# Patient Record
Sex: MALE | Race: WHITE | NOT HISPANIC OR LATINO | Employment: OTHER | URBAN - METROPOLITAN AREA
[De-identification: names, ages, dates, MRNs, and addresses within clinical notes are randomized per-mention and may not be internally consistent; named-entity substitution may affect disease eponyms.]

---

## 2017-01-09 ENCOUNTER — OFFICE VISIT (OUTPATIENT)
Dept: FAMILY MEDICINE | Facility: CLINIC | Age: 77
End: 2017-01-09
Payer: COMMERCIAL

## 2017-01-09 VITALS
DIASTOLIC BLOOD PRESSURE: 67 MMHG | HEIGHT: 71 IN | OXYGEN SATURATION: 100 % | BODY MASS INDEX: 21.22 KG/M2 | TEMPERATURE: 97.6 F | WEIGHT: 151.6 LBS | HEART RATE: 47 BPM | SYSTOLIC BLOOD PRESSURE: 120 MMHG

## 2017-01-09 DIAGNOSIS — I25.10 ATHEROSCLEROSIS OF NATIVE CORONARY ARTERY OF NATIVE HEART WITHOUT ANGINA PECTORIS: ICD-10-CM

## 2017-01-09 DIAGNOSIS — L30.9 ECZEMA, UNSPECIFIED TYPE: ICD-10-CM

## 2017-01-09 DIAGNOSIS — I10 BENIGN ESSENTIAL HYPERTENSION: ICD-10-CM

## 2017-01-09 DIAGNOSIS — F43.22 ADJUSTMENT DISORDER WITH ANXIOUS MOOD: ICD-10-CM

## 2017-01-09 DIAGNOSIS — E78.5 HYPERLIPIDEMIA LDL GOAL <100: Primary | ICD-10-CM

## 2017-01-09 PROCEDURE — 99214 OFFICE O/P EST MOD 30 MIN: CPT | Performed by: INTERNAL MEDICINE

## 2017-01-09 RX ORDER — TRIAMCINOLONE ACETONIDE 1 MG/G
CREAM TOPICAL
Qty: 30 G | Refills: 1 | Status: SHIPPED | OUTPATIENT
Start: 2017-01-09 | End: 2017-02-21

## 2017-01-09 NOTE — NURSING NOTE
"Chief Complaint   Patient presents with     Derm Problem       Initial /67 mmHg  Pulse 47  Temp(Src) 97.6  F (36.4  C) (Oral)  Ht 5' 11\" (1.803 m)  Wt 151 lb 9.6 oz (68.765 kg)  BMI 21.15 kg/m2  SpO2 100% Estimated body mass index is 21.15 kg/(m^2) as calculated from the following:    Height as of this encounter: 5' 11\" (1.803 m).    Weight as of this encounter: 151 lb 9.6 oz (68.765 kg).  BP completed using cuff size: regular, left arm  Charissa Akhtar CMA    "

## 2017-01-09 NOTE — PROGRESS NOTES
SUBJECTIVE:                                                    Rm Villarreal is a 76 year old male who presents to clinic today for the following health issues:    Medications:  Metoprolol 100 mg  Amlodipine 5 mg   Imodium    Niacin 500 mg BID  Vitamin D  Baby Aspirin    Rash  Duration of complaint: 6 wks     Patient reports experiencing little red blister spots 6 weeks ago that exacerbated into large, red, hot, itchy blisters. He has associated swelling on forearms and lower legs. He also reports that the symptoms were worse on his left leg than they were on his right leg. He states that he used compounded Eucerin+triamcinolone cream about 3 weeks ago with relief of symptoms. He denies any new lesions or blisters. These symptoms are not new to the patient as he has experienced them in March 2016. Patient also has dry scaly skin on his back. Of note reports walking in the mall for 45 minutes for exercise and denies any breathing problems.    Problem list and histories reviewed & adjusted, as indicated.  Additional history:     Current Outpatient Prescriptions   Medication Sig Dispense Refill     lisinopril (PRINIVIL/ZESTRIL) 20 MG tablet TAKE 1 TABLET BY MOUTH TWICE DAILY 180 tablet 3     LORazepam (ATIVAN) 0.5 MG tablet TAKE ONE-HALF TO ONE TABLET BY MOUTH TWICE DAILY 60 tablet 1     metoprolol (LOPRESSOR) 100 MG tablet Take 1 tablet (100 mg) by mouth 2 times daily 180 tablet 3     amLODIPine (NORVASC) 5 MG tablet Take 5 mg by mouth daily Take 1/2 to 1 tablet by mouth every evening as directed  2     nitroglycerin (NITROSTAT) 0.4 MG SL tablet For chest pain place 1 tablet under the tongue every 5 minutes for 3 doses. If symptoms persist 5 minutes after 1st dose call 911. 25 tablet 3     Loperamide HCl (IMODIUM A-D PO) Take by mouth 2 times daily       rosuvastatin (CRESTOR) 5 MG tablet Take 0.5 tablets (2.5 mg) by mouth three times a week 20 tablet 3     coenzyme Q-10 (CO Q10) 100 MG TABS Take 100 mg by mouth  "daily 90 tablet 3     NIACIN CR PO Take 500 mg by mouth 2 times daily (before meals).       VITAMIN D, CHOLECALCIFEROL, PO Take 1,000 Units by mouth daily.       ASPIRIN ADULT LOW STRENGTH PO Take 81 mg by mouth daily.       [DISCONTINUED] metoprolol (LOPRESSOR) 50 MG tablet Take 50 mg by mouth 2 times daily  60 tablet      No Known Allergies    ROS:  Constitutional, HEENT, cardiovascular, pulmonary, gi and gu systems are negative, except as otherwise noted.    This document serves as a record of the services and decisions personally performed and made by Héctor Solis MD. It was created on his/her behalf by Rylan Flowers, a trained medical scribe. The creation of this document is based the provider's statements to the medical scribe.  Scribloretta Flowers 7:36 AM, January 9, 2017  OBJECTIVE:                                                    /67 mmHg  Pulse 47  Temp(Src) 97.6  F (36.4  C) (Oral)  Ht 1.803 m (5' 11\")  Wt 68.765 kg (151 lb 9.6 oz)  BMI 21.15 kg/m2  SpO2 100%  Body mass index is 21.15 kg/(m^2).    Neck was supple without adenopathy or thyromegaly his carotids were normal without bruits  Chest clear to auscultation and percussion  Cardiovascular S1 and S2 are physiologic without murmurs or gallops  Abdomen bowel sounds were normal.  There is no palpable mass or organomegaly  Extremities nontender without any edema  Pulses pedal pulses are as described otherwise his pulses are bilaterally symmetrical throughout without bruits  Skin: forearms and lower legs erythematous macular rash with minor exfoliative rash without any bullae  Mild to moderate dry scaly skin         ASSESSMENT/PLAN:                                                    1. Hyperlipidemia LDL goal <100  LDL 88, total cholesterol decreased from 209 to 201, not on any statins or cholesterol medications because of side effects with multiple medications.    2. Benign essential hypertension  Well managed with lisinopril, " amlodipine, metoprolol    3. Atherosclerosis of native coronary artery of native heart without angina pectoris      4. Adjustment disorder with anxious mood      5. Eczema, unspecified type  Symptoms not likely to be psoriasis due to absence of scaly plaques. Discussed using Eucerin cream after bathing. Also instructed patient to compound triamcinolone and eucerin cream himself for cost effectiveness.     - triamcinolone (KENALOG) 0.1 % cream; Apply sparingly to affected area three times daily for 14 days.  Dispense: 30 g; Refill: 1    Follow up visit before patient leaves for [place]    The information in this document, created by the medical scribe for me, accurately reflects the services I personally performed and the decisions made by me. I have reviewed and approved this document for accuracy prior to leaving the patient care area.  Héctor Solis MD  7:36 AM, 01/09/2017  Héctor Solis MD  [facility]

## 2017-01-24 DIAGNOSIS — F13.20 SEDATIVE, HYPNOTIC OR ANXIOLYTIC DEPENDENCE (H): ICD-10-CM

## 2017-01-24 DIAGNOSIS — F43.22 ADJUSTMENT DISORDER WITH ANXIOUS MOOD: Primary | ICD-10-CM

## 2017-01-24 NOTE — TELEPHONE ENCOUNTER
LORazepam (ATIVAN) 0.5 MG tablet       Last Written Prescription Date: 11/8/2016  Last Fill Quantity: 60,  # refills: 1   Last Office Visit with [facility], [facility] or [facility] prescribing provider: 1/9/2017

## 2017-01-26 PROBLEM — F13.20 SEDATIVE, HYPNOTIC OR ANXIOLYTIC DEPENDENCE (H): Status: ACTIVE | Noted: 2017-01-26

## 2017-01-26 RX ORDER — LORAZEPAM 0.5 MG/1
TABLET ORAL
Qty: 60 TABLET | Refills: 1 | Status: SHIPPED | OUTPATIENT
Start: 2017-01-26 | End: 2017-04-06

## 2017-02-21 DIAGNOSIS — L30.9 ECZEMA, UNSPECIFIED TYPE: ICD-10-CM

## 2017-02-21 RX ORDER — TRIAMCINOLONE ACETONIDE 1 MG/G
CREAM TOPICAL
Qty: 30 G | Refills: 0 | Status: SHIPPED | OUTPATIENT
Start: 2017-02-21 | End: 2017-03-14

## 2017-02-21 NOTE — TELEPHONE ENCOUNTER
triamcinolone (KENALOG) 0.1 % cream        Last Written Prescription Date: 1/9/2017  Last Fill Quantity: 30g,  # refills: 1   Last Office Visit with [facility], [facility] or [facility] prescribing provider: 1/9/2017

## 2017-03-14 ENCOUNTER — OFFICE VISIT (OUTPATIENT)
Dept: FAMILY MEDICINE | Facility: CLINIC | Age: 77
End: 2017-03-14
Payer: COMMERCIAL

## 2017-03-14 VITALS
WEIGHT: 155 LBS | DIASTOLIC BLOOD PRESSURE: 74 MMHG | BODY MASS INDEX: 21.7 KG/M2 | TEMPERATURE: 97.8 F | SYSTOLIC BLOOD PRESSURE: 132 MMHG | HEIGHT: 71 IN | OXYGEN SATURATION: 97 % | HEART RATE: 54 BPM

## 2017-03-14 DIAGNOSIS — I10 BENIGN ESSENTIAL HYPERTENSION: ICD-10-CM

## 2017-03-14 DIAGNOSIS — F13.20 SEDATIVE, HYPNOTIC OR ANXIOLYTIC DEPENDENCE (H): ICD-10-CM

## 2017-03-14 DIAGNOSIS — L30.9 ECZEMA, UNSPECIFIED TYPE: Primary | ICD-10-CM

## 2017-03-14 DIAGNOSIS — I25.10 ATHEROSCLEROSIS OF NATIVE CORONARY ARTERY OF NATIVE HEART WITHOUT ANGINA PECTORIS: ICD-10-CM

## 2017-03-14 DIAGNOSIS — F43.22 ADJUSTMENT DISORDER WITH ANXIOUS MOOD: ICD-10-CM

## 2017-03-14 DIAGNOSIS — E78.5 HYPERLIPIDEMIA LDL GOAL <100: ICD-10-CM

## 2017-03-14 PROCEDURE — 99214 OFFICE O/P EST MOD 30 MIN: CPT | Performed by: INTERNAL MEDICINE

## 2017-03-14 RX ORDER — TRIAMCINOLONE ACETONIDE 1 MG/G
CREAM TOPICAL
Qty: 30 G | Refills: 1 | Status: SHIPPED | OUTPATIENT
Start: 2017-03-14 | End: 2017-05-24

## 2017-03-14 NOTE — MR AVS SNAPSHOT
"              After Visit Summary   3/14/2017    Rm Villarreal    MRN: [id number]           Patient Information     Date Of Birth          1940        Visit Information        Provider Department      3/14/2017 7:30 AM Héctor Solis MD [facility]        Today's Diagnoses     Eczema, unspecified type    -  1    Atherosclerosis of native coronary artery of native heart without angina pectoris        Hyperlipidemia LDL goal <100        Benign essential hypertension        Adjustment disorder with anxious mood        Sedative, hypnotic or anxiolytic dependence (H)           Follow-ups after your visit        Who to contact     If you have questions or need follow up information about today's clinic visit or your schedule please contact [facility] directly at [phone].  Normal or non-critical lab and imaging results will be communicated to you by MyChart, letter or phone within 4 business days after the clinic has received the results. If you do not hear from us within 7 days, please contact the clinic through MyChart or phone. If you have a critical or abnormal lab result, we will notify you by phone as soon as possible.  Submit refill requests through [employer] or call your pharmacy and they will forward the refill request to us. Please allow 3 business days for your refill to be completed.          Additional Information About Your Visit        MyChart Information     [employer] lets you send messages to your doctor, view your test results, renew your prescriptions, schedule appointments and more. To sign up, go to [email or web address] . Click on \"Log in\" on the left side of the screen, which will take you to the Welcome page. Then click on \"Sign up Now\" on the right side of the page.     You will be asked to enter the access code listed below, as well as some personal information. Please follow the directions to create your username and password.     Your access code is: " [email or web address]  Expires: 2017  3:08 PM     Your access code will  in 90 days. If you need help or a new code, please call your [place] clinic or [phone].        Care EveryWhere ID     This is your Care EveryWhere ID. This could be used by other organizations to access your [place] medical records  [id number]        Your Vitals Were     Pulse Temperature Height Pulse Oximetry BMI (Body Mass Index)       54 97.8  F (36.6  C) (Oral) 5' 11\" (1.803 m) 97% 21.62 kg/m2        Blood Pressure from Last 3 Encounters:   17 132/74   17 120/67   10/21/16 137/76    Weight from Last 3 Encounters:   17 155 lb (70.3 kg)   17 151 lb 9.6 oz (68.8 kg)   10/21/16 150 lb (68 kg)              Today, you had the following     No orders found for display         Today's Medication Changes          These changes are accurate as of: 3/14/17  3:08 PM.  If you have any questions, ask your nurse or doctor.               These medicines have changed or have updated prescriptions.        Dose/Directions    triamcinolone 0.1 % cream   Commonly known as:  KENALOG   This may have changed:  See the new instructions.   Used for:  Eczema, unspecified type   Changed by:  Héctor Solis MD        APPLY SPARINGLY EXTERNALLY TO THE AFFECTED AREA THREE TIMES DAILY FOR 14 DAYS   Quantity:  30 g   Refills:  1         Stop taking these medicines if you haven't already. Please contact your care team if you have questions.     coenzyme Q-10 100 MG Tabs   Stopped by:  Héctor Solis MD           rosuvastatin 5 MG tablet   Commonly known as:  CRESTOR   Stopped by:  Hcétor Solis MD                Where to get your medicines      These medications were sent to [facility] [address] [facility] [address] AT [facility] [address] [facility] [address]     Phone:  [phone]     triamcinolone 0.1 % cream                Primary Care Provider Office Phone # Fax #    Héctor Solis MD " [phone]       [facility] [address] [facility] [address]  [facility] [address]        Thank you!     Thank you for choosing [facility]  for your care. Our goal is always to provide you with excellent care. Hearing back from our patients is one way we can continue to improve our services. Please take a few minutes to complete the written survey that you may receive in the mail after your visit with us. Thank you!             Your Updated Medication List - Protect others around you: Learn how to safely use, store and throw away your medicines at www.disposemymeds.org.          This list is accurate as of: 3/14/17  3:08 PM.  Always use your most recent med list.                   Brand Name Dispense Instructions for use    amLODIPine 5 MG tablet    NORVASC    90 tablet    TAKE 1/2 TO 1 TABLET BY MOUTH EVERY EVENING AS DIRECTED       ASPIRIN ADULT LOW STRENGTH PO      Take 81 mg by mouth daily.       IMODIUM A-D PO      Take by mouth 2 times daily       lisinopril 20 MG tablet    PRINIVIL/ZESTRIL    180 tablet    TAKE 1 TABLET BY MOUTH TWICE DAILY       LORazepam 0.5 MG tablet    ATIVAN    60 tablet    TAKE ONE-HALF TO ONE TABLET BY MOUTH TWICE DAILY       metoprolol 100 MG tablet    LOPRESSOR    180 tablet    Take 1 tablet (100 mg) by mouth 2 times daily       NIACIN CR PO      Take 500 mg by mouth 2 times daily (before meals).       nitroglycerin 0.4 MG sublingual tablet    NITROSTAT    25 tablet    For chest pain place 1 tablet under the tongue every 5 minutes for 3 doses. If symptoms persist 5 minutes after 1st dose call 911.       triamcinolone 0.1 % cream    KENALOG    30 g    APPLY SPARINGLY EXTERNALLY TO THE AFFECTED AREA THREE TIMES DAILY FOR 14 DAYS       VITAMIN D (CHOLECALCIFEROL) PO      Take 1,000 Units by mouth daily.

## 2017-03-14 NOTE — NURSING NOTE
"No chief complaint on file.      Initial /79  Pulse 54  Temp 97.8  F (36.6  C) (Oral)  Ht 5' 11\" (1.803 m)  Wt 155 lb (70.3 kg)  SpO2 97%  BMI 21.62 kg/m2 Estimated body mass index is 21.62 kg/(m^2) as calculated from the following:    Height as of this encounter: 5' 11\" (1.803 m).    Weight as of this encounter: 155 lb (70.3 kg).  Medication Reconciliation: complete   Kailee SCHUMACHER CMA      "

## 2017-03-14 NOTE — PROGRESS NOTES
SUBJECTIVE:                                                    Rm Villarreal is a 76 year old male who presents to clinic today for the following health issues:    He reports persistent itchy rash over that last 2 months covering his legs (left greater than right), hips and stomach that waxes and wanes in intensity. He also reports an itchy back without noticable lesions. Patient has been using triamcinolone and Eucerin with some relief but not total resolve. He also uses acetaminophen with relief of itch discomfort. Mr. Villarreal reports a similar occurrence last winter he attributed to amoxacillin use that completely resolved with  The onset of summer. Of note, he will be leaving for [place] in 4 days.     Rash  Duration of complaint: 2 months     Problem list and histories reviewed & adjusted, as indicated.  Additional history: as documented    Current Outpatient Prescriptions   Medication Sig Dispense Refill     triamcinolone (KENALOG) 0.1 % cream APPLY SPARINGLY EXTERNALLY TO THE AFFECTED AREA THREE TIMES DAILY FOR 14 DAYS 30 g 0     LORazepam (ATIVAN) 0.5 MG tablet TAKE ONE-HALF TO ONE TABLET BY MOUTH TWICE DAILY 60 tablet 1     amLODIPine (NORVASC) 5 MG tablet TAKE 1/2 TO 1 TABLET BY MOUTH EVERY EVENING AS DIRECTED 90 tablet 3     lisinopril (PRINIVIL/ZESTRIL) 20 MG tablet TAKE 1 TABLET BY MOUTH TWICE DAILY 180 tablet 3     metoprolol (LOPRESSOR) 100 MG tablet Take 1 tablet (100 mg) by mouth 2 times daily 180 tablet 3     Loperamide HCl (IMODIUM A-D PO) Take by mouth 2 times daily       NIACIN CR PO Take 500 mg by mouth 2 times daily (before meals).       VITAMIN D, CHOLECALCIFEROL, PO Take 1,000 Units by mouth daily.       ASPIRIN ADULT LOW STRENGTH PO Take 81 mg by mouth daily.       nitroglycerin (NITROSTAT) 0.4 MG SL tablet For chest pain place 1 tablet under the tongue every 5 minutes for 3 doses. If symptoms persist 5 minutes after 1st dose call 911. (Patient not taking: Reported on 3/14/2017) 25  "tablet 3     No Known Allergies    Reviewed and updated as needed this visit by clinical staff  Meds       Reviewed and updated as needed this visit by Provider  Meds       ROS:  Constitutional, HEENT, cardiovascular, pulmonary, gi and gu systems are negative, except as otherwise noted.    This document serves as a record of the services and decisions personally performed and made by Héctor Solis MD. It was created on his/her behalf by Marcy Tam, a trained medical scribe. The creation of this document is based the provider's statements to the medical scribe.  Scribe Marcy Tam 8:36 AM, March 14, 2017    OBJECTIVE:                                                    /74  Pulse 54  Temp 97.8  F (36.6  C) (Oral)  Ht 1.803 m (5' 11\")  Wt 70.3 kg (155 lb)  SpO2 97%  BMI 21.62 kg/m2  Body mass index is 21.62 kg/(m^2).  Neck was supple without adenopathy or thyromegaly his carotids were normal without bruits  Chest clear to auscultation and percussion  Cardiovascular S1 and S2 are physiologic without murmurs or gallops  Abdomen bowel sounds were normal.  There is no palpable mass or organomegaly  Extremities nontender without any edema  Pulses pedal pulses are as described otherwise his pulses are bilaterally symmetrical throughout without bruits  Skin dry scaly skin with patches of erythematous scaly macular rash, prominently on his arms and legs, trunk was clear      ASSESSMENT/PLAN:                                                    1. Atherosclerosis of native coronary artery of native heart without angina pectoris      2. Hyperlipidemia LDL goal <100  Will not place on a statin or cholesterol medications because of side effects with multiple medications.    3. Benign essential hypertension  Well managed with lisinopril, metoprolol and amlodipine.     4. Adjustment disorder with anxious mood      5. Sedative, hypnotic or anxiolytic dependence (H)      6. Eczema, unspecified type  Discussed using " a mixture of Eucerin and triamcinolone cream after bathing. Recommended use of antihistamine for intense pruritis relief.    - triamcinolone (KENALOG) 0.1 % cream; APPLY SPARINGLY EXTERNALLY TO THE AFFECTED AREA THREE TIMES DAILY FOR 14 DAYS  Dispense: 30 g; Refill: 1    Follow up in mid-April for annual physical.     Héctor Solis MD  [facility]    The information in this document, created by the medical scribe for me, accurately reflects the services I personally performed and the decisions made by me. I have reviewed and approved this document for accuracy prior to leaving the patient care area.  Héctor Solis MD  7:48 AM, 03/14/17

## 2017-04-06 ENCOUNTER — OFFICE VISIT (OUTPATIENT)
Dept: FAMILY MEDICINE | Facility: CLINIC | Age: 77
End: 2017-04-06
Payer: COMMERCIAL

## 2017-04-06 ENCOUNTER — RADIANT APPOINTMENT (OUTPATIENT)
Dept: GENERAL RADIOLOGY | Facility: CLINIC | Age: 77
End: 2017-04-06
Attending: INTERNAL MEDICINE
Payer: COMMERCIAL

## 2017-04-06 VITALS
TEMPERATURE: 96.7 F | HEART RATE: 51 BPM | BODY MASS INDEX: 20.44 KG/M2 | SYSTOLIC BLOOD PRESSURE: 179 MMHG | HEIGHT: 71 IN | WEIGHT: 146 LBS | DIASTOLIC BLOOD PRESSURE: 88 MMHG | OXYGEN SATURATION: 99 %

## 2017-04-06 DIAGNOSIS — R05.9 COUGH: ICD-10-CM

## 2017-04-06 DIAGNOSIS — E78.5 HYPERLIPIDEMIA LDL GOAL <100: Primary | ICD-10-CM

## 2017-04-06 DIAGNOSIS — Z00.00 ROUTINE GENERAL MEDICAL EXAMINATION AT A HEALTH CARE FACILITY: ICD-10-CM

## 2017-04-06 DIAGNOSIS — F13.20 SEDATIVE, HYPNOTIC OR ANXIOLYTIC DEPENDENCE (H): ICD-10-CM

## 2017-04-06 DIAGNOSIS — I10 BENIGN ESSENTIAL HYPERTENSION: ICD-10-CM

## 2017-04-06 DIAGNOSIS — M85.80 OSTEOPENIA: ICD-10-CM

## 2017-04-06 DIAGNOSIS — F43.22 ADJUSTMENT DISORDER WITH ANXIOUS MOOD: ICD-10-CM

## 2017-04-06 DIAGNOSIS — R53.83 FATIGUE, UNSPECIFIED TYPE: ICD-10-CM

## 2017-04-06 DIAGNOSIS — Z12.5 SCREENING FOR PROSTATE CANCER: ICD-10-CM

## 2017-04-06 DIAGNOSIS — L30.9 ECZEMA, UNSPECIFIED TYPE: ICD-10-CM

## 2017-04-06 DIAGNOSIS — I25.10 ATHEROSCLEROSIS OF NATIVE CORONARY ARTERY OF NATIVE HEART WITHOUT ANGINA PECTORIS: ICD-10-CM

## 2017-04-06 LAB
ALBUMIN SERPL-MCNC: 3.3 G/DL (ref 3.4–5)
ALBUMIN UR-MCNC: ABNORMAL MG/DL
ALP SERPL-CCNC: 77 U/L (ref 40–150)
ALT SERPL W P-5'-P-CCNC: 24 U/L (ref 0–70)
ANION GAP SERPL CALCULATED.3IONS-SCNC: 7 MMOL/L (ref 3–14)
APPEARANCE UR: CLEAR
AST SERPL W P-5'-P-CCNC: 23 U/L (ref 0–45)
BILIRUB SERPL-MCNC: 0.5 MG/DL (ref 0.2–1.3)
BILIRUB UR QL STRIP: NEGATIVE
BUN SERPL-MCNC: 11 MG/DL (ref 7–30)
CALCIUM SERPL-MCNC: 8.7 MG/DL (ref 8.5–10.1)
CHLORIDE SERPL-SCNC: 106 MMOL/L (ref 94–109)
CHOLEST SERPL-MCNC: 164 MG/DL
CO2 SERPL-SCNC: 25 MMOL/L (ref 20–32)
COLOR UR AUTO: YELLOW
CREAT SERPL-MCNC: 0.79 MG/DL (ref 0.66–1.25)
ERYTHROCYTE [DISTWIDTH] IN BLOOD BY AUTOMATED COUNT: 13 % (ref 10–15)
GFR SERPL CREATININE-BSD FRML MDRD: ABNORMAL ML/MIN/1.7M2
GLUCOSE SERPL-MCNC: 86 MG/DL (ref 70–99)
GLUCOSE UR STRIP-MCNC: NEGATIVE MG/DL
HCT VFR BLD AUTO: 39.7 % (ref 40–53)
HDLC SERPL-MCNC: 55 MG/DL
HGB BLD-MCNC: 13.5 G/DL (ref 13.3–17.7)
HGB UR QL STRIP: ABNORMAL
KETONES UR STRIP-MCNC: ABNORMAL MG/DL
LDLC SERPL CALC-MCNC: 88 MG/DL
LEUKOCYTE ESTERASE UR QL STRIP: NEGATIVE
MCH RBC QN AUTO: 33.3 PG (ref 26.5–33)
MCHC RBC AUTO-ENTMCNC: 34 G/DL (ref 31.5–36.5)
MCV RBC AUTO: 98 FL (ref 78–100)
MUCOUS THREADS #/AREA URNS LPF: PRESENT /LPF
NITRATE UR QL: NEGATIVE
NONHDLC SERPL-MCNC: 109 MG/DL
PH UR STRIP: 5.5 PH (ref 5–7)
PLATELET # BLD AUTO: 625 10E9/L (ref 150–450)
POTASSIUM SERPL-SCNC: 4.2 MMOL/L (ref 3.4–5.3)
PROT SERPL-MCNC: 7.4 G/DL (ref 6.8–8.8)
PSA SERPL-ACNC: 0.77 UG/L (ref 0–4)
RBC # BLD AUTO: 4.06 10E12/L (ref 4.4–5.9)
RBC #/AREA URNS AUTO: ABNORMAL /HPF (ref 0–2)
SODIUM SERPL-SCNC: 138 MMOL/L (ref 133–144)
SP GR UR STRIP: 1.02 (ref 1–1.03)
TRIGL SERPL-MCNC: 105 MG/DL
TSH SERPL DL<=0.005 MIU/L-ACNC: 1.47 MU/L (ref 0.4–4)
URN SPEC COLLECT METH UR: ABNORMAL
UROBILINOGEN UR STRIP-ACNC: 0.2 EU/DL (ref 0.2–1)
WBC # BLD AUTO: 8.6 10E9/L (ref 4–11)
WBC #/AREA URNS AUTO: ABNORMAL /HPF (ref 0–2)

## 2017-04-06 PROCEDURE — 84443 ASSAY THYROID STIM HORMONE: CPT | Performed by: INTERNAL MEDICINE

## 2017-04-06 PROCEDURE — 85027 COMPLETE CBC AUTOMATED: CPT | Performed by: INTERNAL MEDICINE

## 2017-04-06 PROCEDURE — 71020 XR CHEST 2 VW: CPT

## 2017-04-06 PROCEDURE — 36415 COLL VENOUS BLD VENIPUNCTURE: CPT | Performed by: INTERNAL MEDICINE

## 2017-04-06 PROCEDURE — 99214 OFFICE O/P EST MOD 30 MIN: CPT | Performed by: INTERNAL MEDICINE

## 2017-04-06 PROCEDURE — 81001 URINALYSIS AUTO W/SCOPE: CPT | Performed by: INTERNAL MEDICINE

## 2017-04-06 PROCEDURE — 80061 LIPID PANEL: CPT | Performed by: INTERNAL MEDICINE

## 2017-04-06 PROCEDURE — 82306 VITAMIN D 25 HYDROXY: CPT | Performed by: INTERNAL MEDICINE

## 2017-04-06 PROCEDURE — 80053 COMPREHEN METABOLIC PANEL: CPT | Performed by: INTERNAL MEDICINE

## 2017-04-06 PROCEDURE — 83695 ASSAY OF LIPOPROTEIN(A): CPT | Performed by: INTERNAL MEDICINE

## 2017-04-06 PROCEDURE — G0103 PSA SCREENING: HCPCS | Performed by: INTERNAL MEDICINE

## 2017-04-06 RX ORDER — LORAZEPAM 0.5 MG/1
TABLET ORAL
Qty: 60 TABLET | Refills: 1 | Status: SHIPPED | OUTPATIENT
Start: 2017-04-06 | End: 2017-05-16

## 2017-04-06 RX ORDER — CODEINE PHOSPHATE AND GUAIFENESIN 10; 100 MG/5ML; MG/5ML
1 SOLUTION ORAL EVERY 4 HOURS PRN
Qty: 120 ML | Refills: 0 | Status: SHIPPED | OUTPATIENT
Start: 2017-04-06 | End: 2017-05-24

## 2017-04-06 NOTE — MR AVS SNAPSHOT
After Visit Summary   4/6/2017    Rm Villarreal    MRN: [id number]           Patient Information     Date Of Birth          1940        Visit Information        Provider Department      4/6/2017 11:00 AM Héctor Solis MD [facility]        Today's Diagnoses     Hyperlipidemia LDL goal <100    -  1    Sedative, hypnotic or anxiolytic dependence (H)        Adjustment disorder with anxious mood        Eczema, unspecified type        Benign essential hypertension        Atherosclerosis of native coronary artery of native heart without angina pectoris        Routine general medical examination at a health care facility        Osteopenia        Fatigue, unspecified type        Screening for prostate cancer        Cough           Follow-ups after your visit        Your next 10 appointments already scheduled     Apr 12, 2017  8:00 AM CDT   Office Visit with Héctor Solis MD   [facility]    [address] [facility] [address] [phone]           Bring a current list of meds and any records pertaining to this visit.  For Physicals, please bring immunization records and any forms needing to be filled out.  Please arrive 10 minutes early to complete paperwork.              Who to contact     If you have questions or need follow up information about today's clinic visit or your schedule please contact [facility] directly at [phone].  Normal or non-critical lab and imaging results will be communicated to you by MyChart, letter or phone within 4 business days after the clinic has received the results. If you do not hear from us within 7 days, please contact the clinic through MyChart or phone. If you have a critical or abnormal lab result, we will notify you by phone as soon as possible.  Submit refill requests through [employer] or call your pharmacy and they will forward the refill request to us. Please allow 3 business days for  "your refill to be completed.          Additional Information About Your Visit        [employer] Information     [employer] lets you send messages to your doctor, view your test results, renew your prescriptions, schedule appointments and more. To sign up, go to [email or web address] . Click on \"Log in\" on the left side of the screen, which will take you to the Welcome page. Then click on \"Sign up Now\" on the right side of the page.     You will be asked to enter the access code listed below, as well as some personal information. Please follow the directions to create your username and password.     Your access code is: [email or web address]  Expires: 2017  3:08 PM     Your access code will  in 90 days. If you need help or a new code, please call your [place] clinic or [phone].        Care EveryWhere ID     This is your Care EveryWhere ID. This could be used by other organizations to access your [place] medical records  [id number]        Your Vitals Were     Pulse Temperature Height Pulse Oximetry BMI (Body Mass Index)       51 96.7  F (35.9  C) 5' 11\" (1.803 m) 99% 20.36 kg/m2        Blood Pressure from Last 3 Encounters:   17 179/88   17 132/74   17 120/67    Weight from Last 3 Encounters:   17 146 lb (66.2 kg)   17 155 lb (70.3 kg)   17 151 lb 9.6 oz (68.8 kg)              We Performed the Following     CBC with platelets     Comprehensive metabolic panel     Lipid panel reflex to direct LDL     Lipoprotein A     Prostate spec antigen screen     TSH with free T4 reflex     UA reflex to Microscopic and Culture     Urine Microscopic     Vitamin D Deficiency          Today's Medication Changes          These changes are accurate as of: 17 11:59 PM.  If you have any questions, ask your nurse or doctor.               Start taking these medicines.        Dose/Directions    guaiFENesin-codeine 100-10 MG/5ML Soln solution   Commonly known as:  ROBITUSSIN AC   Used for:  Cough "   Started by:  Héctor Solis MD        Dose:  1 tsp.   Take 5 mLs by mouth every 4 hours as needed for cough   Quantity:  120 mL   Refills:  0         These medicines have changed or have updated prescriptions.        Dose/Directions    LORazepam 0.5 MG tablet   Commonly known as:  ATIVAN   This may have changed:  See the new instructions.   Used for:  Adjustment disorder with anxious mood   Changed by:  Héctor Solis MD        TAKE ONE-HALF TO ONE TABLET BY MOUTH TWICE DAILY   Quantity:  60 tablet   Refills:  1            Where to get your medicines      Some of these will need a paper prescription and others can be bought over the counter.  Ask your nurse if you have questions.     Bring a paper prescription for each of these medications     guaiFENesin-codeine 100-10 MG/5ML Soln solution    LORazepam 0.5 MG tablet                Primary Care Provider Office Phone # Fax #    Héctor Solis [phone]       [facility] [address] [facility] [address]  [facility] [address]        Thank you!     Thank you for choosing [facility]  for your care. Our goal is always to provide you with excellent care. Hearing back from our patients is one way we can continue to improve our services. Please take a few minutes to complete the written survey that you may receive in the mail after your visit with us. Thank you!             Your Updated Medication List - Protect others around you: Learn how to safely use, store and throw away your medicines at www.disposemymeds.org.          This list is accurate as of: 4/6/17 11:59 PM.  Always use your most recent med list.                   Brand Name Dispense Instructions for use    amLODIPine 5 MG tablet    NORVASC    90 tablet    TAKE 1/2 TO 1 TABLET BY MOUTH EVERY EVENING AS DIRECTED       ASPIRIN ADULT LOW STRENGTH PO      Take 81 mg by mouth daily.       guaiFENesin-codeine 100-10 MG/5ML Soln solution    ROBITUSSIN AC    120 mL    Take 5 mLs by  mouth every 4 hours as needed for cough       IMODIUM A-D PO      Take by mouth 2 times daily       lisinopril 20 MG tablet    PRINIVIL/ZESTRIL    180 tablet    TAKE 1 TABLET BY MOUTH TWICE DAILY       LORazepam 0.5 MG tablet    ATIVAN    60 tablet    TAKE ONE-HALF TO ONE TABLET BY MOUTH TWICE DAILY       metoprolol 100 MG tablet    LOPRESSOR    180 tablet    Take 1 tablet (100 mg) by mouth 2 times daily       NIACIN CR PO      Take 500 mg by mouth 2 times daily (before meals).       nitroglycerin 0.4 MG sublingual tablet    NITROSTAT    25 tablet    For chest pain place 1 tablet under the tongue every 5 minutes for 3 doses. If symptoms persist 5 minutes after 1st dose call 911.       triamcinolone 0.1 % cream    KENALOG    30 g    APPLY SPARINGLY EXTERNALLY TO THE AFFECTED AREA THREE TIMES DAILY FOR 14 DAYS       VITAMIN D (CHOLECALCIFEROL) PO      Take 1,000 Units by mouth daily.

## 2017-04-06 NOTE — PROGRESS NOTES
SUBJECTIVE:                                                    Rm Villarreal is a 76 year old male who presents to clinic today for the following health issues:    Chief Complaint   Patient presents with     URI     pt fell ill last month in [place] -cough, vomiting, diarhhea- pt is fasting for lab work - will do physical at another visit     Patient with history of hypertension presents to the clinic to check on his upper respiratory symptoms. He states that last month while he was traveling he had a bout of upper respiratory symptoms with coughing, vomiting, diarrhea with greenish stool, and associated loss of appetite. He states that he wasn't able to eat and he lost approximately 4 pounds. He then reports that his symptoms improved until a couple days ago when his symptoms returned. He describes that he had a productive cough with clear sputum and had a fever of 102. He states that since then his fever is down to 100, his diarrhea has resolved, his appetite has returned, his vomiting has resolved. He states that he was short of breath especially when he laid down but that resolved as well. He also reports pain in the epigastric area. He denies any hematochezia.    Patient also reports that while he was traveling a door closed on his left ankle. He states that his ankle swelled up and doubled in size and he had associated pain, discomfort, and bruising. Since then his ankle swelling has improved but it is still bruised.    Problem list and histories reviewed & adjusted, as indicated.  Additional history: as documented    Current Outpatient Prescriptions   Medication Sig Dispense Refill     triamcinolone (KENALOG) 0.1 % cream APPLY SPARINGLY EXTERNALLY TO THE AFFECTED AREA THREE TIMES DAILY FOR 14 DAYS 30 g 1     LORazepam (ATIVAN) 0.5 MG tablet TAKE ONE-HALF TO ONE TABLET BY MOUTH TWICE DAILY 60 tablet 1     amLODIPine (NORVASC) 5 MG tablet TAKE 1/2 TO 1 TABLET BY MOUTH EVERY EVENING AS DIRECTED 90 tablet 3  "    lisinopril (PRINIVIL/ZESTRIL) 20 MG tablet TAKE 1 TABLET BY MOUTH TWICE DAILY 180 tablet 3     metoprolol (LOPRESSOR) 100 MG tablet Take 1 tablet (100 mg) by mouth 2 times daily 180 tablet 3     nitroglycerin (NITROSTAT) 0.4 MG SL tablet For chest pain place 1 tablet under the tongue every 5 minutes for 3 doses. If symptoms persist 5 minutes after 1st dose call 911. 25 tablet 3     Loperamide HCl (IMODIUM A-D PO) Take by mouth 2 times daily       NIACIN CR PO Take 500 mg by mouth 2 times daily (before meals).       VITAMIN D, CHOLECALCIFEROL, PO Take 1,000 Units by mouth daily.       ASPIRIN ADULT LOW STRENGTH PO Take 81 mg by mouth daily.       No Known Allergies    Reviewed and updated as needed this visit by clinical staff  Tobacco  Allergies  Meds  Soc Hx      ROS:  Constitutional, HEENT POSITIVE for productive cough, cardiovascular, pulmonary, GI, , musculoskeletal POSITIVE for left ankle swelling and bruising, neuro, skin, endocrine and psych systems are negative, except as otherwise noted.    This document serves as a record of the services and decisions personally performed and made by Héctor Solis MD. It was created on his/her behalf by Rylan Flowers, a trained medical scribe. The creation of this document is based the provider's statements to the medical scribe.  Eda Flowers 11:54 AM, April 6, 2017    OBJECTIVE:                                                    /88  Pulse 51  Temp 96.7  F (35.9  C)  Ht 1.803 m (5' 11\")  Wt 66.2 kg (146 lb)  SpO2 99%  BMI 20.36 kg/m2  Body mass index is 20.36 kg/(m^2).    HEENT nose and throat clear  Neck was supple without adenopathy or thyromegaly his carotids were normal without bruits  Chest decreased breath sounds in both bases  Cardiovascular S1 and S2 are physiologic without murmurs or gallops  Abdomen bowel sounds were normal.  There is no palpable mass or organomegaly  Extremities nontender without any edema  Pulses pedal " pulses are as described otherwise his pulses are bilaterally symmetrical throughout without bruits  Skin without significant abnormality    Chest x-ray: negative  White blood cell count normal  Diagnostic Test Results:  Results for orders placed or performed in visit on 04/06/17 (from the past 24 hour(s))   UA reflex to Microscopic and Culture   Result Value Ref Range    Color Urine Yellow     Appearance Urine Clear     Glucose Urine Negative NEG mg/dL    Bilirubin Urine Negative NEG    Ketones Urine Trace (A) NEG mg/dL    Specific Gravity Urine 1.025 1.003 - 1.035    Blood Urine Trace (A) NEG    pH Urine 5.5 5.0 - 7.0 pH    Protein Albumin Urine Trace (A) NEG mg/dL    Urobilinogen Urine 0.2 0.2 - 1.0 EU/dL    Nitrite Urine Negative NEG    Leukocyte Esterase Urine Negative NEG    Source Midstream Urine    Urine Microscopic   Result Value Ref Range    WBC Urine O - 2 0 - 2 /HPF    RBC Urine O - 2 0 - 2 /HPF    Mucous Urine Present (A) NEG /LPF        ASSESSMENT/PLAN:                                                    1. Sedative, hypnotic or anxiolytic dependence (H)    2. Adjustment disorder with anxious mood  - LORazepam (ATIVAN) 0.5 MG tablet; TAKE ONE-HALF TO ONE TABLET BY MOUTH TWICE DAILY  Dispense: 60 tablet; Refill: 1    3. Eczema, unspecified type    4. Hyperlipidemia LDL goal <100  - Lipid panel reflex to direct LDL  - Lipoprotein A  - Urine Microscopic    5. Benign essential hypertension    6. Atherosclerosis of native coronary artery of native heart without angina pectoris    7. Routine general medical examination at a health care facility  - UA reflex to Microscopic and Culture  - CBC with platelets  - Comprehensive metabolic panel    8. Osteopenia  - Vitamin D Deficiency    9. Fatigue, unspecified type  - TSH with free T4 reflex    10. Screening for prostate cancer  - Prostate spec antigen screen    11. Cough  - XR Chest 2 Views; Future  - guaiFENesin-codeine (ROBITUSSIN AC) 100-10 MG/5ML SOLN solution;  Take 5 mLs by mouth every 4 hours as needed for cough  Dispense: 120 mL; Refill: 0    Follow up Soon for routine physical    The information in this document, created by the medical scribe for me, accurately reflects the services I personally performed and the decisions made by me. I have reviewed and approved this document for accuracy prior to leaving the patient care area.  Héctor Solis MD  11:54 AM, 04/06/17    Héctor Solis MD  [facility]

## 2017-04-07 LAB — DEPRECATED CALCIDIOL+CALCIFEROL SERPL-MC: 37 UG/L (ref 20–75)

## 2017-04-11 LAB — LPA SERPL-MCNC: 145 MG/DL (ref 0–30)

## 2017-04-12 ENCOUNTER — RADIANT APPOINTMENT (OUTPATIENT)
Dept: GENERAL RADIOLOGY | Facility: CLINIC | Age: 77
End: 2017-04-12
Attending: INTERNAL MEDICINE
Payer: COMMERCIAL

## 2017-04-12 ENCOUNTER — OFFICE VISIT (OUTPATIENT)
Dept: FAMILY MEDICINE | Facility: CLINIC | Age: 77
End: 2017-04-12
Payer: COMMERCIAL

## 2017-04-12 VITALS
OXYGEN SATURATION: 95 % | HEART RATE: 80 BPM | SYSTOLIC BLOOD PRESSURE: 116 MMHG | BODY MASS INDEX: 20.71 KG/M2 | WEIGHT: 147.9 LBS | DIASTOLIC BLOOD PRESSURE: 60 MMHG | TEMPERATURE: 96.7 F | HEIGHT: 71 IN

## 2017-04-12 DIAGNOSIS — F43.22 ADJUSTMENT DISORDER WITH ANXIOUS MOOD: ICD-10-CM

## 2017-04-12 DIAGNOSIS — S99.912A ANKLE INJURY, LEFT, INITIAL ENCOUNTER: ICD-10-CM

## 2017-04-12 DIAGNOSIS — F13.20 SEDATIVE, HYPNOTIC OR ANXIOLYTIC DEPENDENCE (H): ICD-10-CM

## 2017-04-12 DIAGNOSIS — I25.10 ATHEROSCLEROSIS OF NATIVE CORONARY ARTERY OF NATIVE HEART WITHOUT ANGINA PECTORIS: Primary | ICD-10-CM

## 2017-04-12 DIAGNOSIS — Z00.00 MEDICARE ANNUAL WELLNESS VISIT, INITIAL: ICD-10-CM

## 2017-04-12 DIAGNOSIS — I10 BENIGN ESSENTIAL HYPERTENSION: ICD-10-CM

## 2017-04-12 DIAGNOSIS — E78.5 HYPERLIPIDEMIA LDL GOAL <100: ICD-10-CM

## 2017-04-12 PROCEDURE — 99212 OFFICE O/P EST SF 10 MIN: CPT | Mod: 25 | Performed by: INTERNAL MEDICINE

## 2017-04-12 PROCEDURE — G0438 PPPS, INITIAL VISIT: HCPCS | Performed by: INTERNAL MEDICINE

## 2017-04-12 PROCEDURE — 73610 X-RAY EXAM OF ANKLE: CPT | Mod: LT

## 2017-04-12 RX ORDER — ATORVASTATIN CALCIUM 20 MG/1
20 TABLET, FILM COATED ORAL DAILY
Qty: 30 TABLET | Refills: 3 | Status: SHIPPED | OUTPATIENT
Start: 2017-04-12 | End: 2017-05-24 | Stop reason: SINTOL

## 2017-04-12 NOTE — MR AVS SNAPSHOT
After Visit Summary   4/12/2017    Rm Villarreal    MRN: [id number]           Patient Information     Date Of Birth          1940        Visit Information        Provider Department      4/12/2017 8:00 AM Héctor Solis MD [facility]        Today's Diagnoses     Atherosclerosis of native coronary artery of native heart without angina pectoris    -  1    Hyperlipidemia LDL goal <100        Benign essential hypertension        Adjustment disorder with anxious mood        Sedative, hypnotic or anxiolytic dependence (H)        Medicare annual wellness visit, initial        Ankle injury, left, initial encounter          Care Instructions      Preventive Health Recommendations:       Male Ages 65 and over    Yearly exam:             See your health care provider every year in order to  o   Review health changes.   o   Discuss preventive care.    o   Review your medicines if your doctor has prescribed any.    Talk with your health care provider about whether you should have a test to screen for prostate cancer (PSA).    Every 3 years, have a diabetes test (fasting glucose). If you are at risk for diabetes, you should have this test more often.    Every 5 years, have a cholesterol test. Have this test more often if you are at risk for high cholesterol or heart disease.     Every 10 years, have a colonoscopy. Or, have a yearly FIT test (stool test). These exams will check for colon cancer.    Talk to with your health care provider about screening for Abdominal Aortic Aneurysm if you have a family history of AAA or have a history of smoking.  Shots:     Get a flu shot each year.     Get a tetanus shot every 10 years.     Talk to your doctor about your pneumonia vaccines. There are now two you should receive - Pneumovax (PPSV 23) and Prevnar (PCV 13).    Talk to your doctor about a shingles vaccine.     Talk to your doctor about the hepatitis B vaccine.  Nutrition:     Eat at least 5  "servings of fruits and vegetables each day.     Eat whole-grain bread, whole-wheat pasta and brown rice instead of white grains and rice.     Talk to your doctor about Calcium and Vitamin D.   Lifestyle    Exercise for at least 150 minutes a week (30 minutes a day, 5 days a week). This will help you control your weight and prevent disease.     Limit alcohol to one drink per day.     No smoking.     Wear sunscreen to prevent skin cancer.     See your dentist every six months for an exam and cleaning.     See your eye doctor every 1 to 2 years to screen for conditions such as glaucoma, macular degeneration and cataracts.        Follow-ups after your visit        Who to contact     If you have questions or need follow up information about today's clinic visit or your schedule please contact [facility] directly at [phone].  Normal or non-critical lab and imaging results will be communicated to you by [employer], letter or phone within 4 business days after the clinic has received the results. If you do not hear from us within 7 days, please contact the clinic through [employer] or phone. If you have a critical or abnormal lab result, we will notify you by phone as soon as possible.  Submit refill requests through [employer] or call your pharmacy and they will forward the refill request to us. Please allow 3 business days for your refill to be completed.          Additional Information About Your Visit        [employer] Information     [employer] lets you send messages to your doctor, view your test results, renew your prescriptions, schedule appointments and more. To sign up, go to [email or web address] . Click on \"Log in\" on the left side of the screen, which will take you to the Welcome page. Then click on \"Sign up Now\" on the right side of the page.     You will be asked to enter the access code listed below, as well as some personal information. Please follow the directions to create your username and " "password.     Your access code is: [email or web address]  Expires: 2017  3:08 PM     Your access code will  in 90 days. If you need help or a new code, please call your [facility] or [phone].        Care EveryWhere ID     This is your Care EveryWhere ID. This could be used by other organizations to access your [place] medical records  [id number]        Your Vitals Were     Pulse Temperature Height Pulse Oximetry BMI (Body Mass Index)       80 96.7  F (35.9  C) (Oral) 5' 11\" (1.803 m) 95% 20.63 kg/m2        Blood Pressure from Last 3 Encounters:   17 116/60   17 179/88   17 132/74    Weight from Last 3 Encounters:   17 147 lb 14.4 oz (67.1 kg)   17 146 lb (66.2 kg)   17 155 lb (70.3 kg)                 Today's Medication Changes          These changes are accurate as of: 17 11:59 PM.  If you have any questions, ask your nurse or doctor.               Start taking these medicines.        Dose/Directions    atorvastatin 20 MG tablet   Commonly known as:  LIPITOR   Used for:  Hyperlipidemia LDL goal <100   Started by:  Héctor Solis MD        Dose:  20 mg   Take 1 tablet (20 mg) by mouth daily   Quantity:  30 tablet   Refills:  3            Where to get your medicines      These medications were sent to [facility] [address] [place] [address] [id number] AT [place] [id number] [facility] [address]     Phone:  [phone]     atorvastatin 20 MG tablet                Primary Care Provider Office Phone # Fax #    Héctor Solis [phone]       [facility] [address]  [facility] [address]        Thank you!     Thank you for choosing [facility]  for your care. Our goal is always to provide you with excellent care. Hearing back from our patients is one way we can continue to improve our services. Please take a few minutes to complete the written survey that you may receive in the " mail after your visit with us. Thank you!             Your Updated Medication List - Protect others around you: Learn how to safely use, store and throw away your medicines at www.disposemymeds.org.          This list is accurate as of: 4/12/17 11:59 PM.  Always use your most recent med list.                   Brand Name Dispense Instructions for use    amLODIPine 5 MG tablet    NORVASC    90 tablet    TAKE 1/2 TO 1 TABLET BY MOUTH EVERY EVENING AS DIRECTED       ASPIRIN ADULT LOW STRENGTH PO      Take 81 mg by mouth daily.       atorvastatin 20 MG tablet    LIPITOR    30 tablet    Take 1 tablet (20 mg) by mouth daily       guaiFENesin-codeine 100-10 MG/5ML Soln solution    ROBITUSSIN AC    120 mL    Take 5 mLs by mouth every 4 hours as needed for cough       IMODIUM A-D PO      Take by mouth 2 times daily       lisinopril 20 MG tablet    PRINIVIL/ZESTRIL    180 tablet    TAKE 1 TABLET BY MOUTH TWICE DAILY       LORazepam 0.5 MG tablet    ATIVAN    60 tablet    TAKE ONE-HALF TO ONE TABLET BY MOUTH TWICE DAILY       metoprolol 100 MG tablet    LOPRESSOR    180 tablet    Take 1 tablet (100 mg) by mouth 2 times daily       NIACIN CR PO      Take 500 mg by mouth 2 times daily (before meals).       nitroglycerin 0.4 MG sublingual tablet    NITROSTAT    25 tablet    For chest pain place 1 tablet under the tongue every 5 minutes for 3 doses. If symptoms persist 5 minutes after 1st dose call 911.       triamcinolone 0.1 % cream    KENALOG    30 g    APPLY SPARINGLY EXTERNALLY TO THE AFFECTED AREA THREE TIMES DAILY FOR 14 DAYS       VITAMIN D (CHOLECALCIFEROL) PO      Take 1,000 Units by mouth daily.

## 2017-04-12 NOTE — PROGRESS NOTES
"Left ankle pain and swelling. For initial presentation, evaluation and plan of care see my note from clinic visit on 4/6/2017.     Rhinorrhea all winter which was a nuisance and not aggravating enough to elicit use of medications. He reports not tolerating Flonase and *** in the past.      Constitutional: He reports slight weight loss after recent illness.   : Nocturia x3-4 which has been stable over the last year.  MS: He reports myalgia and arthralgia with recent cough which has been improving with convalescence of  Illness.    Rash on the left lateral aspect of the leg from the mid calf to the superior aspect of the ankle.    He reports anxiousness which requires daily use of lorazepam. Patient states he \"can make a mountian out of a mole hill.\" Anxiety generally surrounds around family members   "

## 2017-04-12 NOTE — NURSING NOTE
"Chief Complaint   Patient presents with     Wellness Visit       Initial /60 (BP Location: Left arm, Patient Position: Chair, Cuff Size: Adult Regular)  Pulse 80  Temp 96.7  F (35.9  C) (Oral)  Ht 5' 11\" (1.803 m)  Wt 147 lb 14.4 oz (67.1 kg)  SpO2 95%  BMI 20.63 kg/m2 Estimated body mass index is 20.63 kg/(m^2) as calculated from the following:    Height as of this encounter: 5' 11\" (1.803 m).    Weight as of this encounter: 147 lb 14.4 oz (67.1 kg).  Medication Reconciliation: complete     Jonathan Sweeney, SOL     "

## 2017-04-12 NOTE — PROGRESS NOTES
"  SUBJECTIVE:                                                            Rm Villarreal is a 76 year old male who presents for Preventive Visit.  Are you in the first 12 months of your Medicare Part B coverage?  No    Patient presents to the clinic today complaining of left ankle pain and swelling. For initial presentation, evaluation and plan of care see my note from clinic visit on 4/6/2017.      He also notes intermittent rhinorrhea all winter which was a nuisance and not aggravating enough to elicit use of medications. He reports not tolerating nasal sprays in the past. Patient reports increased rhinorrhea when going between cold and warm environments  He would not like to treat symptoms at this time.      He reports anxiousness which requires daily use of lorazepam. Patient states he \"can make a mountian out of a mole hill.\" Anxiety generally surrounds around family members. He reports lorazepam offers sufficient enough relief of anxiety to relax.     Healthy Habits:    Do you get at least three servings of calcium containing foods daily (dairy, green leafy vegetables, etc.)? yes    Amount of exercise or daily activities, outside of work: 6 day(s) per week, walking 2 miles    Problems taking medications regularly No    Medication side effects: No    Have you had an eye exam in the past two years? yes    Do you see a dentist twice per year? yes    Do you have sleep apnea, excessive snoring or daytime drowsiness?no    COGNITIVE SCREEN  1) Repeat 3 items (Banana, Sunrise, Chair)    2) Clock draw: NORMAL  3) 3 item recall: Recalls 2 object   Results: NORMAL clock, 1-2 items recalled: COGNITIVE IMPAIRMENT LESS LIKELY    Mini-CogTM Copyright EVANGELISTA Luz. Licensed by the author for use in [facility]; reprinted with permission (jimmy@.[facility]). All rights reserved.        Reviewed and updated as needed this visit by clinical staff  Tobacco  Allergies  Meds         Reviewed and updated as needed this visit " by Provider        Social History   Substance Use Topics     Smoking status: Former Smoker     Quit date: 7/17/2003     Smokeless tobacco: Never Used     Alcohol use 0.0 oz/week     0 Standard drinks or equivalent per week      Comment: 2-3 beer per day       The patient does not drink >3 drinks per day nor >7 drinks per week.    Today's PHQ-2 Score:   PHQ-2 ( 1999 [employer]) 4/12/2017 3/14/2017   Q1: Little interest or pleasure in doing things 0 0   Q2: Feeling down, depressed or hopeless 0 0   PHQ-2 Score 0 0     Do you feel safe in your environment - Yes    Do you have a Health Care Directive?: Yes: Patient states has Advance Directive and will bring in a copy to clinic.    Current providers sharing in care for this patient include:   Patient Care Team:  Héctor Solis MD as PCP - General (Internal Medicine)      Hearing impairment: No    Ability to successfully perform activities of daily living: Yes, no assistance needed     Fall risk:  Fallen 2 or more times in the past year?: No  Any fall with injury in the past year?: No    Home safety:  none identified      The following health maintenance items are reviewed in Epic and correct as of today:  Health Maintenance   Topic Date Due     ADVANCE DIRECTIVE PLANNING Q5 YRS (NO INBASKET)  07/22/1960     COLON CANCER SCREEN (SYSTEM ASSIGNED)  07/22/1992     FALL RISK ASSESSMENT  07/22/2007     AORTIC ANEURYSM SCREENING (SYSTEM ASSIGNED)  07/22/2007     INFLUENZA VACCINE (SYSTEM ASSIGNED)  09/01/2017     LIPID SCREEN Q5 YR MALE (SYSTEM ASSIGNED)  04/06/2022     TETANUS IMMUNIZATION (SYSTEM ASSIGNED)  10/15/2022     PNEUMOCOCCAL  Completed     ROS:  Constitutional: He reports slight weight loss after recent illness.   : Nocturia x3-4 which has been stable over the last year.  MS: He reports myalgia and arthralgia with recent cough which has been improving with convalescence of Illness.  Skin: Recurrent rash on the bilateral anterior tibial aspect of legs which improves  "over summer.    Constitutional, HEENT, cardiovascular, pulmonary, GI, , musculoskeletal, neuro, skin, endocrine and psych systems are negative, except as otherwise noted.    Problem list, Medication list, Allergies, and Medical/Social/Surgical histories reviewed in [facility] and updated as appropriate.    This document serves as a record of the services and decisions personally performed and made by Héctor Solis MD. It was created on his/her behalf by Marcy Tam, a trained medical scribe. The creation of this document is based the provider's statements to the medical scribe.  Scribe Marcy Tam 9:54 AM, April 12, 2017     OBJECTIVE:                                                            /60 (BP Location: Left arm, Patient Position: Chair, Cuff Size: Adult Regular)  Pulse 80  Temp 96.7  F (35.9  C) (Oral)  Ht 5' 11\" (1.803 m)  Wt 147 lb 14.4 oz (67.1 kg)  SpO2 95%  BMI 20.63 kg/m2 Estimated body mass index is 20.63 kg/(m^2) as calculated from the following:    Height as of this encounter: 5' 11\" (1.803 m).    Weight as of this encounter: 147 lb 14.4 oz (67.1 kg).  EXAM:   GENERAL: healthy, alert and no distress. Sallow appearance  EYES: Eyes grossly normal to inspection, PERRL and conjunctivae and sclerae normal  HENT: ear canals and TM's normal, nose and mouth without ulcers or lesions  NECK: no adenopathy, no asymmetry, masses, or scars and thyroid normal to palpation  RESP: lungs clear to auscultation - no rales, rhonchi or wheezes  CV: regular rate and rhythm, normal S1 S2, no S3 or S4, no murmur, click or rub, no peripheral edema and peripheral pulses strong  ABDOMEN: soft, nontender, no hepatosplenomegaly, no masses and bowel sounds normal  : testicles normal, anus was deformed with excessive scarring firm previous hemorrhoidal surgery, prostate was 2+ and smooth.  MS: no gross musculoskeletal defects noted, no edema. Left ankle showed soft tissue swelling with tenderness over the " "medial malleolus with a small joint effusion.  SKIN: no suspicious lesions. He has dry scaly skin and erythematous macualar patches on the anterior tibial regions of both legs.  NEURO: Normal strength and tone, mentation intact and speech normal  PSYCH: mentation appears normal, affect normal/bright    ASSESSMENT / PLAN:                                                            1. Atherosclerosis of native coronary artery of native heart without angina pectoris      2. Hyperlipidemia LDL goal <100  Advised OTC Co-Q 10 100 mg tablet.  - atorvastatin (LIPITOR) 20 MG tablet; Take 1 tablet (20 mg) by mouth daily  Dispense: 30 tablet; Refill: 3    3. Benign essential hypertension      4. Adjustment disorder with anxious mood      5. Sedative, hypnotic or anxiolytic dependence (H)      6. Medicare annual wellness visit, initial      7. Ankle injury, left, initial encounter  - XR Ankle Left G/E 3 Views; Future      Follow up in  2 months for muscle/liver enzymes and cholesterol.    End of Life Planning:  Patient currently has an advanced directive: Yes. He will bring in a copy of his Advanced Directive.    COUNSELING:  Reviewed preventive health counseling, as reflected in patient instructions    Estimated body mass index is 20.63 kg/(m^2) as calculated from the following:    Height as of this encounter: 5' 11\" (1.803 m).    Weight as of this encounter: 147 lb 14.4 oz (67.1 kg).  Weight management plan noted, stable and monitoring   reports that he quit smoking about 13 years ago. He has never used smokeless tobacco.      Appropriate preventive services were discussed with this patient, including applicable screening as appropriate for cardiovascular disease, diabetes, osteopenia/osteoporosis, and glaucoma.  As appropriate for age/gender, discussed screening for colorectal cancer, prostate cancer, breast cancer, and cervical cancer. Checklist reviewing preventive services available has been given to the " patient.    Reviewed patients plan of care and provided an AVS. The Basic Care Plan (routine screening as documented in Health Maintenance) for Rm meets the Care Plan requirement. This Care Plan has been established and reviewed with the Patient.    Counseling Resources:  ATP IV Guidelines  Pooled Cohorts Equation Calculator  Breast Cancer Risk Calculator  FRAX Risk Assessment  ICSI Preventive Guidelines  Dietary Guidelines for Americans, 2010  [employer]'s MyPlate  ASA Prophylaxis  Lung CA Screening    Héctor Solis MD  [facility]    The information in this document, created by the medical scribe for me, accurately reflects the services I personally performed and the decisions made by me. I have reviewed and approved this document for accuracy prior to leaving the patient care area.  Héctor Solis MD  8:40 AM, 04/12/17

## 2017-04-24 DIAGNOSIS — I10 BENIGN ESSENTIAL HYPERTENSION: ICD-10-CM

## 2017-04-25 RX ORDER — METOPROLOL TARTRATE 100 MG
50 TABLET ORAL 2 TIMES DAILY
Qty: 90 TABLET | Refills: 3 | Status: SHIPPED | OUTPATIENT
Start: 2017-04-25 | End: 2018-04-12

## 2017-04-25 NOTE — TELEPHONE ENCOUNTER
Pending Prescriptions:                       Disp   Refills    metoprolol (LOPRESSOR) 100 MG tablet [Phar*90 tab*0        Sig: TAKE 1/2 TABLET BY MOUTH EVERY MORNING AND 1/2 TABLET           BY MOUTH EVERY EVENING          Last Written Prescription Date: 10/21/2016  Last Fill Quantity: 180, # refills: 3  Last Office Visit with [facility] or [facility] prescribing provider: 04/12/2017       Potassium   Date Value Ref Range Status   04/06/2017 4.2 3.4 - 5.3 mmol/L Final     Creatinine   Date Value Ref Range Status   04/06/2017 0.79 0.66 - 1.25 mg/dL Final     BP Readings from Last 3 Encounters:   04/12/17 116/60   04/06/17 179/88   03/14/17 132/74

## 2017-05-16 DIAGNOSIS — F43.22 ADJUSTMENT DISORDER WITH ANXIOUS MOOD: ICD-10-CM

## 2017-05-16 NOTE — TELEPHONE ENCOUNTER
LORazepam (ATIVAN) 0.5 MG tablet 60 tablet 1 4/6/2017        Last Written Prescription Date: 04/06/2017  Last Fill Quantity: 60,  # refills: 1   Last Office Visit with [facility], [facility] or [facility] prescribing provider: 04/12/2017

## 2017-05-17 RX ORDER — LORAZEPAM 0.5 MG/1
TABLET ORAL
Qty: 60 TABLET | Refills: 1 | Status: SHIPPED | OUTPATIENT
Start: 2017-05-17 | End: 2017-08-15

## 2017-05-24 ENCOUNTER — RADIANT APPOINTMENT (OUTPATIENT)
Dept: GENERAL RADIOLOGY | Facility: CLINIC | Age: 77
End: 2017-05-24
Attending: INTERNAL MEDICINE
Payer: COMMERCIAL

## 2017-05-24 ENCOUNTER — OFFICE VISIT (OUTPATIENT)
Dept: FAMILY MEDICINE | Facility: CLINIC | Age: 77
End: 2017-05-24
Payer: COMMERCIAL

## 2017-05-24 VITALS
WEIGHT: 147.8 LBS | HEIGHT: 71 IN | DIASTOLIC BLOOD PRESSURE: 70 MMHG | SYSTOLIC BLOOD PRESSURE: 126 MMHG | HEART RATE: 96 BPM | BODY MASS INDEX: 20.69 KG/M2 | OXYGEN SATURATION: 98 % | TEMPERATURE: 97.6 F

## 2017-05-24 DIAGNOSIS — E78.5 HYPERLIPIDEMIA LDL GOAL <100: ICD-10-CM

## 2017-05-24 DIAGNOSIS — I25.10 ATHEROSCLEROSIS OF NATIVE CORONARY ARTERY OF NATIVE HEART WITHOUT ANGINA PECTORIS: ICD-10-CM

## 2017-05-24 DIAGNOSIS — F43.22 ADJUSTMENT DISORDER WITH ANXIOUS MOOD: ICD-10-CM

## 2017-05-24 DIAGNOSIS — S93.402D SPRAIN OF LEFT ANKLE, UNSPECIFIED LIGAMENT, SUBSEQUENT ENCOUNTER: Primary | ICD-10-CM

## 2017-05-24 DIAGNOSIS — I10 BENIGN ESSENTIAL HYPERTENSION: ICD-10-CM

## 2017-05-24 DIAGNOSIS — S93.402D SPRAIN OF LEFT ANKLE, UNSPECIFIED LIGAMENT, SUBSEQUENT ENCOUNTER: ICD-10-CM

## 2017-05-24 DIAGNOSIS — L30.9 ECZEMA, UNSPECIFIED TYPE: ICD-10-CM

## 2017-05-24 DIAGNOSIS — F13.20 SEDATIVE, HYPNOTIC OR ANXIOLYTIC DEPENDENCE (H): ICD-10-CM

## 2017-05-24 PROCEDURE — 99213 OFFICE O/P EST LOW 20 MIN: CPT | Performed by: INTERNAL MEDICINE

## 2017-05-24 PROCEDURE — 73610 X-RAY EXAM OF ANKLE: CPT | Mod: LT

## 2017-05-24 RX ORDER — TRIAMCINOLONE ACETONIDE 1 MG/G
CREAM TOPICAL
Qty: 30 G | Refills: 1 | Status: SHIPPED | OUTPATIENT
Start: 2017-05-24 | End: 2017-07-09

## 2017-05-24 NOTE — MR AVS SNAPSHOT
"              After Visit Summary   5/24/2017    Rm Villarreal    MRN: [id number]           Patient Information     Date Of Birth          1940        Visit Information        Provider Department      5/24/2017 8:30 AM Héctor Solis MD [facility] [place]        Today's Diagnoses     Sprain of left ankle, unspecified ligament, subsequent encounter    -  1    Sedative, hypnotic or anxiolytic dependence (H)        Adjustment disorder with anxious mood        Hyperlipidemia LDL goal <100        Benign essential hypertension        Atherosclerosis of native coronary artery of native heart without angina pectoris           Follow-ups after your visit        Who to contact     If you have questions or need follow up information about today's clinic visit or your schedule please contact [facility] directly at [phone].  Normal or non-critical lab and imaging results will be communicated to you by Arm[employer]onhart, letter or phone within 4 business days after the clinic has received the results. If you do not hear from us within 7 days, please contact the clinic through Arm[employer]on[employer] or phone. If you have a critical or abnormal lab result, we will notify you by phone as soon as possible.  Submit refill requests through [employer] or call your pharmacy and they will forward the refill request to us. Please allow 3 business days for your refill to be completed.          Additional Information About Your Visit        Armetheon[employer]agen Information     [employer] lets you send messages to your doctor, view your test results, renew your prescriptions, schedule appointments and more. To sign up, go to [email or web address] . Click on \"Log in\" on the left side of the screen, which will take you to the Welcome page. Then click on \"Sign up Now\" on the right side of the page.     You will be asked to enter the access code listed below, as well as some personal information. Please follow the directions to create your username and " "password.     Your access code is: [email or web address]  Expires: 2017  5:53 AM     Your access code will  in 90 days. If you need help or a new code, please call your [facility] clinic or [phone].        Care EveryWhere ID     This is your Care EveryWhere ID. This could be used by other organizations to access your [facility] medical records  [id number]        Your Vitals Were     Pulse Temperature Height Pulse Oximetry BMI (Body Mass Index)       96 97.6  F (36.4  C) (Oral) 5' 11\" (1.803 m) 98% 20.61 kg/m2        Blood Pressure from Last 3 Encounters:   17 126/70   17 116/60   17 179/88    Weight from Last 3 Encounters:   17 147 lb 12.8 oz (67 kg)   17 147 lb 14.4 oz (67.1 kg)   17 146 lb (66.2 kg)                 Today's Medication Changes          These changes are accurate as of: 17 11:59 PM.  If you have any questions, ask your nurse or doctor.               These medicines have changed or have updated prescriptions.        Dose/Directions    triamcinolone 0.1 % cream   Commonly known as:  KENALOG   This may have changed:  See the new instructions.   Used for:  Eczema, unspecified type   Changed by:  Héctor Solis MD        APPLY SPARINGLY EXTERNALLY TO THE AFFECTED AREA THREE TIMES DAILY FOR 14 DAYS   Quantity:  30 g   Refills:  1         Stop taking these medicines if you haven't already. Please contact your care team if you have questions.     atorvastatin 20 MG tablet   Commonly known as:  LIPITOR   Stopped by:  Héctor Solis MD           guaiFENesin-codeine 100-10 MG/5ML Soln solution   Commonly known as:  ROBITUSSIN AC   Stopped by:  Héctor Solis MD                Where to get your medicines      These medications were sent to [facility] [address] [facility] [address] AT [facility] [address]  [address] [facility] [address]     Phone:  [phone]     triamcinolone 0.1 % cream                Primary Care Provider " Office Phone # Fax #    Héctor Solis [phone]       [facility] [address] [facility] [address]  [facility] [address]        Thank you!     Thank you for choosing [facility]  for your care. Our goal is always to provide you with excellent care. Hearing back from our patients is one way we can continue to improve our services. Please take a few minutes to complete the written survey that you may receive in the mail after your visit with us. Thank you!             Your Updated Medication List - Protect others around you: Learn how to safely use, store and throw away your medicines at www.disposemymeds.org.          This list is accurate as of: 5/24/17 11:59 PM.  Always use your most recent med list.                   Brand Name Dispense Instructions for use    amLODIPine 5 MG tablet    NORVASC    90 tablet    TAKE 1/2 TO 1 TABLET BY MOUTH EVERY EVENING AS DIRECTED       ASPIRIN ADULT LOW STRENGTH PO      Take 81 mg by mouth daily.       IMODIUM A-D PO      Take by mouth 2 times daily       lisinopril 20 MG tablet    PRINIVIL/ZESTRIL    180 tablet    TAKE 1 TABLET BY MOUTH TWICE DAILY       LORazepam 0.5 MG tablet    ATIVAN    60 tablet    TAKE ONE-HALF TO ONE TABLET BY MOUTH TWICE DAILY       metoprolol 100 MG tablet    LOPRESSOR    90 tablet    Take 0.5 tablets (50 mg) by mouth 2 times daily Take one half tab in AM and one half tab in PM approximately 12 hours apart.       NIACIN CR PO      Take 500 mg by mouth 2 times daily (before meals).       nitroglycerin 0.4 MG sublingual tablet    NITROSTAT    25 tablet    For chest pain place 1 tablet under the tongue every 5 minutes for 3 doses. If symptoms persist 5 minutes after 1st dose call 911.       triamcinolone 0.1 % cream    KENALOG    30 g    APPLY SPARINGLY EXTERNALLY TO THE AFFECTED AREA THREE TIMES DAILY FOR 14 DAYS       VITAMIN D (CHOLECALCIFEROL) PO      Take 1,000 Units by mouth daily.

## 2017-05-24 NOTE — TELEPHONE ENCOUNTER
Prescription approved per [facility] Refill Protocol.  Lisa Bejarano RN

## 2017-05-24 NOTE — PROGRESS NOTES
SUBJECTIVE:                                                    Rm Villarreal is a 76 year old male who presents to clinic today for the following health issues:    Mr. Villarreal presents to the clinic for follow up of left ankle pain and pruritic, scaly, patchy erythematous rash of the left lower leg. For last related presentation, evaluation and plan of care see my note from clinic visit on 04/12/2017. He notes erythematous patches are present on his lower legs bilaterally, on the thighs and groin with a few healing lesions on the arms, bilaterally. He states left ankle swelling and associate pain is worsening as the day progresses. He notes pain with palpation of the left foot to the mid lower left extremity.    He notes generalized myalgias which resolved when he stopped atorvastatin.     Problem list and histories reviewed & adjusted, as indicated.  Additional history: as documented    Current Outpatient Prescriptions   Medication Sig Dispense Refill     LORazepam (ATIVAN) 0.5 MG tablet TAKE ONE-HALF TO ONE TABLET BY MOUTH TWICE DAILY 60 tablet 1     metoprolol (LOPRESSOR) 100 MG tablet Take 0.5 tablets (50 mg) by mouth 2 times daily Take one half tab in AM and one half tab in PM approximately 12 hours apart. 90 tablet 3     amLODIPine (NORVASC) 5 MG tablet TAKE 1/2 TO 1 TABLET BY MOUTH EVERY EVENING AS DIRECTED 90 tablet 3     lisinopril (PRINIVIL/ZESTRIL) 20 MG tablet TAKE 1 TABLET BY MOUTH TWICE DAILY 180 tablet 3     nitroglycerin (NITROSTAT) 0.4 MG SL tablet For chest pain place 1 tablet under the tongue every 5 minutes for 3 doses. If symptoms persist 5 minutes after 1st dose call 911. 25 tablet 3     Loperamide HCl (IMODIUM A-D PO) Take by mouth 2 times daily       NIACIN CR PO Take 500 mg by mouth 2 times daily (before meals).       VITAMIN D, CHOLECALCIFEROL, PO Take 1,000 Units by mouth daily.       ASPIRIN ADULT LOW STRENGTH PO Take 81 mg by mouth daily.       No Known Allergies    Reviewed and  "updated as needed this visit by clinical staff       Reviewed and updated as needed this visit by Provider       ROS:  Constitutional, HEENT, cardiovascular, pulmonary, gi and gu systems are negative, except as otherwise noted.    This document serves as a record of the services and decisions personally performed and made by Héctor Solis MD. It was created on his/her behalf by Marcy Tam, a trained medical scribe. The creation of this document is based the provider's statements to the medical scribe.  Scribe Marcy Tam 8:53 AM, May 24, 2017     OBJECTIVE:                                                    /70 (BP Location: Right arm, Patient Position: Chair, Cuff Size: Adult Regular)  Pulse 96  Temp 97.6  F (36.4  C) (Oral)  Ht 1.803 m (5' 11\")  Wt 67 kg (147 lb 12.8 oz)  SpO2 98%  BMI 20.61 kg/m2  Body mass index is 20.61 kg/(m^2).  Neck was supple without adenopathy or thyromegaly his carotids were normal without bruits  Chest clear to auscultation and percussion  Cardiovascular S1 and S2 are physiologic without murmurs or gallops  Abdomen bowel sounds were normal.  There is no palpable mass or organomegaly  Extremities nontender without any edema.   Ankle he has tenderness of the lateral malleolus and posterior and inferiorly  Good ROM and the achilles tendon was not tender  Pulses pedal pulses are as described otherwise his pulses are bilaterally symmetrical throughout without bruits  Skin has soft tissue swelling of the left lower extremity which is 2-3+ as compared to trace to 1+ on the right. Erythematous macular serpentine rash with scaly boarder left greater than right. He has nummular erythematous patches on his thighs      ASSESSMENT/PLAN:                                                    1. Sprain of left ankle, unspecified ligament, subsequent encounter  - XR Ankle Left G/E 3 Views; Future    2. Sedative, hypnotic or anxiolytic dependence (H)    3. Adjustment disorder with " anxious mood    4. Hyperlipidemia LDL goal <100    5. Benign essential hypertension    6. Atherosclerosis of native coronary artery of native heart without angina pectoris    Follow up in one month    Héctor Solis MD  [facility]    The information in this document, created by the medical scribe for me, accurately reflects the services I personally performed and the decisions made by me. I have reviewed and approved this document for accuracy prior to leaving the patient care area.  Héctor Solis MD  8:53 AM, 05/24/17

## 2017-05-24 NOTE — NURSING NOTE
"Chief Complaint   Patient presents with     Ankle Pain       Initial /70 (BP Location: Right arm, Patient Position: Chair, Cuff Size: Adult Regular)  Pulse 96  Temp 97.6  F (36.4  C) (Oral)  Ht 5' 11\" (1.803 m)  Wt 147 lb 12.8 oz (67 kg)  SpO2 98%  BMI 20.61 kg/m2 Estimated body mass index is 20.61 kg/(m^2) as calculated from the following:    Height as of this encounter: 5' 11\" (1.803 m).    Weight as of this encounter: 147 lb 12.8 oz (67 kg).  Medication Reconciliation: complete.  Charissa Akhtar CMA    "

## 2017-05-31 ENCOUNTER — TELEPHONE (OUTPATIENT)
Dept: FAMILY MEDICINE | Facility: CLINIC | Age: 77
End: 2017-05-31

## 2017-05-31 NOTE — TELEPHONE ENCOUNTER
Reason for Call:  Other update    Detailed comments: patient said he was supposed to call Dr Solis to remind him to call patient regarding rash he had been seen for.  He said the rash has improved.     Phone Number Patient can be reached at: Home number on file [phone] (home)    Best Time: any    Can we leave a detailed message on this number? YES    Call taken on 5/31/2017 at 9:26 AM by Rocio Collado

## 2017-05-31 NOTE — TELEPHONE ENCOUNTER
Called, rash 80% better w/ approp application of steroid cream.Rec: cont & if not resolved in 10 d rtc.

## 2017-06-08 ENCOUNTER — TELEPHONE (OUTPATIENT)
Dept: FAMILY MEDICINE | Facility: CLINIC | Age: 77
End: 2017-06-08

## 2017-06-08 NOTE — TELEPHONE ENCOUNTER
Pt called back re rash -states that he is using kenalog and it seems to be helping and it is drying up the area where the rash was at-Nany B, CMA

## 2017-06-08 NOTE — TELEPHONE ENCOUNTER
Follow-up on leg rash patient reports that the rash is 80% resolved and he has residual small erythematous discoid macular areas.  On the dry skin has improved with moisturization.  Recommend continuing to manage the dry skin with lubrication and to continue the triamcinolone cream as needed and as the rash continues to improve to reduce the amount of steroid cream from a dose and frequency standpoint.  If the rash is not continuing to make progress that he was requested to return to clinic for further evaluation

## 2017-06-08 NOTE — TELEPHONE ENCOUNTER
Reason for Call:  Other call back    Detailed comments: patient would like a call back from Dr Solis, for an update on your rash, rash has improved    Phone Number Patient can be reached at: Home number on file [phone] (home)    Best Time: anytime    Can we leave a detailed message on this number? YES    Call taken on 6/8/2017 at 9:02 AM by Bryan Torres

## 2017-06-27 DIAGNOSIS — L30.9 ECZEMA, UNSPECIFIED TYPE: ICD-10-CM

## 2017-06-28 RX ORDER — TRIAMCINOLONE ACETONIDE 1 MG/G
CREAM TOPICAL
Refills: 0
Start: 2017-06-28

## 2017-06-28 NOTE — TELEPHONE ENCOUNTER
Kenalog      Last Written Prescription Date: 05/24/17  Last Fill Quantity: 30g,  # refills: 1   Last Office Visit with [facility] or [facility] prescribing provider: 05/24/17                                            Quiana Trejo MA

## 2017-07-09 DIAGNOSIS — L30.9 ECZEMA, UNSPECIFIED TYPE: ICD-10-CM

## 2017-07-10 RX ORDER — TRIAMCINOLONE ACETONIDE 1 MG/G
CREAM TOPICAL
Qty: 30 G | Refills: 0 | Status: SHIPPED | OUTPATIENT
Start: 2017-07-10 | End: 2017-10-19

## 2017-07-10 NOTE — TELEPHONE ENCOUNTER
Prescription approved per [facility] Refill Protocol.    Julee Worrell RN

## 2017-07-10 NOTE — TELEPHONE ENCOUNTER
triamcinolone (KENALOG) 0.1 % cream        Last Written Prescription Date: 5/24/2017  Last Fill Quantity: 30g,  # refills: 1   Last Office Visit with [facility], [facility] or [facility] prescribing provider: 5/24/2017

## 2017-08-15 DIAGNOSIS — F43.22 ADJUSTMENT DISORDER WITH ANXIOUS MOOD: ICD-10-CM

## 2017-08-15 RX ORDER — LORAZEPAM 0.5 MG/1
TABLET ORAL
Qty: 60 TABLET | Refills: 0 | Status: SHIPPED | OUTPATIENT
Start: 2017-08-15 | End: 2017-09-19

## 2017-08-15 NOTE — TELEPHONE ENCOUNTER
LORazepam (ATIVAN) 0.5 MG tablet        Last Written Prescription Date: 5/17/2017  Last Fill Quantity: 60,  # refills: 1   Last Office Visit with [facility], [facility] or [facility] prescribing provider: 5/24/2017

## 2017-09-19 DIAGNOSIS — F43.22 ADJUSTMENT DISORDER WITH ANXIOUS MOOD: ICD-10-CM

## 2017-09-20 NOTE — TELEPHONE ENCOUNTER
Pending Prescriptions:                       Disp   Refills    LORazepam (ATIVAN) 0.5 MG tablet [Pharmac*60 tab*0            Sig: TAKE ONE-HALF TO 1 TABLET BY MOUTH TWICE DAILY.          Last Written Prescription Date:  8/15/17  Last Fill Quantity: 60,   # refills: 0  Last [facility] Visit with [facility] or [facility] prescribing provider: 5/24/17 [facility]  Future Office visit:       Routing refill request to provider for review/approval because:  Drug not on the [facility], [facility] or [facility] refill protocol or controlled substance    RT Monica(R)

## 2017-09-21 RX ORDER — LORAZEPAM 0.5 MG/1
TABLET ORAL
Qty: 60 TABLET | Refills: 0 | Status: SHIPPED | OUTPATIENT
Start: 2017-09-21 | End: 2017-10-31

## 2017-10-19 DIAGNOSIS — L30.9 ECZEMA, UNSPECIFIED TYPE: ICD-10-CM

## 2017-10-23 RX ORDER — TRIAMCINOLONE ACETONIDE 1 MG/G
CREAM TOPICAL
Qty: 30 G | Refills: 0 | Status: SHIPPED | OUTPATIENT
Start: 2017-10-23 | End: 2017-11-20

## 2017-10-23 NOTE — TELEPHONE ENCOUNTER
Kenalog       Last Written Prescription Date:  7/10/17  Last Fill Quantity: 30g,   # refills: 0  Future Office visit:       Routing refill request to provider for review/approval because:  Pt was to use APPLY SPARINGLY EXTERNALLY TO THE AFFECTED AREA THREE TIMES DAILY FOR 14 DAYS    Please authorize if appropriate.  Thanks,  Kelsey Castaneda RN

## 2017-10-31 DIAGNOSIS — F43.22 ADJUSTMENT DISORDER WITH ANXIOUS MOOD: ICD-10-CM

## 2017-10-31 NOTE — TELEPHONE ENCOUNTER
LORazepam (ATIVAN) 0.5 MG tablet        Last Written Prescription Date: 9/21/2017  Last Fill Quantity: 60,  # refills: 0   Last Office Visit with [facility], [facility] or [facility] prescribing provider: 5/24/2017

## 2017-11-01 RX ORDER — LORAZEPAM 0.5 MG/1
TABLET ORAL
Qty: 60 TABLET | Refills: 0 | Status: SHIPPED | OUTPATIENT
Start: 2017-11-01 | End: 2017-12-11

## 2017-11-20 DIAGNOSIS — L30.9 ECZEMA, UNSPECIFIED TYPE: ICD-10-CM

## 2017-11-22 RX ORDER — TRIAMCINOLONE ACETONIDE 1 MG/G
CREAM TOPICAL
Qty: 30 G | Refills: 4 | Status: SHIPPED | OUTPATIENT
Start: 2017-11-22 | End: 2019-03-11

## 2017-12-11 DIAGNOSIS — F43.22 ADJUSTMENT DISORDER WITH ANXIOUS MOOD: ICD-10-CM

## 2017-12-11 NOTE — TELEPHONE ENCOUNTER
LORazepam (ATIVAN) 0.5 MG tablet 60 tablet 0 11/1/2017           Last Written Prescription Date:  11/01/2017  Last Fill Quantity: 60,   # refills: 0  Last Office Visit: 05/24/2017  Future Office visit:  Unknown     Routing refill request to provider for review/approval because:  Drug not on the [facility], [facility] or [facility] refill protocol or controlled substance

## 2017-12-14 RX ORDER — LORAZEPAM 0.5 MG/1
TABLET ORAL
Qty: 60 TABLET | Refills: 0 | Status: SHIPPED | OUTPATIENT
Start: 2017-12-14 | End: 2018-01-15

## 2017-12-21 DIAGNOSIS — I10 BENIGN ESSENTIAL HYPERTENSION: ICD-10-CM

## 2017-12-22 RX ORDER — LISINOPRIL 20 MG/1
TABLET ORAL
Qty: 180 TABLET | Refills: 1 | Status: SHIPPED | OUTPATIENT
Start: 2017-12-22 | End: 2018-06-24

## 2018-01-04 ENCOUNTER — OFFICE VISIT (OUTPATIENT)
Dept: FAMILY MEDICINE | Facility: CLINIC | Age: 78
End: 2018-01-04
Payer: COMMERCIAL

## 2018-01-04 VITALS
DIASTOLIC BLOOD PRESSURE: 72 MMHG | TEMPERATURE: 97 F | WEIGHT: 150.1 LBS | HEART RATE: 51 BPM | OXYGEN SATURATION: 98 % | SYSTOLIC BLOOD PRESSURE: 112 MMHG | BODY MASS INDEX: 21.01 KG/M2 | HEIGHT: 71 IN

## 2018-01-04 DIAGNOSIS — J06.9 UPPER RESPIRATORY TRACT INFECTION, UNSPECIFIED TYPE: Primary | ICD-10-CM

## 2018-01-04 DIAGNOSIS — I10 BENIGN ESSENTIAL HYPERTENSION: ICD-10-CM

## 2018-01-04 DIAGNOSIS — F13.20 SEDATIVE, HYPNOTIC OR ANXIOLYTIC DEPENDENCE (H): ICD-10-CM

## 2018-01-04 DIAGNOSIS — F43.22 ADJUSTMENT DISORDER WITH ANXIOUS MOOD: ICD-10-CM

## 2018-01-04 DIAGNOSIS — I25.10 ATHEROSCLEROSIS OF NATIVE CORONARY ARTERY OF NATIVE HEART WITHOUT ANGINA PECTORIS: ICD-10-CM

## 2018-01-04 DIAGNOSIS — L30.9 ECZEMA, UNSPECIFIED TYPE: ICD-10-CM

## 2018-01-04 DIAGNOSIS — E78.5 HYPERLIPIDEMIA LDL GOAL <100: ICD-10-CM

## 2018-01-04 LAB
ERYTHROCYTE [DISTWIDTH] IN BLOOD BY AUTOMATED COUNT: 13.1 % (ref 10–15)
HCT VFR BLD AUTO: 39.4 % (ref 40–53)
HGB BLD-MCNC: 13.5 G/DL (ref 13.3–17.7)
MCH RBC QN AUTO: 32.8 PG (ref 26.5–33)
MCHC RBC AUTO-ENTMCNC: 34.3 G/DL (ref 31.5–36.5)
MCV RBC AUTO: 96 FL (ref 78–100)
PLATELET # BLD AUTO: 500 10E9/L (ref 150–450)
RBC # BLD AUTO: 4.11 10E12/L (ref 4.4–5.9)
WBC # BLD AUTO: 9.1 10E9/L (ref 4–11)

## 2018-01-04 PROCEDURE — 85027 COMPLETE CBC AUTOMATED: CPT | Performed by: INTERNAL MEDICINE

## 2018-01-04 PROCEDURE — 99214 OFFICE O/P EST MOD 30 MIN: CPT | Performed by: INTERNAL MEDICINE

## 2018-01-04 PROCEDURE — 36415 COLL VENOUS BLD VENIPUNCTURE: CPT | Performed by: INTERNAL MEDICINE

## 2018-01-04 RX ORDER — ALBUTEROL SULFATE 90 UG/1
2 AEROSOL, METERED RESPIRATORY (INHALATION) EVERY 6 HOURS PRN
Qty: 1 INHALER | Refills: 1 | Status: SHIPPED | OUTPATIENT
Start: 2018-01-04 | End: 2019-03-11

## 2018-01-04 RX ORDER — CODEINE PHOSPHATE AND GUAIFENESIN 10; 100 MG/5ML; MG/5ML
1 SOLUTION ORAL EVERY 4 HOURS PRN
Qty: 120 ML | Refills: 0 | Status: SHIPPED | OUTPATIENT
Start: 2018-01-04 | End: 2019-03-11

## 2018-01-04 NOTE — PROGRESS NOTES
SUBJECTIVE:   Rm Villarreal is a 77 year old male who presents to clinic today for the following health issues:    Refill request : Virtussin AC Syrup     Acute Illness   Acute illness concerns:  URI   Onset: x 10 days     Fever: no    Chills/Sweats: no    Headache (location?): no    Sinus Pressure:no    Conjunctivitis:  no    Ear Pain: no    Rhinorrhea: YES-  Mild     Congestion: YES-  Mild     Sore Throat: no     Cough: YES-productive of clear sputum    Wheeze: no    Decreased Appetite: no    Nausea: no    Vomiting: no    Diarrhea:  no    Dysuria/Freq.: no    Fatigue/Achiness: YES    Sick/Strep Exposure: YES    Body aches      Therapies Tried and outcome:  Virtussin Ac Syrup - shows improvement     Patient presents to the clinic to follow up on his URI symptoms. He states that his symptoms presented on elsie day with a severe dry cough with occasional yellow secretions. He also reports having nausea, post nasal drip, rhinorrhea, yellow drainage, nasal congestion, fatigue, myalgias, and a fever of 102. He denies any nasal congestion. He states that for three days he was unable to eat due to severe nausea. He states that he tried taking Virtussin AC syrup which seemed to have improved his symptoms. Today his symptoms have improved, but his cough persists.      Problem list and histories reviewed & adjusted, as indicated.  Additional history: as documented    Current Outpatient Prescriptions   Medication Sig Dispense Refill     lisinopril (PRINIVIL/ZESTRIL) 20 MG tablet TAKE 1 TABLET BY MOUTH TWICE DAILY 180 tablet 1     LORazepam (ATIVAN) 0.5 MG tablet TAKE 1/2 TO 1 TABLET BY MOUTH TWICE DAILY 60 tablet 0     metoprolol (LOPRESSOR) 100 MG tablet Take 0.5 tablets (50 mg) by mouth 2 times daily Take one half tab in AM and one half tab in PM approximately 12 hours apart. 90 tablet 3     amLODIPine (NORVASC) 5 MG tablet TAKE 1/2 TO 1 TABLET BY MOUTH EVERY EVENING AS DIRECTED 90 tablet 3     NIACIN CR PO Take  "500 mg by mouth 2 times daily (before meals).       ASPIRIN ADULT LOW STRENGTH PO Take 81 mg by mouth daily.       triamcinolone (KENALOG) 0.1 % cream APPLY SPARINGLY EXTERNALLY TO THE AFFECTED AREA THREE TIMES DAILY FOR 14 DAYS 30 g 4     nitroglycerin (NITROSTAT) 0.4 MG SL tablet For chest pain place 1 tablet under the tongue every 5 minutes for 3 doses. If symptoms persist 5 minutes after 1st dose call 911. 25 tablet 3     Loperamide HCl (IMODIUM A-D PO) Take by mouth 2 times daily       VITAMIN D, CHOLECALCIFEROL, PO Take 1,000 Units by mouth daily.       No Known Allergies    Reviewed and updated as needed this visit by clinical staff     Reviewed and updated as needed this visit by Provider         ROS:  Constitutional, HEENT, cardiovascular, pulmonary, gi and gu systems are negative, except as otherwise noted.    This document serves as a record of the services and decisions personally performed and made by Héctor Solis MD. It was created on his/her behalf by Rylan Flowers, a trained medical scribe. The creation of this document is based the provider's statements to the medical scribe.  Scribloretta Flowers 9:21 AM, January 4, 2018    OBJECTIVE:   /72 (BP Location: Left arm, Patient Position: Chair, Cuff Size: Adult Regular)  Pulse 51  Temp 97  F (36.1  C) (Oral)  Ht 1.803 m (5' 11\")  Wt 68.1 kg (150 lb 1.6 oz)  SpO2 98%  BMI 20.93 kg/m2  Body mass index is 20.93 kg/(m^2).    HEENT: Throat was clear  Neck was supple without adenopathy or thyromegaly his carotids were normal without bruits  Chest clear to auscultation and percussion, bilateral rhonchi left greater than right  Cardiovascular S1 and S2 are physiologic without murmurs or gallops  Abdomen bowel sounds were normal.  There is no palpable mass or organomegaly  Extremities nontender without any edema  Pulses pedal pulses are as described otherwise his pulses are bilaterally symmetrical throughout without bruits  Skin without " significant abnormality    Diagnostic Test Results:  Results for orders placed or performed in visit on 01/04/18 (from the past 24 hour(s))   CBC with platelets   Result Value Ref Range    WBC 9.1 4.0 - 11.0 10e9/L    RBC Count 4.11 (L) 4.4 - 5.9 10e12/L    Hemoglobin 13.5 13.3 - 17.7 g/dL    Hematocrit 39.4 (L) 40.0 - 53.0 %    MCV 96 78 - 100 fl    MCH 32.8 26.5 - 33.0 pg    MCHC 34.3 31.5 - 36.5 g/dL    RDW 13.1 10.0 - 15.0 %    Platelet Count 500 (H) 150 - 450 10e9/L     ASSESSMENT/PLAN:     1. Upper respiratory tract infection, unspecified type  - CBC with platelets  - albuterol (PROAIR HFA/PROVENTIL HFA/VENTOLIN HFA) 108 (90 BASE) MCG/ACT Inhaler; Inhale 2 puffs into the lungs every 6 hours as needed for shortness of breath / dyspnea or wheezing  Dispense: 1 Inhaler; Refill: 1  - guaiFENesin-codeine (ROBITUSSIN AC) 100-10 MG/5ML SOLN solution; Take 5 mLs by mouth every 4 hours as needed for cough  Dispense: 120 mL; Refill: 0    2. Atherosclerosis of native coronary artery of native heart without angina pectoris    3. Sedative, hypnotic or anxiolytic dependence (H)    4. Benign essential hypertension  -Well managed with current therapies.    5. Hyperlipidemia LDL goal <100  Status quo    6. Adjustment disorder with anxious mood  -Continue Adivan PRN    7. Eczema, unspecified type  Status quo.    -Recommended patient use Advair, two puffs 4x a day for the next week. Then after patient will use PRN.  -Patient will return in a month for follow up on his URI symptoms and area of skin on his right cheek.    Héctor Solis MD  [facility]    The information in this document, created by the medical scribe for me, accurately reflects the services I personally performed and the decisions made by me. I have reviewed and approved this document for accuracy prior to leaving the patient care area.  Héctor Solis MD  9:45 AM, 01/04/18

## 2018-01-04 NOTE — NURSING NOTE
"Chief Complaint   Patient presents with     URI     Refill Request     Virtussin AC Syrup        Initial /72 (BP Location: Left arm, Patient Position: Chair, Cuff Size: Adult Regular)  Pulse 51  Temp 97  F (36.1  C) (Oral)  Ht 5' 11\" (1.803 m)  Wt 150 lb 1.6 oz (68.1 kg)  SpO2 98%  BMI 20.93 kg/m2 Estimated body mass index is 20.93 kg/(m^2) as calculated from the following:    Height as of this encounter: 5' 11\" (1.803 m).    Weight as of this encounter: 150 lb 1.6 oz (68.1 kg).  Medication Reconciliation: complete     Sharron Curry MA     "

## 2018-01-04 NOTE — MR AVS SNAPSHOT
"              After Visit Summary   1/4/2018    Rm Villarreal    MRN: [id number]           Patient Information     Date Of Birth          1940        Visit Information        Provider Department      1/4/2018 8:30 AM Héctor Solis MD [facility]        Today's Diagnoses     Upper respiratory tract infection, unspecified type    -  1    Atherosclerosis of native coronary artery of native heart without angina pectoris        Sedative, hypnotic or anxiolytic dependence (H)        Benign essential hypertension        Hyperlipidemia LDL goal <100        Adjustment disorder with anxious mood        Eczema, unspecified type           Follow-ups after your visit        Who to contact     If you have questions or need follow up information about today's clinic visit or your schedule please contact [facility] directly at [phone].  Normal or non-critical lab and imaging results will be communicated to you by MyChart, letter or phone within 4 business days after the clinic has received the results. If you do not hear from us within 7 days, please contact the clinic through MyChart or phone. If you have a critical or abnormal lab result, we will notify you by phone as soon as possible.  Submit refill requests through Bacula or call your pharmacy and they will forward the refill request to us. Please allow 3 business days for your refill to be completed.          Additional Information About Your Visit        [employer] Information     Bacula lets you send messages to your doctor, view your test results, renew your prescriptions, schedule appointments and more. To sign up, go to [email or web address] . Click on \"Log in\" on the left side of the screen, which will take you to the Welcome page. Then click on \"Sign up Now\" on the right side of the page.     You will be asked to enter the access code listed below, as well as some personal information. Please follow the directions to create " "your username and password.     Your access code is: [email or web address]  Expires: 2018  9:19 PM     Your access code will  in 90 days. If you need help or a new code, please call your [place] clinic or [phone].        Care EveryWhere ID     This is your Care EveryWhere ID. This could be used by other organizations to access your [place] medical records  [id number]        Your Vitals Were     Pulse Temperature Height Pulse Oximetry BMI (Body Mass Index)       51 97  F (36.1  C) (Oral) 5' 11\" (1.803 m) 98% 20.93 kg/m2        Blood Pressure from Last 3 Encounters:   18 112/72   17 126/70   17 116/60    Weight from Last 3 Encounters:   18 150 lb 1.6 oz (68.1 kg)   17 147 lb 12.8 oz (67 kg)   17 147 lb 14.4 oz (67.1 kg)              We Performed the Following     CBC with platelets          Today's Medication Changes          These changes are accurate as of: 18 11:59 PM.  If you have any questions, ask your nurse or doctor.               Start taking these medicines.        Dose/Directions    albuterol 108 (90 BASE) MCG/ACT Inhaler   Commonly known as:  PROAIR HFA/PROVENTIL HFA/VENTOLIN HFA   Used for:  Upper respiratory tract infection, unspecified type   Started by:  Héctor Solis MD        Dose:  2 puff   Inhale 2 puffs into the lungs every 6 hours as needed for shortness of breath / dyspnea or wheezing   Quantity:  1 Inhaler   Refills:  1       guaiFENesin-codeine 100-10 MG/5ML Soln solution   Commonly known as:  ROBITUSSIN AC   Used for:  Upper respiratory tract infection, unspecified type   Started by:  Héctor Solis MD        Dose:  1 tsp.   Take 5 mLs by mouth every 4 hours as needed for cough   Quantity:  120 mL   Refills:  0            Where to get your medicines      [id number] [address] AT [facility] &   [address], [facility] [address]     Phone:  [phone] " albuterol 108 (90 BASE) MCG/ACT Inhaler         Some of these will need a paper prescription and others can be bought over the counter.  Ask your nurse if you have questions.     Bring a paper prescription for each of these medications     guaiFENesin-codeine 100-10 MG/5ML Soln solution                Primary Care Provider Office Phone # Fax #    Héctor Solis [phone] [address]  [facility] [address]        Equal Access to Services     MARC LINARES : Hadii aad ku hadasho Soomaali, waaxda luqadaha, qaybta kaalmada adeegyada, waxay idiin hayaan adeeg kharash lafaviola . So [facility] [phone].    ATENCIÓN: Si habla español, tiene a foy disposición servicios gratuitos de asistencia lingüística. [facility] [phone].    We comply with applicable federal civil rights laws and [place] laws. We do not discriminate on the basis of race, color, national origin, age, disability, sex, sexual orientation, or gender identity.            Thank you!     Thank you for choosing [facility]  for your care. Our goal is always to provide you with excellent care. Hearing back from our patients is one way we can continue to improve our services. Please take a few minutes to complete the written survey that you may receive in the mail after your visit with us. Thank you!             Your Updated Medication List - Protect others around you: Learn how to safely use, store and throw away your medicines at www.disposemymeds.org.          This list is accurate as of: 1/4/18 11:59 PM.  Always use your most recent med list.                   Brand Name Dispense Instructions for use Diagnosis    albuterol 108 (90 BASE) MCG/ACT Inhaler    PROAIR HFA/PROVENTIL HFA/VENTOLIN HFA    1 Inhaler    Inhale 2 puffs into the lungs every 6 hours as needed for shortness of breath / dyspnea or wheezing    Upper respiratory tract infection, unspecified type       amLODIPine 5 MG tablet    NORVASC    90 tablet     TAKE 1/2 TO 1 TABLET BY MOUTH EVERY EVENING AS DIRECTED    Benign essential hypertension       ASPIRIN ADULT LOW STRENGTH PO      Take 81 mg by mouth daily.        guaiFENesin-codeine 100-10 MG/5ML Soln solution    ROBITUSSIN AC    120 mL    Take 5 mLs by mouth every 4 hours as needed for cough    Upper respiratory tract infection, unspecified type       IMODIUM A-D PO      Take by mouth 2 times daily        lisinopril 20 MG tablet    PRINIVIL/ZESTRIL    180 tablet    TAKE 1 TABLET BY MOUTH TWICE DAILY    Benign essential hypertension       LORazepam 0.5 MG tablet    ATIVAN    60 tablet    TAKE 1/2 TO 1 TABLET BY MOUTH TWICE DAILY    Adjustment disorder with anxious mood       metoprolol 100 MG tablet    LOPRESSOR    90 tablet    Take 0.5 tablets (50 mg) by mouth 2 times daily Take one half tab in AM and one half tab in PM approximately 12 hours apart.    Benign essential hypertension       NIACIN CR PO      Take 500 mg by mouth 2 times daily (before meals).        nitroGLYcerin 0.4 MG sublingual tablet    NITROSTAT    25 tablet    For chest pain place 1 tablet under the tongue every 5 minutes for 3 doses. If symptoms persist 5 minutes after 1st dose call 911.    Chronic ischemic heart disease       triamcinolone 0.1 % cream    KENALOG    30 g    APPLY SPARINGLY EXTERNALLY TO THE AFFECTED AREA THREE TIMES DAILY FOR 14 DAYS    Eczema, unspecified type       VITAMIN D (CHOLECALCIFEROL) PO      Take 1,000 Units by mouth daily.

## 2018-01-15 DIAGNOSIS — F43.22 ADJUSTMENT DISORDER WITH ANXIOUS MOOD: ICD-10-CM

## 2018-01-16 RX ORDER — LORAZEPAM 0.5 MG/1
TABLET ORAL
Qty: 60 TABLET | Refills: 0 | Status: SHIPPED | OUTPATIENT
Start: 2018-01-16 | End: 2018-02-13

## 2018-01-16 NOTE — TELEPHONE ENCOUNTER
LORazepam (ATIVAN) 0.5 MG tablet      Last Written Prescription Date:  12/14/17  Last Fill Quantity: 60 tablet,   # refills: 0  Last Office Visit: 1/4/18 Will  Future Office visit:    Next 5 appointments (look out 90 days)     Jan 25, 2018  8:30 AM CST   [facility]    [address] [facility] [address]   [phone]            Jan 25, 2018  1:15 PM CST   Office Visit with Héctor Solis MD   [facility]    [address] [facility] [address]   [phone]                   Routing refill request to provider for review/approval because:  Drug not on the [facility], [facility] or [facility] refill protocol or controlled substance

## 2018-01-25 ENCOUNTER — OFFICE VISIT (OUTPATIENT)
Dept: FAMILY MEDICINE | Facility: CLINIC | Age: 78
End: 2018-01-25
Payer: COMMERCIAL

## 2018-01-25 VITALS
HEIGHT: 71 IN | DIASTOLIC BLOOD PRESSURE: 69 MMHG | HEART RATE: 59 BPM | OXYGEN SATURATION: 98 % | BODY MASS INDEX: 21.36 KG/M2 | WEIGHT: 152.6 LBS | SYSTOLIC BLOOD PRESSURE: 120 MMHG | TEMPERATURE: 96.8 F

## 2018-01-25 DIAGNOSIS — F13.20 SEDATIVE, HYPNOTIC OR ANXIOLYTIC DEPENDENCE (H): ICD-10-CM

## 2018-01-25 DIAGNOSIS — L30.9 ECZEMA, UNSPECIFIED TYPE: ICD-10-CM

## 2018-01-25 DIAGNOSIS — F43.22 ADJUSTMENT DISORDER WITH ANXIOUS MOOD: ICD-10-CM

## 2018-01-25 DIAGNOSIS — I10 BENIGN ESSENTIAL HYPERTENSION: ICD-10-CM

## 2018-01-25 DIAGNOSIS — E78.5 HYPERLIPIDEMIA LDL GOAL <100: ICD-10-CM

## 2018-01-25 DIAGNOSIS — J20.9 ACUTE BRONCHITIS, UNSPECIFIED ORGANISM: Primary | ICD-10-CM

## 2018-01-25 PROCEDURE — 99214 OFFICE O/P EST MOD 30 MIN: CPT | Performed by: INTERNAL MEDICINE

## 2018-01-25 NOTE — PROGRESS NOTES
SUBJECTIVE:   Rm Villarreal is a 77 year old male who presents to clinic today for the following health issues:    Chief Complaint   Patient presents with     Follow Up For     Acute bronchitis, unspecified organism        Patient presents to the clinic to follow up on his acute bronchitis. He states that he feels great and his symptoms have resolved. He reports that his breathing is back to normal. He is not having much drainage but still does get rhinorrhea, especially with cold temperatures. He notes that when he coughs up secretions they are white and not colored. Patient is not currently on any cholesterol lowering medication. He states he had to discontinue all the medications that he tried due to side effects like myalgias and fatigue.      Problem list and histories reviewed & adjusted, as indicated.  Additional history: as documented    Current Outpatient Prescriptions   Medication Sig Dispense Refill     LORazepam (ATIVAN) 0.5 MG tablet TAKE 1/2 TO 1 TABLET BY MOUTH TWICE DAILY 60 tablet 0     albuterol (PROAIR HFA/PROVENTIL HFA/VENTOLIN HFA) 108 (90 BASE) MCG/ACT Inhaler Inhale 2 puffs into the lungs every 6 hours as needed for shortness of breath / dyspnea or wheezing 1 Inhaler 1     guaiFENesin-codeine (ROBITUSSIN AC) 100-10 MG/5ML SOLN solution Take 5 mLs by mouth every 4 hours as needed for cough 120 mL 0     lisinopril (PRINIVIL/ZESTRIL) 20 MG tablet TAKE 1 TABLET BY MOUTH TWICE DAILY 180 tablet 1     triamcinolone (KENALOG) 0.1 % cream APPLY SPARINGLY EXTERNALLY TO THE AFFECTED AREA THREE TIMES DAILY FOR 14 DAYS 30 g 4     metoprolol (LOPRESSOR) 100 MG tablet Take 0.5 tablets (50 mg) by mouth 2 times daily Take one half tab in AM and one half tab in PM approximately 12 hours apart. 90 tablet 3     amLODIPine (NORVASC) 5 MG tablet TAKE 1/2 TO 1 TABLET BY MOUTH EVERY EVENING AS DIRECTED 90 tablet 3     nitroglycerin (NITROSTAT) 0.4 MG SL tablet For chest pain place 1 tablet under the tongue  "every 5 minutes for 3 doses. If symptoms persist 5 minutes after 1st dose call 911. 25 tablet 3     Loperamide HCl (IMODIUM A-D PO) Take by mouth 2 times daily       NIACIN CR PO Take 500 mg by mouth 2 times daily (before meals).       VITAMIN D, CHOLECALCIFEROL, PO Take 1,000 Units by mouth daily.       ASPIRIN ADULT LOW STRENGTH PO Take 81 mg by mouth daily.       No Known Allergies    Reviewed and updated as needed this visit by clinical staff  Tobacco  Allergies  Meds  Soc Hx      Reviewed and updated as needed this visit by Provider         ROS:  Constitutional, HEENT, cardiovascular, pulmonary, gi and gu systems are negative, except as otherwise noted.    This document serves as a record of the services and decisions personally performed and made by Héctor Solis MD. It was created on his/her behalf by Rylan Flowers, a trained medical scribe. The creation of this document is based the provider's statements to the medical scribe.  Scribloretta Flowers 8:38 AM, January 25, 2018    OBJECTIVE:     /69 (BP Location: Right arm, Cuff Size: Adult Regular)  Pulse 59  Temp 96.8  F (36  C) (Oral)  Ht 1.803 m (5' 11\")  Wt 69.2 kg (152 lb 9.6 oz)  SpO2 98%  BMI 21.28 kg/m2  Body mass index is 21.28 kg/(m^2).    Neck was supple without adenopathy or thyromegaly her carotids were normal without bruits  Chest clear to auscultation and percussion  Cardiovascular S1 and S2 are physiologic without murmurs or gallops  Abdomen bowel sounds were normal.  There is no palpable mass or organomegaly  Extremities nontender without any edema  Pulses pedal pulses are as described otherwise his pulses are bilaterally symmetrical throughout without bruits  Skin without significant abnormality    Diagnostic Test Results:  none     ASSESSMENT/PLAN:     1. Acute bronchitis, unspecified organism  -Symptoms have resolved.    2. Adjustment disorder with anxious mood    3. Sedative, hypnotic or anxiolytic dependence " (H)    4. Benign essential hypertension  -Well managed with current therapies.    5. Hyperlipidemia LDL goal <100  - STATIN NOT PRESCRIBED, INTENTIONAL,; Please choose reason not prescribed, below    6. Eczema, unspecified type  -Status quo    -Discussed ways for patient to avoid another URI. Patient will be returning in April to follow up with a wellness visit.    Héctor Solis MD  [facility]    The information in this document, created by the medical scribe for me, accurately reflects the services I personally performed and the decisions made by me. I have reviewed and approved this document for accuracy prior to leaving the patient care area.  Héctor Solis MD  8:51 AM, 01/25/18

## 2018-01-25 NOTE — MR AVS SNAPSHOT
"              After Visit Summary   1/25/2018    Rm Villarreal    MRN: [id number]           Patient Information     Date Of Birth          1940        Visit Information        Provider Department      1/25/2018 8:30 AM Héctor Solis MD [facility]        Today's Diagnoses     Acute bronchitis, unspecified organism    -  1    Adjustment disorder with anxious mood        Sedative, hypnotic or anxiolytic dependence (H)        Benign essential hypertension        Hyperlipidemia LDL goal <100        Eczema, unspecified type           Follow-ups after your visit        Who to contact     If you have questions or need follow up information about today's clinic visit or your schedule please contact [facility] directly at [phone].  Normal or non-critical lab and imaging results will be communicated to you by MyChart, letter or phone within 4 business days after the clinic has received the results. If you do not hear from us within 7 days, please contact the clinic through MyChart or phone. If you have a critical or abnormal lab result, we will notify you by phone as soon as possible.  Submit refill requests through [facility] or call your pharmacy and they will forward the refill request to us. Please allow 3 business days for your refill to be completed.          Additional Information About Your Visit        Care EveryWhere ID     This is your Care EveryWhere ID. This could be used by other organizations to access your [place] medical records  [id number]        Your Vitals Were     Pulse Temperature Height Pulse Oximetry BMI (Body Mass Index)       59 96.8  F (36  C) (Oral) 5' 11\" (1.803 m) 98% 21.28 kg/m2        Blood Pressure from Last 3 Encounters:   01/25/18 120/69   01/04/18 112/72   05/24/17 126/70    Weight from Last 3 Encounters:   01/25/18 152 lb 9.6 oz (69.2 kg)   01/04/18 150 lb 1.6 oz (68.1 kg)   05/24/17 147 lb 12.8 oz (67 kg)              Today, you had the following  "    No orders found for display         Today's Medication Changes          These changes are accurate as of 1/25/18 11:59 PM.  If you have any questions, ask your nurse or doctor.               Start taking these medicines.        Dose/Directions    STATIN NOT PRESCRIBED (INTENTIONAL)   Used for:  Hyperlipidemia LDL goal <100   Started by:  Héctor Solis MD        Please choose reason not prescribed, below   Refills:  0            Where to get your medicines      Some of these will need a paper prescription and others can be bought over the counter.  Ask your nurse if you have questions.     You don't need a prescription for these medications     STATIN NOT PRESCRIBED (INTENTIONAL)                Primary Care Provider Office Phone # Fax #    Héctor Solis [phone] [address] [address]        Equal Access to Services     MARC LINARES : Hadii merari blackwell hadasho Soomaali, waaxda luqadaha, qaybta kaalmada adeegyada, yoav alston hayevert [place] . So [facility] [phone].    ATENCIÓN: Si habla español, tiene a foy disposición servicios gratuitos de asistencia lingüística. Llame al [phone].    We comply with applicable federal civil rights laws and [place] laws. We do not discriminate on the basis of race, color, national origin, age, disability, sex, sexual orientation, or gender identity.            Thank you!     Thank you for choosing [facility]  for your care. Our goal is always to provide you with excellent care. Hearing back from our patients is one way we can continue to improve our services. Please take a few minutes to complete the written survey that you may receive in the mail after your visit with us. Thank you!             Your Updated Medication List - Protect others around you: Learn how to safely use, store and throw away your medicines at www.disposemymeds.org.          This list is accurate as of 1/25/18 11:59 PM.  Always  use your most recent med list.                   Brand Name Dispense Instructions for use Diagnosis    albuterol 108 (90 BASE) MCG/ACT Inhaler    PROAIR HFA/PROVENTIL HFA/VENTOLIN HFA    1 Inhaler    Inhale 2 puffs into the lungs every 6 hours as needed for shortness of breath / dyspnea or wheezing    Upper respiratory tract infection, unspecified type       amLODIPine 5 MG tablet    NORVASC    90 tablet    TAKE 1/2 TO 1 TABLET BY MOUTH EVERY EVENING AS DIRECTED    Benign essential hypertension       ASPIRIN ADULT LOW STRENGTH PO      Take 81 mg by mouth daily.        guaiFENesin-codeine 100-10 MG/5ML Soln solution    ROBITUSSIN AC    120 mL    Take 5 mLs by mouth every 4 hours as needed for cough    Upper respiratory tract infection, unspecified type       IMODIUM A-D PO      Take by mouth 2 times daily        lisinopril 20 MG tablet    PRINIVIL/ZESTRIL    180 tablet    TAKE 1 TABLET BY MOUTH TWICE DAILY    Benign essential hypertension       LORazepam 0.5 MG tablet    ATIVAN    60 tablet    TAKE 1/2 TO 1 TABLET BY MOUTH TWICE DAILY    Adjustment disorder with anxious mood       metoprolol tartrate 100 MG tablet    LOPRESSOR    90 tablet    Take 0.5 tablets (50 mg) by mouth 2 times daily Take one half tab in AM and one half tab in PM approximately 12 hours apart.    Benign essential hypertension       NIACIN CR PO      Take 500 mg by mouth 2 times daily (before meals).        nitroGLYcerin 0.4 MG sublingual tablet    NITROSTAT    25 tablet    For chest pain place 1 tablet under the tongue every 5 minutes for 3 doses. If symptoms persist 5 minutes after 1st dose call 911.    Chronic ischemic heart disease       STATIN NOT PRESCRIBED (INTENTIONAL)      Please choose reason not prescribed, below    Hyperlipidemia LDL goal <100       triamcinolone 0.1 % cream    KENALOG    30 g    APPLY SPARINGLY EXTERNALLY TO THE AFFECTED AREA THREE TIMES DAILY FOR 14 DAYS    Eczema, unspecified type       VITAMIN D (CHOLECALCIFEROL)  PO      Take 1,000 Units by mouth daily.

## 2018-01-25 NOTE — NURSING NOTE
"Chief Complaint   Patient presents with     Follow Up For     Acute bronchitis, unspecified organism        Initial /69 (BP Location: Right arm, Cuff Size: Adult Regular)  Pulse 59  Temp 96.8  F (36  C) (Oral)  Ht 5' 11\" (1.803 m)  Wt 152 lb 9.6 oz (69.2 kg)  SpO2 98%  BMI 21.28 kg/m2 Estimated body mass index is 21.28 kg/(m^2) as calculated from the following:    Height as of this encounter: 5' 11\" (1.803 m).    Weight as of this encounter: 152 lb 9.6 oz (69.2 kg).  Medication Reconciliation: complete   Rose Escamilla MA    "

## 2018-02-13 DIAGNOSIS — F43.22 ADJUSTMENT DISORDER WITH ANXIOUS MOOD: ICD-10-CM

## 2018-02-13 RX ORDER — LORAZEPAM 0.5 MG/1
TABLET ORAL
Qty: 60 TABLET | Refills: 0 | Status: SHIPPED | OUTPATIENT
Start: 2018-02-13 | End: 2018-03-16

## 2018-02-13 NOTE — TELEPHONE ENCOUNTER
Pending Prescriptions:                       Disp   Refills    LORazepam (ATIVAN) 0.5 MG tablet          60 tab*0            Sig: TAKE 1/2 TO 1 TABLET BY MOUTH TWICE DAILY          Last Written Prescription Date:  1-16-18  Last Fill Quantity: 60,   # refills: 0  Last Office Visit: 1-25-18 [facility]  Future Office visit:       Routing refill request to provider for review/approval because:  Drug not on the [facility], P or [facility] refill protocol or controlled substance    RT Robinson (R)

## 2018-02-18 DIAGNOSIS — I10 BENIGN ESSENTIAL HYPERTENSION: ICD-10-CM

## 2018-02-19 RX ORDER — AMLODIPINE BESYLATE 5 MG/1
TABLET ORAL
Qty: 90 TABLET | Refills: 1 | Status: SHIPPED | OUTPATIENT
Start: 2018-02-19 | End: 2018-11-18

## 2018-02-19 NOTE — TELEPHONE ENCOUNTER
"Last Written Prescription Date:  1/11/2017  Last Fill Quantity: 90,  # refills: 3   Last office visit: 1/25/2018 with prescribing provider:     Future Office Visit:      Requested Prescriptions   Pending Prescriptions Disp Refills     amLODIPine (NORVASC) 5 MG tablet [Pharmacy Med Name: AMLODIPINE BESYLATE 5MG TABLETS] 90 tablet 0     Sig: TAKE 1/2 TO 1 TABLET BY MOUTH EVERY EVENING AS DIRECTED    Calcium Channel Blockers Protocol  Passed    2/18/2018 10:25 AM       Passed - Blood pressure under 140/90 in past 12 months    BP Readings from Last 3 Encounters:   01/25/18 120/69   01/04/18 112/72   05/24/17 126/70                Passed - Recent or future visit with authorizing provider    Patient had office visit in the last year or has a visit in the next 30 days with authorizing provider.  See \"Patient Info\" tab in inbasket, or \"Choose Columns\" in Meds & Orders section of the refill encounter.            Passed - Patient is age 18 or older       Passed - Normal serum creatinine on file in past 12 months    Recent Labs   Lab Test  04/06/17   1155   CR  0.79               "

## 2018-02-19 NOTE — TELEPHONE ENCOUNTER
Prescription approved per [facility] Refill Protocol.  Gabriela Wilson RN

## 2018-03-16 DIAGNOSIS — F43.22 ADJUSTMENT DISORDER WITH ANXIOUS MOOD: ICD-10-CM

## 2018-03-16 NOTE — TELEPHONE ENCOUNTER
Routing refill request to provider for review/approval because:  Drug not on the [facility] refill protocol     Thank you,  Estrellita HINSON RN,BSN

## 2018-03-16 NOTE — TELEPHONE ENCOUNTER
LORazepam (ATIVAN) 0.5 MG tablet 60 tablet 0 2/13/2018           Last Written Prescription Date:  02/13/2018  Last Fill Quantity: 60,   # refills: 0  Last Office Visit: 01/25/2018  Future Office visit:   Unknown    Routing refill request to provider for review/approval because:  Drug not on the [facility], P or [facility] refill protocol or controlled substance

## 2018-03-19 RX ORDER — LORAZEPAM 0.5 MG/1
TABLET ORAL
Qty: 60 TABLET | Refills: 0 | Status: SHIPPED | OUTPATIENT
Start: 2018-03-19 | End: 2018-04-16

## 2018-04-12 DIAGNOSIS — I10 BENIGN ESSENTIAL HYPERTENSION: ICD-10-CM

## 2018-04-12 NOTE — TELEPHONE ENCOUNTER
"Last Written Prescription Date:  4/25/17  Last Fill Quantity: 90 tablet,  # refills: 3   Last office visit: 1/25/2018 with prescribing provider:  Will   Future Office Visit:      Requested Prescriptions   Pending Prescriptions Disp Refills     metoprolol tartrate (LOPRESSOR) 100 MG tablet [Pharmacy Med Name: METOPROLOL TARTRATE 100MG TABLETS] 90 tablet 0     Sig: TAKE 1/2 TABLETS BY MOUTH TWICE DAILY APPROXIMATELY 12 HOURS APART    Beta-Blockers Protocol Passed    4/12/2018  3:45 AM       Passed - Blood pressure under 140/90 in past 12 months    BP Readings from Last 3 Encounters:   01/25/18 120/69   01/04/18 112/72   05/24/17 126/70                Passed - Patient is age 6 or older       Passed - Recent (12 mo) or future (30 days) visit within the authorizing provider's specialty    Patient had office visit in the last 12 months or has a visit in the next 30 days with authorizing provider or within the authorizing provider's specialty.  See \"Patient Info\" tab in inbasket, or \"Choose Columns\" in Meds & Orders section of the refill encounter.              "

## 2018-04-13 RX ORDER — METOPROLOL TARTRATE 100 MG
TABLET ORAL
Qty: 90 TABLET | Refills: 1 | Status: SHIPPED | OUTPATIENT
Start: 2018-04-13 | End: 2018-10-09

## 2018-04-13 NOTE — TELEPHONE ENCOUNTER
Prescription approved per [facility] Refill Protocol.    Mariposa Arnold RN   [facility]

## 2018-04-16 DIAGNOSIS — F43.22 ADJUSTMENT DISORDER WITH ANXIOUS MOOD: ICD-10-CM

## 2018-04-17 RX ORDER — LORAZEPAM 0.5 MG/1
TABLET ORAL
Qty: 60 TABLET | Refills: 0 | Status: SHIPPED | OUTPATIENT
Start: 2018-04-17 | End: 2018-07-08

## 2018-04-17 NOTE — TELEPHONE ENCOUNTER
LORazepam (ATIVAN) 0.5 MG tablet        Last Written Prescription Date:  3/19/2018  Last Fill Quantity: 60,   # refills: 0  Last Office Visit: 1/25/2018  Future Office visit:       Routing refill request to provider for review/approval because:  Drug not on the [facility], [facility] or [facility] refill protocol or controlled substance

## 2018-05-16 ENCOUNTER — TELEPHONE (OUTPATIENT)
Dept: FAMILY MEDICINE | Facility: CLINIC | Age: 78
End: 2018-05-16

## 2018-05-16 DIAGNOSIS — F13.20 SEDATIVE, HYPNOTIC OR ANXIOLYTIC DEPENDENCE (H): ICD-10-CM

## 2018-05-16 DIAGNOSIS — I25.10 ATHEROSCLEROSIS OF NATIVE CORONARY ARTERY OF NATIVE HEART WITHOUT ANGINA PECTORIS: Primary | ICD-10-CM

## 2018-05-16 DIAGNOSIS — L30.9 ECZEMA, UNSPECIFIED TYPE: ICD-10-CM

## 2018-05-16 DIAGNOSIS — F43.22 ADJUSTMENT DISORDER WITH ANXIOUS MOOD: ICD-10-CM

## 2018-05-16 DIAGNOSIS — E78.5 HYPERLIPIDEMIA LDL GOAL <100: ICD-10-CM

## 2018-05-16 DIAGNOSIS — Z12.5 SCREENING FOR PROSTATE CANCER: ICD-10-CM

## 2018-05-16 DIAGNOSIS — I10 BENIGN ESSENTIAL HYPERTENSION: ICD-10-CM

## 2018-05-16 DIAGNOSIS — E55.9 VITAMIN D DEFICIENCY: ICD-10-CM

## 2018-05-16 DIAGNOSIS — Z00.00 ENCOUNTER FOR ROUTINE ADULT HEALTH EXAMINATION WITHOUT ABNORMAL FINDINGS: ICD-10-CM

## 2018-05-16 DIAGNOSIS — R53.83 FATIGUE, UNSPECIFIED TYPE: ICD-10-CM

## 2018-05-16 NOTE — TELEPHONE ENCOUNTER
Reason for Call: Request for an order or referral:    Order or referral being requested: Fasting Labs     Date needed: as soon as possible    Has the patient been seen by the PCP for this problem? YES    Additional comments: Pt would like to do lab work before his PX on 5/23 so he can discuss at his physical. Please call patient back when those orders have been placed. Would like PSA as well     Phone number Patient can be reached at:  Home number on file [phone] (home)    Best Time:  Anytime     Can we leave a detailed message on this number?  YES    Call taken on 5/16/2018 at 2:55 PM by Isabella Kohler

## 2018-05-16 NOTE — TELEPHONE ENCOUNTER
To PCP:     Please see note below. Pt would like Physical Lab orders placed prior to appt on 5/23. Would also like PSA, if possible.     Please place order if appropriate and Triage can call to schedule Lab Only Appt.     Thank you,   Kami BECK RN

## 2018-05-23 ENCOUNTER — OFFICE VISIT (OUTPATIENT)
Dept: FAMILY MEDICINE | Facility: CLINIC | Age: 78
End: 2018-05-23
Payer: COMMERCIAL

## 2018-05-23 VITALS
SYSTOLIC BLOOD PRESSURE: 135 MMHG | OXYGEN SATURATION: 99 % | HEART RATE: 47 BPM | HEIGHT: 71 IN | WEIGHT: 152 LBS | TEMPERATURE: 97.1 F | DIASTOLIC BLOOD PRESSURE: 75 MMHG | BODY MASS INDEX: 21.28 KG/M2

## 2018-05-23 DIAGNOSIS — F43.22 ADJUSTMENT DISORDER WITH ANXIOUS MOOD: ICD-10-CM

## 2018-05-23 DIAGNOSIS — E78.5 HYPERLIPIDEMIA LDL GOAL <100: ICD-10-CM

## 2018-05-23 DIAGNOSIS — L30.9 ECZEMA, UNSPECIFIED TYPE: ICD-10-CM

## 2018-05-23 DIAGNOSIS — Z12.5 SCREENING FOR PROSTATE CANCER: ICD-10-CM

## 2018-05-23 DIAGNOSIS — F13.20 SEDATIVE, HYPNOTIC OR ANXIOLYTIC DEPENDENCE (H): ICD-10-CM

## 2018-05-23 DIAGNOSIS — Z12.11 SPECIAL SCREENING FOR MALIGNANT NEOPLASMS, COLON: ICD-10-CM

## 2018-05-23 DIAGNOSIS — I25.10 ATHEROSCLEROSIS OF NATIVE CORONARY ARTERY OF NATIVE HEART WITHOUT ANGINA PECTORIS: ICD-10-CM

## 2018-05-23 DIAGNOSIS — R53.83 FATIGUE, UNSPECIFIED TYPE: ICD-10-CM

## 2018-05-23 DIAGNOSIS — Z00.00 ENCOUNTER FOR ROUTINE ADULT HEALTH EXAMINATION WITHOUT ABNORMAL FINDINGS: Primary | ICD-10-CM

## 2018-05-23 DIAGNOSIS — I10 BENIGN ESSENTIAL HYPERTENSION: ICD-10-CM

## 2018-05-23 DIAGNOSIS — E55.9 VITAMIN D DEFICIENCY: ICD-10-CM

## 2018-05-23 LAB
ALBUMIN SERPL-MCNC: 3.8 G/DL (ref 3.4–5)
ALBUMIN UR-MCNC: NEGATIVE MG/DL
ALP SERPL-CCNC: 75 U/L (ref 40–150)
ALT SERPL W P-5'-P-CCNC: 22 U/L (ref 0–70)
ANION GAP SERPL CALCULATED.3IONS-SCNC: 8 MMOL/L (ref 3–14)
APPEARANCE UR: CLEAR
AST SERPL W P-5'-P-CCNC: 21 U/L (ref 0–45)
BILIRUB SERPL-MCNC: 0.7 MG/DL (ref 0.2–1.3)
BILIRUB UR QL STRIP: NEGATIVE
BUN SERPL-MCNC: 11 MG/DL (ref 7–30)
CALCIUM SERPL-MCNC: 8.8 MG/DL (ref 8.5–10.1)
CHLORIDE SERPL-SCNC: 106 MMOL/L (ref 94–109)
CHOLEST SERPL-MCNC: 190 MG/DL
CO2 SERPL-SCNC: 24 MMOL/L (ref 20–32)
COLOR UR AUTO: YELLOW
CREAT SERPL-MCNC: 0.9 MG/DL (ref 0.66–1.25)
ERYTHROCYTE [DISTWIDTH] IN BLOOD BY AUTOMATED COUNT: 13.7 % (ref 10–15)
GFR SERPL CREATININE-BSD FRML MDRD: 82 ML/MIN/1.7M2
GLUCOSE SERPL-MCNC: 81 MG/DL (ref 70–99)
GLUCOSE UR STRIP-MCNC: NEGATIVE MG/DL
HCT VFR BLD AUTO: 41.7 % (ref 40–53)
HDLC SERPL-MCNC: 87 MG/DL
HGB BLD-MCNC: 14.5 G/DL (ref 13.3–17.7)
HGB UR QL STRIP: ABNORMAL
KETONES UR STRIP-MCNC: NEGATIVE MG/DL
LDLC SERPL CALC-MCNC: 85 MG/DL
LEUKOCYTE ESTERASE UR QL STRIP: NEGATIVE
MCH RBC QN AUTO: 34 PG (ref 26.5–33)
MCHC RBC AUTO-ENTMCNC: 34.8 G/DL (ref 31.5–36.5)
MCV RBC AUTO: 98 FL (ref 78–100)
NITRATE UR QL: NEGATIVE
NONHDLC SERPL-MCNC: 103 MG/DL
PH UR STRIP: 6 PH (ref 5–7)
PLATELET # BLD AUTO: 276 10E9/L (ref 150–450)
POTASSIUM SERPL-SCNC: 4.3 MMOL/L (ref 3.4–5.3)
PROT SERPL-MCNC: 7.1 G/DL (ref 6.8–8.8)
PSA SERPL-ACNC: 0.59 UG/L (ref 0–4)
RBC # BLD AUTO: 4.27 10E12/L (ref 4.4–5.9)
RBC #/AREA URNS AUTO: NORMAL /HPF
SODIUM SERPL-SCNC: 138 MMOL/L (ref 133–144)
SOURCE: ABNORMAL
SP GR UR STRIP: 1.02 (ref 1–1.03)
TRIGL SERPL-MCNC: 92 MG/DL
TSH SERPL DL<=0.005 MIU/L-ACNC: 2.36 MU/L (ref 0.4–4)
UROBILINOGEN UR STRIP-ACNC: 0.2 EU/DL (ref 0.2–1)
WBC # BLD AUTO: 6.8 10E9/L (ref 4–11)
WBC #/AREA URNS AUTO: NORMAL /HPF

## 2018-05-23 PROCEDURE — 84443 ASSAY THYROID STIM HORMONE: CPT | Performed by: INTERNAL MEDICINE

## 2018-05-23 PROCEDURE — 85027 COMPLETE CBC AUTOMATED: CPT | Performed by: INTERNAL MEDICINE

## 2018-05-23 PROCEDURE — 83695 ASSAY OF LIPOPROTEIN(A): CPT | Performed by: INTERNAL MEDICINE

## 2018-05-23 PROCEDURE — 80053 COMPREHEN METABOLIC PANEL: CPT | Performed by: INTERNAL MEDICINE

## 2018-05-23 PROCEDURE — 80061 LIPID PANEL: CPT | Performed by: INTERNAL MEDICINE

## 2018-05-23 PROCEDURE — 82306 VITAMIN D 25 HYDROXY: CPT | Performed by: INTERNAL MEDICINE

## 2018-05-23 PROCEDURE — G0439 PPPS, SUBSEQ VISIT: HCPCS | Performed by: INTERNAL MEDICINE

## 2018-05-23 PROCEDURE — 81001 URINALYSIS AUTO W/SCOPE: CPT | Performed by: INTERNAL MEDICINE

## 2018-05-23 PROCEDURE — 36415 COLL VENOUS BLD VENIPUNCTURE: CPT | Performed by: INTERNAL MEDICINE

## 2018-05-23 PROCEDURE — G0103 PSA SCREENING: HCPCS | Performed by: INTERNAL MEDICINE

## 2018-05-23 PROCEDURE — 93000 ELECTROCARDIOGRAM COMPLETE: CPT | Performed by: INTERNAL MEDICINE

## 2018-05-23 NOTE — MR AVS SNAPSHOT
After Visit Summary   5/23/2018    Rm Villarreal    MRN: [id number]           Patient Information     Date Of Birth          1940        Visit Information        Provider Department      5/23/2018 9:00 AM Héctor Solis MD [facility]        Today's Diagnoses     Encounter for routine adult health examination without abnormal findings    -  1    Hyperlipidemia LDL goal <100        Benign essential hypertension        Adjustment disorder with anxious mood        Atherosclerosis of native coronary artery of native heart without angina pectoris        Sedative, hypnotic or anxiolytic dependence (H)        Eczema, unspecified type        Special screening for malignant neoplasms, colon        Screening for prostate cancer        Vitamin D deficiency        Fatigue, unspecified type          Care Instructions      Preventive Health Recommendations:       Male Ages 65 and over    Yearly exam:             See your health care provider every year in order to  o   Review health changes.   o   Discuss preventive care.    o   Review your medicines if your doctor has prescribed any.    Talk with your health care provider about whether you should have a test to screen for prostate cancer (PSA).    Every 3 years, have a diabetes test (fasting glucose). If you are at risk for diabetes, you should have this test more often.    Every 5 years, have a cholesterol test. Have this test more often if you are at risk for high cholesterol or heart disease.     Every 10 years, have a colonoscopy. Or, have a yearly FIT test (stool test). These exams will check for colon cancer.    Talk to with your health care provider about screening for Abdominal Aortic Aneurysm if you have a family history of AAA or have a history of smoking.  Shots:     Get a flu shot each year.     Get a tetanus shot every 10 years.     Talk to your doctor about your pneumonia vaccines. There are now two you should receive - Pneumovax  "(PPSV 23) and Prevnar (PCV 13).    Talk to your doctor about a shingles vaccine.     Talk to your doctor about the hepatitis B vaccine.  Nutrition:     Eat at least 5 servings of fruits and vegetables each day.     Eat whole-grain bread, whole-wheat pasta and brown rice instead of white grains and rice.     Talk to your doctor about Calcium and Vitamin D.   Lifestyle    Exercise for at least 150 minutes a week (30 minutes a day, 5 days a week). This will help you control your weight and prevent disease.     Limit alcohol to one drink per day.     No smoking.     Wear sunscreen to prevent skin cancer.     See your dentist every six months for an exam and cleaning.     See your eye doctor every 1 to 2 years to screen for conditions such as glaucoma, macular degeneration and cataracts.          Follow-ups after your visit        Who to contact     If you have questions or need follow up information about today's clinic visit or your schedule please contact [facility] directly at [phone].  Normal or non-critical lab and imaging results will be communicated to you by MyChart, letter or phone within 4 business days after the clinic has received the results. If you do not hear from us within 7 days, please contact the clinic through [employer] or phone. If you have a critical or abnormal lab result, we will notify you by phone as soon as possible.  Submit refill requests through [employer] or call your pharmacy and they will forward the refill request to us. Please allow 3 business days for your refill to be completed.          Additional Information About Your Visit        Care EveryWhere ID     This is your Care EveryWhere ID. This could be used by other organizations to access your [facility] medical records  [id number]        Your Vitals Were     Pulse Temperature Height Pulse Oximetry BMI (Body Mass Index)       47 97.1  F (36.2  C) (Oral) 5' 11\" (1.803 m) 99% 21.2 kg/m2        Blood Pressure from Last 3 " Encounters:   05/23/18 135/75   01/25/18 120/69   01/04/18 112/72    Weight from Last 3 Encounters:   05/23/18 152 lb (68.9 kg)   01/25/18 152 lb 9.6 oz (69.2 kg)   01/04/18 150 lb 1.6 oz (68.1 kg)              We Performed the Following     **CBC with platelets FUTURE anytime     **Comprehensive metabolic panel FUTURE anytime     **Prostate spec antigen screen FUTURE anytime     **TSH with free T4 reflex FUTURE anytime     **UA reflex to Microscopic FUTURE anytime     **Vitamin D Deficiency FUTURE anytime     EKG 12-lead complete w/read - Clinics     Lipid panel reflex to direct LDL Fasting     Lipoprotein A     Urine Microscopic        Primary Care Provider Office Phone # Fax #    Héctor Solis [phone] [address] [facility] [address]  [facility] [address]        Equal Access to Services     [facility] : Hadii aad ku hadasho Soamanda, waaxda luqadaha, qaybta kaalmada adeegyada, yoav alston hayevert [place] . So [facility] [phone].    ATENCIÓN: Si habla español, tiene a foy disposición servicios gratuitos de asistencia lingüística. Krissy al [phone].    We comply with applicable federal civil rights laws and [place] laws. We do not discriminate on the basis of race, color, national origin, age, disability, sex, sexual orientation, or gender identity.            Thank you!     Thank you for choosing [facility]  for your care. Our goal is always to provide you with excellent care. Hearing back from our patients is one way we can continue to improve our services. Please take a few minutes to complete the written survey that you may receive in the mail after your visit with us. Thank you!             Your Updated Medication List - Protect others around you: Learn how to safely use, store and throw away your medicines at www.disposemymeds.org.          This list is accurate as of 5/23/18 11:59 PM.  Always use your most recent med list.                   Brand Name  Dispense Instructions for use Diagnosis    albuterol 108 (90 Base) MCG/ACT Inhaler    PROAIR HFA/PROVENTIL HFA/VENTOLIN HFA    1 Inhaler    Inhale 2 puffs into the lungs every 6 hours as needed for shortness of breath / dyspnea or wheezing    Upper respiratory tract infection, unspecified type       amLODIPine 5 MG tablet    NORVASC    90 tablet    TAKE 1/2 TO 1 TABLET BY MOUTH EVERY EVENING AS DIRECTED    Benign essential hypertension       ASPIRIN ADULT LOW STRENGTH PO      Take 81 mg by mouth daily.        guaiFENesin-codeine 100-10 MG/5ML Soln solution    ROBITUSSIN AC    120 mL    Take 5 mLs by mouth every 4 hours as needed for cough    Upper respiratory tract infection, unspecified type       IMODIUM A-D PO      Take by mouth 2 times daily        lisinopril 20 MG tablet    PRINIVIL/ZESTRIL    180 tablet    TAKE 1 TABLET BY MOUTH TWICE DAILY    Benign essential hypertension       LORazepam 0.5 MG tablet    ATIVAN    60 tablet    TAKE 1/2 TABLET BY MOUTH TWICE DAILY    Adjustment disorder with anxious mood       metoprolol tartrate 100 MG tablet    LOPRESSOR    90 tablet    TAKE 1/2 TABLETS BY MOUTH TWICE DAILY APPROXIMATELY 12 HOURS APART    Benign essential hypertension       NIACIN CR PO      Take 500 mg by mouth 2 times daily (before meals).        nitroGLYcerin 0.4 MG sublingual tablet    NITROSTAT    25 tablet    For chest pain place 1 tablet under the tongue every 5 minutes for 3 doses. If symptoms persist 5 minutes after 1st dose call 911.    Chronic ischemic heart disease       STATIN NOT PRESCRIBED (INTENTIONAL)      Please choose reason not prescribed, below    Hyperlipidemia LDL goal <100       triamcinolone 0.1 % cream    KENALOG    30 g    APPLY SPARINGLY EXTERNALLY TO THE AFFECTED AREA THREE TIMES DAILY FOR 14 DAYS    Eczema, unspecified type       VITAMIN D (CHOLECALCIFEROL) PO      Take 1,000 Units by mouth daily.

## 2018-05-23 NOTE — LETTER
[facility]  [address] [facility]  [address]  [place]  [address]  Tel: [phone]    June 11, 2018    Rm Villarreal  [address] [place] [address]        Giovany Meehan are your lab reports from your recent wellness exam. I will review the reports individually, with comments.     Your urinalysis was normal.     The lipoprotein A was abnormal but better than it was a year ago. This is associated with your cholesterol report.The lipid panel shows a your total cholesterol was 190, anything less than 200 is normal. The important details are related to the triglyceride, HDL and LDL cholesterol. The triglycerides were 92 better than 105 from a year ago. The triglycerides are intimately related to carbohydrates in your diet. The HDL or good cholesterol was remarkably better at 87, better than 55 from a year ago and anything greater than 40 is normal and anything greater than 59 is to your advantage. The most important factor is the LDL or bad cholesterol which was 85 better than 88 from a year ago.     The vitamin D level which is important for bone health is slightly lower than normal at 25 and not as good as 37 from a year ago. Because you live in [place] and should be worrying good sunscreen which blocks the absorption of vitamin D from the sun you should be taking a vitamin D supplement. I recommend getting vitamin D3 over-the-counter at 1000 international units daily.     The TSH is a sensitive indicator of thyroid function and yours was well within the normal range at 2.36.     The comprehensive metabolic panel showed us that your minerals and electrolytes, kidney function and liver function tests were all normal.     The PSA is a screen for prostate cancer was in a very good range and slightly better than a year ago, no sign of prostate cancer.     The CBC was essentially normal, no sign of low blood or anemia.     Last but not least we had given you a stool test as a screen for  prostate cancer. The report came back abnormal which means that we need to follow up with a colonoscopy. The abnormality could come from other inflammatory conditions including polyps. So as a screening test it does not guarantee that you have colon cancer but it suggests that a more definitive test such as colonoscopy is necessary. I have signed you up for a colonoscopy and you should be receiving a phone call and you can schedule this at your convenience.     If you have any questions regarding these reports please let me know. I anticipate having you come back in the fall if everything turns out normal as I would expect.       Sincerely,    Héctor Solis MD/gretta    Results for orders placed or performed in visit on 05/23/18   **CBC with platelets FUTURE anytime   Result Value Ref Range    WBC 6.8 4.0 - 11.0 10e9/L    RBC Count 4.27 (L) 4.4 - 5.9 10e12/L    Hemoglobin 14.5 13.3 - 17.7 g/dL    Hematocrit 41.7 40.0 - 53.0 %    MCV 98 78 - 100 fl    MCH 34.0 (H) 26.5 - 33.0 pg    MCHC 34.8 31.5 - 36.5 g/dL    RDW 13.7 10.0 - 15.0 %    Platelet Count 276 150 - 450 10e9/L   **Comprehensive metabolic panel FUTURE anytime   Result Value Ref Range    Sodium 138 133 - 144 mmol/L    Potassium 4.3 3.4 - 5.3 mmol/L    Chloride 106 94 - 109 mmol/L    Carbon Dioxide 24 20 - 32 mmol/L    Anion Gap 8 3 - 14 mmol/L    Glucose 81 70 - 99 mg/dL    Urea Nitrogen 11 7 - 30 mg/dL    Creatinine 0.90 0.66 - 1.25 mg/dL    GFR Estimate 82 >60 mL/min/1.7m2    GFR Estimate If Black >90 >60 mL/min/1.7m2    Calcium 8.8 8.5 - 10.1 mg/dL    Bilirubin Total 0.7 0.2 - 1.3 mg/dL    Albumin 3.8 3.4 - 5.0 g/dL    Protein Total 7.1 6.8 - 8.8 g/dL    Alkaline Phosphatase 75 40 - 150 U/L    ALT 22 0 - 70 U/L    AST 21 0 - 45 U/L   Lipid panel reflex to direct LDL Fasting   Result Value Ref Range    Cholesterol 190 <200 mg/dL    Triglycerides 92 <150 mg/dL    HDL Cholesterol 87 >39 mg/dL    LDL Cholesterol Calculated 85 <100 mg/dL    Non HDL Cholesterol  103 <130 mg/dL   **Prostate spec antigen screen FUTURE anytime   Result Value Ref Range    PSA 0.59 0 - 4 ug/L   **Vitamin D Deficiency FUTURE anytime   Result Value Ref Range    Vitamin D Deficiency screening 25 20 - 75 ug/L   **TSH with free T4 reflex FUTURE anytime   Result Value Ref Range    TSH 2.36 0.40 - 4.00 mU/L   **UA reflex to Microscopic FUTURE anytime   Result Value Ref Range    Color Urine Yellow     Appearance Urine Clear     Glucose Urine Negative NEG^Negative mg/dL    Bilirubin Urine Negative NEG^Negative    Ketones Urine Negative NEG^Negative mg/dL    Specific Gravity Urine 1.020 1.003 - 1.035    Blood Urine Trace (A) NEG^Negative    pH Urine 6.0 5.0 - 7.0 pH    Protein Albumin Urine Negative NEG^Negative mg/dL    Urobilinogen Urine 0.2 0.2 - 1.0 EU/dL    Nitrite Urine Negative NEG^Negative    Leukocyte Esterase Urine Negative NEG^Negative    Source Midstream Urine    Lipoprotein A   Result Value Ref Range    Lipoprotein(a) 121 (H) 0 - 30 mg/dL   Urine Microscopic   Result Value Ref Range    WBC Urine 0 - 5 OTO5^0 - 5 /HPF    RBC Urine O - 2 OTO2^O - 2 /HPF           Enclosure: Lab Results

## 2018-05-23 NOTE — PROGRESS NOTES
SUBJECTIVE:   Rm Villarreal is a 77 year old male who presents for Preventive Visit.    Are you in the first 12 months of your Medicare Part B coverage?  No    Healthy Habits:    Do you get at least three servings of calcium containing foods daily (dairy, green leafy vegetables, etc.)? yes    Amount of exercise or daily activities, outside of work: 5-7 day(s) per week    Problems taking medications regularly No    Medication side effects: No    Have you had an eye exam in the past two years? no    Do you see a dentist twice per year? yes    Do you have sleep apnea, excessive snoring or daytime drowsiness?no      Ability to successfully perform activities of daily living: Yes, no assistance needed    Home safety:  none identified     Hearing impairment: No    Fall risk:  Fallen 2 or more times in the past year?: No  Any fall with injury in the past year?: No        COGNITIVE SCREEN  1) Repeat 3 items (Banana, Sunrise, Chair)    2) Clock draw: NORMAL  3) 3 item recall: Recalls 3 objects  Results: 3 items recalled: COGNITIVE IMPAIRMENT LESS LIKELY    Mini-CogTM Copyright EVANGELISTA Luz. Licensed by the author for use in [facility] [employer]; reprinted with permission ([email or web address]). All rights reserved.        The patient presents to the clinic for a wellness exam. He has a history of hypertension that is controlled well with amlodipine, lisinopril, and metoprolol. He has a history of hyperlipidemia controlled with Niacin, intentionally not prescribed statin.     He denies vision changes, neck problems, back problems, headaches, sinus pressure, shortness of breath, chest pain, chest discomfort, heart palpitations, stomach problems, indigestion, heartburn, hematochezia, diarrhea, constipation, urination problems, nocturia, skin changes, rashes, bone pain, muscle pain, joint pain, and weight changes.      Reviewed and updated as needed this visit by clinical staff  Tobacco  Allergies  Meds  Problems          Reviewed and updated as needed this visit by Provider        Social History   Substance Use Topics     Smoking status: Former Smoker     Quit date: 7/17/2003     Smokeless tobacco: Never Used     Alcohol use 0.0 oz/week     0 Standard drinks or equivalent per week      Comment: 3 beer per day       If you drink alcohol do you typically have >3 drinks per day or >7 drinks per week? No                        Today's PHQ-2 Score:   PHQ-2 ( 1999 [employer]) 5/23/2018 1/25/2018   Q1: Little interest or pleasure in doing things 0 0   Q2: Feeling down, depressed or hopeless 0 0   PHQ-2 Score 0 0       Do you feel safe in your environment - Yes    Do you have a Health Care Directive?: Yes: Patient states has Advance Directive and will bring in a copy to clinic.    Current providers sharing in care for this patient include:   Patient Care Team:  Héctor Solis MD as PCP - General (Internal Medicine)    The following health maintenance items are reviewed in Epic and correct as of today:  Health Maintenance   Topic Date Due     COLON CANCER SCREEN (SYSTEM ASSIGNED)  07/22/1992     ADVANCE DIRECTIVE PLANNING Q5 YRS  07/22/1997     AORTIC ANEURYSM SCREENING (SYSTEM ASSIGNED)  07/22/2007     FALL RISK ASSESSMENT  04/12/2018     INFLUENZA VACCINE (Season Ended) 09/01/2018     LIPID SCREEN Q5 YR MALE (SYSTEM ASSIGNED)  04/06/2022     TETANUS IMMUNIZATION (SYSTEM ASSIGNED)  10/15/2022     PNEUMOCOCCAL  Completed     BP Readings from Last 3 Encounters:   05/23/18 135/75   01/25/18 120/69   01/04/18 112/72    Wt Readings from Last 3 Encounters:   05/23/18 68.9 kg (152 lb)   01/25/18 69.2 kg (152 lb 9.6 oz)   01/04/18 68.1 kg (150 lb 1.6 oz)                  Current Outpatient Prescriptions   Medication Sig Dispense Refill     albuterol (PROAIR HFA/PROVENTIL HFA/VENTOLIN HFA) 108 (90 BASE) MCG/ACT Inhaler Inhale 2 puffs into the lungs every 6 hours as needed for shortness of breath / dyspnea or wheezing 1 Inhaler 1     amLODIPine  (NORVASC) 5 MG tablet TAKE 1/2 TO 1 TABLET BY MOUTH EVERY EVENING AS DIRECTED 90 tablet 1     ASPIRIN ADULT LOW STRENGTH PO Take 81 mg by mouth daily.       guaiFENesin-codeine (ROBITUSSIN AC) 100-10 MG/5ML SOLN solution Take 5 mLs by mouth every 4 hours as needed for cough 120 mL 0     lisinopril (PRINIVIL/ZESTRIL) 20 MG tablet TAKE 1 TABLET BY MOUTH TWICE DAILY 180 tablet 1     Loperamide HCl (IMODIUM A-D PO) Take by mouth 2 times daily       LORazepam (ATIVAN) 0.5 MG tablet TAKE 1/2 TABLET BY MOUTH TWICE DAILY 60 tablet 0     metoprolol tartrate (LOPRESSOR) 100 MG tablet TAKE 1/2 TABLETS BY MOUTH TWICE DAILY APPROXIMATELY 12 HOURS APART 90 tablet 1     NIACIN CR PO Take 500 mg by mouth 2 times daily (before meals).       nitroglycerin (NITROSTAT) 0.4 MG SL tablet For chest pain place 1 tablet under the tongue every 5 minutes for 3 doses. If symptoms persist 5 minutes after 1st dose call 911. 25 tablet 3     STATIN NOT PRESCRIBED, INTENTIONAL, Please choose reason not prescribed, below       triamcinolone (KENALOG) 0.1 % cream APPLY SPARINGLY EXTERNALLY TO THE AFFECTED AREA THREE TIMES DAILY FOR 14 DAYS 30 g 4     VITAMIN D, CHOLECALCIFEROL, PO Take 1,000 Units by mouth daily.         ROS:  CONSTITUTIONAL: NEGATIVE for fever, chills, change in weight  INTEGUMENTARY/SKIN: NEGATIVE for worrisome rashes, moles or lesions  EYES: NEGATIVE for vision changes or irritation  ENT/MOUTH: NEGATIVE for ear, mouth and throat problems  RESP: NEGATIVE for significant cough or SOB  BREAST: NEGATIVE for masses, tenderness or discharge  CV: NEGATIVE for chest pain, palpitations or peripheral edema  GI: NEGATIVE for nausea, abdominal pain, heartburn, or change in bowel habits  : NEGATIVE for frequency, dysuria, or hematuria  MUSCULOSKELETAL: NEGATIVE for significant arthralgias or myalgia  NEURO: NEGATIVE for weakness, dizziness or paresthesias  ENDOCRINE: NEGATIVE for temperature intolerance, skin/hair changes  HEME: NEGATIVE for  "bleeding problems  PSYCHIATRIC: NEGATIVE for changes in mood or affect    This document serves as a record of the services and decisions personally performed and made by Héctor Solis MD. It was created on his behalf by Abigail Taylor, a trained medical scribe. The creation of this document is based the provider's statements to the medical scribe. 5/23/2018  OBJECTIVE:   /75 (BP Location: Left arm, Cuff Size: Adult Regular)  Pulse (!) 47  Temp 97.1  F (36.2  C) (Oral)  Ht 1.803 m (5' 11\")  Wt 68.9 kg (152 lb)  SpO2 99%  BMI 21.2 kg/m2 Estimated body mass index is 21.2 kg/(m^2) as calculated from the following:    Height as of this encounter: 1.803 m (5' 11\").    Weight as of this encounter: 68.9 kg (152 lb).  EXAM:   GENERAL: healthy, alert and no distress  EYES: Eyes grossly normal to inspection, PERRL and conjunctivae and sclerae normal  HENT: ear canals and TM's normal, nose and mouth without ulcers or lesions  NECK: no adenopathy, no asymmetry, masses, or scars and thyroid normal to palpation  RESP: lungs clear to auscultation - no rales, rhonchi or wheezes  BREAST: normal without masses, tenderness or nipple discharge and no palpable axillary masses or adenopathy  CV: regular rate and rhythm, normal S1 S2, no S3 or S4, no murmur, click or rub, no peripheral edema and peripheral pulses strong  ABDOMEN: soft, nontender, no hepatosplenomegaly, no masses and bowel sounds normal, bilateral femoral bruits    (male): left epididymal cyst, otherwise normal male genitalia without lesions or urethral discharge, no hernia  RECTAL: anal stenosis with associated scaring and fissures otherwise normal sphincter tone, no rectal masses, prostate 1-2+, smooth, nontender without nodules or masses  MS: no gross musculoskeletal defects noted, no edema with minor varicose veins and chronic venous stasis skin changes without current edema   SKIN: no suspicious lesions or rashes  NEURO: Normal strength and tone, " mentation intact and speech normal  PSYCH: mentation appears normal, affect normal/bright  LYMPH: no cervical, supraclavicular, axillary, or inguinal adenopathy    Diagnostic Results:  Pending     EKG:  Marked sinus  Bradycardia   Diffuse low voltage.    -Anterior infarct -age undetermined.    -  Precordial T-wave abnormality  -Possible anterolateral ischemia.  ASSESSMENT / PLAN:   1. Encounter for routine adult health examination without abnormal findings  Fasting labs pending. Patient will call insurance company to see if Shingrix vaccination is covered.  - **CBC with platelets FUTURE anytime  - **Comprehensive metabolic panel FUTURE anytime  - Lipid panel reflex to direct LDL Fasting  - **Prostate spec antigen screen FUTURE anytime  - **Vitamin D Deficiency FUTURE anytime  - **TSH with free T4 reflex FUTURE anytime  - **UA reflex to Microscopic FUTURE anytime  - Lipoprotein A    2. Hyperlipidemia LDL goal <100  Controlled with Niacin.   - Lipid panel reflex to direct LDL Fasting  - Lipoprotein A    3. Benign essential hypertension  Controlled with amlodipine and lisinopril.   - **CBC with platelets FUTURE anytime  - **Comprehensive metabolic panel FUTURE anytime  - **UA reflex to Microscopic FUTURE anytime    4. Adjustment disorder with anxious mood  Controlled with Ativan.     5. Atherosclerosis of native coronary artery of native heart without angina pectoris  No changes noted in EKG, decided stress echo was not needed at this time.   - EKG 12-lead complete w/read - Clinics    6. Sedative, hypnotic or anxiolytic dependence (H)    7. Eczema, unspecified type  Controlled with Kenalog.     8. Special screening for malignant neoplasms, colon  Complete FIT test.  - Fecal colorectal cancer screen (FIT); Future    9. Screening for prostate cancer  - **Prostate spec antigen screen FUTURE anytime    10. Vitamin D deficiency  - **Vitamin D Deficiency FUTURE anytime    11. Fatigue, unspecified type  - **TSH with free T4  "reflex FUTURE anytime    End of Life Planning:  Patient currently has an advanced directive: No.  I have verified the patient's ablity to prepare an advanced directive/make health care decisions.  Literature was provided to assist patient in preparing an advanced directive.    COUNSELING:  Reviewed preventive health counseling, as reflected in patient instructions       Consider AAA screening for ages 65-75 and smoking history       Regular exercise       Healthy diet/nutrition       Vision screening       Hearing screening       Dental care       Consider lung cancer screening for ages 55-80 years and 30 pack-year smoking history        Colon cancer screening       Prostate cancer screening    Estimated body mass index is 21.2 kg/(m^2) as calculated from the following:    Height as of this encounter: 1.803 m (5' 11\").    Weight as of this encounter: 68.9 kg (152 lb).     reports that he quit smoking about 14 years ago. He has never used smokeless tobacco.    Appropriate preventive services were discussed with this patient, including applicable screening as appropriate for cardiovascular disease, diabetes, osteopenia/osteoporosis, and glaucoma.  As appropriate for age/gender, discussed screening for colorectal cancer, prostate cancer, breast cancer, and cervical cancer. Checklist reviewing preventive services available has been given to the patient.    Reviewed patients plan of care and provided an AVS. The Basic Care Plan (routine screening as documented in Health Maintenance) for Rm meets the Care Plan requirement. This Care Plan has been established and reviewed with the Patient.    Counseling Resources:  ATP IV Guidelines  Pooled Cohorts Equation Calculator  Breast Cancer Risk Calculator  FRAX Risk Assessment  ICSI Preventive Guidelines  Dietary Guidelines for Americans, 2010  [employer]'s MyPlate  ASA Prophylaxis  Lung CA Screening    Héctor Solis MD  [facility]    The information in this document, " created by the medical scribe for me, accurately reflects the services I personally performed and the decisions made by me. I have reviewed and approved this document for accuracy prior to leaving the patient care area.  Héctor Solis MD  9:38 AM, 05/23/18

## 2018-05-24 LAB
DEPRECATED CALCIDIOL+CALCIFEROL SERPL-MC: 25 UG/L (ref 20–75)
LPA SERPL-MCNC: 121 MG/DL (ref 0–30)

## 2018-05-24 PROCEDURE — 82274 ASSAY TEST FOR BLOOD FECAL: CPT | Performed by: INTERNAL MEDICINE

## 2018-05-27 LAB — HEMOCCULT STL QL IA: POSITIVE

## 2018-05-29 DIAGNOSIS — Z12.11 SPECIAL SCREENING FOR MALIGNANT NEOPLASMS, COLON: ICD-10-CM

## 2018-06-11 ENCOUNTER — TELEPHONE (OUTPATIENT)
Dept: FAMILY MEDICINE | Facility: CLINIC | Age: 78
End: 2018-06-11

## 2018-06-11 DIAGNOSIS — Z12.11 SPECIAL SCREENING FOR MALIGNANT NEOPLASMS, COLON: Primary | ICD-10-CM

## 2018-06-24 DIAGNOSIS — I10 BENIGN ESSENTIAL HYPERTENSION: ICD-10-CM

## 2018-06-25 NOTE — TELEPHONE ENCOUNTER
"Requested Prescriptions   Pending Prescriptions Disp Refills     lisinopril (PRINIVIL/ZESTRIL) 20 MG tablet [Pharmacy Med Name: LISINOPRIL 20MG TABLETS] 180 tablet 0    Last Written Prescription Date:  12/22/17  Last Fill Quantity: 180,  # refills: 1  Last office visit: 5/23/2018 with prescribing provider:     Future Office Visit:   Sig: TAKE 1 TABLET BY MOUTH TWICE DAILY    ACE Inhibitors (Including Combos) Protocol Passed    6/24/2018  3:59 AM       Passed - Blood pressure under 140/90 in past 12 months    BP Readings from Last 3 Encounters:   05/23/18 135/75   01/25/18 120/69   01/04/18 112/72                Passed - Recent (12 mo) or future (30 days) visit within the authorizing provider's specialty    Patient had office visit in the last 12 months or has a visit in the next 30 days with authorizing provider or within the authorizing provider's specialty.  See \"Patient Info\" tab in inbasket, or \"Choose Columns\" in Meds & Orders section of the refill encounter.           Passed - Patient is age 18 or older       Passed - Normal serum creatinine on file in past 12 months    Recent Labs   Lab Test  05/23/18   0954   CR  0.90            Passed - Normal serum potassium on file in past 12 months    Recent Labs   Lab Test  05/23/18   0954   POTASSIUM  4.3                 "

## 2018-06-26 RX ORDER — LISINOPRIL 20 MG/1
TABLET ORAL
Qty: 180 TABLET | Refills: 1 | Status: SHIPPED | OUTPATIENT
Start: 2018-06-26 | End: 2018-12-23

## 2018-07-08 DIAGNOSIS — F43.22 ADJUSTMENT DISORDER WITH ANXIOUS MOOD: ICD-10-CM

## 2018-07-09 NOTE — TELEPHONE ENCOUNTER
LORazepam (ATIVAN) 0.5 MG tablet 60 tablet 0 4/17/2018           Last Written Prescription Date:  04/17/2018  Last Fill Quantity: 60,   # refills: 0  Last Office Visit: 05/23/2018  Future Office visit:   Unknown    Routing refill request to provider for review/approval because:  Drug not on the [facility], P or [facility] refill protocol or controlled substance

## 2018-07-10 RX ORDER — LORAZEPAM 0.5 MG/1
TABLET ORAL
Qty: 60 TABLET | Refills: 0 | Status: SHIPPED | OUTPATIENT
Start: 2018-07-10 | End: 2018-09-07

## 2018-08-30 ENCOUNTER — TRANSFERRED RECORDS (OUTPATIENT)
Dept: HEALTH INFORMATION MANAGEMENT | Facility: CLINIC | Age: 78
End: 2018-08-30

## 2018-08-30 ENCOUNTER — SURGERY (OUTPATIENT)
Age: 78
End: 2018-08-30

## 2018-08-30 ENCOUNTER — HOSPITAL ENCOUNTER (OUTPATIENT)
Facility: CLINIC | Age: 78
Discharge: HOME | End: 2018-08-30
Attending: SPECIALIST | Admitting: SPECIALIST
Payer: COMMERCIAL

## 2018-08-30 VITALS
WEIGHT: 150 LBS | BODY MASS INDEX: 21 KG/M2 | RESPIRATION RATE: 21 BRPM | HEIGHT: 71 IN | OXYGEN SATURATION: 93 % | SYSTOLIC BLOOD PRESSURE: 129 MMHG | DIASTOLIC BLOOD PRESSURE: 96 MMHG

## 2018-08-30 LAB — COLONOSCOPY: NORMAL

## 2018-08-30 PROCEDURE — 45380 COLONOSCOPY AND BIOPSY: CPT | Mod: PT | Performed by: SPECIALIST

## 2018-08-30 PROCEDURE — 88305 TISSUE EXAM BY PATHOLOGIST: CPT | Mod: 26 | Performed by: SPECIALIST

## 2018-08-30 PROCEDURE — 99153 MOD SED SAME PHYS/QHP EA: CPT | Performed by: SPECIALIST

## 2018-08-30 PROCEDURE — 25000128 H RX IP 250 OP 636: Performed by: SPECIALIST

## 2018-08-30 PROCEDURE — G0500 MOD SEDAT ENDO SERVICE >5YRS: HCPCS | Performed by: SPECIALIST

## 2018-08-30 PROCEDURE — 88305 TISSUE EXAM BY PATHOLOGIST: CPT | Performed by: SPECIALIST

## 2018-08-30 RX ORDER — FENTANYL CITRATE 50 UG/ML
INJECTION, SOLUTION INTRAMUSCULAR; INTRAVENOUS PRN
Status: DISCONTINUED | OUTPATIENT
Start: 2018-08-30 | End: 2018-08-30 | Stop reason: HOSPADM

## 2018-08-30 RX ORDER — ONDANSETRON 2 MG/ML
4 INJECTION INTRAMUSCULAR; INTRAVENOUS
Status: DISCONTINUED | OUTPATIENT
Start: 2018-08-30 | End: 2018-08-30 | Stop reason: HOSPADM

## 2018-08-30 RX ORDER — LIDOCAINE 40 MG/G
CREAM TOPICAL
Status: DISCONTINUED | OUTPATIENT
Start: 2018-08-30 | End: 2018-08-30 | Stop reason: HOSPADM

## 2018-08-30 RX ADMIN — FENTANYL CITRATE 50 MCG: 50 INJECTION, SOLUTION INTRAMUSCULAR; INTRAVENOUS at 12:31

## 2018-08-30 RX ADMIN — MIDAZOLAM 1 MG: 1 INJECTION INTRAMUSCULAR; INTRAVENOUS at 12:16

## 2018-08-30 RX ADMIN — FENTANYL CITRATE 50 MCG: 50 INJECTION, SOLUTION INTRAMUSCULAR; INTRAVENOUS at 12:17

## 2018-08-30 RX ADMIN — MIDAZOLAM 1 MG: 1 INJECTION INTRAMUSCULAR; INTRAVENOUS at 12:29

## 2018-08-30 NOTE — H&P
Pre-Endoscopy History and Physical     Rm Villarreal MRN# [id number]   YOB: 1940 Age: 78 year old     Date of Procedure: 8/30/2018  Primary care provider: Héctor Solis  Type of Endoscopy: Colonoscopy with possible biopsy, possible polypectomy  Reason for Procedure: screening, positive FIT test  Type of Anesthesia Anticipated: Conscious Sedation    HPI:    Rm is a 78 year old male who will be undergoing the above procedure.      A history and physical has been performed. The patient's medications and allergies have been reviewed. The risks and benefits of the procedure and the sedation options and risks were discussed with the patient.  All questions were answered and informed consent was obtained.      He denies a personal or family history of anesthesia complications or bleeding disorders.     Patient Active Problem List   Diagnosis     Coronary atherosclerosis of native coronary artery     Hyperlipidemia LDL goal <100     Benign essential hypertension     Adjustment disorder with anxious mood     Eczema, unspecified type     Sedative, hypnotic or anxiolytic dependence (H)        Past Medical History:   Diagnosis Date     Coronary artery disease     stents X2     History of blood transfusion      Hypertension         Past Surgical History:   Procedure Laterality Date     CARDIAC SURGERY       COLONOSCOPY      2010     VASCULAR SURGERY      left CEA       Social History   Substance Use Topics     Smoking status: Former Smoker     Quit date: 7/17/2003     Smokeless tobacco: Never Used     Alcohol use 0.0 oz/week     0 Standard drinks or equivalent per week      Comment: 3 beer per day       History reviewed. No pertinent family history.    Prior to Admission medications    Medication Sig Start Date End Date Taking? Authorizing Provider   amLODIPine (NORVASC) 5 MG tablet TAKE 1/2 TO 1 TABLET BY MOUTH EVERY EVENING AS DIRECTED 2/19/18  Yes Héctor Solis MD   ASPIRIN ADULT LOW STRENGTH PO  "Take 81 mg by mouth daily.   Yes Reported, Patient   lisinopril (PRINIVIL/ZESTRIL) 20 MG tablet TAKE 1 TABLET BY MOUTH TWICE DAILY 6/26/18  Yes Héctor Solis MD   LORazepam (ATIVAN) 0.5 MG tablet TAKE 1/2 TABLET BY MOUTH TWICE DAILY 7/10/18  Yes Héctor Solis MD   metoprolol tartrate (LOPRESSOR) 100 MG tablet TAKE 1/2 TABLETS BY MOUTH TWICE DAILY APPROXIMATELY 12 HOURS APART 4/13/18  Yes Héctor Solis MD   NIACIN CR PO Take 500 mg by mouth 2 times daily (before meals).   Yes Reported, Patient   albuterol (PROAIR HFA/PROVENTIL HFA/VENTOLIN HFA) 108 (90 BASE) MCG/ACT Inhaler Inhale 2 puffs into the lungs every 6 hours as needed for shortness of breath / dyspnea or wheezing 1/4/18   Héctor Solis MD   guaiFENesin-codeine (ROBITUSSIN AC) 100-10 MG/5ML SOLN solution Take 5 mLs by mouth every 4 hours as needed for cough 1/4/18   Héctor Solis MD   Loperamide HCl (IMODIUM A-D PO) Take by mouth 2 times daily    Reported, Patient   nitroglycerin (NITROSTAT) 0.4 MG SL tablet For chest pain place 1 tablet under the tongue every 5 minutes for 3 doses. If symptoms persist 5 minutes after 1st dose call 911. 10/21/16   Héctor Solis MD   STATIN NOT PRESCRIBED, INTENTIONAL, Please choose reason not prescribed, below 1/25/18   Héctor Solis MD   triamcinolone (KENALOG) 0.1 % cream APPLY SPARINGLY EXTERNALLY TO THE AFFECTED AREA THREE TIMES DAILY FOR 14 DAYS 11/22/17   Héctor Solis MD   VITAMIN D, CHOLECALCIFEROL, PO Take 1,000 Units by mouth daily.    Reported, Patient       No Known Allergies     REVIEW OF SYSTEMS:   5 point ROS negative except as noted above in HPI, including Gen., Resp., CV, GI &  system review.    PHYSICAL EXAM:   BP (!) 170/101  Resp 19  Ht 1.803 m (5' 11\")  Wt 68 kg (150 lb)  SpO2 95%  BMI 20.92 kg/m2 Estimated body mass index is 20.92 kg/(m^2) as calculated from the following:    Height as of this encounter: 1.803 m (5' 11\").    Weight as of this encounter: 68 kg (150 lb). "   GENERAL APPEARANCE: alert, and oriented  MENTAL STATUS: alert  AIRWAY EXAM: Mallampatti Class II (visualization of the soft palate, fauces, and uvula)  RESP: lungs clear to auscultation - no rales, rhonchi or wheezes  CV: regular rates and rhythm  DIAGNOSTICS:    Not indicated    IMPRESSION   ASA Class 2 - Mild systemic disease    PLAN:   Plan for Colonoscopy with possible biopsy, possible polypectomy. We discussed the risks, benefits and alternatives and the patient wished to proceed.    The above has been forwarded to the consulting provider.      Signed Electronically by: Tyler Dee  August 30, 2018

## 2018-08-30 NOTE — BRIEF OP NOTE
[facility] Brief Operative Note    Pre-operative diagnosis: positive fit test    Post-operative diagnosis diverticulosis     Procedure: Procedure(s):  colonoscopy - Wound Class: II-Clean Contaminated   Surgeon(s): Surgeon(s) and Role:     * Tyler Dee MD - Primary   Estimated blood loss: * No values recorded between 8/30/2018 12:00 AM and 8/30/2018 12:48 PM *    Specimens:   ID Type Source Tests Collected by Time Destination   A : right colon bopsies for microscopic colitis Biopsy Large Intestine, Right/Ascending SURGICAL PATHOLOGY EXAM Katina Servin RN 8/30/2018 12:41 PM    B : left colon biopsies for microscopic colitis Biopsy Large Intestine, Left/Descending SURGICAL PATHOLOGY EXAM Katina Servin RN 8/30/2018 12:47 PM       Findings: Please see ProVation procedure note in Chart Review

## 2018-08-31 LAB — COPATH REPORT: NORMAL

## 2018-09-07 DIAGNOSIS — F43.22 ADJUSTMENT DISORDER WITH ANXIOUS MOOD: ICD-10-CM

## 2018-09-07 NOTE — TELEPHONE ENCOUNTER
LORazepam (ATIVAN) 0.5 MG tablet 60 tablet 0 7/10/2018  No   Sig: TAKE 1/2 TABLET BY MOUTH TWICE DAILY       Last Written Prescription Date:  07/10/2018  Last Fill Quantity: 60,  # refills: 0   Last office visit: 5/23/2018 with prescribing provider:     Future Office Visit:   Next 5 appointments (look out 90 days)     Sep 19, 2018  8:00 AM CDT   Office Visit with Héctor Solis MD   [facility]    [address] [facility] [address]   [phone]                        Routing refill request to provider for review/approval because:  Drug not on the [facility], P or [facility] refill protocol or controlled substance

## 2018-09-10 RX ORDER — LORAZEPAM 0.5 MG/1
TABLET ORAL
Qty: 60 TABLET | Refills: 0 | Status: SHIPPED | OUTPATIENT
Start: 2018-09-10 | End: 2018-11-07

## 2018-10-09 DIAGNOSIS — I10 BENIGN ESSENTIAL HYPERTENSION: ICD-10-CM

## 2018-10-09 NOTE — TELEPHONE ENCOUNTER
"Last Written Prescription Date:  04/13/2018  Last Fill Quantity: 90,  # refills: 1   Last office visit: 5/23/2018 with prescribing provider:  Will   Future Office Visit:      Requested Prescriptions   Pending Prescriptions Disp Refills     metoprolol tartrate (LOPRESSOR) 100 MG tablet [Pharmacy Med Name: METOPROLOL TARTRATE 100MG TABLETS] 90 tablet 0     Sig: TAKE 1/2 TABLETS BY MOUTH TWICE DAILY APPROXIMATELY 12 HOURS APART    Beta-Blockers Protocol Failed    10/9/2018  3:45 AM       Failed - Blood pressure under 140/90 in past 12 months    BP Readings from Last 3 Encounters:   08/30/18 (!) 129/96   05/23/18 135/75   01/25/18 120/69                Passed - Patient is age 6 or older       Passed - Recent (12 mo) or future (30 days) visit within the authorizing provider's specialty    Patient had office visit in the last 12 months or has a visit in the next 30 days with authorizing provider or within the authorizing provider's specialty.  See \"Patient Info\" tab in inbasket, or \"Choose Columns\" in Meds & Orders section of the refill encounter.            "

## 2018-10-10 NOTE — TELEPHONE ENCOUNTER
Routing refill request to provider for review/approval because:  BP out of range:   08/30/18 (!) 129/96   05/23/18 135/75   01/25/18 120/69     Please review and authorize if appropriate.    Thank you,   Silas BANDA RN

## 2018-10-11 RX ORDER — METOPROLOL TARTRATE 100 MG
TABLET ORAL
Qty: 90 TABLET | Refills: 1 | Status: SHIPPED | OUTPATIENT
Start: 2018-10-11 | End: 2019-04-08

## 2018-11-07 DIAGNOSIS — F43.22 ADJUSTMENT DISORDER WITH ANXIOUS MOOD: ICD-10-CM

## 2018-11-07 NOTE — TELEPHONE ENCOUNTER
Pending Prescriptions:                       Disp   Refills    LORazepam (ATIVAN) 0.5 MG tablet          60 tab*0            Sig: TAKE 1/2 TABLET BY MOUTH TWICE DAILY          Last Written Prescription Date:  9-10-18  Last Fill Quantity: 60,   # refills: 0  Last Office Visit: 5-23-18 [facility]  Future Office visit:       Routing refill request to provider for review/approval because:  Drug not on the [facility], P or [facility] refill protocol or controlled substance    RT Robinson (R)

## 2018-11-08 RX ORDER — LORAZEPAM 0.5 MG/1
TABLET ORAL
Qty: 60 TABLET | Refills: 0 | Status: SHIPPED | OUTPATIENT
Start: 2018-11-08 | End: 2018-12-24

## 2018-11-08 NOTE — ASSESSMENT & PLAN NOTE
Patient is followed by CRISTIANO ACOSTA for ongoing prescription of benzodiazepines.  All refills should be approved by this provider, or covering partner.    Medication(s): Pending Prescriptions:                       Disp   Refills    LORazepam (ATIVAN) 0.5 MG tablet          60 tab*0            Sig: TAKE 1/2 TABLET BY MOUTH TWICE DAILY    .   Maximum quantity per month: 60  Clinic visit frequency required: Q 6  months     Controlled substance agreement on file: No  Benzodiazepine use reviewed by psychiatry: No    Last [facility] website verification:  done on 11/8/2018 ES   [email or web address]

## 2018-11-18 DIAGNOSIS — I10 BENIGN ESSENTIAL HYPERTENSION: ICD-10-CM

## 2018-11-19 NOTE — TELEPHONE ENCOUNTER
Routing refill request to provider for review/approval because:  Labs out of range:  BP---recent diastolic.     BP Readings from Last 3 Encounters:   08/30/18 (!) 129/96   05/23/18 135/75   01/25/18 120/69         Estrellita HINSON RN,BSN

## 2018-11-19 NOTE — TELEPHONE ENCOUNTER
"amLODIPine (NORVASC) 5 MG tablet  Last Written Prescription Date:  2/19/18  Last Fill Quantity: 90,  # refills: 1   Last office visit: 5/23/2018 with prescribing provider:  Will   Future Office Visit:        Requested Prescriptions   Pending Prescriptions Disp Refills     amLODIPine (NORVASC) 5 MG tablet [Pharmacy Med Name: AMLODIPINE BESYLATE 5MG TABLETS] 90 tablet 0     Sig: TAKE 1/2 TO 1 TABLET BY MOUTH EVERY EVENING AS DIRECTED    Calcium Channel Blockers Protocol  Failed    11/18/2018  3:44 AM       Failed - Blood pressure under 140/90 in past 12 months    BP Readings from Last 3 Encounters:   08/30/18 (!) 129/96   05/23/18 135/75   01/25/18 120/69                Passed - Recent (12 mo) or future (30 days) visit within the authorizing provider's specialty    Patient had office visit in the last 12 months or has a visit in the next 30 days with authorizing provider or within the authorizing provider's specialty.  See \"Patient Info\" tab in inbasket, or \"Choose Columns\" in Meds & Orders section of the refill encounter.             Passed - Patient is age 18 or older       Passed - Normal serum creatinine on file in past 12 months    Recent Labs   Lab Test  05/23/18   0954   CR  0.90               "

## 2018-11-20 RX ORDER — AMLODIPINE BESYLATE 5 MG/1
TABLET ORAL
Qty: 90 TABLET | Refills: 0 | Status: SHIPPED | OUTPATIENT
Start: 2018-11-20 | End: 2019-04-13

## 2018-12-23 DIAGNOSIS — I10 BENIGN ESSENTIAL HYPERTENSION: ICD-10-CM

## 2018-12-24 DIAGNOSIS — F43.22 ADJUSTMENT DISORDER WITH ANXIOUS MOOD: ICD-10-CM

## 2018-12-24 DIAGNOSIS — I10 BENIGN ESSENTIAL HYPERTENSION: ICD-10-CM

## 2018-12-24 NOTE — TELEPHONE ENCOUNTER
"Requested Prescriptions   Pending Prescriptions Disp Refills     LORazepam (ATIVAN) 0.5 MG tablet [Pharmacy Med Name: LORAZEPAM 0.5MG TABLETS] 60 tablet 0     Sig: TAKE 1/2 TABLET BY MOUTH TWICE DAILY    There is no refill protocol information for this order        lisinopril (PRINIVIL/ZESTRIL) 20 MG tablet [Pharmacy Med Name: LISINOPRIL 20MG TABLETS] 180 tablet 0     Sig: TAKE 1 TABLET BY MOUTH TWICE DAILY    ACE Inhibitors (Including Combos) Protocol Failed - 12/24/2018 10:32 AM       Failed - Blood pressure under 140/90 in past 12 months    BP Readings from Last 3 Encounters:   08/30/18 (!) 129/96   05/23/18 135/75   01/25/18 120/69                Passed - Recent (12 mo) or future (30 days) visit within the authorizing provider's specialty    Patient had office visit in the last 12 months or has a visit in the next 30 days with authorizing provider or within the authorizing provider's specialty.  See \"Patient Info\" tab in inbasket, or \"Choose Columns\" in Meds & Orders section of the refill encounter.             Passed - Patient is age 18 or older       Passed - Normal serum creatinine on file in past 12 months    Recent Labs   Lab Test 05/23/18  0954   CR 0.90            Passed - Normal serum potassium on file in past 12 months    Recent Labs   Lab Test 05/23/18  0954   POTASSIUM 4.3             A prescription for Lorazepam  Last Written Prescription Date:  11/08/18  Last Fill Quantity: 60,  # refills: 0   Last office visit: 5/23/2018 with prescribing provider:     Future Office Visit:      A prescription forLisinopril  Last Written Prescription Date:  06/26/18  Last Fill Quantity: 180,  # refills: 1   Last office visit: 5/23/2018 with prescribing provider:     Future Office Visit:      Quiana Trejo MA      "

## 2018-12-24 NOTE — TELEPHONE ENCOUNTER
"Requested Prescriptions   Pending Prescriptions Disp Refills     lisinopril (PRINIVIL/ZESTRIL) 20 MG tablet [Pharmacy Med Name: LISINOPRIL 20MG TABLETS] 180 tablet 0     Sig: TAKE 1 TABLET BY MOUTH TWICE DAILY    ACE Inhibitors (Including Combos) Protocol Failed - 12/23/2018  3:45 AM       Failed - Blood pressure under 140/90 in past 12 months    BP Readings from Last 3 Encounters:   08/30/18 (!) 129/96   05/23/18 135/75   01/25/18 120/69                Passed - Recent (12 mo) or future (30 days) visit within the authorizing provider's specialty    Patient had office visit in the last 12 months or has a visit in the next 30 days with authorizing provider or within the authorizing provider's specialty.  See \"Patient Info\" tab in inbasket, or \"Choose Columns\" in Meds & Orders section of the refill encounter.             Passed - Patient is age 18 or older       Passed - Normal serum creatinine on file in past 12 months    Recent Labs   Lab Test 05/23/18  0954   CR 0.90            Passed - Normal serum potassium on file in past 12 months    Recent Labs   Lab Test 05/23/18  0954   POTASSIUM 4.3             Last Written Prescription Date:  06/26/18  Last Fill Quantity: 180,  # refills: 1   Last office visit: 5/23/2018 with prescribing provider:     Future Office Visit:      Quiana Trejo MA    "

## 2018-12-24 NOTE — TELEPHONE ENCOUNTER
Last Written Prescription Date:  11/08/2018  Last Fill Quantity: 60,  # refills: 0   Last office visit: 5/23/2018 with prescribing provider:  5/23/2018   Future Office Visit:    Requested Prescriptions   Pending Prescriptions Disp Refills     LORazepam (ATIVAN) 0.5 MG tablet [Pharmacy Med Name: LORAZEPAM 0.5MG TABLETS] 60 tablet 0     Sig: TAKE 1/2 TABLET BY MOUTH TWICE DAILY    There is no refill protocol information for this order

## 2018-12-26 NOTE — TELEPHONE ENCOUNTER
Routing refill request to provider for review/approval because:  BP out of range.  Please authorize if appropriate.  Thanks,  Kelsey Castaneda RN

## 2018-12-26 NOTE — TELEPHONE ENCOUNTER
Patient is followed by SANCHEZ ROWLEY for ongoing prescription of benzodiazepines.  All refills should be approved by this provider, or covering partner.    Medication(s): Lorazepam 0.5mg.   Maximum quantity per month: #60   Clinic visit frequency required: Q 6  months     Controlled substance agreement on file: No  Benzodiazepine use reviewed by psychiatry:  No    Last [facility] website verification:  done on 12/26/18 [place]    [email or web address]    Routing refill request to provider for review/approval because:  Drug not on the [facility] refill protocol     Sabine SCHUMACHER RN

## 2018-12-27 RX ORDER — LORAZEPAM 0.5 MG/1
TABLET ORAL
Qty: 60 TABLET | Refills: 0 | Status: SHIPPED | OUTPATIENT
Start: 2018-12-27 | End: 2019-04-30

## 2018-12-27 RX ORDER — LISINOPRIL 20 MG/1
TABLET ORAL
Qty: 180 TABLET | Refills: 1 | Status: SHIPPED | OUTPATIENT
Start: 2018-12-27 | End: 2019-03-11

## 2018-12-27 RX ORDER — LISINOPRIL 20 MG/1
TABLET ORAL
Qty: 180 TABLET | Refills: 3 | Status: SHIPPED | OUTPATIENT
Start: 2018-12-27 | End: 2019-10-02 | Stop reason: DRUGHIGH

## 2018-12-27 NOTE — TELEPHONE ENCOUNTER
Patient is followed by CRISTIANO ACOSTA for ongoing prescription of benzodiazepines.  All refills should be approved by this provider, or covering partner.    Medication(s): Ativan.   Maximum quantity per month: 60  Clinic visit frequency required: Q 6  months     Controlled substance agreement on file: No  Benzodiazepine use reviewed by psychiatry:  No    Last [facility] website verification:  done on 12/27/18   [email or web address]        Lisinopril Prescription approved per [facility] Refill Protocol.    Silas BANDA RN

## 2018-12-28 RX ORDER — LORAZEPAM 0.5 MG/1
TABLET ORAL
Qty: 60 TABLET | Refills: 0 | Status: SHIPPED | OUTPATIENT
Start: 2018-12-28 | End: 2019-03-11

## 2019-03-11 ENCOUNTER — OFFICE VISIT (OUTPATIENT)
Dept: FAMILY MEDICINE | Facility: CLINIC | Age: 79
End: 2019-03-11
Payer: COMMERCIAL

## 2019-03-11 VITALS
SYSTOLIC BLOOD PRESSURE: 129 MMHG | BODY MASS INDEX: 21.14 KG/M2 | WEIGHT: 151 LBS | TEMPERATURE: 97.1 F | DIASTOLIC BLOOD PRESSURE: 72 MMHG | OXYGEN SATURATION: 100 % | HEART RATE: 53 BPM | HEIGHT: 71 IN

## 2019-03-11 DIAGNOSIS — F13.20 SEDATIVE, HYPNOTIC OR ANXIOLYTIC DEPENDENCE (H): ICD-10-CM

## 2019-03-11 DIAGNOSIS — E78.5 HYPERLIPIDEMIA LDL GOAL <100: ICD-10-CM

## 2019-03-11 DIAGNOSIS — I25.10 ATHEROSCLEROSIS OF NATIVE CORONARY ARTERY OF NATIVE HEART WITHOUT ANGINA PECTORIS: ICD-10-CM

## 2019-03-11 DIAGNOSIS — I10 BENIGN ESSENTIAL HYPERTENSION: Primary | ICD-10-CM

## 2019-03-11 DIAGNOSIS — L30.9 ECZEMA, UNSPECIFIED TYPE: ICD-10-CM

## 2019-03-11 DIAGNOSIS — F43.22 ADJUSTMENT DISORDER WITH ANXIOUS MOOD: ICD-10-CM

## 2019-03-11 PROCEDURE — 99214 OFFICE O/P EST MOD 30 MIN: CPT | Performed by: INTERNAL MEDICINE

## 2019-03-11 ASSESSMENT — MIFFLIN-ST. JEOR: SCORE: 1437.06

## 2019-03-11 NOTE — PROGRESS NOTES
SUBJECTIVE:   Rm Villarreal is a 78 year old male who presents to clinic today for the following health issues:    Medication Recheck  Rm is encountering episodes of dizziness and inquiries if his medications may be causing his intermittent lightheadedness episodes.     Colonoscopy Follow Up  Rm presents to clinic to review and discuss his colonoscopy results.     Problem list and histories reviewed & adjusted, as indicated.  Additional history: as documented    Patient Active Problem List   Diagnosis     Coronary atherosclerosis of native coronary artery     Hyperlipidemia LDL goal <100     Benign essential hypertension     Adjustment disorder with anxious mood     Eczema, unspecified type     Sedative, hypnotic or anxiolytic dependence (H)     Past Surgical History:   Procedure Laterality Date     CARDIAC SURGERY       COLONOSCOPY      2010     COLONOSCOPY N/A 8/30/2018    Procedure: COMBINED COLONOSCOPY, SINGLE OR MULTIPLE BIOPSY/POLYPECTOMY BY BIOPSY;  colonoscopy;  Surgeon: Tyler Dee MD;  Location:  GI     GI SURGERY      hemorrhoidectomy     VASCULAR SURGERY      left CEA       Social History     Tobacco Use     Smoking status: Former Smoker     Last attempt to quit: 7/17/2003     Years since quitting: 15.6     Smokeless tobacco: Never Used   Substance Use Topics     Alcohol use: Yes     Alcohol/week: 0.0 oz     Comment: 3 beer per day     History reviewed. No pertinent family history.      Current Outpatient Medications   Medication Sig Dispense Refill     amLODIPine (NORVASC) 5 MG tablet TAKE 1/2 TO 1 TABLET BY MOUTH EVERY EVENING AS DIRECTED 90 tablet 0     ASPIRIN ADULT LOW STRENGTH PO Take 81 mg by mouth daily.       lisinopril (PRINIVIL/ZESTRIL) 20 MG tablet TAKE 1 TABLET BY MOUTH TWICE DAILY 180 tablet 3     Loperamide HCl (IMODIUM A-D PO) Take by mouth 2 times daily       LORazepam (ATIVAN) 0.5 MG tablet TAKE 1/2 TABLET BY MOUTH TWICE DAILY 60 tablet 0     metoprolol tartrate  (LOPRESSOR) 100 MG tablet TAKE 1/2 TABLETS BY MOUTH TWICE DAILY APPROXIMATELY 12 HOURS APART 90 tablet 1     NIACIN CR PO Take 500 mg by mouth 2 times daily (before meals).       VITAMIN D, CHOLECALCIFEROL, PO Take 1,000 Units by mouth daily.       nitroglycerin (NITROSTAT) 0.4 MG SL tablet For chest pain place 1 tablet under the tongue every 5 minutes for 3 doses. If symptoms persist 5 minutes after 1st dose call 911. (Patient not taking: Reported on 3/11/2019) 25 tablet 3     STATIN NOT PRESCRIBED, INTENTIONAL, Please choose reason not prescribed, below (Patient not taking: Reported on 3/11/2019)       No Known Allergies  Labs reviewed in EPIC    Reviewed and updated as needed this visit by clinical staff  Tobacco  Allergies  Meds  Problems  Med Hx  Surg Hx  Fam Hx  Soc Hx        Reviewed and updated as needed this visit by Provider         ROS:  CONSTITUTIONAL: NEGATIVE for fever, chills, change in weight  INTEGUMENTARY/SKIN: NEGATIVE for worrisome rashes, moles or lesions; dark skin spots on right cheek  EYES: NEGATIVE for vision changes or irritation  ENT/MOUTH: NEGATIVE for ear, mouth and throat problems  RESP: NEGATIVE for significant cough or SOB; Bronchitis 3 months ago, had sprays left which were used to help symptoms and had run its course.    BREAST: NEGATIVE for masses, tenderness or discharge  CV: NEGATIVE for chest pain, palpitations or peripheral edema  GI: NEGATIVE for nausea, abdominal pain, heartburn, or change in bowel habits  : NEGATIVE for frequency, dysuria, or hematuria  MUSCULOSKELETAL: NEGATIVE for significant arthralgias or myalgia; difficulty to turn   NEURO: NEGATIVE for weakness, dizziness or paresthesias  ENDOCRINE: NEGATIVE for temperature intolerance, skin/hair changes  HEME: NEGATIVE for bleeding problems  PSYCHIATRIC: NEGATIVE for changes in mood or affect  This document serves as a record of the services and decisions personally performed and made by Héctor Solis MD. It  "was created on his behalf by Sergio Waggoner, a trained medical scribe. The creation of this document is based the provider's statements to the medical scribe.  Scribe Sergio Waggoner 9:55 AM, March 11, 2019    OBJECTIVE:   /72 (BP Location: Right arm, Patient Position: Chair, Cuff Size: Adult Large)   Pulse 53   Temp 97.1  F (36.2  C) (Oral)   Ht 1.803 m (5' 11\")   Wt 68.5 kg (151 lb)   SpO2 100%   BMI 21.06 kg/m    Body mass index is 21.06 kg/m .   HEENT: right face on his cheek he has prominent venules across his cheek with hyperpigmentation otherwise no other skin changes of his cheeks. Scaliness with his scalp as it may be indicative of a sunburn.  Neck was supple without adenopathy or thyromegaly his carotids were normal without bruits  Chest clear to auscultation and percussion  Cardiovascular S1 and S2 are physiologic without murmurs or gallops  Abdomen bowel sounds were normal.  There is no palpable mass or organomegaly  Extremities nontender without any edema  Pulses pedal pulses are as described otherwise his pulses are bilaterally symmetrical throughout without bruits  Skin without significant abnormality    Colonoscopy results discussed.     ASSESSMENT/PLAN:   Rm was seen today for recheck medication.    Diagnoses and all orders for this visit:    Benign essential hypertension  Controlled with current therapy, no medication changes. Following up in 4 months for medication recheck.    Hyperlipidemia LDL goal <100  Controlled with current therapy, no medication changes.     Adjustment disorder with anxious mood  Stable, no concerns    Sedative, hypnotic or anxiolytic dependence (H)    Eczema, unspecified type  Stable with current therapy    Atherosclerosis of native coronary artery of native heart without angina pectoris  Will be scheduling a stress echo in the future. Discussing status and wellness 4 months    The information in this document, created by the medical scribe for me, accurately reflects the " services I personally performed and the decisions made by me. I have reviewed and approved this document for accuracy prior to leaving the patient care area.  9:55 AM, 03/11/19    Héctor Solis MD  [facility]

## 2019-04-08 DIAGNOSIS — I10 BENIGN ESSENTIAL HYPERTENSION: ICD-10-CM

## 2019-04-08 NOTE — TELEPHONE ENCOUNTER
"Pending Prescriptions:                       Disp   Refills    metoprolol tartrate (LOPRESSOR) 100 MG ta*90 tab*0            Sig: TAKE 1/2 TABLETS BY MOUTH TWICE DAILY           APPROXIMATELY 12 HOURS APART    Last Written Prescription Date:  10/11/18  Last Fill Quantity: 90,  # refills: 0   Last office visit: 3/11/2019 with prescribing provider:     Future Office Visit:   Next 5 appointments (look out 90 days)    Jun 18, 2019  9:00 AM CDT  PHYSICAL with Héctor Solis MD  [facility] ([facility]) [address] [address]  [phone]         Requested Prescriptions   Pending Prescriptions Disp Refills     metoprolol tartrate (LOPRESSOR) 100 MG tablet [Pharmacy Med Name: METOPROLOL TARTRATE 100MG TABLETS] 90 tablet 0     Sig: TAKE 1/2 TABLETS BY MOUTH TWICE DAILY APPROXIMATELY 12 HOURS APART       Beta-Blockers Protocol Passed - 4/8/2019  3:46 AM        Passed - Blood pressure under 140/90 in past 12 months     BP Readings from Last 3 Encounters:   03/11/19 129/72   08/30/18 (!) 129/96   05/23/18 135/75                 Passed - Patient is age 6 or older        Passed - Recent (12 mo) or future (30 days) visit within the authorizing provider's specialty     Patient had office visit in the last 12 months or has a visit in the next 30 days with authorizing provider or within the authorizing provider's specialty.  See \"Patient Info\" tab in inbasket, or \"Choose Columns\" in Meds & Orders section of the refill encounter.              Passed - Medication is active on med list          "

## 2019-04-09 RX ORDER — METOPROLOL TARTRATE 100 MG
TABLET ORAL
Qty: 60 TABLET | Refills: 0 | Status: SHIPPED | OUTPATIENT
Start: 2019-04-09 | End: 2019-05-12

## 2019-04-09 NOTE — TELEPHONE ENCOUNTER
Prescription approved per [facility] Refill Protocol.  Kelsey Castaneda RN      OV notes;  Return in about 3 months (around 6/11/2019) for Physical Exam.

## 2019-04-30 DIAGNOSIS — F43.22 ADJUSTMENT DISORDER WITH ANXIOUS MOOD: ICD-10-CM

## 2019-05-01 NOTE — TELEPHONE ENCOUNTER
LORazepam (ATIVAN) 0.5 MG tablet  Last Written Prescription Date:  12/27/18  Last Fill Quantity: 60,  # refills: 0   Last office visit: 3/11/2019 with prescribing provider:  Will    Future Office Visit:   Next 5 appointments (look out 90 days)    Jun 18, 2019  9:00 AM CDT  PHYSICAL with Héctor Solis MD  [facility] [address] [facility] [address]  [phone]           Requested Prescriptions   Pending Prescriptions Disp Refills     LORazepam (ATIVAN) 0.5 MG tablet [Pharmacy Med Name: LORAZEPAM 0.5MG TABLETS] 60 tablet 0     Sig: TAKE ONE-HALF TABLET BY MOUTH TWICE DAILY       There is no refill protocol information for this order

## 2019-05-02 ENCOUNTER — ANCILLARY PROCEDURE (OUTPATIENT)
Dept: GENERAL RADIOLOGY | Facility: CLINIC | Age: 79
End: 2019-05-02
Attending: INTERNAL MEDICINE
Payer: COMMERCIAL

## 2019-05-02 ENCOUNTER — OFFICE VISIT (OUTPATIENT)
Dept: FAMILY MEDICINE | Facility: CLINIC | Age: 79
End: 2019-05-02
Payer: COMMERCIAL

## 2019-05-02 VITALS
WEIGHT: 150 LBS | HEART RATE: 50 BPM | DIASTOLIC BLOOD PRESSURE: 74 MMHG | HEIGHT: 71 IN | OXYGEN SATURATION: 98 % | BODY MASS INDEX: 21 KG/M2 | SYSTOLIC BLOOD PRESSURE: 142 MMHG | TEMPERATURE: 97.4 F

## 2019-05-02 DIAGNOSIS — R05.9 COUGH: Primary | ICD-10-CM

## 2019-05-02 DIAGNOSIS — R05.9 COUGH: ICD-10-CM

## 2019-05-02 PROCEDURE — 99213 OFFICE O/P EST LOW 20 MIN: CPT | Performed by: INTERNAL MEDICINE

## 2019-05-02 PROCEDURE — 71046 X-RAY EXAM CHEST 2 VIEWS: CPT

## 2019-05-02 RX ORDER — LORAZEPAM 0.5 MG/1
TABLET ORAL
Qty: 60 TABLET | Refills: 0 | Status: SHIPPED | OUTPATIENT
Start: 2019-05-02 | End: 2019-06-18

## 2019-05-02 RX ORDER — CODEINE PHOSPHATE/GUAIFENESIN 10-100MG/5
5 LIQUID (ML) ORAL
Qty: 236 ML | Refills: 0 | Status: SHIPPED | OUTPATIENT
Start: 2019-05-02 | End: 2019-06-18

## 2019-05-02 RX ORDER — GUAIFENESIN AND DEXTROMETHORPHAN HYDROBROMIDE 600; 30 MG/1; MG/1
1 TABLET, EXTENDED RELEASE ORAL EVERY 12 HOURS PRN
Qty: 30 TABLET | Refills: 3 | Status: SHIPPED | OUTPATIENT
Start: 2019-05-02 | End: 2019-06-18

## 2019-05-02 ASSESSMENT — MIFFLIN-ST. JEOR: SCORE: 1432.53

## 2019-05-02 NOTE — PROGRESS NOTES
SUBJECTIVE:   Rm Villarreal is a 78 year old male who presents to clinic today for the following   health issues:      RESPIRATORY SYMPTOMS      Duration: 1 week    Description  nasal congestion, rhinorrhea, facial pain/pressure, cough, wheezing, fever, chills, headache, fatigue/malaise, hoarse voice and myalgias    Severity: moderate    Accompanying signs and symptoms: occasional tachycardia    History (predisposing factors):  none    Precipitating or alleviating factors: has a hx of same illness symptoms 2-3 times the past year    Therapies tried and outcome:  rest and fluids OTC NSAID - ibuprofen helps with myalgias      Pleasant man with coronary artery disease, former smoker who presents with a one-week history of a severe cough that is worse at night.  Cough is not productive of sputum although he wishes he could cough sputum that seems to be stuck down his throat out better.  He denies hemoptysis.  He did have fevers and chills at the onset of his symptoms.  He describes mild fatigue and malaise without dyspnea.  He denies new chest pains.    Additional history: as documented    Reviewed  and updated as needed this visit by clinical staff         Reviewed and updated as needed this visit by Provider         Patient Active Problem List   Diagnosis     Coronary atherosclerosis of native coronary artery     Hyperlipidemia LDL goal <100     Benign essential hypertension     Adjustment disorder with anxious mood     Eczema, unspecified type     Sedative, hypnotic or anxiolytic dependence (H)     Past Surgical History:   Procedure Laterality Date     CARDIAC SURGERY       COLONOSCOPY      2010     COLONOSCOPY N/A 8/30/2018    Procedure: COMBINED COLONOSCOPY, SINGLE OR MULTIPLE BIOPSY/POLYPECTOMY BY BIOPSY;  colonoscopy;  Surgeon: Tyler Dee MD;  Location:  GI     GI SURGERY      hemorrhoidectomy     VASCULAR SURGERY      left CEA       Social History     Tobacco Use     Smoking status: Former Smoker     " Last attempt to quit: 7/17/2003     Years since quitting: 15.8     Smokeless tobacco: Never Used   Substance Use Topics     Alcohol use: Yes     Alcohol/week: 0.0 oz     Comment: 3 beer per day     No family history on file.      Current Outpatient Medications   Medication Sig Dispense Refill     amLODIPine (NORVASC) 5 MG tablet TAKE 1/2 TO 1 TABLET BY MOUTH EVERY EVENING AS DIRECTED 90 tablet 0     ASPIRIN ADULT LOW STRENGTH PO Take 81 mg by mouth daily.       dextromethorphan-guaiFENesin (MUCINEX DM)  MG 12 hr tablet Take 1 tablet by mouth every 12 hours as needed for cough 30 tablet 3     guaiFENesin-codeine (GUAIFENESIN AC) 100-10 MG/5ML syrup Take 5 mLs by mouth nightly as needed for congestion 236 mL 0     lisinopril (PRINIVIL/ZESTRIL) 20 MG tablet TAKE 1 TABLET BY MOUTH TWICE DAILY 180 tablet 3     Loperamide HCl (IMODIUM A-D PO) Take by mouth 2 times daily       LORazepam (ATIVAN) 0.5 MG tablet TAKE ONE-HALF TABLET BY MOUTH TWICE DAILY 60 tablet 0     metoprolol tartrate (LOPRESSOR) 100 MG tablet TAKE 1/2 TABLETS BY MOUTH TWICE DAILY APPROXIMATELY 12 HOURS APART 60 tablet 0     NIACIN CR PO Take 500 mg by mouth 2 times daily (before meals).       nitroglycerin (NITROSTAT) 0.4 MG SL tablet For chest pain place 1 tablet under the tongue every 5 minutes for 3 doses. If symptoms persist 5 minutes after 1st dose call 911. 25 tablet 3     STATIN NOT PRESCRIBED, INTENTIONAL, Please choose reason not prescribed, below       VITAMIN D, CHOLECALCIFEROL, PO Take 1,000 Units by mouth daily.       No Known Allergies    ROS:  Pertinent positive negatives are reviewed in history of present illness    OBJECTIVE:     /74 (BP Location: Right arm, Patient Position: Sitting, Cuff Size: Adult Regular)   Pulse 50   Temp 97.4  F (36.3  C) (Tympanic)   Ht 1.803 m (5' 11\")   Wt 68 kg (150 lb)   SpO2 98%   BMI 20.92 kg/m    Body mass index is 20.92 kg/m .  GENERAL: healthy, alert and no distress  EYES: Eyes grossly " normal to inspection, PERRL and conjunctivae and sclerae normal  HENT: ear canals and TM's normal, nose and mouth without ulcers or lesions  NECK: no adenopathy, no asymmetry, masses, or scars and thyroid normal to palpation  RESP: lungs clear to auscultation - no rales, rhonchi or wheezes  CV: Heart with regular rate and rhythm. No carotid bruits.    ABDOMEN: soft, nontender, no hepatosplenomegaly, no masses and bowel sounds normal  MS: no gross musculoskeletal defects noted, no edema  NEURO: Normal strength and tone, mentation intact and speech normal  PSYCH: mentation appears normal, affect normal/bright    Diagnostic Test Results:  Chest x-ray pending    ASSESSMENT/PLAN:       1. Cough  Pleasant 78-year-old man with coronary artery disease who presents with a one-week history of cough it seems most consistent with a viral upper respiratory tract infection.  He can use Mucinex DM and codeine cough syrup as below to manage symptoms.  Side effects and risks were discussed.  Check chest x-ray to exclude pneumonia or other intrathoracic pathology that might be contributing to cough.  Also, since he seems to have infections like this once a winter, and he has a history of smoking, we did decide to obtain pulmonary function testing to exclude COPD as a potential possibility for recurrent lung infections, he does have an appointment to see his primary care physician in June upon which time the results of the pulmonary function testing studies can be discussed.  - XR Chest 2 Views; Future  - General PFT Lab (Please always keep checked); Future  - Pulmonary Function Test; Future  - dextromethorphan-guaiFENesin (MUCINEX DM)  MG 12 hr tablet; Take 1 tablet by mouth every 12 hours as needed for cough  Dispense: 30 tablet; Refill: 3  - guaiFENesin-codeine (GUAIFENESIN AC) 100-10 MG/5ML syrup; Take 5 mLs by mouth nightly as needed for congestion  Dispense: 236 mL; Refill: 0        Ron Wright MD  [facility]  MILL[place]

## 2019-05-02 NOTE — LETTER
[address] [facility]  [address]  [place]  [address]  Tel: [phone]    May 3, 2019    Rm Villarreal  [address] [place] [address] [address]  [facility] [address]        Dear Mr. Villarreal,    The following letter pertains to your most recent diagnostic tests:    Good news! The chest x-ray looks normal.  If you have any further questions or problems, please contact our office.      Sincerely,    Ron Wright MD/Т[facility]

## 2019-05-03 NOTE — RESULT ENCOUNTER NOTE
The following letter pertains to your most recent diagnostic tests:    Good news! The chest x-ray looks normal.          Sincerely,    Dr. Wright

## 2019-05-12 DIAGNOSIS — I10 BENIGN ESSENTIAL HYPERTENSION: ICD-10-CM

## 2019-05-13 NOTE — TELEPHONE ENCOUNTER
"Requested Prescriptions   Pending Prescriptions Disp Refills     metoprolol tartrate (LOPRESSOR) 100 MG tablet [Pharmacy Med Name: METOPROLOL TARTRATE 100MG TABLETS]  Last Written Prescription Date:  4/9/2019  Last Fill Quantity: 60 tab,  # refills: 0   Last Office Visit 3/11/2019 kristin  Future Office Visit:    Next 5 appointments (look out 90 days)    Jun 18, 2019  9:00 AM CDT  PHYSICAL with Héctor Solis MD  [facility] [address] [facility] [facility] [address]  [phone]          60 tablet 0     Sig: TAKE 1/2 TABLET BY MOUTH TWICE DAILY. TAKE APPROXIMATELY 12 HOURS APART       Beta-Blockers Protocol Failed - 5/12/2019  3:45 AM        Failed - Blood pressure under 140/90 in past 12 months     BP Readings from Last 3 Encounters:   05/02/19 142/74   03/11/19 129/72   08/30/18 (!) 129/96                 Passed - Patient is age 6 or older        Passed - Recent (12 mo) or future (30 days) visit within the authorizing provider's specialty     Patient had office visit in the last 12 months or has a visit in the next 30 days with authorizing provider or within the authorizing provider's specialty.  See \"Patient Info\" tab in inbasket, or \"Choose Columns\" in Meds & Orders section of the refill encounter.              Passed - Medication is active on med list        "

## 2019-05-14 RX ORDER — METOPROLOL TARTRATE 100 MG
TABLET ORAL
Qty: 60 TABLET | Refills: 0 | Status: SHIPPED | OUTPATIENT
Start: 2019-05-14 | End: 2019-07-08

## 2019-05-14 NOTE — TELEPHONE ENCOUNTER
Prescription approved per [facility] Refill Protocol.  Due and scheduled for physical next month.  Kelsey Castaneda RN

## 2019-05-20 ENCOUNTER — TELEPHONE (OUTPATIENT)
Dept: FAMILY MEDICINE | Facility: CLINIC | Age: 79
End: 2019-05-20

## 2019-05-20 DIAGNOSIS — Z00.00 ENCOUNTER FOR ROUTINE ADULT HEALTH EXAMINATION WITHOUT ABNORMAL FINDINGS: ICD-10-CM

## 2019-05-20 DIAGNOSIS — I10 BENIGN ESSENTIAL HYPERTENSION: Primary | ICD-10-CM

## 2019-05-20 DIAGNOSIS — E78.5 HYPERLIPIDEMIA LDL GOAL <100: ICD-10-CM

## 2019-05-20 DIAGNOSIS — F43.22 ADJUSTMENT DISORDER WITH ANXIOUS MOOD: ICD-10-CM

## 2019-05-20 DIAGNOSIS — R53.83 FATIGUE, UNSPECIFIED TYPE: ICD-10-CM

## 2019-05-20 DIAGNOSIS — E55.9 VITAMIN D DEFICIENCY: ICD-10-CM

## 2019-05-20 DIAGNOSIS — Z12.5 SCREENING FOR PROSTATE CANCER: ICD-10-CM

## 2019-05-20 NOTE — TELEPHONE ENCOUNTER
Reason for Call: Request for an order or referral:    Order being requested: fasting labs for physical    Date needed: before my next appointment on 6.10.19    Has the patient been seen by the PCP for this problem? NO    Additional comments: Pt is requesting fasting labs prior to his physical    Phone number Patient can be reached at:  Cell number on file:    Telephone Information:   Mobile [phone]     Best Time:  any    Can we leave a detailed message on this number?  YES    Call taken on 5/20/2019 at 11:03 AM by Gabi Perez

## 2019-06-10 DIAGNOSIS — Z12.5 SCREENING FOR PROSTATE CANCER: ICD-10-CM

## 2019-06-10 DIAGNOSIS — I10 BENIGN ESSENTIAL HYPERTENSION: ICD-10-CM

## 2019-06-10 DIAGNOSIS — R53.83 FATIGUE, UNSPECIFIED TYPE: ICD-10-CM

## 2019-06-10 DIAGNOSIS — E78.5 HYPERLIPIDEMIA LDL GOAL <100: ICD-10-CM

## 2019-06-10 DIAGNOSIS — Z00.00 ENCOUNTER FOR ROUTINE ADULT HEALTH EXAMINATION WITHOUT ABNORMAL FINDINGS: ICD-10-CM

## 2019-06-10 DIAGNOSIS — E55.9 VITAMIN D DEFICIENCY: ICD-10-CM

## 2019-06-10 LAB
ALBUMIN SERPL-MCNC: 3.8 G/DL (ref 3.4–5)
ALBUMIN UR-MCNC: NEGATIVE MG/DL
ALP SERPL-CCNC: 74 U/L (ref 40–150)
ALT SERPL W P-5'-P-CCNC: 19 U/L (ref 0–70)
ANION GAP SERPL CALCULATED.3IONS-SCNC: 10 MMOL/L (ref 3–14)
APPEARANCE UR: CLEAR
AST SERPL W P-5'-P-CCNC: 18 U/L (ref 0–45)
BACTERIA #/AREA URNS HPF: ABNORMAL /HPF
BILIRUB SERPL-MCNC: 0.7 MG/DL (ref 0.2–1.3)
BILIRUB UR QL STRIP: NEGATIVE
BUN SERPL-MCNC: 14 MG/DL (ref 7–30)
CALCIUM SERPL-MCNC: 8.8 MG/DL (ref 8.5–10.1)
CHLORIDE SERPL-SCNC: 105 MMOL/L (ref 94–109)
CHOLEST SERPL-MCNC: 223 MG/DL
CO2 SERPL-SCNC: 24 MMOL/L (ref 20–32)
COLOR UR AUTO: YELLOW
CREAT SERPL-MCNC: 0.98 MG/DL (ref 0.66–1.25)
DEPRECATED CALCIDIOL+CALCIFEROL SERPL-MC: 24 UG/L (ref 20–75)
ERYTHROCYTE [DISTWIDTH] IN BLOOD BY AUTOMATED COUNT: 14.1 % (ref 10–15)
GFR SERPL CREATININE-BSD FRML MDRD: 74 ML/MIN/{1.73_M2}
GLUCOSE SERPL-MCNC: 92 MG/DL (ref 70–99)
GLUCOSE UR STRIP-MCNC: NEGATIVE MG/DL
HCT VFR BLD AUTO: 42.4 % (ref 40–53)
HDLC SERPL-MCNC: 96 MG/DL
HGB BLD-MCNC: 15.2 G/DL (ref 13.3–17.7)
HGB UR QL STRIP: ABNORMAL
KETONES UR STRIP-MCNC: NEGATIVE MG/DL
LDLC SERPL CALC-MCNC: 109 MG/DL
LEUKOCYTE ESTERASE UR QL STRIP: NEGATIVE
MCH RBC QN AUTO: 33.9 PG (ref 26.5–33)
MCHC RBC AUTO-ENTMCNC: 35.8 G/DL (ref 31.5–36.5)
MCV RBC AUTO: 94 FL (ref 78–100)
NITRATE UR QL: NEGATIVE
NON-SQ EPI CELLS #/AREA URNS LPF: ABNORMAL /LPF
NONHDLC SERPL-MCNC: 127 MG/DL
PH UR STRIP: 6 PH (ref 5–7)
PLATELET # BLD AUTO: 281 10E9/L (ref 150–450)
POTASSIUM SERPL-SCNC: 5.1 MMOL/L (ref 3.4–5.3)
PROT SERPL-MCNC: 7.5 G/DL (ref 6.8–8.8)
PSA SERPL-ACNC: 0.69 UG/L (ref 0–4)
RBC # BLD AUTO: 4.49 10E12/L (ref 4.4–5.9)
RBC #/AREA URNS AUTO: ABNORMAL /HPF
SODIUM SERPL-SCNC: 139 MMOL/L (ref 133–144)
SOURCE: ABNORMAL
SP GR UR STRIP: 1.02 (ref 1–1.03)
TRIGL SERPL-MCNC: 89 MG/DL
TSH SERPL DL<=0.005 MIU/L-ACNC: 3.6 MU/L (ref 0.4–4)
UROBILINOGEN UR STRIP-ACNC: 0.2 EU/DL (ref 0.2–1)
WBC # BLD AUTO: 5.5 10E9/L (ref 4–11)
WBC #/AREA URNS AUTO: ABNORMAL /HPF

## 2019-06-10 PROCEDURE — 80061 LIPID PANEL: CPT | Performed by: INTERNAL MEDICINE

## 2019-06-10 PROCEDURE — 36415 COLL VENOUS BLD VENIPUNCTURE: CPT | Performed by: INTERNAL MEDICINE

## 2019-06-10 PROCEDURE — 84443 ASSAY THYROID STIM HORMONE: CPT | Performed by: INTERNAL MEDICINE

## 2019-06-10 PROCEDURE — 85027 COMPLETE CBC AUTOMATED: CPT | Performed by: INTERNAL MEDICINE

## 2019-06-10 PROCEDURE — 82306 VITAMIN D 25 HYDROXY: CPT | Performed by: INTERNAL MEDICINE

## 2019-06-10 PROCEDURE — G0103 PSA SCREENING: HCPCS | Performed by: INTERNAL MEDICINE

## 2019-06-10 PROCEDURE — 80053 COMPREHEN METABOLIC PANEL: CPT | Performed by: INTERNAL MEDICINE

## 2019-06-10 PROCEDURE — 81001 URINALYSIS AUTO W/SCOPE: CPT | Performed by: INTERNAL MEDICINE

## 2019-06-11 ENCOUNTER — HOSPITAL ENCOUNTER (OUTPATIENT)
Dept: CARDIAC REHAB | Facility: CLINIC | Age: 79
Setting detail: THERAPIES SERIES
End: 2019-06-11
Attending: INTERNAL MEDICINE
Payer: COMMERCIAL

## 2019-06-11 DIAGNOSIS — R05.9 COUGH: ICD-10-CM

## 2019-06-11 PROCEDURE — 40001038 ZZH STATISTIC RESPIRATORY TESTING VISIT

## 2019-06-11 PROCEDURE — 94729 DIFFUSING CAPACITY: CPT

## 2019-06-11 PROCEDURE — 94060 EVALUATION OF WHEEZING: CPT

## 2019-06-11 NOTE — LETTER
"[place] [address] [facility]  [address]  [place]  [address]  Tel: [phone]    June 19, 2019    Rm Villarreal  [address] [place] [address] [address] SILVA  [facility] [address]        Dear Mr. Villarreal,    The following letter pertains to your most recent diagnostic tests:    Your lung function tests indicate the presence of mild obstructive lung disease (\"COPD\").  This might explain why you are more susceptible to respiratory infections.   You can discuss treatment for this if any is needed when you next see Dr. Solis.        Sincerely,    Dr. Wright/ТАТЬЯНА        "

## 2019-06-17 LAB
DLCOUNC-%PRED-PRE: 92 %
DLCOUNC-PRE: 23.94 ML/MIN/MMHG
DLCOUNC-PRED: 25.76 ML/MIN/MMHG
ERV-%PRED-PRE: 76 %
ERV-PRE: 1.12 L
ERV-PRED: 1.47 L
EXPTIME-PRE: 7.28 SEC
FEF2575-%PRED-POST: 67 %
FEF2575-%PRED-PRE: 55 %
FEF2575-POST: 1.51 L/SEC
FEF2575-PRE: 1.23 L/SEC
FEF2575-PRED: 2.24 L/SEC
FEFMAX-%PRED-PRE: 73 %
FEFMAX-PRE: 5.83 L/SEC
FEFMAX-PRED: 7.94 L/SEC
FEV1-%PRED-PRE: 74 %
FEV1-PRE: 2.32 L
FEV1FEV6-PRE: 63 %
FEV1FEV6-PRED: 77 %
FEV1FVC-PRE: 61 %
FEV1FVC-PRED: 75 %
FEV1SVC-PRE: 60 %
FEV1SVC-PRED: 65 %
FIFMAX-PRE: 4.54 L/SEC
FVC-%PRED-PRE: 90 %
FVC-PRE: 3.79 L
FVC-PRED: 4.17 L
IC-%PRED-PRE: 82 %
IC-PRE: 2.75 L
IC-PRED: 3.33 L
VA-%PRED-PRE: 99 %
VA-PRE: 6.54 L
VC-%PRED-PRE: 80 %
VC-PRE: 3.88 L
VC-PRED: 4.8 L

## 2019-06-18 ENCOUNTER — OFFICE VISIT (OUTPATIENT)
Dept: FAMILY MEDICINE | Facility: CLINIC | Age: 79
End: 2019-06-18
Payer: COMMERCIAL

## 2019-06-18 VITALS
SYSTOLIC BLOOD PRESSURE: 142 MMHG | BODY MASS INDEX: 22.36 KG/M2 | HEIGHT: 69 IN | WEIGHT: 151 LBS | OXYGEN SATURATION: 99 % | TEMPERATURE: 97 F | DIASTOLIC BLOOD PRESSURE: 80 MMHG | HEART RATE: 42 BPM

## 2019-06-18 DIAGNOSIS — Z12.5 SCREENING FOR PROSTATE CANCER: ICD-10-CM

## 2019-06-18 DIAGNOSIS — F43.22 ADJUSTMENT DISORDER WITH ANXIOUS MOOD: ICD-10-CM

## 2019-06-18 DIAGNOSIS — L30.9 ECZEMA, UNSPECIFIED TYPE: ICD-10-CM

## 2019-06-18 DIAGNOSIS — Z00.00 ENCOUNTER FOR ROUTINE ADULT HEALTH EXAMINATION WITHOUT ABNORMAL FINDINGS: Primary | ICD-10-CM

## 2019-06-18 DIAGNOSIS — E55.9 VITAMIN D DEFICIENCY: ICD-10-CM

## 2019-06-18 DIAGNOSIS — E78.5 HYPERLIPIDEMIA LDL GOAL <100: ICD-10-CM

## 2019-06-18 DIAGNOSIS — I10 BENIGN ESSENTIAL HYPERTENSION: ICD-10-CM

## 2019-06-18 DIAGNOSIS — I25.10 ATHEROSCLEROSIS OF NATIVE CORONARY ARTERY OF NATIVE HEART WITHOUT ANGINA PECTORIS: ICD-10-CM

## 2019-06-18 DIAGNOSIS — F13.20 SEDATIVE, HYPNOTIC OR ANXIOLYTIC DEPENDENCE (H): ICD-10-CM

## 2019-06-18 PROCEDURE — G0439 PPPS, SUBSEQ VISIT: HCPCS | Performed by: INTERNAL MEDICINE

## 2019-06-18 RX ORDER — AMLODIPINE BESYLATE 5 MG/1
TABLET ORAL
Qty: 90 TABLET | Refills: 0 | Status: SHIPPED | OUTPATIENT
Start: 2019-06-18 | End: 2019-09-16

## 2019-06-18 RX ORDER — LORAZEPAM 0.5 MG/1
TABLET ORAL
Qty: 60 TABLET | Refills: 0 | Status: SHIPPED | OUTPATIENT
Start: 2019-06-18 | End: 2019-07-25

## 2019-06-18 SDOH — HEALTH STABILITY: MENTAL HEALTH: HOW OFTEN DO YOU HAVE A DRINK CONTAINING ALCOHOL?: 4 OR MORE TIMES A WEEK

## 2019-06-18 SDOH — HEALTH STABILITY: MENTAL HEALTH: HOW MANY DRINKS CONTAINING ALCOHOL DO YOU HAVE ON A TYPICAL DAY WHEN YOU ARE DRINKING?: 3 OR 4

## 2019-06-18 SDOH — HEALTH STABILITY: MENTAL HEALTH: HOW OFTEN DO YOU HAVE SIX OR MORE DRINKS ON ONE OCCASION?: NEVER

## 2019-06-18 ASSESSMENT — ACTIVITIES OF DAILY LIVING (ADL): CURRENT_FUNCTION: NO ASSISTANCE NEEDED

## 2019-06-18 ASSESSMENT — MIFFLIN-ST. JEOR: SCORE: 1405.31

## 2019-06-18 NOTE — RESULT ENCOUNTER NOTE
"The following letter pertains to your most recent diagnostic tests:    Your lung function tests indicate the presence of mild obstructive lung disease (\"COPD\").  This might explain why you are more susceptible to respiratory infections.   You can discuss treatment for this if any is needed when you next see Dr. Solis.        Sincerely,    Dr. Wright"

## 2019-06-18 NOTE — PROGRESS NOTES
"SUBJECTIVE:   Rm Villarreal is a 78 year old male who presents for Preventive Visit.    Are you in the first 12 months of your Medicare coverage?  No    Healthy Habits:     In general, how would you rate your overall health?  Good    Frequency of exercise:  6-7 days/week    Duration of exercise:  15-30 minutes (walking and yard work)    Do you usually eat at least 4 servings of fruit and vegetables a day, include whole grains    & fiber and avoid regularly eating high fat or \"junk\" foods?  Yes    Taking medications regularly:  Yes    Barriers to taking medications:  None    Medication side effects:  Lightheadedness    Ability to successfully perform activities of daily living:  No assistance needed    Home Safety:  No safety concerns identified    Hearing Impairment:  No hearing concerns    In the past 6 months, have you been bothered by leaking of urine?  No    In general, how would you rate your overall mental or emotional health?  Very good      PHQ-2 Total Score: 0    Additional concerns today:  No    Do you feel safe in your environment? YES    Do you have a Health Care Directive? Yes: Patient states has Advance Directive and will bring in a copy to clinic.      Fall risk  Fallen 2 or more times in the past year?: No  Any fall with injury in the past year?: No    Cognitive Screening   1) Repeat 3 items (Leader, Season, Table)    2) Clock draw: NORMAL  3) 3 item recall: Recalls 3 objects  Results: 3 items recalled: COGNITIVE IMPAIRMENT LESS LIKELY    Mini-CogTM Copyright S Natty. Licensed by the author for use in [facility]; reprinted with permission (jimmy@.[facility]). All rights reserved.      Do you have sleep apnea, excessive snoring or daytime drowsiness?: no    Reviewed and updated as needed this visit by clinical staff  Tobacco  Allergies  Meds  Soc Hx        Reviewed and updated as needed this visit by Provider        Social History     Tobacco Use     Smoking status: Former Smoker     " Last attempt to quit: 7/17/2003     Years since quitting: 15.9     Smokeless tobacco: Never Used   Substance Use Topics     Alcohol use: Yes     Alcohol/week: 0.0 oz     Frequency: 4 or more times a week     Drinks per session: 3 or 4     Binge frequency: Never     Comment: 3 beer per day     If you drink alcohol do you typically have >3 drinks per day or >7 drinks per week? Yes      Alcohol Use 6/18/2019   Prescreen: >3 drinks/day or >7 drinks/week? -   AUDIT SCORE  6     AUDIT - Alcohol Use Disorders Identification Test - Reproduced from the World Health Organization Audit 2001 (Second Edition) 6/18/2019   1.  How often do you have a drink containing alcohol? 4 or more times a week   2.  How many drinks containing alcohol do you have on a typical day when you are drinking? 3 or 4   3.  How often do you have five or more drinks on one occasion? Less than monthly   4.  How often during the last year have you found that you were not able to stop drinking once you had started? Never   5.  How often during the last year have you failed to do what was normally expected of you because of drinking? Never   6.  How often during the last year have you needed a first drink in the morning to get yourself going after a heavy drinking session? Never   7.  How often during the last year have you had a feeling of guilt or remorse after drinking? Never   8.  How often during the last year have you been unable to remember what happened the night before because of your drinking? Never   9.  Have you or someone else been injured because of your drinking? No   10. Has a relative, friend, doctor or other health care worker been concerned about your drinking or suggested you cut down? No   TOTAL SCORE 6             Current providers sharing in care for this patient include:   Patient Care Team:  Héctor Solis MD as PCP - General (Internal Medicine)  Héctor Solis MD as Assigned PCP    The following health maintenance items are  reviewed in Epic and correct as of today:  Health Maintenance   Topic Date Due     ADVANCE CARE PLANNING  1940     ZOSTER IMMUNIZATION (1 of 2) 07/22/1992     MEDICARE ANNUAL WELLNESS VISIT  05/23/2019     DTAP/TDAP/TD IMMUNIZATION (2 - Td) 10/15/2022     LIPID  06/10/2024     COLONOSCOPY  08/30/2028     PHQ-2  Completed     INFLUENZA VACCINE  Completed     PNEUMOCOCCAL IMMUNIZATION 65+ LOW/MEDIUM RISK  Completed     IPV IMMUNIZATION  Aged Out     MENINGITIS IMMUNIZATION  Aged Out     Current Outpatient Medications   Medication Sig Dispense Refill     amLODIPine (NORVASC) 5 MG tablet TAKE 1/2 TO 1 TABLET BY MOUTH EVERY EVENING AS DIRECTED 90 tablet 0     ASPIRIN ADULT LOW STRENGTH PO Take 81 mg by mouth daily.       lisinopril (PRINIVIL/ZESTRIL) 20 MG tablet TAKE 1 TABLET BY MOUTH TWICE DAILY 180 tablet 3     Loperamide HCl (IMODIUM A-D PO) Take by mouth 2 times daily       LORazepam (ATIVAN) 0.5 MG tablet TAKE ONE-HALF TABLET BY MOUTH TWICE DAILY 60 tablet 0     metoprolol tartrate (LOPRESSOR) 100 MG tablet TAKE 1/2 TABLET BY MOUTH TWICE DAILY. TAKE APPROXIMATELY 12 HOURS APART 60 tablet 0     NIACIN CR PO Take 500 mg by mouth 2 times daily (before meals).       nitroglycerin (NITROSTAT) 0.4 MG SL tablet For chest pain place 1 tablet under the tongue every 5 minutes for 3 doses. If symptoms persist 5 minutes after 1st dose call 911. 25 tablet 3     STATIN NOT PRESCRIBED, INTENTIONAL, Please choose reason not prescribed, below       VITAMIN D, CHOLECALCIFEROL, PO Take 1,000 Units by mouth daily.       No Known Allergies      Review of Systems  CONSTITUTIONAL: NEGATIVE for fever, chills, change in weight  INTEGUMENTARY/SKIN: NEGATIVE for worrisome rashes, moles or lesions  EYES: NEGATIVE for vision changes or irritation  ENT/MOUTH: NEGATIVE for ear, mouth and throat problems  RESP: NEGATIVE for significant cough or SOB  BREAST: NEGATIVE for masses, tenderness or discharge  CV: NEGATIVE for chest pain,  "palpitations or peripheral edema  GI: NEGATIVE for nausea, abdominal pain, heartburn, or change in bowel habits  : NEGATIVE for frequency, dysuria, or hematuria  MUSCULOSKELETAL: NEGATIVE for significant arthralgias or myalgia  NEURO: NEGATIVE for weakness, dizziness or paresthesias  ENDOCRINE: NEGATIVE for temperature intolerance, skin/hair changes  HEME: NEGATIVE for bleeding problems  PSYCHIATRIC: NEGATIVE for changes in mood or affect    OBJECTIVE:   /80 (BP Location: Right arm, Patient Position: Sitting, Cuff Size: Adult Regular)   Pulse (!) 42   Temp 97  F (36.1  C) (Oral)   Ht 1.753 m (5' 9\")   Wt 68.5 kg (151 lb)   SpO2 99%   BMI 22.30 kg/m   Estimated body mass index is 22.3 kg/m  as calculated from the following:    Height as of this encounter: 1.753 m (5' 9\").    Weight as of this encounter: 68.5 kg (151 lb).  Physical Exam  GENERAL: healthy, alert and no distress  EYES: Eyes grossly normal to inspection, PERRL and conjunctivae and sclerae normal  HENT: ear canals and TM's normal, nose and mouth without ulcers or lesions  NECK: no adenopathy, no asymmetry, masses, or scars and thyroid normal to palpation  RESP: lungs clear to auscultation - no rales, rhonchi or wheezes  CV: regular rate and rhythm, normal S1 S2, no S3 or S4, no murmur, click or rub, no peripheral edema and peripheral pulses strong  ABDOMEN: soft, nontender, no hepatosplenomegaly, no masses and bowel sounds normal  MS: no gross musculoskeletal defects noted, no edema  SKIN: no suspicious lesions or rashes  NEURO: Normal strength and tone, mentation intact and speech normal  PSYCH: mentation appears normal, affect normal/bright        ASSESSMENT / PLAN:   1. Encounter for routine adult health examination without abnormal findings      2. Atherosclerosis of native coronary artery of native heart without angina pectoris      3. Benign essential hypertension    - amLODIPine (NORVASC) 5 MG tablet; TAKE 1/2 TO 1 TABLET BY MOUTH " "EVERY EVENING AS DIRECTED  Dispense: 90 tablet; Refill: 0    4. Sedative, hypnotic or anxiolytic dependence (H)      5. Adjustment disorder with anxious mood    - LORazepam (ATIVAN) 0.5 MG tablet; TAKE ONE-HALF TABLET BY MOUTH TWICE DAILY  Dispense: 60 tablet; Refill: 0    6. Screening for prostate cancer      7. Hyperlipidemia LDL goal <100      8. Eczema, unspecified type      9. Vitamin D deficiency        End of Life Planning:  Patient currently has an advanced directive:     COUNSELING:        Estimated body mass index is 22.3 kg/m  as calculated from the following:    Height as of this encounter: 1.753 m (5' 9\").    Weight as of this encounter: 68.5 kg (151 lb).         reports that he quit smoking about 15 years ago. He has never used smokeless tobacco.      Appropriate preventive services were discussed with this patient, including applicable screening as appropriate for cardiovascular disease, diabetes, osteopenia/osteoporosis, and glaucoma.  As appropriate for age/gender, discussed screening for colorectal cancer, prostate cancer, breast cancer, and cervical cancer. Checklist reviewing preventive services available has been given to the patient.    Reviewed patients plan of care and provided an AVS. The  for Rm meets the Care Plan requirement. This Care Plan has been established and reviewed with the Patient.    Counseling Resources:  ATP IV Guidelines  Pooled Cohorts Equation Calculator  Breast Cancer Risk Calculator  FRAX Risk Assessment  ICSI Preventive Guidelines  Dietary Guidelines for Americans, 2010  USDA's MyPlate  ASA Prophylaxis  Lung CA Screening  RTC 1 mo.  Héctor Solis MD  [facility]    Identified Health Risks:  "

## 2019-07-08 ENCOUNTER — TELEPHONE (OUTPATIENT)
Dept: FAMILY MEDICINE | Facility: CLINIC | Age: 79
End: 2019-07-08

## 2019-07-08 DIAGNOSIS — I10 BENIGN ESSENTIAL HYPERTENSION: ICD-10-CM

## 2019-07-08 NOTE — TELEPHONE ENCOUNTER
Reason for Call:  Other     Detailed comments: Pt would like to know if he should take his medication as normal on the day of his stress test at 8 am on 7/29    Phone Number Patient can be reached at: Home number on file [phone] (home)    Best Time: any    Can we leave a detailed message on this number? YES    Call taken on 7/8/2019 at 7:13 AM by Pura Aguilar

## 2019-07-09 NOTE — TELEPHONE ENCOUNTER
"Last Written Prescription Date:  5/14/19  Last Fill Quantity: 60 tablet,  # refills: 0   Last office visit: 6/18/2019 with prescribing provider:  Will   Future Office Visit:      Requested Prescriptions   Pending Prescriptions Disp Refills     metoprolol tartrate (LOPRESSOR) 100 MG tablet [Pharmacy Med Name: METOPROLOL TARTRATE 100MG TABLETS] 60 tablet 0     Sig: TAKE 1/2 TABLET BY MOUTH TWICE DAILY. TAKE APPROXIMATELY 12 HOURS APART       Beta-Blockers Protocol Failed - 7/8/2019  3:44 AM        Failed - Blood pressure under 140/90 in past 12 months     BP Readings from Last 3 Encounters:   06/18/19 142/80   05/02/19 142/74   03/11/19 129/72                 Passed - Patient is age 6 or older        Passed - Recent (12 mo) or future (30 days) visit within the authorizing provider's specialty     Patient had office visit in the last 12 months or has a visit in the next 30 days with authorizing provider or within the authorizing provider's specialty.  See \"Patient Info\" tab in inbasket, or \"Choose Columns\" in Meds & Orders section of the refill encounter.              Passed - Medication is active on med list          "

## 2019-07-10 RX ORDER — METOPROLOL TARTRATE 100 MG
TABLET ORAL
Qty: 60 TABLET | Refills: 0 | Status: SHIPPED | OUTPATIENT
Start: 2019-07-10 | End: 2019-07-25

## 2019-07-10 NOTE — TELEPHONE ENCOUNTER
Sent 1 refill. Reviewed previous OV and htn diagnosis comment. This should be documented at next OV.   SAMANTHA Soni CNP

## 2019-07-25 ENCOUNTER — APPOINTMENT (OUTPATIENT)
Dept: CT IMAGING | Facility: CLINIC | Age: 79
End: 2019-07-25
Attending: EMERGENCY MEDICINE
Payer: COMMERCIAL

## 2019-07-25 ENCOUNTER — APPOINTMENT (OUTPATIENT)
Dept: GENERAL RADIOLOGY | Facility: CLINIC | Age: 79
End: 2019-07-25
Attending: EMERGENCY MEDICINE
Payer: COMMERCIAL

## 2019-07-25 ENCOUNTER — TELEPHONE (OUTPATIENT)
Dept: FAMILY MEDICINE | Facility: CLINIC | Age: 79
End: 2019-07-25

## 2019-07-25 ENCOUNTER — APPOINTMENT (OUTPATIENT)
Dept: INTERVENTIONAL RADIOLOGY/VASCULAR | Facility: CLINIC | Age: 79
End: 2019-07-25
Attending: RADIOLOGY
Payer: COMMERCIAL

## 2019-07-25 ENCOUNTER — HOSPITAL ENCOUNTER (EMERGENCY)
Facility: CLINIC | Age: 79
Discharge: ANOTHER HOSPITAL | End: 2019-07-25
Attending: EMERGENCY MEDICINE | Admitting: EMERGENCY MEDICINE
Payer: COMMERCIAL

## 2019-07-25 VITALS
RESPIRATION RATE: 16 BRPM | SYSTOLIC BLOOD PRESSURE: 95 MMHG | WEIGHT: 150 LBS | TEMPERATURE: 98.6 F | DIASTOLIC BLOOD PRESSURE: 64 MMHG | BODY MASS INDEX: 20.32 KG/M2 | HEIGHT: 72 IN | OXYGEN SATURATION: 98 % | HEART RATE: 58 BPM

## 2019-07-25 DIAGNOSIS — S91.312A FOOT LACERATION, LEFT, INITIAL ENCOUNTER: ICD-10-CM

## 2019-07-25 DIAGNOSIS — S42.301A: ICD-10-CM

## 2019-07-25 DIAGNOSIS — S32.591A CLOSED FRACTURE OF MULTIPLE PUBIC RAMI, RIGHT, INITIAL ENCOUNTER (H): ICD-10-CM

## 2019-07-25 DIAGNOSIS — S91.011A LACERATION OF RIGHT ANKLE, INITIAL ENCOUNTER: ICD-10-CM

## 2019-07-25 DIAGNOSIS — S32.591A: ICD-10-CM

## 2019-07-25 DIAGNOSIS — S32.10XA CLOSED FRACTURE OF SACRUM, UNSPECIFIED FRACTURE MORPHOLOGY, INITIAL ENCOUNTER (H): ICD-10-CM

## 2019-07-25 DIAGNOSIS — S82.51XB: ICD-10-CM

## 2019-07-25 DIAGNOSIS — T14.8XXA SKIN ABRASION: ICD-10-CM

## 2019-07-25 DIAGNOSIS — S82.451A CLOSED DISPLACED COMMINUTED FRACTURE OF SHAFT OF RIGHT FIBULA, INITIAL ENCOUNTER: ICD-10-CM

## 2019-07-25 DIAGNOSIS — S81.811A LACERATION OF LEG, RIGHT, INITIAL ENCOUNTER: ICD-10-CM

## 2019-07-25 LAB
ABO + RH BLD: NORMAL
ABO + RH BLD: NORMAL
ANION GAP SERPL CALCULATED.3IONS-SCNC: 7 MMOL/L (ref 3–14)
BASOPHILS # BLD AUTO: 0.1 10E9/L (ref 0–0.2)
BASOPHILS NFR BLD AUTO: 0.3 %
BLD GP AB SCN SERPL QL: NORMAL
BLD PROD TYP BPU: NORMAL
BLD UNIT ID BPU: 0
BLD UNIT ID BPU: 0
BLOOD BANK CMNT PATIENT-IMP: NORMAL
BLOOD PRODUCT CODE: NORMAL
BLOOD PRODUCT CODE: NORMAL
BPU ID: NORMAL
BPU ID: NORMAL
BUN SERPL-MCNC: 15 MG/DL (ref 7–30)
CALCIUM SERPL-MCNC: 8.1 MG/DL (ref 8.5–10.1)
CHLORIDE SERPL-SCNC: 108 MMOL/L (ref 94–109)
CO2 SERPL-SCNC: 25 MMOL/L (ref 20–32)
CREAT SERPL-MCNC: 0.85 MG/DL (ref 0.66–1.25)
DIFFERENTIAL METHOD BLD: ABNORMAL
EOSINOPHIL # BLD AUTO: 0.2 10E9/L (ref 0–0.7)
EOSINOPHIL NFR BLD AUTO: 1.1 %
ERYTHROCYTE [DISTWIDTH] IN BLOOD BY AUTOMATED COUNT: 13.9 % (ref 10–15)
GFR SERPL CREATININE-BSD FRML MDRD: 84 ML/MIN/{1.73_M2}
GLUCOSE SERPL-MCNC: 118 MG/DL (ref 70–99)
HCT VFR BLD AUTO: 37.9 % (ref 40–53)
HGB BLD-MCNC: 11.7 G/DL (ref 13.3–17.7)
HGB BLD-MCNC: 13.3 G/DL (ref 13.3–17.7)
IMM GRANULOCYTES # BLD: 0.2 10E9/L (ref 0–0.4)
IMM GRANULOCYTES NFR BLD: 1.2 %
INR PPP: 1.08 (ref 0.86–1.14)
LYMPHOCYTES # BLD AUTO: 1.5 10E9/L (ref 0.8–5.3)
LYMPHOCYTES NFR BLD AUTO: 8.7 %
MCH RBC QN AUTO: 33.2 PG (ref 26.5–33)
MCHC RBC AUTO-ENTMCNC: 35.1 G/DL (ref 31.5–36.5)
MCV RBC AUTO: 95 FL (ref 78–100)
MONOCYTES # BLD AUTO: 1 10E9/L (ref 0–1.3)
MONOCYTES NFR BLD AUTO: 5.8 %
NEUTROPHILS # BLD AUTO: 14.1 10E9/L (ref 1.6–8.3)
NEUTROPHILS NFR BLD AUTO: 82.9 %
NRBC # BLD AUTO: 0 10*3/UL
NRBC BLD AUTO-RTO: 0 /100
NUM BPU REQUESTED: 2
PLATELET # BLD AUTO: 249 10E9/L (ref 150–450)
POTASSIUM SERPL-SCNC: 4.2 MMOL/L (ref 3.4–5.3)
RBC # BLD AUTO: 4.01 10E12/L (ref 4.4–5.9)
SODIUM SERPL-SCNC: 140 MMOL/L (ref 133–144)
SPECIMEN EXP DATE BLD: NORMAL
TRANSFUSION STATUS PATIENT QL: NORMAL
WBC # BLD AUTO: 17 10E9/L (ref 4–11)

## 2019-07-25 PROCEDURE — 86901 BLOOD TYPING SEROLOGIC RH(D): CPT | Performed by: EMERGENCY MEDICINE

## 2019-07-25 PROCEDURE — 12004 RPR S/N/AX/GEN/TRK7.6-12.5CM: CPT | Mod: 59

## 2019-07-25 PROCEDURE — 25500064 ZZH RX 255 OP 636: Performed by: RADIOLOGY

## 2019-07-25 PROCEDURE — 25000125 ZZHC RX 250: Performed by: EMERGENCY MEDICINE

## 2019-07-25 PROCEDURE — C1769 GUIDE WIRE: HCPCS

## 2019-07-25 PROCEDURE — 99204 OFFICE O/P NEW MOD 45 MIN: CPT | Performed by: SURGERY

## 2019-07-25 PROCEDURE — 86923 COMPATIBILITY TEST ELECTRIC: CPT | Performed by: EMERGENCY MEDICINE

## 2019-07-25 PROCEDURE — 72040 X-RAY EXAM NECK SPINE 2-3 VW: CPT

## 2019-07-25 PROCEDURE — 25000128 H RX IP 250 OP 636: Performed by: EMERGENCY MEDICINE

## 2019-07-25 PROCEDURE — 96376 TX/PRO/DX INJ SAME DRUG ADON: CPT

## 2019-07-25 PROCEDURE — 25500064 ZZH RX 255 OP 636: Performed by: EMERGENCY MEDICINE

## 2019-07-25 PROCEDURE — 90471 IMMUNIZATION ADMIN: CPT

## 2019-07-25 PROCEDURE — 25000125 ZZHC RX 250: Performed by: RADIOLOGY

## 2019-07-25 PROCEDURE — 96366 THER/PROPH/DIAG IV INF ADDON: CPT | Mod: 59

## 2019-07-25 PROCEDURE — 96367 TX/PROPH/DG ADDL SEQ IV INF: CPT | Mod: 59

## 2019-07-25 PROCEDURE — 85025 COMPLETE CBC W/AUTO DIFF WBC: CPT | Performed by: EMERGENCY MEDICINE

## 2019-07-25 PROCEDURE — 75774 ARTERY X-RAY EACH VESSEL: CPT | Mod: XS

## 2019-07-25 PROCEDURE — 96365 THER/PROPH/DIAG IV INF INIT: CPT | Mod: 59

## 2019-07-25 PROCEDURE — 72191 CT ANGIOGRAPH PELV W/O&W/DYE: CPT

## 2019-07-25 PROCEDURE — 99291 CRITICAL CARE FIRST HOUR: CPT | Mod: 25

## 2019-07-25 PROCEDURE — P9016 RBC LEUKOCYTES REDUCED: HCPCS | Performed by: EMERGENCY MEDICINE

## 2019-07-25 PROCEDURE — 36430 TRANSFUSION BLD/BLD COMPNT: CPT | Mod: 59

## 2019-07-25 PROCEDURE — 85018 HEMOGLOBIN: CPT | Performed by: EMERGENCY MEDICINE

## 2019-07-25 PROCEDURE — 70450 CT HEAD/BRAIN W/O DYE: CPT

## 2019-07-25 PROCEDURE — 25000128 H RX IP 250 OP 636

## 2019-07-25 PROCEDURE — 27210892 ZZH CATH CR4

## 2019-07-25 PROCEDURE — 99292 CRITICAL CARE ADDL 30 MIN: CPT

## 2019-07-25 PROCEDURE — 25000128 H RX IP 250 OP 636: Performed by: RADIOLOGY

## 2019-07-25 PROCEDURE — 71260 CT THORAX DX C+: CPT

## 2019-07-25 PROCEDURE — 86850 RBC ANTIBODY SCREEN: CPT | Performed by: EMERGENCY MEDICINE

## 2019-07-25 PROCEDURE — C1760 CLOSURE DEV, VASC: HCPCS

## 2019-07-25 PROCEDURE — G0390 TRAUMA RESPONS W/HOSP CRITI: HCPCS

## 2019-07-25 PROCEDURE — 85610 PROTHROMBIN TIME: CPT | Performed by: EMERGENCY MEDICINE

## 2019-07-25 PROCEDURE — 96375 TX/PRO/DX INJ NEW DRUG ADDON: CPT

## 2019-07-25 PROCEDURE — 99153 MOD SED SAME PHYS/QHP EA: CPT

## 2019-07-25 PROCEDURE — 36246 INS CATH ABD/L-EXT ART 2ND: CPT

## 2019-07-25 PROCEDURE — 27210886 ZZH ACCESSORY CR5

## 2019-07-25 PROCEDURE — 80048 BASIC METABOLIC PNL TOTAL CA: CPT | Performed by: EMERGENCY MEDICINE

## 2019-07-25 PROCEDURE — 90715 TDAP VACCINE 7 YRS/> IM: CPT | Performed by: EMERGENCY MEDICINE

## 2019-07-25 PROCEDURE — 27210804 ZZH SHEATH CR3

## 2019-07-25 PROCEDURE — 73610 X-RAY EXAM OF ANKLE: CPT | Mod: RT

## 2019-07-25 PROCEDURE — 73060 X-RAY EXAM OF HUMERUS: CPT | Mod: LT

## 2019-07-25 PROCEDURE — 86900 BLOOD TYPING SEROLOGIC ABO: CPT | Performed by: EMERGENCY MEDICINE

## 2019-07-25 PROCEDURE — 27210746 ZZH CATH CR16

## 2019-07-25 PROCEDURE — 25800030 ZZH RX IP 258 OP 636: Performed by: RADIOLOGY

## 2019-07-25 RX ORDER — SODIUM CHLORIDE 9 MG/ML
INJECTION, SOLUTION INTRAVENOUS CONTINUOUS
Status: CANCELLED | OUTPATIENT
Start: 2019-07-25

## 2019-07-25 RX ORDER — IOPAMIDOL 612 MG/ML
150 INJECTION, SOLUTION INTRAVASCULAR ONCE
Status: COMPLETED | OUTPATIENT
Start: 2019-07-25 | End: 2019-07-25

## 2019-07-25 RX ORDER — ONDANSETRON 2 MG/ML
INJECTION INTRAMUSCULAR; INTRAVENOUS
Status: COMPLETED
Start: 2019-07-25 | End: 2019-07-25

## 2019-07-25 RX ORDER — CEFAZOLIN SODIUM 1 G/3ML
1 INJECTION, POWDER, FOR SOLUTION INTRAMUSCULAR; INTRAVENOUS ONCE
Status: COMPLETED | OUTPATIENT
Start: 2019-07-25 | End: 2019-07-25

## 2019-07-25 RX ORDER — FENTANYL CITRATE 50 UG/ML
INJECTION, SOLUTION INTRAMUSCULAR; INTRAVENOUS
Status: DISCONTINUED
Start: 2019-07-25 | End: 2019-07-25 | Stop reason: HOSPADM

## 2019-07-25 RX ORDER — FLUMAZENIL 0.1 MG/ML
0.2 INJECTION, SOLUTION INTRAVENOUS
Status: DISCONTINUED | OUTPATIENT
Start: 2019-07-25 | End: 2019-07-25 | Stop reason: HOSPADM

## 2019-07-25 RX ORDER — ONDANSETRON 2 MG/ML
4 INJECTION INTRAMUSCULAR; INTRAVENOUS ONCE
Status: COMPLETED | OUTPATIENT
Start: 2019-07-25 | End: 2019-07-25

## 2019-07-25 RX ORDER — LIDOCAINE HYDROCHLORIDE 10 MG/ML
INJECTION, SOLUTION INFILTRATION; PERINEURAL
Status: DISCONTINUED
Start: 2019-07-25 | End: 2019-07-25 | Stop reason: HOSPADM

## 2019-07-25 RX ORDER — NALOXONE HYDROCHLORIDE 0.4 MG/ML
.1-.4 INJECTION, SOLUTION INTRAMUSCULAR; INTRAVENOUS; SUBCUTANEOUS
Status: DISCONTINUED | OUTPATIENT
Start: 2019-07-25 | End: 2019-07-25 | Stop reason: HOSPADM

## 2019-07-25 RX ORDER — NICOTINE POLACRILEX 4 MG
15-30 LOZENGE BUCCAL
Status: CANCELLED | OUTPATIENT
Start: 2019-07-25

## 2019-07-25 RX ORDER — FENTANYL CITRATE 50 UG/ML
25-50 INJECTION, SOLUTION INTRAMUSCULAR; INTRAVENOUS EVERY 5 MIN PRN
Status: DISCONTINUED | OUTPATIENT
Start: 2019-07-25 | End: 2019-07-25 | Stop reason: HOSPADM

## 2019-07-25 RX ORDER — ACETAMINOPHEN 500 MG
500 TABLET ORAL EVERY 6 HOURS PRN
Status: CANCELLED | OUTPATIENT
Start: 2019-07-25

## 2019-07-25 RX ORDER — ONDANSETRON 2 MG/ML
INJECTION INTRAMUSCULAR; INTRAVENOUS
Status: DISCONTINUED
Start: 2019-07-25 | End: 2019-07-25 | Stop reason: HOSPADM

## 2019-07-25 RX ORDER — METOPROLOL TARTRATE 50 MG
50 TABLET ORAL 2 TIMES DAILY
COMMUNITY
End: 2020-02-13

## 2019-07-25 RX ORDER — DEXTROSE MONOHYDRATE 25 G/50ML
25-50 INJECTION, SOLUTION INTRAVENOUS
Status: CANCELLED | OUTPATIENT
Start: 2019-07-25

## 2019-07-25 RX ADMIN — ONDANSETRON 4 MG: 2 INJECTION INTRAMUSCULAR; INTRAVENOUS at 11:40

## 2019-07-25 RX ADMIN — TRANEXAMIC ACID 1 G: 100 INJECTION, SOLUTION INTRAVENOUS at 13:37

## 2019-07-25 RX ADMIN — LIDOCAINE HYDROCHLORIDE 10 ML: 10 INJECTION, SOLUTION INFILTRATION; PERINEURAL at 16:02

## 2019-07-25 RX ADMIN — CEFAZOLIN 1 G: 1 INJECTION, POWDER, FOR SOLUTION INTRAMUSCULAR; INTRAVENOUS at 12:49

## 2019-07-25 RX ADMIN — IOPAMIDOL 70 ML: 612 INJECTION, SOLUTION INTRAVENOUS at 16:02

## 2019-07-25 RX ADMIN — SODIUM CHLORIDE 63 ML: 9 INJECTION, SOLUTION INTRAVENOUS at 12:38

## 2019-07-25 RX ADMIN — HEPARIN SODIUM 10000 UNITS: 10000 INJECTION INTRAVENOUS; SUBCUTANEOUS at 14:27

## 2019-07-25 RX ADMIN — FENTANYL CITRATE 25 MCG: 50 INJECTION, SOLUTION INTRAMUSCULAR; INTRAVENOUS at 14:39

## 2019-07-25 RX ADMIN — SODIUM CHLORIDE 1000 ML: 9 INJECTION, SOLUTION INTRAVENOUS at 13:13

## 2019-07-25 RX ADMIN — ONDANSETRON 4 MG: 2 INJECTION INTRAMUSCULAR; INTRAVENOUS at 16:40

## 2019-07-25 RX ADMIN — HEPARIN SODIUM 10000 UNITS: 10000 INJECTION INTRAVENOUS; SUBCUTANEOUS at 14:26

## 2019-07-25 RX ADMIN — IOHEXOL 75 ML: 350 INJECTION, SOLUTION INTRAVENOUS at 12:38

## 2019-07-25 RX ADMIN — CLOSTRIDIUM TETANI TOXOID ANTIGEN (FORMALDEHYDE INACTIVATED), CORYNEBACTERIUM DIPHTHERIAE TOXOID ANTIGEN (FORMALDEHYDE INACTIVATED), BORDETELLA PERTUSSIS TOXOID ANTIGEN (GLUTARALDEHYDE INACTIVATED), BORDETELLA PERTUSSIS FILAMENTOUS HEMAGGLUTININ ANTIGEN (FORMALDEHYDE INACTIVATED), BORDETELLA PERTUSSIS PERTACTIN ANTIGEN, AND BORDETELLA PERTUSSIS FIMBRIAE 2/3 ANTIGEN 0.5 ML: 5; 2; 2.5; 5; 3; 5 INJECTION, SUSPENSION INTRAMUSCULAR at 12:49

## 2019-07-25 RX ADMIN — SODIUM CHLORIDE 80 ML: 9 INJECTION, SOLUTION INTRAVENOUS at 14:02

## 2019-07-25 RX ADMIN — MIDAZOLAM HYDROCHLORIDE 0.5 MG: 1 INJECTION, SOLUTION INTRAMUSCULAR; INTRAVENOUS at 14:39

## 2019-07-25 RX ADMIN — IOHEXOL 80 ML: 350 INJECTION, SOLUTION INTRAVENOUS at 14:02

## 2019-07-25 RX ADMIN — FENTANYL CITRATE 25 MCG: 50 INJECTION, SOLUTION INTRAMUSCULAR; INTRAVENOUS at 16:47

## 2019-07-25 ASSESSMENT — ENCOUNTER SYMPTOMS
LIGHT-HEADEDNESS: 0
HEADACHES: 0
MYALGIAS: 1
ARTHRALGIAS: 1
WOUND: 1

## 2019-07-25 ASSESSMENT — MIFFLIN-ST. JEOR: SCORE: 1443.4

## 2019-07-25 NOTE — TELEPHONE ENCOUNTER
Panel Management Review      Patient has the following on his problem list:     Hypertension   Last three blood pressure readings:  BP Readings from Last 3 Encounters:   06/18/19 142/80   05/02/19 142/74   03/11/19 129/72     Blood pressure: FAILED    HTN Guidelines:  Less than 140/90      Composite cancer screening  Chart review shows that this patient is due/due soon for the following None  Summary:    Patient is due/failing the following:   BP CHECK    Action needed:   Patient will be doing stress test on 7/29 and bp will probably be checked at that time.       Type of outreach:    Phone, spoke to patient.  says he has white coat syndrome    Questions for provider review:    None                                                                                                                                    Quiana Trejo MA       Chart routed to  .
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
## 2019-07-25 NOTE — PROGRESS NOTES
patient Name:  Rm Villarreal    Medical Record Number: [id number]  Today's Date:  July 25, 2019  Procedure: pelvic angiogram, venogram and coiling of right obturator artery,angioseal rt femoral artery.  Start Time: 1439  End of procedure time: 1602    Report given to: ED  RN  Time pt departs:  1625       Notes:       Pt transferred to IR table. Prepped and draped appropriately. Monitoring equipment applied. NC applied. No complaints of pain at this time. Timeout recorded.       VSS. Pt remains on RA. No c/o pain at this time.  Rt Groin arterial site closed with angioseal, Left femoral venous access sheath dcd, both sites CDI, soft. No further questions from RN or pt.    Versed 0.5mg, Fentanyl 25mcg.
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
## 2019-07-25 NOTE — ED NOTES
Pt transferred to stabe room, no c/o abd pain, some nausea, given zofran. Dr Raymond here to see pt. Pt sent for CT. Family here. Blood started. sbp 108 after blood started. Pt [id number].

## 2019-07-25 NOTE — ED NOTES
Pulses to right DP and PT found per doppler and marked. Right groin assessed, no hematoma, no oozing. Foot is cool to touch. MD aware. Posterior splint applied to left arm. Tolerated well.
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
## 2019-08-04 VITALS
WEIGHT: 156.7 LBS | RESPIRATION RATE: 20 BRPM | OXYGEN SATURATION: 98 % | SYSTOLIC BLOOD PRESSURE: 118 MMHG | TEMPERATURE: 98 F | DIASTOLIC BLOOD PRESSURE: 73 MMHG | HEART RATE: 78 BPM | BODY MASS INDEX: 21.25 KG/M2

## 2019-08-04 RX ORDER — LIDOCAINE 50 MG/G
PATCH TOPICAL
COMMUNITY
End: 2019-08-05

## 2019-08-04 RX ORDER — OXYCODONE HYDROCHLORIDE 5 MG/1
TABLET ORAL
COMMUNITY
End: 2019-09-08

## 2019-08-04 RX ORDER — SENNOSIDES 8.6 MG
1 TABLET ORAL 2 TIMES DAILY PRN
COMMUNITY
End: 2019-08-29

## 2019-08-04 RX ORDER — METHOCARBAMOL 500 MG/1
TABLET, FILM COATED ORAL
COMMUNITY
End: 2019-10-10

## 2019-08-04 RX ORDER — POLYETHYLENE GLYCOL 3350 17 G/17G
1 POWDER, FOR SOLUTION ORAL DAILY PRN
COMMUNITY
End: 2019-10-10

## 2019-08-04 RX ORDER — ACETAMINOPHEN 500 MG
1000 TABLET ORAL EVERY 6 HOURS PRN
COMMUNITY
End: 2021-04-17

## 2019-08-05 ENCOUNTER — NURSING HOME VISIT (OUTPATIENT)
Dept: GERIATRICS | Facility: CLINIC | Age: 79
End: 2019-08-05
Payer: COMMERCIAL

## 2019-08-05 DIAGNOSIS — I10 BENIGN ESSENTIAL HYPERTENSION: ICD-10-CM

## 2019-08-05 DIAGNOSIS — S32.810D: Primary | ICD-10-CM

## 2019-08-05 DIAGNOSIS — S82.841D: ICD-10-CM

## 2019-08-05 DIAGNOSIS — F41.1 GAD (GENERALIZED ANXIETY DISORDER): ICD-10-CM

## 2019-08-05 DIAGNOSIS — D62 ANEMIA DUE TO BLOOD LOSS, ACUTE: ICD-10-CM
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
## 2019-08-05 DIAGNOSIS — I25.10 ATHEROSCLEROSIS OF NATIVE CORONARY ARTERY OF NATIVE HEART WITHOUT ANGINA PECTORIS: ICD-10-CM

## 2019-08-05 DIAGNOSIS — S42.302D CLOSED TRAUMATIC DISPLACED FRACTURE OF SHAFT OF LEFT HUMERUS WITH ROUTINE HEALING, SUBSEQUENT ENCOUNTER: ICD-10-CM

## 2019-08-05 PROCEDURE — 99310 SBSQ NF CARE HIGH MDM 45: CPT | Performed by: NURSE PRACTITIONER
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
## 2019-08-06 ENCOUNTER — TRANSFERRED RECORDS (OUTPATIENT)
Dept: HEALTH INFORMATION MANAGEMENT | Facility: CLINIC | Age: 79
End: 2019-08-06

## 2019-08-06 LAB
ANION GAP SERPL CALCULATED.3IONS-SCNC: 11 MMOL/L (ref 7–16)
BUN SERPL-MCNC: 11 MG/DL (ref 7–26)
CALCIUM SERPL-MCNC: 8.4 MG/DL (ref 8.4–10.4)
CHLORIDE SERPLBLD-SCNC: 101 MMOL/L (ref 98–109)
CO2 SERPL-SCNC: 20 MMOL/L (ref 20–29)
CREAT SERPL-MCNC: 0.79 MG/DL (ref 0.73–1.18)
GFR SERPL CREATININE-BSD FRML MDRD: >60 ML/MIN/1.73M2
GLUCOSE SERPL-MCNC: 73 MG/DL (ref 70–100)
HEMOGLOBIN: 9.1 G/DL (ref 13.5–17.5)
POTASSIUM SERPL-SCNC: 4.3 MMOL/L (ref 3.5–5.1)
SODIUM SERPL-SCNC: 132 MMOL/L (ref 136–145)

## 2019-08-07 VITALS
DIASTOLIC BLOOD PRESSURE: 71 MMHG | SYSTOLIC BLOOD PRESSURE: 126 MMHG | RESPIRATION RATE: 18 BRPM | BODY MASS INDEX: 21.25 KG/M2 | OXYGEN SATURATION: 100 % | TEMPERATURE: 97.6 F | HEART RATE: 83 BPM | WEIGHT: 156.7 LBS

## 2019-08-07 RX ORDER — LIDOCAINE 4 G/G
PATCH TOPICAL
COMMUNITY
End: 2019-09-16

## 2019-08-07 RX ORDER — OXYCODONE HCL 10 MG/1
10 TABLET, FILM COATED, EXTENDED RELEASE ORAL EVERY 12 HOURS
COMMUNITY
End: 2019-08-29

## 2019-08-08 ENCOUNTER — NURSING HOME VISIT (OUTPATIENT)
Dept: GERIATRICS | Facility: CLINIC | Age: 79
End: 2019-08-08
Payer: COMMERCIAL

## 2019-08-08 DIAGNOSIS — S32.810D: ICD-10-CM

## 2019-08-08 DIAGNOSIS — I10 BENIGN ESSENTIAL HYPERTENSION: ICD-10-CM

## 2019-08-08 DIAGNOSIS — F41.1 GAD (GENERALIZED ANXIETY DISORDER): ICD-10-CM

## 2019-08-08 DIAGNOSIS — D62 ANEMIA DUE TO BLOOD LOSS, ACUTE: ICD-10-CM

## 2019-08-08 DIAGNOSIS — S82.841D: ICD-10-CM
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
## 2019-08-08 DIAGNOSIS — S42.302D CLOSED TRAUMATIC DISPLACED FRACTURE OF SHAFT OF LEFT HUMERUS WITH ROUTINE HEALING, SUBSEQUENT ENCOUNTER: ICD-10-CM

## 2019-08-08 PROCEDURE — 99207 ZZC CDG-CODE INCORRECT PER BILLING BASED ON TIME: CPT | Performed by: NURSE PRACTITIONER

## 2019-08-08 PROCEDURE — 99309 SBSQ NF CARE MODERATE MDM 30: CPT | Performed by: NURSE PRACTITIONER
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
## 2019-08-09 ENCOUNTER — NURSING HOME VISIT (OUTPATIENT)
Dept: GERIATRICS | Facility: CLINIC | Age: 79
End: 2019-08-09
Payer: COMMERCIAL

## 2019-08-09 DIAGNOSIS — I10 BENIGN ESSENTIAL HYPERTENSION: ICD-10-CM

## 2019-08-09 DIAGNOSIS — S32.810D: ICD-10-CM
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
## 2019-08-09 DIAGNOSIS — S82.841D: ICD-10-CM

## 2019-08-09 DIAGNOSIS — D62 ANEMIA DUE TO BLOOD LOSS, ACUTE: ICD-10-CM

## 2019-08-09 PROCEDURE — 99306 1ST NF CARE HIGH MDM 50: CPT | Performed by: INTERNAL MEDICINE
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
## 2019-08-13 VITALS
DIASTOLIC BLOOD PRESSURE: 69 MMHG | SYSTOLIC BLOOD PRESSURE: 124 MMHG | OXYGEN SATURATION: 100 % | WEIGHT: 149.7 LBS | HEART RATE: 78 BPM | TEMPERATURE: 97.2 F | BODY MASS INDEX: 20.3 KG/M2 | RESPIRATION RATE: 16 BRPM

## 2019-08-14 ENCOUNTER — NURSING HOME VISIT (OUTPATIENT)
Dept: GERIATRICS | Facility: CLINIC | Age: 79
End: 2019-08-14
Payer: COMMERCIAL
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
## 2019-08-14 DIAGNOSIS — S32.810D: ICD-10-CM

## 2019-08-14 DIAGNOSIS — F41.1 GAD (GENERALIZED ANXIETY DISORDER): ICD-10-CM

## 2019-08-14 DIAGNOSIS — D62 ANEMIA DUE TO BLOOD LOSS, ACUTE: ICD-10-CM

## 2019-08-14 DIAGNOSIS — I10 BENIGN ESSENTIAL HYPERTENSION: ICD-10-CM

## 2019-08-14 DIAGNOSIS — S82.841D: ICD-10-CM

## 2019-08-14 DIAGNOSIS — S42.302D CLOSED TRAUMATIC DISPLACED FRACTURE OF SHAFT OF LEFT HUMERUS WITH ROUTINE HEALING, SUBSEQUENT ENCOUNTER: ICD-10-CM

## 2019-08-14 PROCEDURE — 99309 SBSQ NF CARE MODERATE MDM 30: CPT | Performed by: NURSE PRACTITIONER

## 2019-08-14 PROCEDURE — 99207 ZZC CDG-CODE INCORRECT PER BILLING BASED ON TIME: CPT | Performed by: NURSE PRACTITIONER
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
## 2019-08-15 ENCOUNTER — TRANSFERRED RECORDS (OUTPATIENT)
Dept: HEALTH INFORMATION MANAGEMENT | Facility: CLINIC | Age: 79
End: 2019-08-15

## 2019-08-15 ENCOUNTER — TELEPHONE (OUTPATIENT)
Dept: GERIATRICS | Facility: CLINIC | Age: 79
End: 2019-08-15
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
## 2019-08-16 ENCOUNTER — TELEPHONE (OUTPATIENT)
Dept: GERIATRICS | Facility: CLINIC | Age: 79
End: 2019-08-16

## 2019-08-16 ENCOUNTER — TRANSFERRED RECORDS (OUTPATIENT)
Dept: HEALTH INFORMATION MANAGEMENT | Facility: CLINIC | Age: 79
End: 2019-08-16

## 2019-08-16 LAB
ANION GAP SERPL CALCULATED.3IONS-SCNC: 10 MMOL/L (ref 7–16)
BUN SERPL-MCNC: 10 MG/DL (ref 7–26)
CALCIUM SERPL-MCNC: 9 MG/DL (ref 8.4–10.4)
CHLORIDE SERPLBLD-SCNC: 102 MMOL/L (ref 98–109)
CO2 SERPL-SCNC: 21 MMOL/L (ref 20–29)
CREAT SERPL-MCNC: 0.74 MG/DL (ref 0.73–1.18)
GFR SERPL CREATININE-BSD FRML MDRD: >60 ML/MIN/1.73M2
GLUCOSE SERPL-MCNC: 87 MG/DL (ref 70–100)
HEMOGLOBIN: 10.3 G/DL (ref 13.5–17.5)
POTASSIUM SERPL-SCNC: 4.2 MMOL/L (ref 3.5–5.1)
SODIUM SERPL-SCNC: 33 MMOL/L (ref 136–145)
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
## 2019-08-21 ENCOUNTER — TRANSFERRED RECORDS (OUTPATIENT)
Dept: HEALTH INFORMATION MANAGEMENT | Facility: CLINIC | Age: 79
End: 2019-08-21

## 2019-08-22 ENCOUNTER — TRANSFERRED RECORDS (OUTPATIENT)
Dept: HEALTH INFORMATION MANAGEMENT | Facility: CLINIC | Age: 79
End: 2019-08-22

## 2019-08-22 VITALS
RESPIRATION RATE: 17 BRPM | HEART RATE: 82 BPM | DIASTOLIC BLOOD PRESSURE: 64 MMHG | OXYGEN SATURATION: 98 % | BODY MASS INDEX: 18.72 KG/M2 | TEMPERATURE: 95.6 F | WEIGHT: 138 LBS | SYSTOLIC BLOOD PRESSURE: 98 MMHG

## 2019-08-22 RX ORDER — GINSENG 100 MG
CAPSULE ORAL
COMMUNITY
End: 2021-04-17

## 2019-08-22 RX ORDER — CLINDAMYCIN HCL 300 MG
300 CAPSULE ORAL EVERY 6 HOURS
COMMUNITY
End: 2019-08-29

## 2019-08-22 RX ORDER — TRAZODONE HYDROCHLORIDE 50 MG/1
25 TABLET, FILM COATED ORAL AT BEDTIME
COMMUNITY
End: 2019-08-29 | Stop reason: ALTCHOICE

## 2019-08-23 ENCOUNTER — DISCHARGE SUMMARY NURSING HOME (OUTPATIENT)
Dept: GERIATRICS | Facility: CLINIC | Age: 79
End: 2019-08-23
Payer: COMMERCIAL
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
## 2019-08-23 DIAGNOSIS — S82.841D: ICD-10-CM

## 2019-08-23 DIAGNOSIS — D62 ANEMIA DUE TO BLOOD LOSS, ACUTE: ICD-10-CM

## 2019-08-23 DIAGNOSIS — S42.302D CLOSED TRAUMATIC DISPLACED FRACTURE OF SHAFT OF LEFT HUMERUS WITH ROUTINE HEALING, SUBSEQUENT ENCOUNTER: ICD-10-CM

## 2019-08-23 DIAGNOSIS — I10 BENIGN ESSENTIAL HYPERTENSION: ICD-10-CM

## 2019-08-23 DIAGNOSIS — S32.810D: ICD-10-CM

## 2019-08-23 DIAGNOSIS — F41.1 GAD (GENERALIZED ANXIETY DISORDER): ICD-10-CM

## 2019-08-23 PROCEDURE — 99316 NF DSCHRG MGMT 30 MIN+: CPT | Performed by: NURSE PRACTITIONER

## 2019-08-23 RX ORDER — OXYCODONE HYDROCHLORIDE 5 MG/1
5-10 TABLET ORAL EVERY 4 HOURS PRN
Qty: 45 TABLET | Refills: 0 | Status: SHIPPED | OUTPATIENT
Start: 2019-08-23 | End: 2019-09-16

## 2019-08-23 RX ORDER — LORAZEPAM 0.5 MG/1
0.5 TABLET ORAL EVERY 8 HOURS PRN
Qty: 20 TABLET | Refills: 0 | Status: SHIPPED | OUTPATIENT
Start: 2019-08-23 | End: 2019-08-29 | Stop reason: ALTCHOICE
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
## 2019-08-27 ENCOUNTER — TELEPHONE (OUTPATIENT)
Dept: FAMILY MEDICINE | Facility: CLINIC | Age: 79
End: 2019-08-27

## 2019-08-27 NOTE — TELEPHONE ENCOUNTER
Verbal approval given per request below.Homecare/hospice agency to fax orders for provider signature.     Kami BECK RN

## 2019-08-28 ENCOUNTER — TELEPHONE (OUTPATIENT)
Dept: FAMILY MEDICINE | Facility: CLINIC | Age: 79
End: 2019-08-28
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
## 2019-08-28 NOTE — TELEPHONE ENCOUNTER
"ED / Discharge Outreach Protocol    Patient Contact    Attempt # 1    Was call answered?  Yes.  \"May I please speak with <patient name>\"  Is patient available?   Yes      Pt is still in therapy at \"a big house\" on [address]     He is still not able to walk- they are going to proceed \"up one notch\" with therapy, soon. Once OK'd by surgery.     He plans to attend his f/u appt with Dr. Solis, tomorrow.     Kami BECK RN                "

## 2019-08-29 ENCOUNTER — OFFICE VISIT (OUTPATIENT)
Dept: FAMILY MEDICINE | Facility: CLINIC | Age: 79
End: 2019-08-29
Payer: COMMERCIAL

## 2019-08-29 ENCOUNTER — TELEPHONE (OUTPATIENT)
Dept: FAMILY MEDICINE | Facility: CLINIC | Age: 79
End: 2019-08-29

## 2019-08-29 VITALS
HEART RATE: 74 BPM | WEIGHT: 138 LBS | TEMPERATURE: 98.1 F | OXYGEN SATURATION: 100 % | HEIGHT: 72 IN | BODY MASS INDEX: 18.69 KG/M2 | DIASTOLIC BLOOD PRESSURE: 64 MMHG | SYSTOLIC BLOOD PRESSURE: 98 MMHG

## 2019-08-29 DIAGNOSIS — L30.9 ECZEMA, UNSPECIFIED TYPE: ICD-10-CM

## 2019-08-29 DIAGNOSIS — F43.22 ADJUSTMENT DISORDER WITH ANXIOUS MOOD: ICD-10-CM

## 2019-08-29 DIAGNOSIS — S32.810A MULTIPLE CLOSED FRACTURES OF PELVIS WITH STABLE DISRUPTION OF PELVIC RING, INITIAL ENCOUNTER (H): Primary | ICD-10-CM

## 2019-08-29 DIAGNOSIS — F13.20 SEDATIVE, HYPNOTIC OR ANXIOLYTIC DEPENDENCE (H): ICD-10-CM

## 2019-08-29 DIAGNOSIS — F43.23 ADJUSTMENT DISORDER WITH MIXED ANXIETY AND DEPRESSED MOOD: ICD-10-CM

## 2019-08-29 DIAGNOSIS — I25.10 ATHEROSCLEROSIS OF NATIVE CORONARY ARTERY OF NATIVE HEART WITHOUT ANGINA PECTORIS: ICD-10-CM

## 2019-08-29 DIAGNOSIS — S72.90XA CLOSED FRACTURE OF FEMUR, UNSPECIFIED FRACTURE MORPHOLOGY, UNSPECIFIED LATERALITY, UNSPECIFIED PORTION OF FEMUR, INITIAL ENCOUNTER (H): ICD-10-CM

## 2019-08-29 DIAGNOSIS — L03.115 CELLULITIS OF RIGHT LOWER EXTREMITY: ICD-10-CM

## 2019-08-29 DIAGNOSIS — I10 BENIGN ESSENTIAL HYPERTENSION: ICD-10-CM

## 2019-08-29 DIAGNOSIS — E78.5 HYPERLIPIDEMIA LDL GOAL <100: ICD-10-CM

## 2019-08-29 PROCEDURE — 99214 OFFICE O/P EST MOD 30 MIN: CPT | Performed by: INTERNAL MEDICINE

## 2019-08-29 RX ORDER — CLINDAMYCIN HCL 300 MG
300 CAPSULE ORAL 4 TIMES DAILY
Qty: 12 CAPSULE | Refills: 0 | Status: SHIPPED | OUTPATIENT
Start: 2019-08-29 | End: 2019-10-10

## 2019-08-29 RX ORDER — LORAZEPAM 0.5 MG/1
TABLET ORAL
Qty: 45 TABLET | Refills: 1 | Status: SHIPPED | OUTPATIENT
Start: 2019-08-29 | End: 2019-09-04

## 2019-08-29 RX ORDER — MIRTAZAPINE 7.5 MG/1
7.5 TABLET, FILM COATED ORAL AT BEDTIME
Qty: 30 TABLET | Refills: 1 | Status: SHIPPED | OUTPATIENT
Start: 2019-08-29 | End: 2020-03-18

## 2019-08-29 ASSESSMENT — MIFFLIN-ST. JEOR: SCORE: 1388.96

## 2019-08-29 NOTE — TELEPHONE ENCOUNTER
Reason for Call:  Home Health Care    [facility] with [facility] called regarding (reason for call): verbal orders    Orders are needed for this patient. {PT OT Skilled Nursing     PT: na    OT: na    Skilled Nursing: 3 x/wk for 3 wks and 3 prn visits ok for wound care      Pt Provider: Will    Phone Number Homecare Nurse can be reached at: [phone]    Can we leave a detailed message on this number? YES    Phone number patient can be reached at: Home number on file [phone] (home)    Best Time: any    Call taken on 8/29/2019 at 1:46 PM by Sharron Allen
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
## 2019-08-29 NOTE — TELEPHONE ENCOUNTER
Called Indy with [facility] to provide verbal orders for SN as requested    Silas BANDA RN

## 2019-09-04 ENCOUNTER — DOCUMENTATION ONLY (OUTPATIENT)
Dept: CARE COORDINATION | Facility: CLINIC | Age: 79
End: 2019-09-04

## 2019-09-04 ENCOUNTER — TELEPHONE (OUTPATIENT)
Dept: FAMILY MEDICINE | Facility: CLINIC | Age: 79
End: 2019-09-04

## 2019-09-04 DIAGNOSIS — F43.23 ADJUSTMENT DISORDER WITH MIXED ANXIETY AND DEPRESSED MOOD: ICD-10-CM
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
## 2019-09-04 NOTE — TELEPHONE ENCOUNTER
Recent OV and referral in [facility].  YUAN for Colleen, [facility].  Ti for the requested orders.    Elise Alexandre RN on 9/4/2019 at 12:48 PM

## 2019-09-04 NOTE — TELEPHONE ENCOUNTER
Charissa White Delia called:     Requested clarification on some med orders - reviewed med list/med rec with her     Only change needed is: new script to [facility]. Sig cannot state to take 1/2 to 1 tablet. States cannot have a range. Pt always takes the full tab, not 1/2 so they are asking that PCP send a new Rx    Thank you    Sabine SCHUMACHER RN

## 2019-09-04 NOTE — TELEPHONE ENCOUNTER
Reason for Call:  Home Health Care    Colleen with [facility] called regarding (reason for call):     Orders are needed for this patient.       OT: treat 4 visits in September, for equipment and home safety       Pt Provider: Dr. Solis    Phone Number Homecare Nurse can be reached at: [phone]    Can we leave a detailed message on this number? YES    Phone number patient can be reached at: na    Best Time: any    Call taken on 9/4/2019 at 12:01 PM by Pura Aguilar

## 2019-09-05 RX ORDER — LORAZEPAM 0.5 MG/1
TABLET ORAL
Qty: 90 TABLET | Refills: 0 | Status: SHIPPED | OUTPATIENT
Start: 2019-09-05 | End: 2019-09-14

## 2019-09-08 ENCOUNTER — TELEPHONE (OUTPATIENT)
Dept: NURSING | Facility: CLINIC | Age: 79
End: 2019-09-08

## 2019-09-08 ENCOUNTER — NURSE TRIAGE (OUTPATIENT)
Dept: NURSING | Facility: CLINIC | Age: 79
End: 2019-09-08

## 2019-09-08 ENCOUNTER — TELEPHONE (OUTPATIENT)
Dept: FAMILY MEDICINE | Facility: CLINIC | Age: 79
End: 2019-09-08

## 2019-09-08 DIAGNOSIS — G89.21 CHRONIC PAIN DUE TO TRAUMA: Primary | ICD-10-CM

## 2019-09-08 RX ORDER — OXYCODONE HYDROCHLORIDE 5 MG/1
TABLET ORAL
Qty: 18 TABLET | Refills: 0 | Status: SHIPPED | OUTPATIENT
Start: 2019-09-08 | End: 2019-09-13

## 2019-09-08 NOTE — TELEPHONE ENCOUNTER
"  Reason for Disposition    Caller has URGENT medication question about med that PCP prescribed and triager unable to answer question    Additional Information    Negative: Drug overdose and nurse unable to answer question    Negative: Caller requesting information not related to medicine    Negative: Caller requesting a prescription for Strep throat and has a positive culture result    Negative: Rash while taking a medication or within 3 days of stopping it    Negative: Immunization reaction suspected    Negative: [1] Asthma and [2] having symptoms of asthma (cough, wheezing, etc)    Negative: MORE THAN A DOUBLE DOSE of a prescription or over-the-counter (OTC) drug    Negative: [1] DOUBLE DOSE (an extra dose or lesser amount) of over-the-counter (OTC) drug AND [2] any symptoms (e.g., dizziness, nausea, pain, sleepiness)    Negative: [1] DOUBLE DOSE (an extra dose or lesser amount) of prescription drug AND [2] any symptoms (e.g., dizziness, nausea, pain, sleepiness)    Negative: Took another person's prescription drug    Negative: [1] DOUBLE DOSE (an extra dose or lesser amount) of prescription drug AND [2] NO symptoms (Exception: a double dose of antibiotics)    Negative: [1] Prescription not at pharmacy AND [2] was prescribed today by PCP    Negative: Pharmacy calling with prescription questions and triager unable to answer question    Negative: [1] Request for URGENT new prescription or refill of \"essential\" medication (i.e., likelihood of harm to patient if not taken) AND [2] triager unable to fill per unit policy    Protocols used: MEDICATION QUESTION CALL-A-    "
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
## 2019-09-08 NOTE — TELEPHONE ENCOUNTER
Wife is calling, Zoran is present and gives verbal consent to talk about his issues. He is out of oxycodone and needs a refill. Advised that he will need to be evaluated in ER in order to get this medication today. Wife verbalized understanding.  Luz Feliciano RN  [place] [facility]

## 2019-09-11 ENCOUNTER — TRANSFERRED RECORDS (OUTPATIENT)
Dept: HEALTH INFORMATION MANAGEMENT | Facility: CLINIC | Age: 79
End: 2019-09-11

## 2019-09-11 ENCOUNTER — TELEPHONE (OUTPATIENT)
Dept: FAMILY MEDICINE | Facility: CLINIC | Age: 79
End: 2019-09-11

## 2019-09-11 NOTE — TELEPHONE ENCOUNTER
Reason for Call:  Form, our goal is to have forms completed with 72 hours, however, some forms may require a visit or additional information.    Type of letter, form or note:  handicap    Who is the form from?: [place] DMV (if other please explain)    Where did the form come from: Patient or family brought in       What clinic location was the form placed at?: [facility]    Where the form was placed: black forms bin    What number is listed as a contact on the form?: [phone]     Additional comments: Disability parking form dropped off by Queenie (wife) please call wife to come  form [phone]     Call taken on 9/11/2019 at 4:09 PM by Vamsi Lee

## 2019-09-11 NOTE — TELEPHONE ENCOUNTER
Reason for Call:  Other referral    Detailed comments: Pt would like a vascular surgeon referral    Phone Number Patient can be reached at: [phone] diya Jerome    Best Time: anytime  Can we leave a detailed message on this number? YES    Call taken on 9/11/2019 at 4:03 PM by Vamsi Lee

## 2019-09-12 NOTE — TELEPHONE ENCOUNTER
Spoke with Queenie - she will  completed disability parking form at     Copy placed in brown accordion [place] side, original placed at      Sabine SCHUMACHER RN

## 2019-09-12 NOTE — TELEPHONE ENCOUNTER
PT saw him today  Sat on edge of bed  Moved from bed to wheelchair   Assist x1 pivot     Completed forms and returned to PCP    Silas BANDA RN

## 2019-09-12 NOTE — TELEPHONE ENCOUNTER
Sabine from [facility] called and wanted to let us know that she is going to fax in this paperwork this morning and that we should keep an eye out.

## 2019-09-13 DIAGNOSIS — G89.21 CHRONIC PAIN DUE TO TRAUMA: ICD-10-CM

## 2019-09-13 RX ORDER — OXYCODONE HYDROCHLORIDE 5 MG/1
TABLET ORAL
Qty: 18 TABLET | Refills: 0 | Status: SHIPPED | OUTPATIENT
Start: 2019-09-13 | End: 2019-10-02 | Stop reason: DRUGHIGH

## 2019-09-13 NOTE — TELEPHONE ENCOUNTER
Spoke with Sabine at [facility]     They received med list faxed over     They said pt will be seeing a provider there to establish care, he has enough medication of Lorazepam on his script to last until he is seen by a provider there but they are needing a script for Oxycodone because they believe pt may run out before a provider there sees     Typically taking 2 per day - if he takes any extra Oxy may run out before he can see a provider     They would like Rx e-scribed to Sandra SCHUMACHER RN

## 2019-09-13 NOTE — TELEPHONE ENCOUNTER
Reason for Call:  Medication or medication refill: oxyCODONE (ROXICODONE) 5 MG tablet, LORazepam (ATIVAN) 0.5 MG tablet, and mirtazapine (REMERON) 7.5 MG tablet    Sabine called back requesting Rx be faxed along with medication list.     Name of the medication requested: oxyCODONE (ROXICODONE) 5 MG tablet, LORazepam (ATIVAN) 0.5 MG tablet, and mirtazapine (REMERON) 7.5 MG tablet      Other request: Sabine called requesting Rx for above medications be faxed to [phone] along with pt medication list.    Can we leave a detailed message on this number? [phone]    Phone number patient can be reached at: Other phone number: [phone]    Best Time: Anytime     Call taken on 9/13/2019 at 9:41 AM by Jackelyn Diop

## 2019-09-13 NOTE — TELEPHONE ENCOUNTER
Reason for Call:  Other medication list    Detailed comments: Sabine bradford [facility] called, they received paperwork for patient's admission, but the medication list is not signed so cannot be accepted by the pharmacy.  She is asking for a med list signed by an MD to be faxed to them today at [phone].  Patient is admitting today.     Phone Number Patient can be reached at: Other phone number:  [phone]  Ask for Sabine Jaramillo if questions    Best Time: any    Can we leave a detailed message on this number? Not Applicable    Call taken on 9/13/2019 at 8:49 AM by Rocio Collado
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
## 2019-09-13 NOTE — TELEPHONE ENCOUNTER
Called Admissions at [facility] to notify that PCP is OOO.     States that list can be signed by any Provider.     Placed on DOD's desk.     Nadine, are you able to sign? Pt has recent OV 8/29 with PCP    Thank you!

## 2019-09-13 NOTE — TELEPHONE ENCOUNTER
Queenie dropped form off. There is another blank on the form that needs to be filled in. Self addressed envelope on form. Mail when filled out please.

## 2019-09-14 DIAGNOSIS — F43.23 ADJUSTMENT DISORDER WITH MIXED ANXIETY AND DEPRESSED MOOD: ICD-10-CM

## 2019-09-14 RX ORDER — LORAZEPAM 0.5 MG/1
TABLET ORAL
Qty: 6 TABLET | Refills: 0 | Status: SHIPPED | OUTPATIENT
Start: 2019-09-14 | End: 2019-11-13

## 2019-09-15 VITALS
WEIGHT: 136.5 LBS | DIASTOLIC BLOOD PRESSURE: 63 MMHG | HEART RATE: 54 BPM | RESPIRATION RATE: 16 BRPM | OXYGEN SATURATION: 97 % | SYSTOLIC BLOOD PRESSURE: 97 MMHG | TEMPERATURE: 97.4 F | BODY MASS INDEX: 18.51 KG/M2

## 2019-09-16 ENCOUNTER — NURSING HOME VISIT (OUTPATIENT)
Dept: GERIATRICS | Facility: CLINIC | Age: 79
End: 2019-09-16
Payer: COMMERCIAL

## 2019-09-16 DIAGNOSIS — F41.1 GAD (GENERALIZED ANXIETY DISORDER): ICD-10-CM

## 2019-09-16 DIAGNOSIS — S32.810D: ICD-10-CM

## 2019-09-16 DIAGNOSIS — F43.23 ADJUSTMENT DISORDER WITH MIXED ANXIETY AND DEPRESSED MOOD: ICD-10-CM

## 2019-09-16 DIAGNOSIS — I10 BENIGN ESSENTIAL HYPERTENSION: ICD-10-CM

## 2019-09-16 DIAGNOSIS — S42.302D CLOSED TRAUMATIC DISPLACED FRACTURE OF SHAFT OF LEFT HUMERUS WITH ROUTINE HEALING, SUBSEQUENT ENCOUNTER: ICD-10-CM

## 2019-09-16 DIAGNOSIS — S82.841D: ICD-10-CM
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
## 2019-09-16 PROCEDURE — 99207 ZZC CDG-CODE INCORRECT PER BILLING BASED ON TIME: CPT | Performed by: NURSE PRACTITIONER

## 2019-09-16 PROCEDURE — 99309 SBSQ NF CARE MODERATE MDM 30: CPT | Performed by: NURSE PRACTITIONER
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
## 2019-09-16 NOTE — TELEPHONE ENCOUNTER
Form was brought back to addend or add information.  Please complete and return to your MA for the day.   Quiana Trejo MA

## 2019-09-17 NOTE — TELEPHONE ENCOUNTER
"Last Written Prescription Date:  6/18/19  Last Fill Quantity: 90 tablet,  # refills: 0   Last office visit: 8/29/2019 with prescribing provider:  Will   Future Office Visit:   Next 5 appointments (look out 90 days)    Sep 25, 2019 10:00 AM CDT  Office Visit with Héctor Solis MD  [facility] ([facility] [address] [facility] [address] [phone]         Requested Prescriptions   Pending Prescriptions Disp Refills     amLODIPine (NORVASC) 5 MG tablet [Pharmacy Med Name: AMLODIPINE BESYLATE 5MG TABLETS] 90 tablet 0     Sig: TAKE 1/2 TO 1 TABLET BY MOUTH EVERY EVENING AS DIRECTED       Calcium Channel Blockers Protocol  Passed - 9/16/2019  3:45 AM        Passed - Blood pressure under 140/90 in past 12 months     BP Readings from Last 3 Encounters:   09/15/19 97/63   08/29/19 98/64   08/22/19 98/64                 Passed - Recent (12 mo) or future (30 days) visit within the authorizing provider's specialty     Patient had office visit in the last 12 months or has a visit in the next 30 days with authorizing provider or within the authorizing provider's specialty.  See \"Patient Info\" tab in inbasket, or \"Choose Columns\" in Meds & Orders section of the refill encounter.              Passed - Medication is active on med list        Passed - Patient is age 18 or older        Passed - Normal serum creatinine on file in past 12 months     Recent Labs   Lab Test 08/16/19   CR 0.74               "

## 2019-09-18 RX ORDER — AMLODIPINE BESYLATE 5 MG/1
TABLET ORAL
Qty: 90 TABLET | Refills: 0 | Status: SHIPPED | OUTPATIENT
Start: 2019-09-18 | End: 2019-10-10

## 2019-09-20 DIAGNOSIS — I10 BENIGN ESSENTIAL HYPERTENSION: ICD-10-CM

## 2019-09-20 RX ORDER — LISINOPRIL 20 MG/1
TABLET ORAL
Qty: 180 TABLET | Refills: 2 | Status: SHIPPED | OUTPATIENT
Start: 2019-09-20 | End: 2020-12-29

## 2019-09-20 NOTE — TELEPHONE ENCOUNTER
Prescription approved per [facility] Refill Protocol.  Sabine SCHUMACHER RN

## 2019-09-20 NOTE — TELEPHONE ENCOUNTER
"lisinopril (PRINIVIL/ZESTRIL) 20 MG tablet 180 tablet 3 12/27/2018         Last Written Prescription Date:  12/27/2018  Last Fill Quantity: 180,  # refills: 3   Last office visit: 8/29/2019 with prescribing provider:     Future Office Visit:   Next 5 appointments (look out 90 days)    Oct 24, 2019 11:00 AM CDT  Office Visit with Héctor Solis MD  [facility] ([facility] [address] [id number] [phone]         Requested Prescriptions   Pending Prescriptions Disp Refills     lisinopril (PRINIVIL/ZESTRIL) 20 MG tablet [Pharmacy Med Name: LISINOPRIL 20MG TABLETS] 180 tablet 0     Sig: TAKE 1 TABLET BY MOUTH TWICE DAILY       ACE Inhibitors (Including Combos) Protocol Passed - 9/20/2019  3:45 AM        Passed - Blood pressure under 140/90 in past 12 months     BP Readings from Last 3 Encounters:   09/15/19 97/63   08/29/19 98/64   08/22/19 98/64                 Passed - Recent (12 mo) or future (30 days) visit within the authorizing provider's specialty     Patient had office visit in the last 12 months or has a visit in the next 30 days with authorizing provider or within the authorizing provider's specialty.  See \"Patient Info\" tab in inbasket, or \"Choose Columns\" in Meds & Orders section of the refill encounter.              Passed - Medication is active on med list        Passed - Patient is age 18 or older        Passed - Normal serum creatinine on file in past 12 months     Recent Labs   Lab Test 08/16/19   CR 0.74             Passed - Normal serum potassium on file in past 12 months     Recent Labs   Lab Test 08/16/19   POTASSIUM 4.2               "

## 2019-09-21 ENCOUNTER — TELEPHONE (OUTPATIENT)
Dept: GERIATRICS | Facility: CLINIC | Age: 79
End: 2019-09-21

## 2019-09-21 NOTE — TELEPHONE ENCOUNTER
Rm is having pain rating 8/9 out of 10 in the lumbar area.  Asking for other medication.  Daughter offered to go to store to get patches but will order through pharmacy.  Also suggested/asked for x-ray.    Orders:  Lumbar spine x-ray due to pain  Lidocaine patch 4% on for 12 hours and off 12 hours.    Electronically signed by Connie Duff RN, CNP

## 2019-09-23 ENCOUNTER — NURSING HOME VISIT (OUTPATIENT)
Dept: GERIATRICS | Facility: CLINIC | Age: 79
End: 2019-09-23
Payer: COMMERCIAL

## 2019-09-23 DIAGNOSIS — S82.841D: Primary | ICD-10-CM

## 2019-09-23 DIAGNOSIS — I10 BENIGN ESSENTIAL HYPERTENSION: ICD-10-CM

## 2019-09-23 PROCEDURE — 99307 SBSQ NF CARE SF MDM 10: CPT | Performed by: INTERNAL MEDICINE

## 2019-09-24 ENCOUNTER — NURSING HOME VISIT (OUTPATIENT)
Dept: GERIATRICS | Facility: CLINIC | Age: 79
End: 2019-09-24
Payer: COMMERCIAL

## 2019-09-24 DIAGNOSIS — S42.302D CLOSED TRAUMATIC DISPLACED FRACTURE OF SHAFT OF LEFT HUMERUS WITH ROUTINE HEALING, SUBSEQUENT ENCOUNTER: ICD-10-CM

## 2019-09-24 DIAGNOSIS — S32.810D: ICD-10-CM

## 2019-09-24 DIAGNOSIS — F41.1 GAD (GENERALIZED ANXIETY DISORDER): ICD-10-CM
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
## 2019-09-24 DIAGNOSIS — I10 BENIGN ESSENTIAL HYPERTENSION: ICD-10-CM

## 2019-09-24 DIAGNOSIS — S82.841D: Primary | ICD-10-CM

## 2019-09-24 DIAGNOSIS — D62 ANEMIA DUE TO BLOOD LOSS, ACUTE: ICD-10-CM

## 2019-09-24 PROCEDURE — 99310 SBSQ NF CARE HIGH MDM 45: CPT | Performed by: NURSE PRACTITIONER
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
## 2019-09-25 VITALS
HEART RATE: 64 BPM | OXYGEN SATURATION: 98 % | RESPIRATION RATE: 16 BRPM | BODY MASS INDEX: 18.85 KG/M2 | SYSTOLIC BLOOD PRESSURE: 137 MMHG | DIASTOLIC BLOOD PRESSURE: 69 MMHG | TEMPERATURE: 97.1 F | WEIGHT: 139 LBS

## 2019-09-25 NOTE — PROGRESS NOTES
[facility]  [place] Medical Record Number:  [id number]  Place of Service where encounter took place:  [facility] ([facility]) [[id number]]  Chief Complaint   Patient presents with     RECHECK       HPI:    Rm Villarreal  is a 79 year old (1940), who is being seen today for an episodic care visit.  HPI information obtained from: {[facility] HPI:020016}. Today's concern is:  {[facility] DX:678227}    Past Medical and Surgical History reviewed in Epic today.    MEDICATIONS:  Current Outpatient Medications   Medication Sig Dispense Refill     acetaminophen (TYLENOL) 500 MG tablet Take 1,000 mg by mouth every 6 hours as needed        amLODIPine (NORVASC) 5 MG tablet TAKE 1/2 TO 1 TABLET BY MOUTH EVERY EVENING AS DIRECTED 90 tablet 0     ASPIRIN ADULT LOW STRENGTH PO Take 81 mg by mouth daily.       bacitracin 500 UNIT/GM OINT Apply to right ankle (both sides) topically one time a day on even days for Pinkness AND Apply to right ankle (both sides) topically one time a day on odd days for Pinkness       clindamycin (CLEOCIN) 300 MG capsule Take 1 capsule (300 mg) by mouth 4 times daily 12 capsule 0     Lidocaine (LIDOCARE) 4 % Patch Apply to lumbar areas topically one time a day for back pain On for 12hrs. remove @ HS, Off for 12 hrs. AND Apply to Remove from Lumbar topically one time a day for Pain On for 12hrs. remove @ HS, Off for 12 hrs.       lisinopril (PRINIVIL/ZESTRIL) 20 MG tablet TAKE 1 TABLET BY MOUTH TWICE DAILY 180 tablet 2     lisinopril (PRINIVIL/ZESTRIL) 20 MG tablet TAKE 1 TABLET BY MOUTH TWICE DAILY 180 tablet 3     LORazepam (ATIVAN) 0.5 MG tablet Take 1 tablet three times daily as needed for anxiety or sleep 6 tablet 0     magnesium hydroxide (MILK OF MAGNESIA) 400 MG/5ML suspension Take 15 mLs by mouth 4 times daily as needed for constipation or heartburn       melatonin 5 MG tablet Take 5 mg by mouth At Bedtime       methocarbamol (ROBAXIN) 500 MG tablet Give 0.5 tablet by mouth as  needed for pain 1-5/10 BID prn AND Give 1 tablet by mouth as needed for pain 6-10/10 BID prn       metoprolol tartrate (LOPRESSOR) 50 MG tablet Take 50 mg by mouth 2 times daily       mirtazapine (REMERON) 7.5 MG tablet Take 1 tablet (7.5 mg) by mouth At Bedtime 30 tablet 1     Multiple Vitamins-Minerals (CERTAVITE SENIOR/ANTIOXIDANT PO) Take 1 tablet by mouth daily       NIACIN CR PO Take 1,000 mg by mouth 2 times daily (before meals)        nitroglycerin (NITROSTAT) 0.4 MG SL tablet For chest pain place 1 tablet under the tongue every 5 minutes for 3 doses. If symptoms persist 5 minutes after 1st dose call 911. 25 tablet 3     omeprazole (PRILOSEC) 20 MG DR capsule Take 20 mg by mouth daily       oxyCODONE (ROXICODONE) 5 MG tablet Take 5 mg by mouth every 4 hours as needed for severe pain       polyethylene glycol (MIRALAX/GLYCOLAX) packet Take 1 packet by mouth daily as needed for constipation       STATIN NOT PRESCRIBED, INTENTIONAL, Please choose reason not prescribed, below       VITAMIN D, CHOLECALCIFEROL, PO Take 1,000 Units by mouth daily.       oxyCODONE (ROXICODONE) 5 MG tablet Give 5 mg by mouth every 6 hours as needed for pain level 4-7 AND Give 10 mg by mouth every 6 hours as needed for pain level 8-10 (Patient not taking: Reported on 9/23/2019) 18 tablet 0     ***    REVIEW OF SYSTEMS:  {ROS [facility]:237294}    Objective:  /69   Pulse 64   Temp 97.1  F (36.2  C)   Resp 16   Wt 63 kg (139 lb)   SpO2 98%   BMI 18.85 kg/m    Exam:  {Nursing home physical exam :825951}    Labs:   {fgslab:067396}    ASSESSMENT/PLAN:  {[facility] DX:279935}    {fgsorders:389545}  ***    {fgstime1:034088}  Electronically signed by:  Betty Sigala ***  {Providers Please double check the med list (in the plan section >> meds & orders tab) and Discontinue any of the meds flagged by the TC to be discontinued}
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
## 2019-10-02 ENCOUNTER — DISCHARGE SUMMARY NURSING HOME (OUTPATIENT)
Dept: GERIATRICS | Facility: CLINIC | Age: 79
End: 2019-10-02
Payer: COMMERCIAL

## 2019-10-02 VITALS
OXYGEN SATURATION: 95 % | SYSTOLIC BLOOD PRESSURE: 146 MMHG | BODY MASS INDEX: 18.99 KG/M2 | RESPIRATION RATE: 18 BRPM | DIASTOLIC BLOOD PRESSURE: 73 MMHG | TEMPERATURE: 96 F | HEART RATE: 60 BPM | WEIGHT: 140 LBS

## 2019-10-02 DIAGNOSIS — S32.810D: ICD-10-CM

## 2019-10-02 DIAGNOSIS — S82.841D: Primary | ICD-10-CM

## 2019-10-02 DIAGNOSIS — F43.23 ADJUSTMENT DISORDER WITH MIXED ANXIETY AND DEPRESSED MOOD: ICD-10-CM

## 2019-10-02 DIAGNOSIS — F41.1 GAD (GENERALIZED ANXIETY DISORDER): ICD-10-CM
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
## 2019-10-02 DIAGNOSIS — D62 ANEMIA DUE TO BLOOD LOSS, ACUTE: ICD-10-CM

## 2019-10-02 DIAGNOSIS — S42.302D CLOSED TRAUMATIC DISPLACED FRACTURE OF SHAFT OF LEFT HUMERUS WITH ROUTINE HEALING, SUBSEQUENT ENCOUNTER: ICD-10-CM

## 2019-10-02 DIAGNOSIS — I10 BENIGN ESSENTIAL HYPERTENSION: ICD-10-CM

## 2019-10-02 PROCEDURE — 99316 NF DSCHRG MGMT 30 MIN+: CPT | Performed by: NURSE PRACTITIONER

## 2019-10-02 RX ORDER — OXYCODONE HYDROCHLORIDE 5 MG/1
5 TABLET ORAL EVERY 8 HOURS PRN
Qty: 15 TABLET | Refills: 0 | Status: SHIPPED | OUTPATIENT
Start: 2019-10-02 | End: 2019-11-13
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
## 2019-10-04 ENCOUNTER — DOCUMENTATION ONLY (OUTPATIENT)
Dept: CARE COORDINATION | Facility: CLINIC | Age: 79
End: 2019-10-04

## 2019-10-04 NOTE — PROGRESS NOTES
[facility] now requests orders and shares plan of care/discharge summaries for some patients through [employer].  Please REPLY TO THIS MESSAGE OR ROUTE BACK TO THE AUTHOR in order to give authorization for orders when needed.  This is considered a verbal order, you will still receive a faxed copy of orders for signature.  Thank you for your assistance in improving collaboration for our patients.    ORDER TO RESUME CARE    MD SUMMARY/PLAN OF CARE    SN 2W3 and 3PRN    PT/OT/OT lymphedema to eval and retom    HHA 2W3 for bathing assist      Small surgical site wound on the right posterior heal, cleanse with normal saline, bacitracin and bandaid daily until healed. Caregiver to provide wound care on the non nurse visit days.

## 2019-10-07 ENCOUNTER — DOCUMENTATION ONLY (OUTPATIENT)
Dept: FAMILY MEDICINE | Facility: CLINIC | Age: 79
End: 2019-10-07
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
## 2019-10-08 ENCOUNTER — DOCUMENTATION ONLY (OUTPATIENT)
Dept: FAMILY MEDICINE | Facility: CLINIC | Age: 79
End: 2019-10-08

## 2019-10-09 ENCOUNTER — TELEPHONE (OUTPATIENT)
Dept: FAMILY MEDICINE | Facility: CLINIC | Age: 79
End: 2019-10-09

## 2019-10-09 NOTE — TELEPHONE ENCOUNTER
Next 5 appointments (look out 90 days)    Oct 10, 2019  4:00 PM CDT  Office Visit with Héctor Solis MD  [facility]) [address] [address]  [phone]   Oct 24, 2019 11:00 AM CDT  Office Visit with Héctor Solis MD  [facility] ([facility]) [address] [address]  [phone]        Called [facility], gave verbal on below requested HC orders    Sabine SCHUMACHER RN

## 2019-10-09 NOTE — PROGRESS NOTES
[facility] now requests orders and shares plan of care/discharge summaries for some patients through [employer].  Please REPLY TO THIS MESSAGE OR ROUTE BACK TO THE AUTHOR in order to give authorization for orders when needed.  This is considered a verbal order, you will still receive a faxed copy of orders for signature.  Thank you for your assistance in improving collaboration for our patients.    ORDER  OT lymphedema therapy 1w1, 2w2    MD SUMMARY/PLAN OF CARE  Plan to reduce LE edema, assist with wound healing, fit for compression garment and provide long term management education.

## 2019-10-09 NOTE — TELEPHONE ENCOUNTER
Reason for Call:  Home Health Care  nick with [facility] called regarding (reason for call): verbal orders    Orders are needed for this patient.     PT: =    OT: 2x per wk for 2wks     Skilled Nursing:     Pt Provider:     Phone Number Homecare Nurse can be reached at: [phone]    Can we leave a detailed message on this number? YES    Phone number patient can be reached at: Other phone number:  n/a    Best Time: any    Call taken on 10/9/2019 at 2:10 PM by Vasyl Hester

## 2019-10-10 ENCOUNTER — OFFICE VISIT (OUTPATIENT)
Dept: FAMILY MEDICINE | Facility: CLINIC | Age: 79
End: 2019-10-10
Payer: COMMERCIAL

## 2019-10-10 VITALS
BODY MASS INDEX: 18.96 KG/M2 | TEMPERATURE: 97.6 F | HEIGHT: 72 IN | DIASTOLIC BLOOD PRESSURE: 81 MMHG | OXYGEN SATURATION: 100 % | WEIGHT: 140 LBS | SYSTOLIC BLOOD PRESSURE: 148 MMHG | HEART RATE: 67 BPM

## 2019-10-10 DIAGNOSIS — Z23 NEED FOR PROPHYLACTIC VACCINATION AND INOCULATION AGAINST INFLUENZA: ICD-10-CM

## 2019-10-10 DIAGNOSIS — S32.810A MULTIPLE CLOSED FRACTURES OF PELVIS WITH STABLE DISRUPTION OF PELVIC RING, INITIAL ENCOUNTER (H): Primary | ICD-10-CM

## 2019-10-10 DIAGNOSIS — E78.5 HYPERLIPIDEMIA LDL GOAL <100: ICD-10-CM

## 2019-10-10 DIAGNOSIS — I25.10 ATHEROSCLEROSIS OF NATIVE CORONARY ARTERY OF NATIVE HEART WITHOUT ANGINA PECTORIS: ICD-10-CM

## 2019-10-10 DIAGNOSIS — F43.23 ADJUSTMENT DISORDER WITH MIXED ANXIETY AND DEPRESSED MOOD: ICD-10-CM

## 2019-10-10 DIAGNOSIS — S72.90XA CLOSED FRACTURE OF FEMUR, UNSPECIFIED FRACTURE MORPHOLOGY, UNSPECIFIED LATERALITY, UNSPECIFIED PORTION OF FEMUR, INITIAL ENCOUNTER (H): ICD-10-CM

## 2019-10-10 DIAGNOSIS — I10 BENIGN ESSENTIAL HYPERTENSION: ICD-10-CM

## 2019-10-10 DIAGNOSIS — G89.21 CHRONIC PAIN DUE TO TRAUMA: ICD-10-CM

## 2019-10-10 DIAGNOSIS — F43.22 ADJUSTMENT DISORDER WITH ANXIOUS MOOD: ICD-10-CM

## 2019-10-10 DIAGNOSIS — F13.20 SEDATIVE, HYPNOTIC OR ANXIOLYTIC DEPENDENCE (H): ICD-10-CM

## 2019-10-10 PROCEDURE — 99214 OFFICE O/P EST MOD 30 MIN: CPT | Mod: 25 | Performed by: INTERNAL MEDICINE

## 2019-10-10 PROCEDURE — G0008 ADMIN INFLUENZA VIRUS VAC: HCPCS | Performed by: INTERNAL MEDICINE

## 2019-10-10 PROCEDURE — 90662 IIV NO PRSV INCREASED AG IM: CPT | Performed by: INTERNAL MEDICINE

## 2019-10-10 ASSESSMENT — MIFFLIN-ST. JEOR: SCORE: 1398.04
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
## 2019-10-10 NOTE — NURSING NOTE
Prior to immunization administration, verified patients identity using patient s name and date of birth. Please see Immunization Activity for additional information.     Screening Questionnaire for Adult Immunization    Are you sick today?   No   Do you have allergies to medications, food, a vaccine component or latex?   No   Have you ever had a serious reaction after receiving a vaccination?   No   Do you have a long-term health problem with heart disease, lung disease, asthma, kidney disease, metabolic disease (e.g. diabetes), anemia, or other blood disorder?   No   Do you have cancer, leukemia, HIV/AIDS, or any other immune system problem?   No   In the past 3 months, have you taken medications that affect  your immune system, such as prednisone, other steroids, or anticancer drugs; drugs for the treatment of rheumatoid arthritis, Crohn s disease, or psoriasis; or have you had radiation treatments?   No   Have you had a seizure, or a brain or other nervous system problem?   No   During the past year, have you received a transfusion of blood or blood     products, or been given immune (gamma) globulin or antiviral drug?   No   For women: Are you pregnant or is there a chance you could become        pregnant during the next month?   No   Have you received any vaccinations in the past 4 weeks?   No     Immunization questionnaire answers were all negative.        Per orders of Dr. Solis, injection of Flu HD given by Angela Scott CMA. Patient instructed to remain in clinic for 15 minutes afterwards, and to report any adverse reaction to me immediately.  Angela Scott CMA on 10/10/2019 at 4:11 PM       Screening performed by Angela Scott CMA on 10/10/2019 at 4:11 PM.

## 2019-10-14 ENCOUNTER — DOCUMENTATION ONLY (OUTPATIENT)
Dept: FAMILY MEDICINE | Facility: CLINIC | Age: 79
End: 2019-10-14

## 2019-10-14 NOTE — PROGRESS NOTES
[facility] now requests orders and shares plan of care/discharge summaries for some patients through [employer].  Please REPLY TO THIS MESSAGE OR ROUTE BACK TO THE AUTHOR in order to give authorization for orders when needed.  This is considered a verbal order, you will still receive a faxed copy of orders for signature.  Thank you for your assistance in improving collaboration for our patients.    ORDER    Change in PT frequency,    Continued home care PT 1x/week for 1 week for strengthening, balance and mobility training.

## 2019-10-17 ENCOUNTER — TELEPHONE (OUTPATIENT)
Dept: FAMILY MEDICINE | Facility: CLINIC | Age: 79
End: 2019-10-17

## 2019-10-17 DIAGNOSIS — S42.302D CLOSED TRAUMATIC DISPLACED FRACTURE OF SHAFT OF LEFT HUMERUS WITH ROUTINE HEALING, SUBSEQUENT ENCOUNTER: ICD-10-CM

## 2019-10-17 DIAGNOSIS — S82.841D: ICD-10-CM
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
## 2019-10-17 RX ORDER — TIZANIDINE 2 MG/1
2 TABLET ORAL 3 TIMES DAILY PRN
Qty: 30 TABLET | Refills: 0 | Status: SHIPPED | OUTPATIENT
Start: 2019-10-17 | End: 2019-10-25
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
## 2019-10-18 NOTE — TELEPHONE ENCOUNTER
Let him know that I am covering doctor  Tizanidine script is sent to pharmacy.  Dr.Nasima Geo MD

## 2019-10-18 NOTE — TELEPHONE ENCOUNTER
Called pt to update- he has received RX    Advised to f/u with PCP if medications do not help with pain or having worsening symptoms.     Verbalized agreement    Kami BECK RN

## 2019-10-23 ENCOUNTER — TRANSFERRED RECORDS (OUTPATIENT)
Dept: HEALTH INFORMATION MANAGEMENT | Facility: CLINIC | Age: 79
End: 2019-10-23
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
## 2019-10-25 NOTE — TELEPHONE ENCOUNTER
tiZANidine (ZANAFLEX) 2 MG tablet 30 tablet 0 10/17/2019           Last Written Prescription Date:  10/17/2019  Last Fill Quantity: 30,   # refills: 0  Last Office Visit: 10/10/2019  Future Office visit:    Next 5 appointments (look out 90 days)    Nov 13, 2019  9:30 AM CST  Office Visit with Héctor Solis MD  [facility] [address] [facility] [address]  [phone]           Routing refill request to provider for review/approval because:  Drug not on the [facility], [facility] or  Health refill protocol or controlled substance

## 2019-10-26 RX ORDER — TIZANIDINE 2 MG/1
TABLET ORAL
Qty: 30 TABLET | Refills: 0 | Status: SHIPPED | OUTPATIENT
Start: 2019-10-26 | End: 2020-02-13

## 2019-10-28 ENCOUNTER — PATIENT OUTREACH (OUTPATIENT)
Dept: CARE COORDINATION | Facility: CLINIC | Age: 79
End: 2019-10-28

## 2019-10-28 NOTE — PROGRESS NOTES
Clinic Care Coordination Contact  [facility]/Voicemail    Referral Source: Care Team  Clinical Data: Care Coordinator Outreach    Outreach attempted x 1.  Unable to leave message due to voicemail being full.    Plan: Care Coordinator will try to reach patient again in 1-2 business days.    CECI Richardson, [facility]  Clinic Care Coordinator  [facility]  [phone]  [email or web address]

## 2019-10-28 NOTE — LETTER
[place] CARE COORDINATION    October 29, 2019    Rm Villarreal  [address] [place]  [place] [address]      Dear Rm,    I am a clinic care coordinator who works with Héctor Solis MD at [facility]. I wanted to introduce myself and provide you with my contact information so that you can call me with questions or concerns about your health care. Below is a description of clinic care coordination and how I can further assist you.     The clinic care coordinator is a registered nurse and/or  who understand the health care system. The goal of clinic care coordination is to help you manage your health and improve access to the [facility] system in the most efficient manner. The registered nurse can assist you in meeting your health care goals by providing education, coordinating services, and strengthening the communication among your providers. The  can assist you with financial, behavioral, psychosocial, chemical dependency, counseling, and/or psychiatric resources.    Please feel free to contact me at [phone], with any questions or concerns. We at [facility] are focused on providing you with the highest-quality healthcare experience possible and that all starts with you.     Sincerely,     ANMLO Richardson

## 2019-10-29 NOTE — PROGRESS NOTES
Clinic Care Coordination Contact  [facility]/Voicemail    Referral Source: Care Team  Clinical Data: Care Coordinator Outreach    Outreach attempted x 2. Unable to leave a message because voicemail is full.    Plan: Care Coordinator will send care coordination introduction letter with care coordinator contact information and explanation of care coordination services via mail. Care Coordinator will do no further outreaches at this time.    CECI Richardson, [facility]  Clinic Care Coordinator  [facility]  [phone]  [email or web address]

## 2019-11-13 ENCOUNTER — OFFICE VISIT (OUTPATIENT)
Dept: FAMILY MEDICINE | Facility: CLINIC | Age: 79
End: 2019-11-13
Payer: COMMERCIAL

## 2019-11-13 VITALS
TEMPERATURE: 97.9 F | OXYGEN SATURATION: 98 % | BODY MASS INDEX: 19.53 KG/M2 | HEART RATE: 54 BPM | DIASTOLIC BLOOD PRESSURE: 71 MMHG | SYSTOLIC BLOOD PRESSURE: 135 MMHG | WEIGHT: 144 LBS

## 2019-11-13 DIAGNOSIS — D62 ANEMIA DUE TO BLOOD LOSS, ACUTE: ICD-10-CM

## 2019-11-13 DIAGNOSIS — I10 BENIGN ESSENTIAL HYPERTENSION: ICD-10-CM

## 2019-11-13 DIAGNOSIS — F43.23 ADJUSTMENT DISORDER WITH MIXED ANXIETY AND DEPRESSED MOOD: ICD-10-CM

## 2019-11-13 DIAGNOSIS — L30.9 ECZEMA, UNSPECIFIED TYPE: ICD-10-CM

## 2019-11-13 DIAGNOSIS — F13.20 SEDATIVE, HYPNOTIC OR ANXIOLYTIC DEPENDENCE (H): ICD-10-CM

## 2019-11-13 DIAGNOSIS — T07.XXXA MULTIPLE FRACTURES: ICD-10-CM

## 2019-11-13 DIAGNOSIS — I25.10 ATHEROSCLEROSIS OF NATIVE CORONARY ARTERY OF NATIVE HEART WITHOUT ANGINA PECTORIS: Primary | ICD-10-CM

## 2019-11-13 DIAGNOSIS — F43.22 ADJUSTMENT DISORDER WITH ANXIOUS MOOD: ICD-10-CM

## 2019-11-13 LAB
ANION GAP SERPL CALCULATED.3IONS-SCNC: 6 MMOL/L (ref 3–14)
BUN SERPL-MCNC: 17 MG/DL (ref 7–30)
CALCIUM SERPL-MCNC: 9.2 MG/DL (ref 8.5–10.1)
CHLORIDE SERPL-SCNC: 105 MMOL/L (ref 94–109)
CO2 SERPL-SCNC: 25 MMOL/L (ref 20–32)
CREAT SERPL-MCNC: 0.73 MG/DL (ref 0.66–1.25)
ERYTHROCYTE [DISTWIDTH] IN BLOOD BY AUTOMATED COUNT: 15.7 % (ref 10–15)
GFR SERPL CREATININE-BSD FRML MDRD: 89 ML/MIN/{1.73_M2}
GLUCOSE SERPL-MCNC: 85 MG/DL (ref 70–99)
HCT VFR BLD AUTO: 38.3 % (ref 40–53)
HGB BLD-MCNC: 13.2 G/DL (ref 13.3–17.7)
MCH RBC QN AUTO: 33.1 PG (ref 26.5–33)
MCHC RBC AUTO-ENTMCNC: 34.5 G/DL (ref 31.5–36.5)
MCV RBC AUTO: 96 FL (ref 78–100)
PLATELET # BLD AUTO: 323 10E9/L (ref 150–450)
POTASSIUM SERPL-SCNC: 4.9 MMOL/L (ref 3.4–5.3)
RBC # BLD AUTO: 3.99 10E12/L (ref 4.4–5.9)
SODIUM SERPL-SCNC: 136 MMOL/L (ref 133–144)
WBC # BLD AUTO: 8.3 10E9/L (ref 4–11)

## 2019-11-13 PROCEDURE — 83550 IRON BINDING TEST: CPT | Performed by: INTERNAL MEDICINE

## 2019-11-13 PROCEDURE — 80048 BASIC METABOLIC PNL TOTAL CA: CPT | Performed by: INTERNAL MEDICINE

## 2019-11-13 PROCEDURE — 36415 COLL VENOUS BLD VENIPUNCTURE: CPT | Performed by: INTERNAL MEDICINE

## 2019-11-13 PROCEDURE — 83540 ASSAY OF IRON: CPT | Performed by: INTERNAL MEDICINE

## 2019-11-13 PROCEDURE — 85027 COMPLETE CBC AUTOMATED: CPT | Performed by: INTERNAL MEDICINE

## 2019-11-13 PROCEDURE — 99214 OFFICE O/P EST MOD 30 MIN: CPT | Performed by: INTERNAL MEDICINE

## 2019-11-13 RX ORDER — LORAZEPAM 0.5 MG/1
TABLET ORAL
Qty: 60 TABLET | Refills: 1 | Status: SHIPPED | OUTPATIENT
Start: 2019-11-13 | End: 2019-12-17
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
## 2019-11-14 LAB
IRON SATN MFR SERPL: 44 % (ref 15–46)
IRON SERPL-MCNC: 119 UG/DL (ref 35–180)
TIBC SERPL-MCNC: 271 UG/DL (ref 240–430)

## 2019-11-26 ENCOUNTER — OFFICE VISIT (OUTPATIENT)
Dept: FAMILY MEDICINE | Facility: CLINIC | Age: 79
End: 2019-11-26
Payer: COMMERCIAL

## 2019-11-26 VITALS
WEIGHT: 143 LBS | DIASTOLIC BLOOD PRESSURE: 80 MMHG | BODY MASS INDEX: 19.39 KG/M2 | SYSTOLIC BLOOD PRESSURE: 140 MMHG | TEMPERATURE: 97.1 F | OXYGEN SATURATION: 98 % | HEART RATE: 52 BPM

## 2019-11-26 DIAGNOSIS — L03.115 CELLULITIS OF RIGHT LOWER EXTREMITY: Primary | ICD-10-CM

## 2019-11-26 PROCEDURE — 87077 CULTURE AEROBIC IDENTIFY: CPT | Performed by: INTERNAL MEDICINE

## 2019-11-26 PROCEDURE — 87070 CULTURE OTHR SPECIMN AEROBIC: CPT | Performed by: INTERNAL MEDICINE

## 2019-11-26 PROCEDURE — 99214 OFFICE O/P EST MOD 30 MIN: CPT | Performed by: INTERNAL MEDICINE

## 2019-11-26 PROCEDURE — 87186 SC STD MICRODIL/AGAR DIL: CPT | Performed by: INTERNAL MEDICINE
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
## 2019-11-30 LAB
BACTERIA SPEC CULT: ABNORMAL
Lab: ABNORMAL
SPECIMEN SOURCE: ABNORMAL

## 2019-12-04 ENCOUNTER — TRANSFERRED RECORDS (OUTPATIENT)
Dept: HEALTH INFORMATION MANAGEMENT | Facility: CLINIC | Age: 79
End: 2019-12-04

## 2019-12-04 ENCOUNTER — TELEPHONE (OUTPATIENT)
Dept: FAMILY MEDICINE | Facility: CLINIC | Age: 79
End: 2019-12-04

## 2019-12-04 DIAGNOSIS — L03.115 CELLULITIS OF RIGHT LOWER EXTREMITY: Primary | ICD-10-CM

## 2019-12-04 NOTE — TELEPHONE ENCOUNTER
Reason for Call:  Request for results:    Name of test or procedure: Wound culture    Date of test of procedure: 11/26/2019    Location of the test or procedure: in office    OK to leave the result message on voice mail or with a family member? YES    Phone number Patient can be reached at:  Home number on file [phone] (home)    Additional comments: Please call with results asap    Call taken on 12/4/2019 at 10:14 AM by Miranda Palm

## 2019-12-06 RX ORDER — AZITHROMYCIN 250 MG/1
TABLET, FILM COATED ORAL
Qty: 6 TABLET | Refills: 0 | Status: SHIPPED | OUTPATIENT
Start: 2019-12-06 | End: 2019-12-17

## 2019-12-06 RX ORDER — CIPROFLOXACIN 250 MG/1
250 TABLET, FILM COATED ORAL 2 TIMES DAILY WITH MEALS
Qty: 14 TABLET | Refills: 0 | Status: SHIPPED | OUTPATIENT
Start: 2019-12-06 | End: 2019-12-17

## 2019-12-17 ENCOUNTER — OFFICE VISIT (OUTPATIENT)
Dept: FAMILY MEDICINE | Facility: CLINIC | Age: 79
End: 2019-12-17
Payer: COMMERCIAL

## 2019-12-17 VITALS
SYSTOLIC BLOOD PRESSURE: 150 MMHG | TEMPERATURE: 96.8 F | BODY MASS INDEX: 19.77 KG/M2 | DIASTOLIC BLOOD PRESSURE: 80 MMHG | HEIGHT: 72 IN | WEIGHT: 146 LBS | HEART RATE: 54 BPM | OXYGEN SATURATION: 99 %

## 2019-12-17 DIAGNOSIS — F13.20 SEDATIVE, HYPNOTIC OR ANXIOLYTIC DEPENDENCE (H): ICD-10-CM

## 2019-12-17 DIAGNOSIS — D62 ANEMIA DUE TO BLOOD LOSS, ACUTE: ICD-10-CM

## 2019-12-17 DIAGNOSIS — F43.23 ADJUSTMENT DISORDER WITH MIXED ANXIETY AND DEPRESSED MOOD: ICD-10-CM

## 2019-12-17 DIAGNOSIS — L30.9 ECZEMA, UNSPECIFIED TYPE: ICD-10-CM

## 2019-12-17 DIAGNOSIS — R91.1 PULMONARY NODULE: ICD-10-CM
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
## 2019-12-17 DIAGNOSIS — I10 BENIGN ESSENTIAL HYPERTENSION: ICD-10-CM

## 2019-12-17 DIAGNOSIS — E78.5 HYPERLIPIDEMIA LDL GOAL <100: ICD-10-CM

## 2019-12-17 DIAGNOSIS — I65.23 CAROTID STENOSIS, BILATERAL: ICD-10-CM

## 2019-12-17 DIAGNOSIS — E55.9 VITAMIN D DEFICIENCY: ICD-10-CM

## 2019-12-17 DIAGNOSIS — Z13.29 SCREENING FOR HYPOTHYROIDISM: ICD-10-CM

## 2019-12-17 DIAGNOSIS — I25.5 ISCHEMIC CARDIOMYOPATHY: ICD-10-CM

## 2019-12-17 LAB — ERYTHROCYTE [SEDIMENTATION RATE] IN BLOOD BY WESTERGREN METHOD: 11 MM/H (ref 0–20)

## 2019-12-17 PROCEDURE — 80053 COMPREHEN METABOLIC PANEL: CPT | Performed by: INTERNAL MEDICINE

## 2019-12-17 PROCEDURE — 82306 VITAMIN D 25 HYDROXY: CPT | Performed by: INTERNAL MEDICINE

## 2019-12-17 PROCEDURE — 99214 OFFICE O/P EST MOD 30 MIN: CPT | Performed by: INTERNAL MEDICINE

## 2019-12-17 PROCEDURE — 36415 COLL VENOUS BLD VENIPUNCTURE: CPT | Performed by: INTERNAL MEDICINE

## 2019-12-17 PROCEDURE — 84443 ASSAY THYROID STIM HORMONE: CPT | Performed by: INTERNAL MEDICINE

## 2019-12-17 PROCEDURE — 85652 RBC SED RATE AUTOMATED: CPT | Performed by: INTERNAL MEDICINE

## 2019-12-17 RX ORDER — LORAZEPAM 0.5 MG/1
TABLET ORAL
Qty: 60 TABLET | Refills: 1 | Status: SHIPPED | OUTPATIENT
Start: 2019-12-17 | End: 2020-02-13

## 2019-12-17 RX ORDER — AMLODIPINE BESYLATE 5 MG/1
5 TABLET ORAL DAILY
Qty: 30 TABLET | Refills: 1 | Status: SHIPPED | OUTPATIENT
Start: 2019-12-17 | End: 2020-05-11

## 2019-12-17 ASSESSMENT — MIFFLIN-ST. JEOR: SCORE: 1425.25
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
## 2019-12-18 ENCOUNTER — OFFICE VISIT (OUTPATIENT)
Dept: CARDIOLOGY | Facility: CLINIC | Age: 79
End: 2019-12-18
Payer: COMMERCIAL

## 2019-12-18 VITALS
DIASTOLIC BLOOD PRESSURE: 77 MMHG | HEIGHT: 72 IN | WEIGHT: 146.9 LBS | BODY MASS INDEX: 19.9 KG/M2 | HEART RATE: 54 BPM | SYSTOLIC BLOOD PRESSURE: 153 MMHG

## 2019-12-18 DIAGNOSIS — I73.9 PERIPHERAL ARTERY DISEASE (H): ICD-10-CM

## 2019-12-18 DIAGNOSIS — I25.10 ATHEROSCLEROSIS OF NATIVE CORONARY ARTERY OF NATIVE HEART WITHOUT ANGINA PECTORIS: Primary | ICD-10-CM

## 2019-12-18 DIAGNOSIS — I65.23 CAROTID STENOSIS, BILATERAL: ICD-10-CM

## 2019-12-18 DIAGNOSIS — I25.5 ISCHEMIC CARDIOMYOPATHY: ICD-10-CM

## 2019-12-18 LAB
ALBUMIN SERPL-MCNC: 3.8 G/DL (ref 3.4–5)
ALP SERPL-CCNC: 106 U/L (ref 40–150)
ALT SERPL W P-5'-P-CCNC: 18 U/L (ref 0–70)
ANION GAP SERPL CALCULATED.3IONS-SCNC: 7 MMOL/L (ref 3–14)
AST SERPL W P-5'-P-CCNC: 15 U/L (ref 0–45)
BILIRUB SERPL-MCNC: 0.6 MG/DL (ref 0.2–1.3)
BUN SERPL-MCNC: 13 MG/DL (ref 7–30)
CALCIUM SERPL-MCNC: 8.8 MG/DL (ref 8.5–10.1)
CHLORIDE SERPL-SCNC: 105 MMOL/L (ref 94–109)
CO2 SERPL-SCNC: 25 MMOL/L (ref 20–32)
CREAT SERPL-MCNC: 0.68 MG/DL (ref 0.66–1.25)
DEPRECATED CALCIDIOL+CALCIFEROL SERPL-MC: 28 UG/L (ref 20–75)
GFR SERPL CREATININE-BSD FRML MDRD: >90 ML/MIN/{1.73_M2}
GLUCOSE SERPL-MCNC: 83 MG/DL (ref 70–99)
POTASSIUM SERPL-SCNC: 4.3 MMOL/L (ref 3.4–5.3)
PROT SERPL-MCNC: 7.4 G/DL (ref 6.8–8.8)
SODIUM SERPL-SCNC: 137 MMOL/L (ref 133–144)
TSH SERPL DL<=0.005 MIU/L-ACNC: 3.47 MU/L (ref 0.4–4)

## 2019-12-18 PROCEDURE — 99203 OFFICE O/P NEW LOW 30 MIN: CPT | Performed by: INTERNAL MEDICINE

## 2019-12-18 PROCEDURE — 93000 ELECTROCARDIOGRAM COMPLETE: CPT | Performed by: INTERNAL MEDICINE

## 2019-12-18 ASSESSMENT — MIFFLIN-ST. JEOR: SCORE: 1429.33

## 2019-12-18 NOTE — PATIENT INSTRUCTIONS
1. PPG of the lower extremity ([facility])  2. TCPO2 of the lower extremity ([facility])  3. CT angiogram lower extremities  4. Depending on results of the above imaging, we will call you to schedule you with vascular surgery   5. Echocardiogram at [facility]  6. Follow up with Dr. Banuelos in 6 months
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
## 2019-12-30 DIAGNOSIS — I10 BENIGN ESSENTIAL HYPERTENSION: ICD-10-CM

## 2019-12-30 NOTE — TELEPHONE ENCOUNTER
"  metoprolol tartrate (LOPRESSOR) 50 MG tablet     --   Sig - Route: Take 50 mg by mouth 2 times daily - Oral   Class: Historical       Last Written Prescription Date:  Unknown  Last Fill Quantity: Unknown,  # refills: Unknown   Last office visit: 12/17/2019 with prescribing provider:     Future Office Visit:   Next 5 appointments (look out 90 days)    Feb 13, 2020  9:00 AM CST  Office Visit with Héctor Solis MD  [facility] ([facility] [address] [facility] [address] [phone]           Requested Prescriptions   Pending Prescriptions Disp Refills     metoprolol tartrate (LOPRESSOR) 100 MG tablet [Pharmacy Med Name: METOPROLOL TARTRATE 100MG TABLETS] 60 tablet 0     Sig: TAKE 1/2 TABLET BY MOUTH TWICE DAILY 12 HOURS APART       Beta-Blockers Protocol Failed - 12/30/2019 10:21 AM        Failed - Blood pressure under 140/90 in past 12 months     BP Readings from Last 3 Encounters:   12/18/19 (!) 153/77   12/17/19 (!) 150/80   11/26/19 (!) 140/80                 Passed - Patient is age 6 or older        Passed - Recent (12 mo) or future (30 days) visit within the authorizing provider's specialty     Patient has had an office visit with the authorizing provider or a provider within the authorizing providers department within the previous 12 mos or has a future within next 30 days. See \"Patient Info\" tab in inbasket, or \"Choose Columns\" in Meds & Orders section of the refill encounter.              Passed - Medication is active on med list          "

## 2020-01-02 RX ORDER — METOPROLOL TARTRATE 100 MG
TABLET ORAL
Qty: 60 TABLET | Refills: 11 | Status: SHIPPED | OUTPATIENT
Start: 2020-01-02 | End: 2021-02-18

## 2020-01-02 NOTE — TELEPHONE ENCOUNTER
Routing refill request to provider for review/approval because:  Bp out of range  Due and scheduled for a visit next month with PCP.  Please authorize if appropriate.  Thanks,  Kelsey Castaneda RN

## 2020-01-09 ENCOUNTER — ANCILLARY PROCEDURE (OUTPATIENT)
Dept: ULTRASOUND IMAGING | Facility: CLINIC | Age: 80
End: 2020-01-09
Attending: INTERNAL MEDICINE
Payer: COMMERCIAL

## 2020-01-09 DIAGNOSIS — I73.9 PERIPHERAL ARTERY DISEASE (H): ICD-10-CM

## 2020-01-16 ENCOUNTER — HOSPITAL ENCOUNTER (OUTPATIENT)
Dept: ULTRASOUND IMAGING | Facility: CLINIC | Age: 80
Discharge: HOME | End: 2020-01-16
Attending: INTERNAL MEDICINE | Admitting: INTERNAL MEDICINE
Payer: COMMERCIAL

## 2020-01-16 ENCOUNTER — HOSPITAL ENCOUNTER (OUTPATIENT)
Dept: CT IMAGING | Facility: CLINIC | Age: 80
End: 2020-01-16
Attending: INTERNAL MEDICINE
Payer: COMMERCIAL

## 2020-01-16 ENCOUNTER — HOSPITAL ENCOUNTER (OUTPATIENT)
Dept: CARDIOLOGY | Facility: CLINIC | Age: 80
Discharge: HOME | End: 2020-01-16
Attending: INTERNAL MEDICINE | Admitting: INTERNAL MEDICINE
Payer: COMMERCIAL

## 2020-01-16 DIAGNOSIS — I25.5 ISCHEMIC CARDIOMYOPATHY: ICD-10-CM

## 2020-01-16 DIAGNOSIS — I65.23 CAROTID STENOSIS, BILATERAL: ICD-10-CM

## 2020-01-16 DIAGNOSIS — I25.10 ATHEROSCLEROSIS OF NATIVE CORONARY ARTERY OF NATIVE HEART WITHOUT ANGINA PECTORIS: ICD-10-CM

## 2020-01-16 DIAGNOSIS — I73.9 PERIPHERAL ARTERY DISEASE (H): ICD-10-CM

## 2020-01-16 PROCEDURE — 75635 CT ANGIO ABDOMINAL ARTERIES: CPT

## 2020-01-16 PROCEDURE — 93306 TTE W/DOPPLER COMPLETE: CPT | Mod: 26 | Performed by: INTERNAL MEDICINE

## 2020-01-16 PROCEDURE — 93880 EXTRACRANIAL BILAT STUDY: CPT

## 2020-01-16 PROCEDURE — 25000125 ZZHC RX 250: Performed by: INTERNAL MEDICINE

## 2020-01-16 PROCEDURE — 25000128 H RX IP 250 OP 636: Performed by: INTERNAL MEDICINE

## 2020-01-16 PROCEDURE — 25500064 ZZH RX 255 OP 636: Performed by: INTERNAL MEDICINE

## 2020-01-16 PROCEDURE — 93880 EXTRACRANIAL BILAT STUDY: CPT | Mod: 26 | Performed by: INTERNAL MEDICINE

## 2020-01-16 RX ORDER — IOPAMIDOL 755 MG/ML
100 INJECTION, SOLUTION INTRAVASCULAR ONCE
Status: COMPLETED | OUTPATIENT
Start: 2020-01-16 | End: 2020-01-16

## 2020-01-16 RX ADMIN — SODIUM CHLORIDE 160 ML: 9 INJECTION, SOLUTION INTRAVENOUS at 09:25

## 2020-01-16 RX ADMIN — HUMAN ALBUMIN MICROSPHERES AND PERFLUTREN 2 ML: 10; .22 INJECTION, SOLUTION INTRAVENOUS at 08:13

## 2020-01-16 RX ADMIN — IOPAMIDOL 100 ML: 755 INJECTION, SOLUTION INTRAVENOUS at 09:25

## 2020-01-27 ENCOUNTER — TELEPHONE (OUTPATIENT)
Dept: CARDIOLOGY | Facility: CLINIC | Age: 80
End: 2020-01-27

## 2020-01-27 NOTE — TELEPHONE ENCOUNTER
Notes recorded by Madelin Banuelos MD on 1/24/2020 at 10:27 AM CST  Hi Zoran,    I have reviewed all of your studies. Good news is the blockages in the arteries are not as severe as we thought and you are getting adequate blood flow to the legs to heal your wounds and prevent further ulcerations. We will continue medical therapy for now. I will have my nurses reach out to you to go through results and see if you have any questions. I will also forward them to your other doctors.    Dr. Banuelos    Notes recorded by Madelin Banuelos MD on 1/26/2020 at 3:46 AM CST  Dear Zoran,    Here are the results of your ultrasound of the carotid arteries. You have some plaque there that we will continue to monitor with yearly ultrasounds. You are on the right medications to treat this. I will have my nurse reach out to see if you have any questions!     Best,  Dr. Banuelos    Sent patient [employer] message to reach out if he has any further questions from nursing regarding his results. Results and Dr. Banuelos's comments have been reviewed by patient via [employer].

## 2020-02-13 ENCOUNTER — OFFICE VISIT (OUTPATIENT)
Dept: FAMILY MEDICINE | Facility: CLINIC | Age: 80
End: 2020-02-13
Payer: COMMERCIAL

## 2020-02-13 VITALS
WEIGHT: 148 LBS | HEART RATE: 48 BPM | DIASTOLIC BLOOD PRESSURE: 70 MMHG | OXYGEN SATURATION: 99 % | BODY MASS INDEX: 20.05 KG/M2 | TEMPERATURE: 97.7 F | HEIGHT: 72 IN | SYSTOLIC BLOOD PRESSURE: 134 MMHG

## 2020-02-13 DIAGNOSIS — I73.9 PAD (PERIPHERAL ARTERY DISEASE) (H): ICD-10-CM

## 2020-02-13 DIAGNOSIS — F13.20 SEDATIVE, HYPNOTIC OR ANXIOLYTIC DEPENDENCE (H): ICD-10-CM

## 2020-02-13 DIAGNOSIS — I65.23 CAROTID STENOSIS, BILATERAL: ICD-10-CM

## 2020-02-13 DIAGNOSIS — F43.23 ADJUSTMENT DISORDER WITH MIXED ANXIETY AND DEPRESSED MOOD: ICD-10-CM

## 2020-02-13 DIAGNOSIS — L30.9 ECZEMA, UNSPECIFIED TYPE: ICD-10-CM

## 2020-02-13 DIAGNOSIS — D62 ANEMIA DUE TO BLOOD LOSS, ACUTE: ICD-10-CM

## 2020-02-13 DIAGNOSIS — I10 BENIGN ESSENTIAL HYPERTENSION: ICD-10-CM

## 2020-02-13 DIAGNOSIS — I25.10 ATHEROSCLEROSIS OF NATIVE CORONARY ARTERY OF NATIVE HEART WITHOUT ANGINA PECTORIS: ICD-10-CM

## 2020-02-13 DIAGNOSIS — F43.22 ADJUSTMENT DISORDER WITH ANXIOUS MOOD: ICD-10-CM
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
## 2020-02-13 LAB
ANION GAP SERPL CALCULATED.3IONS-SCNC: 6 MMOL/L (ref 3–14)
BUN SERPL-MCNC: 15 MG/DL (ref 7–30)
CALCIUM SERPL-MCNC: 9 MG/DL (ref 8.5–10.1)
CHLORIDE SERPL-SCNC: 104 MMOL/L (ref 94–109)
CO2 SERPL-SCNC: 26 MMOL/L (ref 20–32)
CREAT SERPL-MCNC: 0.77 MG/DL (ref 0.66–1.25)
ERYTHROCYTE [DISTWIDTH] IN BLOOD BY AUTOMATED COUNT: 12.6 % (ref 10–15)
GFR SERPL CREATININE-BSD FRML MDRD: 87 ML/MIN/{1.73_M2}
GLUCOSE SERPL-MCNC: 85 MG/DL (ref 70–99)
HCT VFR BLD AUTO: 40.5 % (ref 40–53)
HGB BLD-MCNC: 14.1 G/DL (ref 13.3–17.7)
MCH RBC QN AUTO: 33.5 PG (ref 26.5–33)
MCHC RBC AUTO-ENTMCNC: 34.8 G/DL (ref 31.5–36.5)
MCV RBC AUTO: 96 FL (ref 78–100)
PLATELET # BLD AUTO: 270 10E9/L (ref 150–450)
POTASSIUM SERPL-SCNC: 4.6 MMOL/L (ref 3.4–5.3)
RBC # BLD AUTO: 4.21 10E12/L (ref 4.4–5.9)
SODIUM SERPL-SCNC: 136 MMOL/L (ref 133–144)
WBC # BLD AUTO: 6.6 10E9/L (ref 4–11)

## 2020-02-13 PROCEDURE — 80048 BASIC METABOLIC PNL TOTAL CA: CPT | Performed by: INTERNAL MEDICINE

## 2020-02-13 PROCEDURE — 99214 OFFICE O/P EST MOD 30 MIN: CPT | Performed by: INTERNAL MEDICINE

## 2020-02-13 PROCEDURE — 36415 COLL VENOUS BLD VENIPUNCTURE: CPT | Performed by: INTERNAL MEDICINE

## 2020-02-13 PROCEDURE — 85027 COMPLETE CBC AUTOMATED: CPT | Performed by: INTERNAL MEDICINE

## 2020-02-13 RX ORDER — LORAZEPAM 0.5 MG/1
TABLET ORAL
Qty: 60 TABLET | Refills: 1 | Status: SHIPPED | OUTPATIENT
Start: 2020-02-13 | End: 2020-04-29

## 2020-02-13 ASSESSMENT — MIFFLIN-ST. JEOR: SCORE: 1434.32
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
## 2020-03-12 ENCOUNTER — TELEPHONE (OUTPATIENT)
Dept: FAMILY MEDICINE | Facility: CLINIC | Age: 80
End: 2020-03-12

## 2020-03-12 NOTE — TELEPHONE ENCOUNTER
Reason for Call:  Other call back    Detailed comments:   Pt had an appt for 03/18/2020.  He has cancelled due to possibly being exposed to COVID 19.  Pt would like to have a phone call with Dr. Solis concerning his BP, which was what has been being monitored and the purpose of his cancelled appt.      Phone Number Patient can be reached at: Home number on file [phone] (home)    Best Time: Any     Can we leave a detailed message on this number? YES    Call taken on 3/12/2020 at 2:08 PM by Sita Arias

## 2020-03-13 NOTE — TELEPHONE ENCOUNTER
Called Pt and informed him that Dr. Solis would like for him to start an E-visit on 0318. I explained to  Patient how to initiate through [employer]. Patient will call if he has any questions.

## 2020-03-18 ENCOUNTER — VIRTUAL VISIT (OUTPATIENT)
Dept: FAMILY MEDICINE | Facility: CLINIC | Age: 80
End: 2020-03-18
Payer: COMMERCIAL

## 2020-03-18 DIAGNOSIS — I65.23 CAROTID STENOSIS, BILATERAL: ICD-10-CM

## 2020-03-18 DIAGNOSIS — I10 BENIGN ESSENTIAL HYPERTENSION: ICD-10-CM

## 2020-03-18 DIAGNOSIS — I25.5 ISCHEMIC CARDIOMYOPATHY: ICD-10-CM

## 2020-03-18 DIAGNOSIS — E78.5 HYPERLIPIDEMIA LDL GOAL <100: ICD-10-CM

## 2020-03-18 DIAGNOSIS — F43.22 ADJUSTMENT DISORDER WITH ANXIOUS MOOD: ICD-10-CM

## 2020-03-18 DIAGNOSIS — I25.10 ATHEROSCLEROSIS OF NATIVE CORONARY ARTERY OF NATIVE HEART WITHOUT ANGINA PECTORIS: ICD-10-CM

## 2020-03-18 DIAGNOSIS — I73.9 PAD (PERIPHERAL ARTERY DISEASE) (H): ICD-10-CM
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
## 2020-03-18 DIAGNOSIS — F13.20 SEDATIVE, HYPNOTIC OR ANXIOLYTIC DEPENDENCE (H): ICD-10-CM

## 2020-03-18 PROCEDURE — 99442 ZZC PHYSICIAN TELEPHONE EVALUATION 11-20 MIN: CPT | Performed by: INTERNAL MEDICINE
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
## 2020-03-24 ENCOUNTER — MYC MEDICAL ADVICE (OUTPATIENT)
Dept: FAMILY MEDICINE | Facility: CLINIC | Age: 80
End: 2020-03-24

## 2020-03-25 NOTE — TELEPHONE ENCOUNTER
BP under good control except for day he was worrying about the stock market.Cont same dose of amlodipine.

## 2020-04-27 DIAGNOSIS — F43.23 ADJUSTMENT DISORDER WITH MIXED ANXIETY AND DEPRESSED MOOD: ICD-10-CM

## 2020-04-27 DIAGNOSIS — F43.22 ADJUSTMENT DISORDER WITH ANXIOUS MOOD: ICD-10-CM

## 2020-04-29 RX ORDER — LORAZEPAM 0.5 MG/1
TABLET ORAL
Qty: 60 TABLET | Refills: 0 | Status: SHIPPED | OUTPATIENT
Start: 2020-04-29 | End: 2020-05-22

## 2020-04-29 NOTE — TELEPHONE ENCOUNTER
Office visit up to date.  Pended for covering provider review and sign of Ativan if needed.  Thank you,    Elise Alexandre RN on 4/29/2020 at 9:20 AM

## 2020-04-29 NOTE — TELEPHONE ENCOUNTER
Controlled Substance Refill Request for LORazepam (ATIVAN) 0.5 MG tablet  Problem List Complete:  Yes      Patient is followed by CRISTIANO ACOSTA for ongoing prescription of benzodiazepines.  All refills should be approved by this provider, or covering partner.     Medication(s): Ativan.   Maximum quantity per month: 60  Clinic visit frequency required: Q 6  months      Controlled substance agreement on file: No  Benzodiazepine use reviewed by psychiatry:  No     Last [facility] website verification:  done on 12/27/18 GM   [email or web address]      Last Written Prescription Date:  2/13/2020  Last Fill Quantity: 60 tablet,  # refills: 1   Last office visit: 3/18/2020 with prescribing provider:  Will   Future Office Visit:        Controlled substance agreement:   Encounter-Level CSA:    There are no encounter-level csa.     Patient-Level CSA:    There are no patient-level csa.         Last Urine Drug Screen: No results found for: CDAUT, No results found for: COMDAT, No results found for: THC13, PCP13, COC13, MAMP13, OPI13, AMP13, BZO13, TCA13, MTD13, BAR13, OXY13, PPX13, BUP13         [email or web address]   checked in past 3 months?  No, route to RN Lilia checked today. No concerns noted.  Elise Alexandre RN

## 2020-05-11 ENCOUNTER — VIRTUAL VISIT (OUTPATIENT)
Dept: CARDIOLOGY | Facility: CLINIC | Age: 80
End: 2020-05-11
Attending: INTERNAL MEDICINE
Payer: COMMERCIAL

## 2020-05-11 VITALS
BODY MASS INDEX: 21.28 KG/M2 | DIASTOLIC BLOOD PRESSURE: 72 MMHG | HEIGHT: 71 IN | SYSTOLIC BLOOD PRESSURE: 134 MMHG | HEART RATE: 57 BPM | WEIGHT: 152 LBS

## 2020-05-11 DIAGNOSIS — I25.10 ATHEROSCLEROSIS OF NATIVE CORONARY ARTERY OF NATIVE HEART WITHOUT ANGINA PECTORIS: ICD-10-CM

## 2020-05-11 DIAGNOSIS — I25.5 ISCHEMIC CARDIOMYOPATHY: ICD-10-CM

## 2020-05-11 PROCEDURE — 99214 OFFICE O/P EST MOD 30 MIN: CPT | Mod: 95 | Performed by: INTERNAL MEDICINE

## 2020-05-11 RX ORDER — IBUPROFEN 200 MG
200 TABLET ORAL EVERY 4 HOURS PRN
COMMUNITY
End: 2021-04-17

## 2020-05-11 ASSESSMENT — MIFFLIN-ST. JEOR: SCORE: 1436.6

## 2020-05-11 NOTE — PATIENT INSTRUCTIONS
1. 3 month face to face visit with Dr. Banuelos   2. Cut in half amlodipine to see if this improves dizziness and report blood pressure to our nurses in 1 week, if still dizzy may change metoprolol dose
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
## 2020-05-22 DIAGNOSIS — F43.23 ADJUSTMENT DISORDER WITH MIXED ANXIETY AND DEPRESSED MOOD: ICD-10-CM

## 2020-05-22 RX ORDER — LORAZEPAM 0.5 MG/1
TABLET ORAL
Qty: 60 TABLET | Refills: 0 | Status: SHIPPED | OUTPATIENT
Start: 2020-05-22 | End: 2020-06-17

## 2020-05-22 NOTE — TELEPHONE ENCOUNTER
Routing refill request to provider for review/approval because:  Drug not on the [facility] refill protocol     Last [facility] website verification:  done on 4/29/2020  Elise GALAN      Please review and authorize if appropriate,     Thank you,   Kami HOLLIDAY RN

## 2020-06-17 DIAGNOSIS — F43.23 ADJUSTMENT DISORDER WITH MIXED ANXIETY AND DEPRESSED MOOD: ICD-10-CM

## 2020-06-17 NOTE — TELEPHONE ENCOUNTER
Controlled Substance Refill Request for   LORazepam (ATIVAN) 0.5 MG tablet  60 tablet  0  5/22/2020      Problem List Complete:  Yes    Last Written Prescription Date:  5/22/2020  Last Fill Quantity: 60,   # refills: 0    THE MOST RECENT OFFICE VISIT MUST BE WITHIN THE PAST 3 MONTHS. AT LEAST ONE FACE TO FACE VISIT MUST OCCUR EVERY 6 MONTHS. ADDITIONAL VISITS CAN BE VIRTUAL.  (THIS STATEMENT SHOULD BE DELETED.)    Last Office Visit with [facility] primary care provider: 3/18/2020    Future Office visit: Unknown     Controlled substance agreement:   Encounter-Level CSA:    There are no encounter-level csa.     Patient-Level CSA:    There are no patient-level csa.         Last Urine Drug Screen: No results found for: CDAUT, No results found for: COMDAT, No results found for: THC13, PCP13, COC13, MAMP13, OPI13, AMP13, BZO13, TCA13, MTD13, BAR13, OXY13, PPX13, BUP13     Processing:  Rx to be electronically transmitted to pharmacy by provider     [email or web address]   checked in past 3 months?  Yes - 4/29/2020

## 2020-06-19 NOTE — TELEPHONE ENCOUNTER
Routing refill request to provider for review/approval because:  Drug not on the [facility] refill protocol   Estrellita HINSON RN,BSN

## 2020-06-22 RX ORDER — LORAZEPAM 0.5 MG/1
TABLET ORAL
Qty: 60 TABLET | Refills: 1 | Status: SHIPPED | OUTPATIENT
Start: 2020-06-22 | End: 2020-08-24

## 2020-08-22 DIAGNOSIS — F43.23 ADJUSTMENT DISORDER WITH MIXED ANXIETY AND DEPRESSED MOOD: ICD-10-CM

## 2020-08-24 RX ORDER — LORAZEPAM 0.5 MG/1
TABLET ORAL
Qty: 60 TABLET | Refills: 1 | Status: SHIPPED | OUTPATIENT
Start: 2020-08-24 | End: 2020-10-22

## 2020-08-24 NOTE — TELEPHONE ENCOUNTER
Routing refill request to provider for review/approval because:  Pending Prescriptions:                       Disp   Refills    LORazepam (ATIVAN) 0.5 MG tablet [Pharmacy*60 tab*         Sig: TAKE 1 TABLET BY MOUTH TWICE DAILY AS NEEDED    Last Written Prescription Date:  6/22/20  Last Fill Quantity: 60,  # refills: 1   Last office visit: 2/13/2020 with prescribing provider:  Will Giordano Office Visit:            Lisa Smith RN
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
## 2020-10-08 ENCOUNTER — VIRTUAL VISIT (OUTPATIENT)
Dept: CARDIOLOGY | Facility: CLINIC | Age: 80
End: 2020-10-08
Payer: COMMERCIAL

## 2020-10-08 DIAGNOSIS — I65.23 OCCLUSION AND STENOSIS OF BILATERAL CAROTID ARTERIES: ICD-10-CM

## 2020-10-08 DIAGNOSIS — I25.10 ATHEROSCLEROSIS OF NATIVE CORONARY ARTERY OF NATIVE HEART WITHOUT ANGINA PECTORIS: ICD-10-CM

## 2020-10-08 DIAGNOSIS — I25.5 ISCHEMIC CARDIOMYOPATHY: ICD-10-CM

## 2020-10-08 PROCEDURE — 99213 OFFICE O/P EST LOW 20 MIN: CPT | Mod: 95 | Performed by: INTERNAL MEDICINE
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
## 2020-10-08 NOTE — PATIENT INSTRUCTIONS
1. Nuclear stress test   2. Echocardiogram   3. Carotid ultrasound   4. 4 month follow up with Dr. Banuelos

## 2020-10-20 DIAGNOSIS — F43.23 ADJUSTMENT DISORDER WITH MIXED ANXIETY AND DEPRESSED MOOD: ICD-10-CM

## 2020-10-20 NOTE — TELEPHONE ENCOUNTER
Refill request:    LORAZEPAM 0.5 MG TABLETS    Summary: TAKE 1 TABLET BY MOUTH TWICE DAILY AS NEEDED, Disp-60 tablet, R-1, E-Prescribe   Start: 8/24/2020  Ord/Sold: 8/24/2020

## 2020-10-22 NOTE — TELEPHONE ENCOUNTER
Controlled Substance Refill Request for   LORazepam (ATIVAN) 0.5 MG tablet 60 tablet 1 8/24/2020       Problem List Complete:  Yes    Last Written Prescription Date:  8/24/2020  Last Fill Quantity: 60,   # refills: 1    THE MOST RECENT OFFICE VISIT MUST BE WITHIN THE PAST 3 MONTHS. AT LEAST ONE FACE TO FACE VISIT MUST OCCUR EVERY 6 MONTHS. ADDITIONAL VISITS CAN BE VIRTUAL.  (THIS STATEMENT SHOULD BE DELETED.)    Last Office Visit with [facility] primary care provider: 3/18/2020    Future Office visit:   Next 5 appointments (look out 90 days)    Dec 08, 2020  1:45 PM  Return Visit with Madelin Banuelos MD  [facility] [address] [place] [id number] [phone] OPT 2          Controlled substance agreement:   Encounter-Level CSA:    There are no encounter-level csa.     Patient-Level CSA:    There are no patient-level csa.         Last Urine Drug Screen: No results found for: CDAUT, No results found for: COMDAT, No results found for: THC13, PCP13, COC13, MAMP13, OPI13, AMP13, BZO13, TCA13, MTD13, BAR13, OXY13, PPX13, BUP13     Processing:  Rx to be electronically transmitted to pharmacy by provider     [email or web address]   checked in past 3 months?  No, route to RN

## 2020-10-23 RX ORDER — LORAZEPAM 0.5 MG/1
TABLET ORAL
Qty: 60 TABLET | Refills: 1 | Status: SHIPPED | OUTPATIENT
Start: 2020-10-23 | End: 2020-12-29

## 2020-12-21 ENCOUNTER — ANCILLARY PROCEDURE (OUTPATIENT)
Dept: GENERAL RADIOLOGY | Facility: CLINIC | Age: 80
End: 2020-12-21
Attending: INTERNAL MEDICINE
Payer: COMMERCIAL

## 2020-12-21 ENCOUNTER — OFFICE VISIT (OUTPATIENT)
Dept: FAMILY MEDICINE | Facility: CLINIC | Age: 80
End: 2020-12-21
Payer: COMMERCIAL

## 2020-12-21 DIAGNOSIS — R04.2 HEMOPTYSIS: ICD-10-CM

## 2020-12-21 DIAGNOSIS — R05.9 COUGH: ICD-10-CM

## 2020-12-21 DIAGNOSIS — F13.20 SEDATIVE, HYPNOTIC OR ANXIOLYTIC DEPENDENCE (H): ICD-10-CM

## 2020-12-21 DIAGNOSIS — R04.2 HEMOPTYSIS: Primary | ICD-10-CM
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
## 2020-12-21 DIAGNOSIS — I25.5 ISCHEMIC CARDIOMYOPATHY: ICD-10-CM

## 2020-12-21 DIAGNOSIS — I48.0 PAROXYSMAL ATRIAL FIBRILLATION (H): ICD-10-CM

## 2020-12-21 LAB
ERYTHROCYTE [DISTWIDTH] IN BLOOD BY AUTOMATED COUNT: 13.4 % (ref 10–15)
HCT VFR BLD AUTO: 41.4 % (ref 40–53)
HGB BLD-MCNC: 14.3 G/DL (ref 13.3–17.7)
MCH RBC QN AUTO: 32.5 PG (ref 26.5–33)
MCHC RBC AUTO-ENTMCNC: 34.5 G/DL (ref 31.5–36.5)
MCV RBC AUTO: 94 FL (ref 78–100)
PLATELET # BLD AUTO: 300 10E9/L (ref 150–450)
RBC # BLD AUTO: 4.4 10E12/L (ref 4.4–5.9)
WBC # BLD AUTO: 7.8 10E9/L (ref 4–11)

## 2020-12-21 PROCEDURE — 71046 X-RAY EXAM CHEST 2 VIEWS: CPT | Performed by: RADIOLOGY

## 2020-12-21 PROCEDURE — 93005 ELECTROCARDIOGRAM TRACING: CPT | Performed by: INTERNAL MEDICINE

## 2020-12-21 PROCEDURE — 80053 COMPREHEN METABOLIC PANEL: CPT | Performed by: INTERNAL MEDICINE

## 2020-12-21 PROCEDURE — 85027 COMPLETE CBC AUTOMATED: CPT | Performed by: INTERNAL MEDICINE

## 2020-12-21 PROCEDURE — 99214 OFFICE O/P EST MOD 30 MIN: CPT | Performed by: INTERNAL MEDICINE

## 2020-12-21 PROCEDURE — 84443 ASSAY THYROID STIM HORMONE: CPT | Performed by: INTERNAL MEDICINE

## 2020-12-21 PROCEDURE — 36415 COLL VENOUS BLD VENIPUNCTURE: CPT | Performed by: INTERNAL MEDICINE

## 2020-12-21 NOTE — PROGRESS NOTES
Subjective     Rm Villarreal is a 80 year old male who presents to clinic today for the following health issues:    HPI    History of fall 8 days ago, landing on his anterior chest and he has associated painful ribs.  4 days he started having excessive coughing associated with postnasal drip and he subsequently been coughing up pearls of blood.  3 days ago he had some dyspnea on exertion however, it has been improved over the last 48 hours.  Sputum is clear to white and now it is intermittently bloody.  He has no fevers chills or sweats    Blood pressure checks    Weight gain noted       Coughing up blood      Review of Systems   Constitutional, HEENT, cardiovascular, pulmonary, gi and gu systems are negative, except as otherwise noted.      Objective    BP (!) 138/94 (BP Location: Left arm, Patient Position: Sitting)   Pulse 79   Temp 96.8  F (36  C) (Tympanic)   Wt 70.3 kg (155 lb)   SpO2 99%   BMI 21.62 kg/m    Body mass index is 21.62 kg/m .  Physical Exam /88  HEENT nose and throat were clear  GENERAL: healthy, alert and no distress  NECK: no adenopathy, no asymmetry, masses, or scars and thyroid normal to palpation  RESP: lungs clear to auscultation - no rales, rhonchi or wheezes  CV: Irregularly irregular rhythm, click or rub, no peripheral edema and peripheral pulses strong  ABDOMEN: soft, nontender, no hepatosplenomegaly, no masses and bowel sounds normal  MS: no gross musculoskeletal defects noted, no edema    Chest x-ray preliminary and APD no cardiomegaly        Assessment & Plan     Hemoptysis  Most likely related to irritation of his chronic rhinitis, cannot rule out ongoing bronchitis associated with his coughing.  There are no signs or symptoms suggestive of an acute infectious illness and will not treat with antibiotics until his symptoms are more likely to be infectious etiology.  - XR Chest 2 Views; Future  - CBC with platelets  - Comprehensive metabolic panel    Paroxysmal atrial  fibrillation (H)  Currently in atrial fibrillation  - TSH with free T4 reflex  - apixaban ANTICOAGULANT (ELIQUIS ANTICOAGULANT) 5 MG tablet; Take 1 tablet (5 mg) by mouth 2 times daily    Sedative, hypnotic or anxiolytic dependence (H)  No changes in ongoing therapy    Ischemic cardiomyopathy  Well compensated with current therapy  - EKG 12-lead complete w/read - Clinics    Cough  Most likely related to his ongoing postnasal drip and no suggestion of an ongoing infectious etiology including COVID-19    Fall, initial encounter  No musculoskeletal dysfunction          FUTURE APPOINTMENTS:       - Follow-up visit in 1 mo    No follow-ups on file.    Héctor Solis MD  [facility]

## 2020-12-22 ENCOUNTER — NURSE TRIAGE (OUTPATIENT)
Dept: NURSING | Facility: CLINIC | Age: 80
End: 2020-12-22

## 2020-12-22 LAB
ALBUMIN SERPL-MCNC: 3.8 G/DL (ref 3.4–5)
ALP SERPL-CCNC: 124 U/L (ref 40–150)
ALT SERPL W P-5'-P-CCNC: 26 U/L (ref 0–70)
ANION GAP SERPL CALCULATED.3IONS-SCNC: 7 MMOL/L (ref 3–14)
AST SERPL W P-5'-P-CCNC: 26 U/L (ref 0–45)
BILIRUB SERPL-MCNC: 1.1 MG/DL (ref 0.2–1.3)
BUN SERPL-MCNC: 22 MG/DL (ref 7–30)
CALCIUM SERPL-MCNC: 8.6 MG/DL (ref 8.5–10.1)
CHLORIDE SERPL-SCNC: 105 MMOL/L (ref 94–109)
CO2 SERPL-SCNC: 22 MMOL/L (ref 20–32)
CREAT SERPL-MCNC: 1.02 MG/DL (ref 0.66–1.25)
GFR SERPL CREATININE-BSD FRML MDRD: 70 ML/MIN/{1.73_M2}
GLUCOSE SERPL-MCNC: 97 MG/DL (ref 70–99)
POTASSIUM SERPL-SCNC: 4.2 MMOL/L (ref 3.4–5.3)
PROT SERPL-MCNC: 7.9 G/DL (ref 6.8–8.8)
SODIUM SERPL-SCNC: 133 MMOL/L (ref 133–144)
TSH SERPL DL<=0.005 MIU/L-ACNC: 3.79 MU/L (ref 0.4–4)

## 2020-12-22 NOTE — TELEPHONE ENCOUNTER
Clinic Action Needed: Yes, follow up requested  FNA Triage Call  Presenting Problem:    Caller: [facility]  Phone #: [phone]    Pharmacy received prescription for this patient for Eliquis. Prescribed by Dr. Solis. Prescription does not contain MRN/.    FNA and pharmacist reviewed this chart and does not see any prescription for Eliquis.    Pharmacist would like to verify if Dr. Solis meant to order Eliquis for this patient.  AVS from  office visit lists that patient is to start this medication.    FNA will route message to PCP for confirmation.    Routed to: CS YORK [[id number]]    Sarahi Velasquez RN/[facility] Nurse Advisor        Additional Information    Pharmacy calling with prescription questions and triager unable to answer question    Protocols used: MEDICATION QUESTION CALL-A-[place]

## 2020-12-27 VITALS
BODY MASS INDEX: 21.62 KG/M2 | DIASTOLIC BLOOD PRESSURE: 88 MMHG | OXYGEN SATURATION: 99 % | TEMPERATURE: 96.8 F | HEART RATE: 79 BPM | WEIGHT: 155 LBS | SYSTOLIC BLOOD PRESSURE: 136 MMHG

## 2020-12-29 ENCOUNTER — TRANSFERRED RECORDS (OUTPATIENT)
Dept: HEALTH INFORMATION MANAGEMENT | Facility: CLINIC | Age: 80
End: 2020-12-29

## 2020-12-29 ENCOUNTER — OFFICE VISIT (OUTPATIENT)
Dept: FAMILY MEDICINE | Facility: CLINIC | Age: 80
End: 2020-12-29
Payer: COMMERCIAL

## 2020-12-29 VITALS
TEMPERATURE: 96.3 F | SYSTOLIC BLOOD PRESSURE: 122 MMHG | WEIGHT: 156 LBS | HEART RATE: 71 BPM | DIASTOLIC BLOOD PRESSURE: 85 MMHG | OXYGEN SATURATION: 100 % | BODY MASS INDEX: 21.76 KG/M2

## 2020-12-29 DIAGNOSIS — I48.0 PAROXYSMAL ATRIAL FIBRILLATION (H): ICD-10-CM

## 2020-12-29 DIAGNOSIS — I73.9 PAD (PERIPHERAL ARTERY DISEASE) (H): ICD-10-CM

## 2020-12-29 DIAGNOSIS — F43.23 ADJUSTMENT DISORDER WITH MIXED ANXIETY AND DEPRESSED MOOD: ICD-10-CM

## 2020-12-29 DIAGNOSIS — I25.10 ATHEROSCLEROSIS OF NATIVE CORONARY ARTERY OF NATIVE HEART WITHOUT ANGINA PECTORIS: ICD-10-CM

## 2020-12-29 DIAGNOSIS — I25.5 ISCHEMIC CARDIOMYOPATHY: ICD-10-CM

## 2020-12-29 DIAGNOSIS — R04.2 HEMOPTYSIS: Primary | ICD-10-CM

## 2020-12-29 DIAGNOSIS — F13.20 SEDATIVE, HYPNOTIC OR ANXIOLYTIC DEPENDENCE (H): ICD-10-CM

## 2020-12-29 DIAGNOSIS — I65.23 CAROTID STENOSIS, BILATERAL: ICD-10-CM

## 2020-12-29 DIAGNOSIS — R05.9 COUGH: ICD-10-CM

## 2020-12-29 PROCEDURE — 99214 OFFICE O/P EST MOD 30 MIN: CPT | Performed by: INTERNAL MEDICINE

## 2020-12-29 RX ORDER — LORAZEPAM 0.5 MG/1
TABLET ORAL
Qty: 60 TABLET | Refills: 1 | Status: SHIPPED | OUTPATIENT
Start: 2020-12-29 | End: 2021-03-12

## 2020-12-29 NOTE — PROGRESS NOTES
Subjective     Rm Villarreal is a 80 year old male who presents to clinic today with his wife for the following health issues:    HPI         Chief Complaint   Patient presents with     RECHECK     f/u to fall     No further hemoptysis.  No chest pain or chest discomfort, no problems with shortness of breath feverishness chills or sweats.      Review of Systems   Constitutional, HEENT, cardiovascular, pulmonary, gi and gu systems are negative, except as otherwise noted.      Objective    /85 (BP Location: Left arm, Cuff Size: Adult Regular)   Pulse 71   Temp 96.3  F (35.7  C) (Tympanic)   Wt 70.8 kg (156 lb)   SpO2 100%   BMI 21.76 kg/m    Body mass index is 21.76 kg/m .  Physical Exam   GENERAL: healthy, alert and no distress  NECK: no adenopathy, no asymmetry, masses, or scars and thyroid normal to palpation  RESP: lungs clear to auscultation - no rales, rhonchi or wheezes  CV: regular rate and rhythm, normal S1 S2, no S3 or S4, no murmur, click or rub, no peripheral edema and peripheral pulses strong  ABDOMEN: soft, nontender, no hepatosplenomegaly, no masses and bowel sounds normal  MS: no gross musculoskeletal defects noted, trace to 1+ pretibial edema            Assessment & Plan     Hemoptysis  Resolved and recommend follow-up CT scan    Paroxysmal atrial fibrillation (H)  Well-controlled with current therapy    Sedative, hypnotic or anxiolytic dependence (H)  Managing medications appropriately no current symptoms with his current level of activity    PAD (peripheral artery disease) (H)      Atherosclerosis of native coronary artery of native heart without angina pectoris  Scheduled echocardiogram,  carotid ultrasound and NM Lexiscan  Follow-up with cardiology  Carotid stenosis, bilateral      Ischemic cardiomyopathy      Cough  We will follow-up with CT scan as noted above    Adjustment disorder with mixed anxiety and depressed mood  Managing relatively well with current therapy  - LORazepam  (ATIVAN) 0.5 MG tablet; TAKE 1 TABLET BY MOUTH TWICE DAILY AS NEEDED        FUTURE APPOINTMENTS:       - Follow-up visit in 1 mo    No follow-ups on file.    Héctor Solis MD  [facility]

## 2021-01-06 ENCOUNTER — OFFICE VISIT (OUTPATIENT)
Dept: FAMILY MEDICINE | Facility: CLINIC | Age: 81
End: 2021-01-06
Payer: COMMERCIAL

## 2021-01-06 VITALS
TEMPERATURE: 97.7 F | HEART RATE: 64 BPM | OXYGEN SATURATION: 99 % | SYSTOLIC BLOOD PRESSURE: 113 MMHG | WEIGHT: 156 LBS | BODY MASS INDEX: 21.76 KG/M2 | DIASTOLIC BLOOD PRESSURE: 73 MMHG

## 2021-01-06 DIAGNOSIS — I65.23 CAROTID STENOSIS, BILATERAL: ICD-10-CM

## 2021-01-06 DIAGNOSIS — G89.21 CHRONIC PAIN DUE TO TRAUMA: ICD-10-CM

## 2021-01-06 DIAGNOSIS — R04.2 HEMOPTYSIS: Primary | ICD-10-CM

## 2021-01-06 DIAGNOSIS — I10 BENIGN ESSENTIAL HYPERTENSION: ICD-10-CM

## 2021-01-06 DIAGNOSIS — I73.9 PAD (PERIPHERAL ARTERY DISEASE) (H): ICD-10-CM

## 2021-01-06 DIAGNOSIS — R91.1 PULMONARY NODULE: ICD-10-CM

## 2021-01-06 DIAGNOSIS — F43.23 ADJUSTMENT DISORDER WITH MIXED ANXIETY AND DEPRESSED MOOD: ICD-10-CM

## 2021-01-06 DIAGNOSIS — F13.20 SEDATIVE, HYPNOTIC OR ANXIOLYTIC DEPENDENCE (H): ICD-10-CM

## 2021-01-06 DIAGNOSIS — I48.0 PAROXYSMAL ATRIAL FIBRILLATION (H): ICD-10-CM

## 2021-01-06 PROCEDURE — 99214 OFFICE O/P EST MOD 30 MIN: CPT | Performed by: INTERNAL MEDICINE

## 2021-01-06 NOTE — PROGRESS NOTES
Assessment & Plan   Problem List Items Addressed This Visit     Benign essential hypertension    Sedative, hypnotic or anxiolytic dependence (H)    Carotid stenosis, bilateral    Pulmonary nodule    Adjustment disorder    PAD (peripheral artery disease) (H)      Other Visit Diagnoses     Hemoptysis    -  Primary    Paroxysmal atrial fibrillation (H)        Relevant Medications    apixaban ANTICOAGULANT (ELIQUIS ANTICOAGULANT) 5 MG tablet    Chronic pain due to trauma          Blood pressure well controlled with current therapy, no changes indicated.    Cardiovascular screening including carotid ultrasound, echocardiogram and NM stress test    Review of external notes as documented above                            FUTURE APPOINTMENTS:       - Follow-up visit in 3 mo    Héctor Solis MD  [facility]    Martin Villarreal is a 80 year old who presents to clinic today for the following health issues     HPI       Chief Complaint   Patient presents with     RECHECK     F/u CT     Hypertension     No further hemoptysis after fall, CT chest      Review of Systems   Constitutional, HEENT, cardiovascular, pulmonary, gi and gu systems are negative, except as otherwise noted.      Objective    /73   Pulse 64   Temp 97.7  F (36.5  C) (Oral)   Wt 70.8 kg (156 lb)   SpO2 99%   BMI 21.76 kg/m    Body mass index is 21.76 kg/m .  Physical Exam   GENERAL: healthy, alert and no distress  NECK: no adenopathy, no asymmetry, masses, or scars and thyroid normal to palpation  RESP: lungs clear to auscultation - no rales, rhonchi or wheezes  CV: regular rate and rhythm, normal S1 S2, no S3 or S4, no murmur, click or rub, no peripheral edema and peripheral pulses strong  ABDOMEN: soft, nontender, no hepatosplenomegaly, no masses and bowel sounds normal  MS: no gross musculoskeletal defects noted, no edema       CT chest-unremarkable except 0.5 centimeter nonspecific nodule in low risk patient,  no follow-up needed

## 2021-01-13 ENCOUNTER — HOSPITAL ENCOUNTER (OUTPATIENT)
Dept: CARDIOLOGY | Facility: CLINIC | Age: 81
End: 2021-01-13
Attending: INTERNAL MEDICINE
Payer: COMMERCIAL

## 2021-01-13 ENCOUNTER — TELEPHONE (OUTPATIENT)
Dept: CARDIOLOGY | Facility: CLINIC | Age: 81
End: 2021-01-13

## 2021-01-13 VITALS
HEART RATE: 90 BPM | SYSTOLIC BLOOD PRESSURE: 138 MMHG | BODY MASS INDEX: 21.61 KG/M2 | OXYGEN SATURATION: 98 % | HEIGHT: 71 IN | DIASTOLIC BLOOD PRESSURE: 80 MMHG | WEIGHT: 154.4 LBS

## 2021-01-13 DIAGNOSIS — I25.5 ISCHEMIC CARDIOMYOPATHY: ICD-10-CM

## 2021-01-13 DIAGNOSIS — I25.10 ATHEROSCLEROSIS OF NATIVE CORONARY ARTERY OF NATIVE HEART WITHOUT ANGINA PECTORIS: ICD-10-CM

## 2021-01-13 LAB
CV BLOOD PRESSURE: 24 %
CV STRESS MAX HR HE: 108
RATE PRESSURE PRODUCT: NORMAL
STRESS ECHO BASELINE DIASTOLIC HE: 80
STRESS ECHO BASELINE HR: 90
STRESS ECHO BASELINE SYSTOLIC BP: 138
STRESS ECHO CALCULATED PERCENT HR: 76 %
STRESS ECHO LAST STRESS DIASTOLIC BP: 68
STRESS ECHO LAST STRESS SYSTOLIC BP: 128
STRESS ECHO TARGET HR: 142

## 2021-01-13 PROCEDURE — 78452 HT MUSCLE IMAGE SPECT MULT: CPT

## 2021-01-13 PROCEDURE — 93016 CV STRESS TEST SUPVJ ONLY: CPT | Performed by: INTERNAL MEDICINE

## 2021-01-13 PROCEDURE — 250N000011 HC RX IP 250 OP 636: Performed by: INTERNAL MEDICINE

## 2021-01-13 PROCEDURE — 343N000001 HC RX 343: Performed by: INTERNAL MEDICINE

## 2021-01-13 PROCEDURE — 78452 HT MUSCLE IMAGE SPECT MULT: CPT | Mod: 26 | Performed by: INTERNAL MEDICINE

## 2021-01-13 PROCEDURE — 93018 CV STRESS TEST I&R ONLY: CPT | Performed by: INTERNAL MEDICINE

## 2021-01-13 PROCEDURE — A9502 TC99M TETROFOSMIN: HCPCS | Performed by: INTERNAL MEDICINE

## 2021-01-13 RX ORDER — AMINOPHYLLINE 25 MG/ML
50-100 INJECTION, SOLUTION INTRAVENOUS
Status: DISCONTINUED | OUTPATIENT
Start: 2021-01-13 | End: 2021-01-14 | Stop reason: HOSPADM

## 2021-01-13 RX ORDER — REGADENOSON 0.08 MG/ML
0.4 INJECTION, SOLUTION INTRAVENOUS ONCE
Status: COMPLETED | OUTPATIENT
Start: 2021-01-13 | End: 2021-01-13

## 2021-01-13 RX ORDER — ALBUTEROL SULFATE 90 UG/1
2 AEROSOL, METERED RESPIRATORY (INHALATION) EVERY 5 MIN PRN
Status: DISCONTINUED | OUTPATIENT
Start: 2021-01-13 | End: 2021-01-14 | Stop reason: HOSPADM

## 2021-01-13 RX ORDER — ACYCLOVIR 200 MG/1
0-1 CAPSULE ORAL
Status: DISCONTINUED | OUTPATIENT
Start: 2021-01-13 | End: 2021-01-14 | Stop reason: HOSPADM

## 2021-01-13 RX ORDER — CAFFEINE CITRATE 20 MG/ML
60 SOLUTION INTRAVENOUS
Status: DISCONTINUED | OUTPATIENT
Start: 2021-01-13 | End: 2021-01-14 | Stop reason: HOSPADM

## 2021-01-13 RX ADMIN — REGADENOSON 0.4 MG: 0.08 INJECTION, SOLUTION INTRAVENOUS at 11:55

## 2021-01-13 RX ADMIN — TETROFOSMIN 9.75 MCI.: 1.38 INJECTION, POWDER, LYOPHILIZED, FOR SOLUTION INTRAVENOUS at 11:58

## 2021-01-13 RX ADMIN — TETROFOSMIN 3.31 MCI.: 1.38 INJECTION, POWDER, LYOPHILIZED, FOR SOLUTION INTRAVENOUS at 09:57

## 2021-01-13 ASSESSMENT — MIFFLIN-ST. JEOR: SCORE: 1442.48
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
## 2021-01-14 ENCOUNTER — HOSPITAL ENCOUNTER (OUTPATIENT)
Dept: CARDIOLOGY | Facility: CLINIC | Age: 81
End: 2021-01-14
Attending: INTERNAL MEDICINE
Payer: COMMERCIAL

## 2021-01-14 ENCOUNTER — HOSPITAL ENCOUNTER (OUTPATIENT)
Dept: ULTRASOUND IMAGING | Facility: CLINIC | Age: 81
End: 2021-01-14
Attending: INTERNAL MEDICINE
Payer: COMMERCIAL

## 2021-01-14 ENCOUNTER — HEALTH MAINTENANCE LETTER (OUTPATIENT)
Age: 81
End: 2021-01-14

## 2021-01-14 ENCOUNTER — CARE COORDINATION (OUTPATIENT)
Dept: CARDIOLOGY | Facility: CLINIC | Age: 81
End: 2021-01-14

## 2021-01-14 DIAGNOSIS — I25.10 ATHEROSCLEROSIS OF NATIVE CORONARY ARTERY OF NATIVE HEART WITHOUT ANGINA PECTORIS: ICD-10-CM

## 2021-01-14 DIAGNOSIS — I65.23 OCCLUSION AND STENOSIS OF BILATERAL CAROTID ARTERIES: ICD-10-CM

## 2021-01-14 DIAGNOSIS — I25.5 ISCHEMIC CARDIOMYOPATHY: ICD-10-CM

## 2021-01-14 DIAGNOSIS — I25.10 CORONARY ATHEROSCLEROSIS OF NATIVE CORONARY ARTERY: Primary | ICD-10-CM

## 2021-01-14 PROCEDURE — 255N000002 HC RX 255 OP 636: Performed by: INTERNAL MEDICINE

## 2021-01-14 PROCEDURE — 93880 EXTRACRANIAL BILAT STUDY: CPT

## 2021-01-14 PROCEDURE — 999N000208 ECHOCARDIOGRAM COMPLETE

## 2021-01-14 PROCEDURE — 93880 EXTRACRANIAL BILAT STUDY: CPT | Mod: 26 | Performed by: INTERNAL MEDICINE

## 2021-01-14 PROCEDURE — 93306 TTE W/DOPPLER COMPLETE: CPT | Mod: 26 | Performed by: INTERNAL MEDICINE

## 2021-01-14 RX ADMIN — HUMAN ALBUMIN MICROSPHERES AND PERFLUTREN 2 ML: 10; .22 INJECTION, SOLUTION INTRAVENOUS at 07:51

## 2021-01-14 NOTE — TELEPHONE ENCOUNTER
RN called patient and left  advising patient of Dr. Banuelos's recommendations below. RN advised patient in  to call clinic with any questions.       Dr. Banuelos's recommendations.    Ok I can discuss with him at [facility]. If he has any new symptoms beforehand he can go to the ER.     Madelin

## 2021-01-18 ENCOUNTER — TELEPHONE (OUTPATIENT)
Dept: CARDIOLOGY | Facility: CLINIC | Age: 81
End: 2021-01-18

## 2021-01-18 ENCOUNTER — ANTICOAGULATION THERAPY VISIT (OUTPATIENT)
Dept: CARDIOLOGY | Facility: CLINIC | Age: 81
End: 2021-01-18

## 2021-01-18 ENCOUNTER — OFFICE VISIT (OUTPATIENT)
Dept: CARDIOLOGY | Facility: CLINIC | Age: 81
End: 2021-01-18
Payer: COMMERCIAL

## 2021-01-18 VITALS
SYSTOLIC BLOOD PRESSURE: 133 MMHG | OXYGEN SATURATION: 98 % | DIASTOLIC BLOOD PRESSURE: 77 MMHG | HEART RATE: 72 BPM | BODY MASS INDEX: 22.04 KG/M2 | WEIGHT: 158 LBS

## 2021-01-18 DIAGNOSIS — I25.5 ISCHEMIC CARDIOMYOPATHY: Primary | ICD-10-CM

## 2021-01-18 DIAGNOSIS — I25.10 ATHEROSCLEROSIS OF NATIVE CORONARY ARTERY OF NATIVE HEART WITHOUT ANGINA PECTORIS: ICD-10-CM

## 2021-01-18 DIAGNOSIS — I48.0 PAROXYSMAL ATRIAL FIBRILLATION (H): ICD-10-CM

## 2021-01-18 DIAGNOSIS — Z79.01 LONG TERM CURRENT USE OF ANTICOAGULANT THERAPY: ICD-10-CM

## 2021-01-18 DIAGNOSIS — I25.5 ISCHEMIC CARDIOMYOPATHY: ICD-10-CM

## 2021-01-18 PROBLEM — I48.91 ATRIAL FIBRILLATION (H): Status: ACTIVE | Noted: 2021-01-18

## 2021-01-18 PROCEDURE — 99207 PR NO CHARGE NURSE ONLY: CPT

## 2021-01-18 PROCEDURE — 99214 OFFICE O/P EST MOD 30 MIN: CPT | Performed by: INTERNAL MEDICINE

## 2021-01-18 RX ORDER — WARFARIN SODIUM 5 MG/1
5 TABLET ORAL DAILY
Qty: 30 TABLET | Refills: 3 | Status: SHIPPED | OUTPATIENT
Start: 2021-01-18 | End: 2021-06-01 | Stop reason: DRUGHIGH

## 2021-01-18 NOTE — PATIENT INSTRUCTIONS
"You have \"ischemic cardiomyopathy\" from old heart attack scars. I want to see if you need a defibrillator or pacemaker which is usually indicated when the ventricular function is less than 35%.     1. We would like to evaluate you for abnormal heart rhythms by ziopatch monitor. If any significant ventricular arrhythmias (abnormal electricity) we will likely place a debrillator   2. Will repeat echocardiogram in 4 months with 3D evaluation of your ejection fraction. If Ejection fraction is less than 35% at that time, we will place a defibrillator   3. Restart lisinopril and continue metoprolol and amlodipine   4. Continue aspirin.   5. Will refer you to coumadin clinic. Start warfarin (coumadin) tomorrow, and continue the Eliquis while you are taking warfarin. My nurse will call to give you instructions. You will need an INR lab draw guided by our nurses.  6. Follow up with Dr. Baunelos in 4 months.   "
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
## 2021-01-18 NOTE — TELEPHONE ENCOUNTER
RN reviewed with INR team and placed referral. They advised they will call patient patient to discuss INR today. RN subsequently called patient and advised him that our INR team would be reaching out to him today. Patient verbalized understanding and advised RN that he confirmed his previous dose of lisinopril was 20mg BID. RN will send to Dr. Banuelos to confirm if that is the dose patient should be taking as it is not listed on his med list currently. RN will update patient with Dr. Banuelos's response.       ----- Message from Madelin Banuelos MD sent at 1/18/2021 11:31 AM CST -----  Hey there,    We had complex visit today. Lots of information. He wants to start coumadin and is confused by whole process. I rx'd coumadin 5 mg tabs. Told him to finish current bottle of apixaban (4 days left) start coumadin tomorrow. Can you refer to coumadin clinic and see if they can phone him today or tomorrow with the process? Do you mind calling him later to see if he has any outstanding questions? I am evaluating him for ICD placement - he will need ziopatch to look for VT, and repeat echo with 3D evaluation of LV in 3-4 months then follow up with me.     Thanks!  Madeiln         1/18/21 visit Dr. Banuelos     1. We would like to evaluate you for abnormal heart rhythms by ziopatch monitor. If any significant ventricular arrhythmias (abnormal electricity) we will likely place a debrillator   2. Will repeat echocardiogram in 4 months with 3D evaluation of your ejection fraction. If Ejection fraction is less than 35% at that time, we will place a defibrillator   3. Restart lisinopril and continue metoprolol and amlodipine   4. Continue aspirin.   5. Will refer you to coumadin clinic. Start warfarin (coumadin) tomorrow, and continue the Eliquis while you are taking warfarin. My nurse will call to give you instructions. You will need an INR lab draw guided by our nurses.  6. Follow up with Dr. Banuelos in 4 months.
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
## 2021-01-18 NOTE — PROGRESS NOTES
ANTICOAGULATION INITIAL CLINIC VISIT    Patient Name:  Rm Villarreal  Date:  1/18/2021  Referred by: Dr. Banuelos  Contact Type:  Telephone    SUBJECTIVE:  Coumadin education was completed today.  Topics covered include:  -Introduction to coumadin  -Proper Administration  -INR Testing  -Sign/Symptoms of Bleeding  -Signs/Symptoms of Clot Formation or Stroke  -Dietary Intake of Vitamin K  -Drug Interactions  -Anticoagulation Identification (bracelet, necklace or wallet card)  -Future Surgery  -Effects of Alcohol, Tobacco, and Exercise on Coumadin    Coumadin Education Booklet and Coumadin Identification Wallet Card were given to the patient.    Patient Findings     Comments:  New start, switching from Eliquis to Warfarin         Clinical Outcomes     Negatives:  Major bleeding event, Thromboembolic event, Anticoagulation-related hospital admission, Anticoagulation-related ED visit, Anticoagulation-related fatality    Comments:  New start, switching from Eliquis to Warfarin           OBJECTIVE    No results for input(s): INR, [id number], [id number], 2AGN, F2 in the last 168 hours.    ASSESSMENT / PLAN  INR assessment SUB    Recheck INR In: 4 DAYS    INR Location Outside lab      Anticoagulation Summary  As of 1/18/2021    INR goal:  2.0-3.0   TTR:  --   INR used for dosing:  No new INR was available at the time of this encounter.   Warfarin maintenance plan:  No maintenance plan   Full warfarin instructions:  1/19: 5 mg; 1/20: 5 mg; 1/21: 5 mg   Next INR check:  1/22/2021   Target end date:  Indefinite    Indications    Ischemic cardiomyopathy [I25.5]  Atrial fibrillation (H) [I48.91]  Long term current use of anticoagulant therapy [Z79.01]             Anticoagulation Episode Summary     INR check location:      Preferred lab:      Send INR reminders to:  [facility] HEART INR NURSE    Comments:        Anticoagulation Care Providers     Provider Role Specialty Phone number    Madelin Banuelos MD Referring Cardiovascular  Disease [phone]            See the Encounter Report to view Anticoagulation Flowsheet and Dosing Calendar (Go to Encounters tab in chart review, and find the Anticoagulation Therapy Visit)    No INR.  New start today per Dr. Banuelos.  He was started on Eliquis by Dr. Solis last month for afib and ischemic cardiomyopathy and due to cost patient is switching to warfarin.  Patient has 4 days of Eliquis left and will start warfarin tomorrow and overlap eliquis and warfarin for 3 days per Dr. Banuelos.  He will start warfarin 5mg/day tomorrow with INR check on Friday.  Education provided.  INR orders placed.  Jonathan Fuller, RN

## 2021-01-19 RX ORDER — LISINOPRIL 20 MG/1
20 TABLET ORAL DAILY
Qty: 90 TABLET | Refills: 3 | Status: SHIPPED | OUTPATIENT
Start: 2021-01-19 | End: 2021-02-01

## 2021-01-19 NOTE — TELEPHONE ENCOUNTER
Lets do 20 mg once a day  -Dr. Banuelos        Updated medication list to reflect change. Per Dr. Banuelos, 20mg Lisinopril sent to pharmacy.         AMBAR Pratt January 19, 2021 9:03 AM

## 2021-01-22 ENCOUNTER — ANTICOAGULATION THERAPY VISIT (OUTPATIENT)
Dept: CARDIOLOGY | Facility: CLINIC | Age: 81
End: 2021-01-22
Payer: COMMERCIAL

## 2021-01-22 ENCOUNTER — TELEPHONE (OUTPATIENT)
Dept: FAMILY MEDICINE | Facility: CLINIC | Age: 81
End: 2021-01-22

## 2021-01-22 ENCOUNTER — TELEPHONE (OUTPATIENT)
Dept: CARDIOLOGY | Facility: CLINIC | Age: 81
End: 2021-01-22

## 2021-01-22 DIAGNOSIS — I25.5 ISCHEMIC CARDIOMYOPATHY: ICD-10-CM

## 2021-01-22 DIAGNOSIS — Z79.01 LONG TERM CURRENT USE OF ANTICOAGULANT THERAPY: ICD-10-CM

## 2021-01-22 DIAGNOSIS — I48.0 PAROXYSMAL ATRIAL FIBRILLATION (H): ICD-10-CM

## 2021-01-22 LAB
CAPILLARY BLOOD COLLECTION: NORMAL
INR PPP: 2 (ref 0.86–1.14)

## 2021-01-22 PROCEDURE — 85610 PROTHROMBIN TIME: CPT | Performed by: INTERNAL MEDICINE

## 2021-01-22 PROCEDURE — 36416 COLLJ CAPILLARY BLOOD SPEC: CPT | Performed by: INTERNAL MEDICINE

## 2021-01-22 PROCEDURE — 99207 PR NO CHARGE NURSE ONLY: CPT | Performed by: INTERNAL MEDICINE
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
## 2021-01-22 NOTE — TELEPHONE ENCOUNTER
Reason for Call:  Other call back and returning call    Detailed comments: Pt stated that he inform a nurse to let his PCP to give him a call back. It is about presonal issue that he need to address with Dr. Solis before he leave. Pt want a call soon.     Phone Number Patient can be reached at: Cell number on file:    Telephone Information:   Mobile [phone]       Best Time: anytime    Can we leave a detailed message on this number? YES    Call taken on 1/22/2021 at 9:33 AM by Darcy Hendrix

## 2021-01-22 NOTE — PROGRESS NOTES
ANTICOAGULATION FOLLOW-UP CLINIC VISIT    Patient Name:  mR Villarreal  Date:  2021  Contact Type:  Telephone    SUBJECTIVE:  Patient Findings         Clinical Outcomes     Negatives:  Major bleeding event, Thromboembolic event, Anticoagulation-related hospital admission, Anticoagulation-related ED visit, Anticoagulation-related fatality           OBJECTIVE    Recent labs: (last 7 days)     21  0927   INR 2.00*       ASSESSMENT / PLAN  INR assessment THER    Recheck INR In: 3 DAYS    INR Location Outside lab      Anticoagulation Summary  As of 2021    INR goal:  2.0-3.0   TTR:  --   INR used for dosin.00 (2021)   Warfarin maintenance plan:  No maintenance plan   Full warfarin instructions:  : 2.5 mg; : 5 mg; : 2.5 mg   Next INR check:  2021   Target end date:  Indefinite    Indications    Ischemic cardiomyopathy [I25.5]  Atrial fibrillation (H) [I48.91]  Long term current use of anticoagulant therapy [Z79.01]             Anticoagulation Episode Summary     INR check location:      Preferred lab:      Send INR reminders to:  [facility] INR NURSE    Comments:        Anticoagulation Care Providers     Provider Role Specialty Phone number    Madelin Banuelos MD Referring Cardiovascular Disease [phone]            See the Encounter Report to view Anticoagulation Flowsheet and Dosing Calendar (Go to Encounters tab in chart review, and find the Anticoagulation Therapy Visit)    INR 2.0.  He started Warfarin on Tuesday and overlapped with Eliquis for 3 days then now he will only be taking Warfarin due to cost.  He was started on Eliquis by PCP in December for afib and ischemic cardiomyopathy.  Patient said he is checking out prices and if he can get the price of Eliquis down he might want to switch back to that in the future.  Education provided on Tuesday.  INR went up quickly to 2.0 with only 3 doses of Warfarin.  Decrease dosing to 2.5mg, 5mg, 2.5mg and recheck INR  on Monday.  He has been taking the warfarin at noon but his INR check on Monday is at 2:15 so I told him no warfarin before his INR check then he will move the warfarin to the evening.  Dr. Banuelos put him on Lisinopril but that causes diarrhea so he will contact Dr. Banuelos and PCP to see if meds should be changed.    Jonathan Fuller RN

## 2021-01-25 ENCOUNTER — ANTICOAGULATION THERAPY VISIT (OUTPATIENT)
Dept: CARDIOLOGY | Facility: CLINIC | Age: 81
End: 2021-01-25
Payer: COMMERCIAL

## 2021-01-25 ENCOUNTER — HOSPITAL ENCOUNTER (OUTPATIENT)
Dept: CARDIOLOGY | Facility: CLINIC | Age: 81
Discharge: HOME | End: 2021-01-25
Attending: INTERNAL MEDICINE | Admitting: INTERNAL MEDICINE
Payer: COMMERCIAL

## 2021-01-25 DIAGNOSIS — I48.0 PAROXYSMAL ATRIAL FIBRILLATION (H): ICD-10-CM

## 2021-01-25 DIAGNOSIS — Z79.01 LONG TERM CURRENT USE OF ANTICOAGULANT THERAPY: ICD-10-CM

## 2021-01-25 DIAGNOSIS — I25.5 ISCHEMIC CARDIOMYOPATHY: ICD-10-CM

## 2021-01-25 LAB
CAPILLARY BLOOD COLLECTION: NORMAL
INR PPP: 3.2 (ref 0.86–1.14)

## 2021-01-25 PROCEDURE — 93242 EXT ECG>48HR<7D RECORDING: CPT

## 2021-01-25 PROCEDURE — 99207 PR NO CHARGE NURSE ONLY: CPT

## 2021-01-25 PROCEDURE — 93244 EXT ECG>48HR<7D REV&INTERPJ: CPT | Performed by: INTERNAL MEDICINE

## 2021-01-25 PROCEDURE — 36416 COLLJ CAPILLARY BLOOD SPEC: CPT | Performed by: INTERNAL MEDICINE

## 2021-01-25 PROCEDURE — 85610 PROTHROMBIN TIME: CPT | Performed by: INTERNAL MEDICINE

## 2021-01-25 NOTE — PROGRESS NOTES
ANTICOAGULATION FOLLOW-UP CLINIC VISIT    Patient Name:  Rm Villarreal  Date:  1/25/2021  Contact Type:  Telephone    SUBJECTIVE:  Patient Findings     Positives:  Change in health (loose stools last week (none since holding lisinopril - waiting for reply from Dr Banuelos)), Change in diet/appetite (iceberg salad daily and occasional green veggie (broccoli once))        Clinical Outcomes     Negatives:  Major bleeding event, Thromboembolic event, Anticoagulation-related hospital admission, Anticoagulation-related ED visit, Anticoagulation-related fatality           OBJECTIVE    Recent labs: (last 7 days)     01/25/21  1405   INR 3.20*       ASSESSMENT / PLAN  INR assessment SUPRA    Recheck INR In: 4 DAYS    INR Location Outside lab      Anticoagulation Summary  As of 1/25/2021    INR goal:  2.0-3.0   TTR:  --   INR used for dosing:  3.20 (1/25/2021)   Warfarin maintenance plan:  5 mg (5 mg x 1) every Mon, Wed, Fri; 2.5 mg (5 mg x 0.5) all other days   Full warfarin instructions:  1/25: Hold; Otherwise 5 mg every Mon, Wed, Fri; 2.5 mg all other days   Weekly warfarin total:  25 mg   Plan last modified:  Latisha Moore RN (1/25/2021)   Next INR check:  1/29/2021   Target end date:  Indefinite    Indications    Ischemic cardiomyopathy [I25.5]  Atrial fibrillation (H) [I48.91]  Long term current use of anticoagulant therapy [Z79.01]             Anticoagulation Episode Summary     INR check location:      Preferred lab:      Send INR reminders to:  [facility] HEART INR NURSE    Comments:        Anticoagulation Care Providers     Provider Role Specialty Phone number    Madelin Banuelos MD Referring Cardiovascular Disease [phone]            See the Encounter Report to view Anticoagulation Flowsheet and Dosing Calendar (Go to Encounters tab in chart review, and find the Anticoagulation Therapy Visit)    INR 3.20 He is a recent transition from Eliquis to Warfarin (last dose of Eliquis 1/21).  He had loose stools last  week that he felt was due to Lisinopril (he held dose and loose stools stopped - he is waiting for reply from Dr Banuelos regarding alternative to Lisinopril). No longer having loose stools. No change in other meds. He ate iceberg salads daily and had broccoli once. Denies abnormal bleeding or bruising. INR cami quickly so will hold today's dose then decrease maintenance dose to 5 mg MWF and 2.5 mg all other days with recheck in 4 days. He also called Dr Banuelos's nurse last week to ask about switching back to Eliquis because he thinks he might be able to afford Eliquis. He is still waiting to hear back from Dr Banuelos or her nurse regarding potential med changes. Dosage adjustment made based on physician directed care plan.  Latisha Moore RN

## 2021-01-29 ENCOUNTER — ANTICOAGULATION THERAPY VISIT (OUTPATIENT)
Dept: CARDIOLOGY | Facility: CLINIC | Age: 81
End: 2021-01-29
Payer: COMMERCIAL

## 2021-01-29 ENCOUNTER — TELEPHONE (OUTPATIENT)
Dept: CARDIOLOGY | Facility: CLINIC | Age: 81
End: 2021-01-29

## 2021-01-29 DIAGNOSIS — I48.0 PAROXYSMAL ATRIAL FIBRILLATION (H): ICD-10-CM

## 2021-01-29 DIAGNOSIS — Z79.01 LONG TERM CURRENT USE OF ANTICOAGULANT THERAPY: ICD-10-CM

## 2021-01-29 DIAGNOSIS — I25.5 ISCHEMIC CARDIOMYOPATHY: ICD-10-CM

## 2021-01-29 LAB
CAPILLARY BLOOD COLLECTION: NORMAL
INR PPP: 3.9 (ref 0.86–1.14)

## 2021-01-29 PROCEDURE — 85610 PROTHROMBIN TIME: CPT | Performed by: INTERNAL MEDICINE

## 2021-01-29 PROCEDURE — 99207 PR NO CHARGE LOS: CPT | Performed by: INTERNAL MEDICINE

## 2021-01-29 PROCEDURE — 36416 COLLJ CAPILLARY BLOOD SPEC: CPT | Performed by: INTERNAL MEDICINE

## 2021-01-29 NOTE — PROGRESS NOTES
ANTICOAGULATION FOLLOW-UP CLINIC VISIT    Patient Name:  Rm Villarreal  Date:  1/29/2021  Contact Type:  Telephone    SUBJECTIVE:  Patient Findings         Clinical Outcomes     Negatives:  Major bleeding event, Thromboembolic event, Anticoagulation-related hospital admission, Anticoagulation-related ED visit, Anticoagulation-related fatality           OBJECTIVE    Recent labs: (last 7 days)     01/29/21  0914   INR 3.90*       ASSESSMENT / PLAN  INR assessment SUPRA    Recheck INR In: 4 DAYS    INR Location Outside lab      Anticoagulation Summary  As of 1/29/2021    INR goal:  2.0-3.0   TTR:  0.0 % (1 d)   INR used for dosing:  3.90 (1/29/2021)   Warfarin maintenance plan:  2.5 mg (5 mg x 0.5) every day   Full warfarin instructions:  1/29: Hold; Otherwise 2.5 mg every day   Weekly warfarin total:  17.5 mg   Plan last modified:  Edna Fuller RN (1/29/2021)   Next INR check:  2/2/2021   Target end date:  Indefinite    Indications    Ischemic cardiomyopathy [I25.5]  Atrial fibrillation (H) [I48.91]  Long term current use of anticoagulant therapy [Z79.01]             Anticoagulation Episode Summary     INR check location:      Preferred lab:      Send INR reminders to:  [facility] HEART INR NURSE    Comments:        Anticoagulation Care Providers     Provider Role Specialty Phone number    Madelin Banuelos MD Referring Cardiovascular Disease [phone]            See the Encounter Report to view Anticoagulation Flowsheet and Dosing Calendar (Go to Encounters tab in chart review, and find the Anticoagulation Therapy Visit)      INR 3.9.  Called and spoke to the patient.  Hold dose today x1 then decrease to 2.5mg/day and recheck INR on Tuesday.  He has 5mg pills and will need smaller pill most likely.  Patient is interested in switching back to Eliquis.  I will message Dr. Banuelos regarding switching from Warfarin to Eliquis.  Jonathan Fuller, AMBAR

## 2021-02-01 NOTE — CONFIDENTIAL NOTE
He can stop lisinopril if he thinks this is causing diarrhea. When he stops for a week have him report blood pressure back to us and we can make recommendation for medication/dose.   He can certainly switch to eliquis. Great news about insurance. He can be on 5 mg twice a day.     Dr. Banuelos        Patient has stopped his lisinopril a few days ago and has no GI issues since. Pt has not been checking his BP but will start to and RN will call back in a week to see how his BP is off lisinopril.   Pt and his wife discussed switching from Warfarin to Eliquis and it would still be expensive to be on Eliquis so they decided to stick with Warfarin for now. Will update Jonathan in INR.     Reminder set to call patient in a week to check his BP.  Pt has no further questions at this time.

## 2021-02-02 ENCOUNTER — ANTICOAGULATION THERAPY VISIT (OUTPATIENT)
Dept: CARDIOLOGY | Facility: CLINIC | Age: 81
End: 2021-02-02
Payer: COMMERCIAL

## 2021-02-02 DIAGNOSIS — I25.5 ISCHEMIC CARDIOMYOPATHY: ICD-10-CM

## 2021-02-02 DIAGNOSIS — Z79.01 LONG TERM CURRENT USE OF ANTICOAGULANT THERAPY: ICD-10-CM

## 2021-02-02 DIAGNOSIS — I48.0 PAROXYSMAL ATRIAL FIBRILLATION (H): ICD-10-CM

## 2021-02-02 LAB
CAPILLARY BLOOD COLLECTION: NORMAL
INR PPP: 1.4 (ref 0.86–1.14)

## 2021-02-02 PROCEDURE — 85610 PROTHROMBIN TIME: CPT | Performed by: INTERNAL MEDICINE

## 2021-02-02 PROCEDURE — 36416 COLLJ CAPILLARY BLOOD SPEC: CPT | Performed by: INTERNAL MEDICINE

## 2021-02-02 PROCEDURE — 99207 PR NO CHARGE NURSE ONLY: CPT

## 2021-02-02 NOTE — PROGRESS NOTES
ANTICOAGULATION FOLLOW-UP CLINIC VISIT    Patient Name:  Rm Villarreal  Date:  2021  Contact Type:  Telephone    SUBJECTIVE:  Patient Findings     Positives:  Missed doses    Comments:  Not switching back to Eliquis at this time        Clinical Outcomes     Negatives:  Major bleeding event, Thromboembolic event, Anticoagulation-related hospital admission, Anticoagulation-related ED visit, Anticoagulation-related fatality    Comments:  Not switching back to Eliquis at this time           OBJECTIVE    Recent labs: (last 7 days)     21  1352   INR 1.40*       ASSESSMENT / PLAN  INR assessment SUB    Recheck INR In: 3 DAYS    INR Location Outside lab      Anticoagulation Summary  As of 2021    INR goal:  2.0-3.0   TTR:  30.1 % (5 d)   INR used for dosin.40 (2021)   Warfarin maintenance plan:  2.5 mg (5 mg x 0.5) every day   Full warfarin instructions:  22: 5 mg; Otherwise 2.5 mg every day   Weekly warfarin total:  17.5 mg   Plan last modified:  Edna Fuller RN (2021)   Next INR check:  2021   Target end date:  Indefinite    Indications    Ischemic cardiomyopathy [I25.5]  Atrial fibrillation (H) [I48.91]  Long term current use of anticoagulant therapy [Z79.01]             Anticoagulation Episode Summary     INR check location:      Preferred lab:      Send INR reminders to:  [facility] HEART INR NURSE    Comments:        Anticoagulation Care Providers     Provider Role Specialty Phone number    Madelin Banuelos MD Referring Cardiovascular Disease [phone]            See the Encounter Report to view Anticoagulation Flowsheet and Dosing Calendar (Go to Encounters tab in chart review, and find the Anticoagulation Therapy Visit)    INR 1.4.  Called and spoke to the patient.  He mentioned on Friday that he wanted to switch from Warfarin back to Eliquis.  He called and spoke to Dr. Banuelos's team and he said he changed his mind and will stay on Warfarin for now.  He was told to  hold his Warfarin on Friday only since INR was high but he hasn't taken any warfarin since Friday.  Dose 5mg today then 2.5mg wed and thur and recheck INR on Friday.  We need to see how high his INR will get on 2/5mg/day to decide if we need to send in lower dose warfarin script.  Jonathan Fuller, RN

## 2021-02-03 ENCOUNTER — OFFICE VISIT (OUTPATIENT)
Dept: FAMILY MEDICINE | Facility: CLINIC | Age: 81
End: 2021-02-03
Payer: COMMERCIAL

## 2021-02-03 VITALS
DIASTOLIC BLOOD PRESSURE: 89 MMHG | WEIGHT: 157 LBS | BODY MASS INDEX: 21.9 KG/M2 | OXYGEN SATURATION: 98 % | TEMPERATURE: 97.3 F | HEART RATE: 85 BPM | SYSTOLIC BLOOD PRESSURE: 135 MMHG

## 2021-02-03 DIAGNOSIS — J43.9 PULMONARY EMPHYSEMA, UNSPECIFIED EMPHYSEMA TYPE (H): ICD-10-CM

## 2021-02-03 DIAGNOSIS — I10 BENIGN ESSENTIAL HYPERTENSION: ICD-10-CM

## 2021-02-03 DIAGNOSIS — F43.22 ADJUSTMENT DISORDER WITH ANXIOUS MOOD: Primary | ICD-10-CM

## 2021-02-03 PROBLEM — J44.9 COPD (CHRONIC OBSTRUCTIVE PULMONARY DISEASE) (H): Status: ACTIVE | Noted: 2021-02-03

## 2021-02-03 PROCEDURE — 99214 OFFICE O/P EST MOD 30 MIN: CPT | Performed by: INTERNAL MEDICINE

## 2021-02-03 NOTE — PROGRESS NOTES
Assessment & Plan     Adjustment disorder with anxious mood  This patient has been on lorazepam for 4 years.  He currently is on 0.5 mg twice daily regularly.  He is not on any SSRI or other medication to help him with his mood and anxiety.  % Patient.    Recommend overtime to try to reduce his.  He is 80 years of age and the half-life of this medication for this gentleman may be greater than 18 hours.    Benign essential hypertension  Continue current medications.  Blood pressure is reasonably well controlled currently.  He did not take his amlodipine this morning    Pulmonary emphysema, unspecified emphysema type (H)  Stable.  The patient has a small nodule.  This should be evaluated in December of this.  The nodule is less than 1 cm in size.      Review of external notes as documented above                    See Patient Instructions    Return in about 6 months (around 8/3/2021) for Follow up.    Zach Pickering MD  [facility] MILLI Meehan is a 80 year old who presents to clinic today for the following health issues     HPI       New Patient/Transfer of Care  New Patient/Transfer of Care    Review of Systems   Constitutional, HEENT, cardiovascular, pulmonary, gi and gu systems are negative, except as otherwise noted.      Objective    There were no vitals taken for this visit.  There is no height or weight on file to calculate BMI.  Physical Exam   Alert patient no apparent distress  His eye contact is superb.  His speech is fluent.  The patient is interactive from a social standpoint.  There is no evidence of significant anxiety currently.    Orders Only on 02/02/2021   Component Date Value Ref Range Status     INR 02/02/2021 1.40* 0.86 - 1.14 Final    Comment: This test is intended for monitoring Coumadin therapy.  Results are not   accurate in patients with prolonged INR due to factor deficiency.       Capillary Blood Collection 02/02/2021 Capillary collection performed   Final

## 2021-02-04 ENCOUNTER — DOCUMENTATION ONLY (OUTPATIENT)
Dept: OTHER | Facility: CLINIC | Age: 81
End: 2021-02-04

## 2021-02-05 ENCOUNTER — ANTICOAGULATION THERAPY VISIT (OUTPATIENT)
Dept: CARDIOLOGY | Facility: CLINIC | Age: 81
End: 2021-02-05
Payer: COMMERCIAL

## 2021-02-05 DIAGNOSIS — Z79.01 LONG TERM CURRENT USE OF ANTICOAGULANT THERAPY: ICD-10-CM

## 2021-02-05 DIAGNOSIS — I48.0 PAROXYSMAL ATRIAL FIBRILLATION (H): ICD-10-CM

## 2021-02-05 DIAGNOSIS — I25.5 ISCHEMIC CARDIOMYOPATHY: ICD-10-CM

## 2021-02-05 LAB
CAPILLARY BLOOD COLLECTION: NORMAL
INR PPP: 2 (ref 0.86–1.14)

## 2021-02-05 PROCEDURE — 99207 PR NO CHARGE NURSE ONLY: CPT

## 2021-02-05 PROCEDURE — 36416 COLLJ CAPILLARY BLOOD SPEC: CPT | Performed by: INTERNAL MEDICINE

## 2021-02-05 PROCEDURE — 85610 PROTHROMBIN TIME: CPT | Performed by: INTERNAL MEDICINE

## 2021-02-05 RX ORDER — WARFARIN SODIUM 2.5 MG/1
TABLET ORAL
Qty: 30 TABLET | Refills: 0 | Status: SHIPPED | OUTPATIENT
Start: 2021-02-05 | End: 2021-03-04

## 2021-02-05 NOTE — PROGRESS NOTES
ANTICOAGULATION FOLLOW-UP CLINIC VISIT    Patient Name:  Rm Villarreal  Date:  2021  Contact Type:  Telephone    SUBJECTIVE:         OBJECTIVE    Recent labs: (last 7 days)     21  0900   INR 2.00*       ASSESSMENT / PLAN  INR assessment THER    Recheck INR In: 5 DAYS    INR Location Outside lab      Anticoagulation Summary  As of 2021    INR goal:  2.0-3.0   TTR:  20.0 % (1.1 wk)   INR used for dosin.00 (2021)   Warfarin maintenance plan:  2.5 mg (5 mg x 0.5) every day   Full warfarin instructions:  2.5 mg every day   Weekly warfarin total:  17.5 mg   Plan last modified:  Edna Fuller, RN (2021)   Next INR check:     Target end date:  Indefinite    Indications    Ischemic cardiomyopathy [I25.5]  Atrial fibrillation (H) [I48.91]  Long term current use of anticoagulant therapy [Z79.01]             Anticoagulation Episode Summary     INR check location:      Preferred lab:      Send INR reminders to:  [facility] HEART INR NURSE    Comments:        Anticoagulation Care Providers     Provider Role Specialty Phone number    Madelin Banuelos MD Referring Cardiovascular Disease [phone]            See the Encounter Report to view Anticoagulation Flowsheet and Dosing Calendar (Go to Encounters tab in chart review, and find the Anticoagulation Therapy Visit)    INR 2.00 INR cami quickly - now at lower end of range after taking 3 doses. Pt not available by phone yet. Will call him later this morning.  9:55am Spoke with pt. No change in meds or diet. No longer having loose stools since he stopped taking Lisinopril 2 weeks ago. Denies bleeding or bruising issues. Will decrease maintenance dose because INR already at lower end of range after 3 doses of  warfarin. Pt currently has a 5 mg tablet so reviewed with pt the need to send in a  Rx for Warfarin 2.5 mg tablets to allow for dosing flexibility. Will decrease maintenance dose to 1.25 mg Shonna and 2.5 mg all other days with recheck in 5  days to see if further dosing adjustment would be needed.  Dosage adjustment made based on physician directed care plan.    Latisha Moore RN

## 2021-02-08 NOTE — TELEPHONE ENCOUNTER
RN called patient and obtained  Updated BP readings off of lisinopril (see below). RN will send to Dr. Banuelos for further review and recommendation.     2/2/21 121/73 HR 72  2/3/21 135/89 HR 85  2/4/21 112/79 HR 79  2/5/21 125/74 HR 76  2/6/21 123/83 HR 74  2/7/21 131/80 HR 79  2/821  105/67 HR 79              2/1/21 note from AMBAR Shah    He can stop lisinopril if he thinks this is causing diarrhea. When he stops for a week have him report blood pressure back to us and we can make recommendation for medication/dose.   He can certainly switch to eliquis. Great news about insurance. He can be on 5 mg twice a day.     Dr. Banuelos        Patient has stopped his lisinopril a few days ago and has no GI issues since. Pt has not been checking his BP but will start to and RN will call back in a week to see how his BP is off lisinopril.   Pt and his wife discussed switching from Warfarin to Eliquis and it would still be expensive to be on Eliquis so they decided to stick with Warfarin for now. Will update Jonathan in INR.     Reminder set to call patient in a week to check his BP.  Pt has no further questions at this time.

## 2021-02-09 NOTE — TELEPHONE ENCOUNTER
RN called patient and updated him with Dr. Banuelos's recommendations. Patient verbalized understanding and is in agreement with plan.       Dr. Banuelos's recommendations    They look good!

## 2021-02-10 ENCOUNTER — ANTICOAGULATION THERAPY VISIT (OUTPATIENT)
Dept: CARDIOLOGY | Facility: CLINIC | Age: 81
End: 2021-02-10
Payer: COMMERCIAL

## 2021-02-10 DIAGNOSIS — I25.5 ISCHEMIC CARDIOMYOPATHY: ICD-10-CM

## 2021-02-10 DIAGNOSIS — Z79.01 LONG TERM CURRENT USE OF ANTICOAGULANT THERAPY: ICD-10-CM

## 2021-02-10 DIAGNOSIS — I48.0 PAROXYSMAL ATRIAL FIBRILLATION (H): ICD-10-CM

## 2021-02-10 LAB
CAPILLARY BLOOD COLLECTION: NORMAL
INR PPP: 1.7 (ref 0.86–1.14)

## 2021-02-10 PROCEDURE — 99207 PR NO CHARGE NURSE ONLY: CPT

## 2021-02-10 PROCEDURE — 36416 COLLJ CAPILLARY BLOOD SPEC: CPT | Performed by: INTERNAL MEDICINE

## 2021-02-10 PROCEDURE — 85610 PROTHROMBIN TIME: CPT | Performed by: INTERNAL MEDICINE

## 2021-02-10 NOTE — PROGRESS NOTES
ANTICOAGULATION FOLLOW-UP CLINIC VISIT    Patient Name:  Rm Villarreal  Date:  2/10/2021  Contact Type:  Telephone    SUBJECTIVE:         OBJECTIVE    Recent labs: (last 7 days)     02/10/21  0923   INR 1.70*       ASSESSMENT / PLAN  No question data found.  Anticoagulation Summary  As of 2/10/2021    INR goal:  2.0-3.0   TTR:  12.5 % (1.9 wk)   INR used for dosin.70 (2/10/2021)   Warfarin maintenance plan:  1.25 mg (2.5 mg x 0.5) every Mon, Sat; 2.5 mg (2.5 mg x 1) all other days   Full warfarin instructions:  1.25 mg every Mon, Sat; 2.5 mg all other days   Weekly warfarin total:  15 mg   Plan last modified:  Latisha Moore RN (2021)   Next INR check:     Target end date:  Indefinite    Indications    Ischemic cardiomyopathy [I25.5]  Atrial fibrillation (H) [I48.91]  Long term current use of anticoagulant therapy [Z79.01]             Anticoagulation Episode Summary     INR check location:      Preferred lab:      Send INR reminders to:  [facility] HEART INR NURSE    Comments:        Anticoagulation Care Providers     Provider Role Specialty Phone number    Madelin Banuelos MD Referring Cardiovascular Disease [phone]            See the Encounter Report to view Anticoagulation Flowsheet and Dosing Calendar (Go to Encounters tab in chart review, and find the Anticoagulation Therapy Visit)    INR was 1.7 today. Pt denies any abnormal bleeding or bruising. Denies any recent medication or recent illness. States no further diarrhea since discontinuing Lisinopril. Discussed diet and vitamin K foods with pt and wife.  Pt would like to enjoy a serving of greens twice weekly. Writer explained to pt and wife (via speaker phone), importance of dietary consistency and to not eat a serving of greens 2 consecutive days in a row, but to spread it out during the week. Informed now is the time during establishment of warfarin maintenance dosing regimen, to declare how many servings of greens pt would like to eat on  average in a week. Pt did have a salad (mainly iceberg lettuce) yesterday. Instructed no serving of greens today to allow INR to float upward. Will increase weekly maintenance dosing by 8.3%. New dosing regimen is 1.25 mg every Mon and 2.5 mg all other days of the week. Next INR recheck in 6 days. Pt and wife verbalized understanding. Dosage adjustment made based on physician directed care plan.    AMBAR Anthony RN

## 2021-02-12 ENCOUNTER — TELEPHONE (OUTPATIENT)
Dept: CARDIOLOGY | Facility: CLINIC | Age: 81
End: 2021-02-12
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
## 2021-02-16 ENCOUNTER — ANTICOAGULATION THERAPY VISIT (OUTPATIENT)
Dept: CARDIOLOGY | Facility: CLINIC | Age: 81
End: 2021-02-16
Payer: COMMERCIAL

## 2021-02-16 DIAGNOSIS — I48.0 PAROXYSMAL ATRIAL FIBRILLATION (H): ICD-10-CM

## 2021-02-16 DIAGNOSIS — Z79.01 LONG TERM CURRENT USE OF ANTICOAGULANT THERAPY: ICD-10-CM

## 2021-02-16 DIAGNOSIS — I25.5 ISCHEMIC CARDIOMYOPATHY: ICD-10-CM

## 2021-02-16 LAB
CAPILLARY BLOOD COLLECTION: NORMAL
INR PPP: 1.8 (ref 0.86–1.14)

## 2021-02-16 PROCEDURE — 85610 PROTHROMBIN TIME: CPT | Performed by: INTERNAL MEDICINE

## 2021-02-16 PROCEDURE — 99207 PR NO CHARGE NURSE ONLY: CPT | Performed by: INTERNAL MEDICINE

## 2021-02-16 PROCEDURE — 36416 COLLJ CAPILLARY BLOOD SPEC: CPT | Performed by: INTERNAL MEDICINE

## 2021-02-16 NOTE — PROGRESS NOTES
ANTICOAGULATION FOLLOW-UP CLINIC VISIT    Patient Name:  Rm Villarreal  Date:  2021  Contact Type:  Telephone    SUBJECTIVE:  Patient Findings         Clinical Outcomes     Negatives:  Major bleeding event, Thromboembolic event, Anticoagulation-related hospital admission, Anticoagulation-related ED visit, Anticoagulation-related fatality           OBJECTIVE    Recent labs: (last 7 days)     21  0835   INR 1.80*       ASSESSMENT / PLAN  INR assessment SUB    Recheck INR In: 1 WEEK    INR Location Outside lab      Anticoagulation Summary  As of 2021    INR goal:  2.0-3.0   TTR:  8.7 % (2.7 wk)   INR used for dosin.80 (2021)   Warfarin maintenance plan:  2.5 mg (2.5 mg x 1) every day   Full warfarin instructions:  : 3.75 mg; Otherwise 2.5 mg every day   Weekly warfarin total:  17.5 mg   Plan last modified:  Latisha Moore RN (2021)   Next INR check:  2021   Target end date:  Indefinite    Indications    Ischemic cardiomyopathy [I25.5]  Atrial fibrillation (H) [I48.91]  Long term current use of anticoagulant therapy [Z79.01]             Anticoagulation Episode Summary     INR check location:      Preferred lab:      Send INR reminders to:  [facility] HEART INR NURSE    Comments:        Anticoagulation Care Providers     Provider Role Specialty Phone number    Madelin Banuelos MD Referring Cardiovascular Disease [phone]            See the Encounter Report to view Anticoagulation Flowsheet and Dosing Calendar (Go to Encounters tab in chart review, and find the Anticoagulation Therapy Visit)    INR 1.80 INR rising gradually. Eating greens 2x/wk. No change in meds. No longer having diarrhea since Lisinopril was stopped a couple weeks ago. Denies bleeding issues. Will boost today's dose to 3.75 mg then increase maintenance dose to 2.5 mg daily with recheck in 1 week. Pt doesn't have a pencil to write down the dosing so will call him later this morning to review dosing and  schedule next appt.   Dosage adjustment made based on physician directed care plan.  11:45am Spoke with pt to review dosing and schedule next INR appt.   Latisha Moore RN

## 2021-02-17 ENCOUNTER — IMMUNIZATION (OUTPATIENT)
Dept: NURSING | Facility: CLINIC | Age: 81
End: 2021-02-17
Payer: COMMERCIAL

## 2021-02-17 PROCEDURE — 91300 PR COVID VAC PFIZER DIL RECON 30 MCG/0.3 ML IM: CPT

## 2021-02-17 PROCEDURE — 0001A PR COVID VAC PFIZER DIL RECON 30 MCG/0.3 ML IM: CPT

## 2021-02-18 DIAGNOSIS — I10 BENIGN ESSENTIAL HYPERTENSION: ICD-10-CM

## 2021-02-18 NOTE — TELEPHONE ENCOUNTER
Madelin Banuelos MD  You 48 minutes ago (9:32 AM)     Good news. No significant ventricular arrhythmias we were looking for and trying to exclude. His afib is well controlled.     Dr. Banuelos    Message text      Results and Dr. Banuelos's response reviewed with patient via [employer].

## 2021-02-19 RX ORDER — METOPROLOL TARTRATE 100 MG
TABLET ORAL
Qty: 60 TABLET | Refills: 4 | Status: SHIPPED | OUTPATIENT
Start: 2021-02-19 | End: 2022-02-18

## 2021-02-23 ENCOUNTER — ANTICOAGULATION THERAPY VISIT (OUTPATIENT)
Dept: CARDIOLOGY | Facility: CLINIC | Age: 81
End: 2021-02-23
Payer: COMMERCIAL

## 2021-02-23 DIAGNOSIS — I48.0 PAROXYSMAL ATRIAL FIBRILLATION (H): ICD-10-CM

## 2021-02-23 DIAGNOSIS — Z79.01 LONG TERM CURRENT USE OF ANTICOAGULANT THERAPY: ICD-10-CM

## 2021-02-23 DIAGNOSIS — I25.5 ISCHEMIC CARDIOMYOPATHY: ICD-10-CM

## 2021-02-23 LAB
CAPILLARY BLOOD COLLECTION: NORMAL
INR PPP: 2.3 (ref 0.86–1.14)

## 2021-02-23 PROCEDURE — 85610 PROTHROMBIN TIME: CPT | Performed by: INTERNAL MEDICINE

## 2021-02-23 PROCEDURE — 99207 PR NO CHARGE NURSE ONLY: CPT | Performed by: INTERNAL MEDICINE

## 2021-02-23 PROCEDURE — 36416 COLLJ CAPILLARY BLOOD SPEC: CPT | Performed by: INTERNAL MEDICINE

## 2021-02-23 NOTE — PROGRESS NOTES
ANTICOAGULATION FOLLOW-UP CLINIC VISIT    Patient Name:  Rm Villarreal  Date:  2021  Contact Type:  Telephone    SUBJECTIVE:  Patient Findings         Clinical Outcomes     Negatives:  Major bleeding event, Thromboembolic event, Anticoagulation-related hospital admission, Anticoagulation-related ED visit, Anticoagulation-related fatality           OBJECTIVE    Recent labs: (last 7 days)     21  0959   INR 2.30*       ASSESSMENT / PLAN  INR assessment THER    Recheck INR In: 10 DAYS    INR Location Outside lab      Anticoagulation Summary  As of 2021    INR goal:  2.0-3.0   TTR:  22.4 % (3.7 wk)   INR used for dosin.30 (2021)   Warfarin maintenance plan:  2.5 mg (2.5 mg x 1) every day   Full warfarin instructions:  2.5 mg every day   Weekly warfarin total:  17.5 mg   No change documented:  Edna Fuller RN   Plan last modified:  Latisha Moore RN (2021)   Next INR check:  3/5/2021   Target end date:  Indefinite    Indications    Ischemic cardiomyopathy [I25.5]  Atrial fibrillation (H) [I48.91]  Long term current use of anticoagulant therapy [Z79.01]             Anticoagulation Episode Summary     INR check location:      Preferred lab:      Send INR reminders to:  [facility] HEART INR NURSE    Comments:        Anticoagulation Care Providers     Provider Role Specialty Phone number    Madelin Banuelos MD Referring Cardiovascular Disease [phone]            See the Encounter Report to view Anticoagulation Flowsheet and Dosing Calendar (Go to Encounters tab in chart review, and find the Anticoagulation Therapy Visit)    INR 2.3.  Called and spoke to the patient.  No changes.  Continue the 2.5mg/day and recheck in 10 days.  Reviewed note from Dr. Banuelos regarding ziopatch results.  Jonathan Fuller, RN

## 2021-03-04 DIAGNOSIS — I48.0 PAROXYSMAL ATRIAL FIBRILLATION (H): ICD-10-CM

## 2021-03-04 DIAGNOSIS — Z79.01 LONG TERM CURRENT USE OF ANTICOAGULANT THERAPY: ICD-10-CM

## 2021-03-04 DIAGNOSIS — I25.5 ISCHEMIC CARDIOMYOPATHY: ICD-10-CM

## 2021-03-04 RX ORDER — WARFARIN SODIUM 2.5 MG/1
TABLET ORAL
Qty: 90 TABLET | Refills: 0 | Status: SHIPPED | OUTPATIENT
Start: 2021-03-04 | End: 2021-04-17

## 2021-03-04 NOTE — TELEPHONE ENCOUNTER
Pt calling to request refill of Warfarin Rx. Verified that he is taking the 2.5 mg tablet - 1 tab daily. Escripted the Rx to [facility]

## 2021-03-05 ENCOUNTER — ANTICOAGULATION THERAPY VISIT (OUTPATIENT)
Dept: CARDIOLOGY | Facility: CLINIC | Age: 81
End: 2021-03-05
Payer: COMMERCIAL

## 2021-03-05 DIAGNOSIS — I48.0 PAROXYSMAL ATRIAL FIBRILLATION (H): ICD-10-CM

## 2021-03-05 DIAGNOSIS — I25.5 ISCHEMIC CARDIOMYOPATHY: ICD-10-CM

## 2021-03-05 DIAGNOSIS — Z79.01 LONG TERM CURRENT USE OF ANTICOAGULANT THERAPY: ICD-10-CM

## 2021-03-05 LAB
CAPILLARY BLOOD COLLECTION: NORMAL
INR PPP: 2.3 (ref 0.86–1.14)

## 2021-03-05 PROCEDURE — 85610 PROTHROMBIN TIME: CPT | Performed by: INTERNAL MEDICINE

## 2021-03-05 PROCEDURE — 99207 PR NO CHARGE NURSE ONLY: CPT

## 2021-03-05 PROCEDURE — 36416 COLLJ CAPILLARY BLOOD SPEC: CPT | Performed by: INTERNAL MEDICINE

## 2021-03-05 NOTE — PROGRESS NOTES
ANTICOAGULATION FOLLOW-UP CLINIC VISIT    Patient Name:  Rm Villarreal  Date:  3/5/2021  Contact Type:  Telephone    SUBJECTIVE:  Patient Findings         Clinical Outcomes     Negatives:  Major bleeding event, Thromboembolic event, Anticoagulation-related hospital admission, Anticoagulation-related ED visit, Anticoagulation-related fatality           OBJECTIVE    Recent labs: (last 7 days)     21  0925   INR 2.30*       ASSESSMENT / PLAN  INR assessment THER    Recheck INR In: 10 DAYS    INR Location Outside lab      Anticoagulation Summary  As of 3/5/2021    INR goal:  2.0-3.0   TTR:  43.7 % (1.2 mo)   INR used for dosin.30 (3/5/2021)   Warfarin maintenance plan:  2.5 mg (2.5 mg x 1) every day   Full warfarin instructions:  2.5 mg every day   Weekly warfarin total:  17.5 mg   No change documented:  Sylvia Lopez RN   Plan last modified:  Latisha Moore RN (2021)   Next INR check:     Target end date:  Indefinite    Indications    Ischemic cardiomyopathy [I25.5]  Atrial fibrillation (H) [I48.91]  Long term current use of anticoagulant therapy [Z79.01]             Anticoagulation Episode Summary     INR check location:      Preferred lab:      Send INR reminders to:  [facility] HEART INR NURSE    Comments:        Anticoagulation Care Providers     Provider Role Specialty Phone number    Madelin Banuelos MD Referring Cardiovascular Disease [phone]            See the Encounter Report to view Anticoagulation Flowsheet and Dosing Calendar (Go to Encounters tab in chart review, and find the Anticoagulation Therapy Visit)    INR 2.3 today at outside clinic. Spoke to patient, no abnormal bleeding/bruising. No changes to medications or diet. Eats mainly head lettuce a few times a week, rare dark greens. Will continue on current warfarin maintenance dosing of 2.5mg daily and recheck in 10 days. Extended to 2wks if INR in range next time.     Sylvia Lopez, AMBAR

## 2021-03-10 ENCOUNTER — IMMUNIZATION (OUTPATIENT)
Dept: NURSING | Facility: CLINIC | Age: 81
End: 2021-03-10
Attending: INTERNAL MEDICINE
Payer: COMMERCIAL

## 2021-03-10 PROCEDURE — 91300 PR COVID VAC PFIZER DIL RECON 30 MCG/0.3 ML IM: CPT

## 2021-03-10 PROCEDURE — 0002A PR COVID VAC PFIZER DIL RECON 30 MCG/0.3 ML IM: CPT

## 2021-03-15 ENCOUNTER — ANTICOAGULATION THERAPY VISIT (OUTPATIENT)
Dept: CARDIOLOGY | Facility: CLINIC | Age: 81
End: 2021-03-15
Payer: COMMERCIAL

## 2021-03-15 DIAGNOSIS — I25.5 ISCHEMIC CARDIOMYOPATHY: ICD-10-CM

## 2021-03-15 DIAGNOSIS — I48.0 PAROXYSMAL ATRIAL FIBRILLATION (H): ICD-10-CM

## 2021-03-15 DIAGNOSIS — Z79.01 LONG TERM CURRENT USE OF ANTICOAGULANT THERAPY: ICD-10-CM

## 2021-03-15 LAB
CAPILLARY BLOOD COLLECTION: NORMAL
INR PPP: 2.5 (ref 0.86–1.14)

## 2021-03-15 PROCEDURE — 85610 PROTHROMBIN TIME: CPT | Performed by: INTERNAL MEDICINE

## 2021-03-15 PROCEDURE — 36416 COLLJ CAPILLARY BLOOD SPEC: CPT | Performed by: INTERNAL MEDICINE

## 2021-03-15 PROCEDURE — 99207 PR NO CHARGE NURSE ONLY: CPT | Performed by: INTERNAL MEDICINE

## 2021-03-15 NOTE — PROGRESS NOTES
ANTICOAGULATION FOLLOW-UP CLINIC VISIT    Patient Name:  Rm Villarreal  Date:  3/15/2021  Contact Type:  Telephone    SUBJECTIVE:  Patient Findings         Clinical Outcomes     Negatives:  Major bleeding event, Thromboembolic event, Anticoagulation-related hospital admission, Anticoagulation-related ED visit, Anticoagulation-related fatality           OBJECTIVE    Recent labs: (last 7 days)     03/15/21  0937   INR 2.50*       ASSESSMENT / PLAN  INR assessment THER    Recheck INR In: 2 WEEKS    INR Location Outside lab      Anticoagulation Summary  As of 3/15/2021    INR goal:  2.0-3.0   TTR:  55.8 % (1.5 mo)   INR used for dosin.50 (3/15/2021)   Warfarin maintenance plan:  2.5 mg (2.5 mg x 1) every day   Full warfarin instructions:  2.5 mg every day   Weekly warfarin total:  17.5 mg   No change documented:  Edna Fuller RN   Plan last modified:  Latisha Moore RN (2021)   Next INR check:  3/29/2021   Target end date:  Indefinite    Indications    Ischemic cardiomyopathy [I25.5]  Atrial fibrillation (H) [I48.91]  Long term current use of anticoagulant therapy [Z79.01]             Anticoagulation Episode Summary     INR check location:      Preferred lab:      Send INR reminders to:  [facility] HEART INR NURSE    Comments:        Anticoagulation Care Providers     Provider Role Specialty Phone number    Madelin Banuelos MD Referring Cardiovascular Disease [phone]            See the Encounter Report to view Anticoagulation Flowsheet and Dosing Calendar (Go to Encounters tab in chart review, and find the Anticoagulation Therapy Visit)    INR 2.5.  Called and spoke to the patient.  Reviewed with patient that he is taking warfarin due to afib and that was seen and confirmed on recent ziopatch.  He said he is getting charged for the INR lab and thinks he is getting charged too much.  Recommended that he contact his insurance company to discuss those charges.  Continue 2.5mg/day and recheck in 2  weeks.  INRs are now in range and we can keep stretching out the checks further each time to get to 1x monthly.  Jonathan Fuller RN

## 2021-03-29 ENCOUNTER — ANTICOAGULATION THERAPY VISIT (OUTPATIENT)
Dept: CARDIOLOGY | Facility: CLINIC | Age: 81
End: 2021-03-29
Payer: COMMERCIAL

## 2021-03-29 DIAGNOSIS — I48.0 PAROXYSMAL ATRIAL FIBRILLATION (H): ICD-10-CM

## 2021-03-29 DIAGNOSIS — Z79.01 LONG TERM CURRENT USE OF ANTICOAGULANT THERAPY: ICD-10-CM

## 2021-03-29 DIAGNOSIS — I25.5 ISCHEMIC CARDIOMYOPATHY: ICD-10-CM

## 2021-03-29 LAB
CAPILLARY BLOOD COLLECTION: NORMAL
INR PPP: 2 (ref 0.86–1.14)

## 2021-03-29 PROCEDURE — 85610 PROTHROMBIN TIME: CPT | Performed by: INTERNAL MEDICINE

## 2021-03-29 PROCEDURE — 99207 PR NO CHARGE NURSE ONLY: CPT | Performed by: INTERNAL MEDICINE

## 2021-03-29 PROCEDURE — 36416 COLLJ CAPILLARY BLOOD SPEC: CPT | Performed by: INTERNAL MEDICINE

## 2021-03-29 NOTE — PROGRESS NOTES
ANTICOAGULATION FOLLOW-UP CLINIC VISIT    Patient Name:  Rm Villarreal  Date:  3/29/2021  Contact Type:  Telephone    SUBJECTIVE:  Patient Findings         Clinical Outcomes     Negatives:  Major bleeding event, Thromboembolic event, Anticoagulation-related hospital admission, Anticoagulation-related ED visit, Anticoagulation-related fatality           OBJECTIVE    Recent labs: (last 7 days)     21  0911   INR 2.00*       ASSESSMENT / PLAN  INR assessment THER    Recheck INR In: 3 WEEKS    INR Location Outside lab      Anticoagulation Summary  As of 3/29/2021    INR goal:  2.0-3.0   TTR:  66.0 % (2 mo)   INR used for dosin.00 (3/29/2021)   Warfarin maintenance plan:  2.5 mg (2.5 mg x 1) every day   Full warfarin instructions:  2.5 mg every day   Weekly warfarin total:  17.5 mg   No change documented:  Latisha Moore RN   Plan last modified:  Latisha Moore RN (2021)   Next INR check:  2021   Target end date:  Indefinite    Indications    Ischemic cardiomyopathy [I25.5]  Atrial fibrillation (H) [I48.91]  Long term current use of anticoagulant therapy [Z79.01]             Anticoagulation Episode Summary     INR check location:      Preferred lab:      Send INR reminders to:  [facility] HEART INR NURSE    Comments:        Anticoagulation Care Providers     Provider Role Specialty Phone number    Madelin Banuelos MD Referring Cardiovascular Disease [phone]            See the Encounter Report to view Anticoagulation Flowsheet and Dosing Calendar (Go to Encounters tab in chart review, and find the Anticoagulation Therapy Visit)    INR 2.00 He missed Saturday's dose of Warfarin but he made up the missed dose last night which would explain why INR is at the lower end of the range today. No other changes in meds or diet. Denies bleeding or bruising. Will continue current dosing of 2.5 mg daily and stretch out time interval to 3 weeks.   Latisha Moore RN

## 2021-04-05 DIAGNOSIS — F43.23 ADJUSTMENT DISORDER WITH MIXED ANXIETY AND DEPRESSED MOOD: ICD-10-CM

## 2021-04-05 NOTE — TELEPHONE ENCOUNTER
Controlled Substance Refill Request for     LORazepam (ATIVAN) 0.5 MG tablet 30 tablet 0 3/12/2021  No   Sig: TAKE 1 TABLET BY MOUTH TWICE DAILY AS NEEDED       Last Written Prescription Date:  03/12/2021  Last Fill Quantity: 30,  # refills: 0   Last office visit: 2/3/2021 with prescribing provider:     Future Office Visit:   Next 5 appointments (look out 90 days)    Jun 14, 2021 10:15 AM  Return Visit with Madelin Banuelos MD  [facility] ([facility] ) [address] [place] [id number] [phone]           Associated Diagnoses  Adjustment disorder with mixed anxiety and depressed mood [F43.23]           Problem List Complete:  Yes     Adjustment disorder with anxious mood Edit Notes Medium  04/29/2020   Elise Alexandre RN     Details  Code: F43.22  Noted: 1/9/2017  Share w/ Pt: []             Overview  Edited:  Elise Alexandre RN  4/29/2020  Patient is followed by CRISTIANO ACOSTA for ongoing prescription of benzodiazepines. All refills should be approved by this provider, or covering partner.     Medication(s): Ativan.   Maximum quantity per month: 60  Clinic visit frequency required: Q 6 months      Controlled substance agreement on file: No  Benzodiazepine use reviewed by psychiatry: No     Last [facility] website verification: done on 4/29/2020 Elise GALAN  [email or web address]                       checked in past 3 months?  No, route to RN

## 2021-04-06 NOTE — TELEPHONE ENCOUNTER
Routing refill request to provider for review/approval because:  Drug not on the [facility] refill protocol   Lisa Smith RN

## 2021-04-07 RX ORDER — LORAZEPAM 0.5 MG/1
TABLET ORAL
Qty: 30 TABLET | Refills: 0 | Status: SHIPPED | OUTPATIENT
Start: 2021-04-09 | End: 2021-05-11

## 2021-04-17 ENCOUNTER — HOSPITAL ENCOUNTER (OUTPATIENT)
Facility: CLINIC | Age: 81
Setting detail: OBSERVATION
Discharge: HOME | End: 2021-04-18
Attending: EMERGENCY MEDICINE | Admitting: HOSPITALIST
Payer: COMMERCIAL

## 2021-04-17 ENCOUNTER — APPOINTMENT (OUTPATIENT)
Dept: GENERAL RADIOLOGY | Facility: CLINIC | Age: 81
End: 2021-04-17
Attending: EMERGENCY MEDICINE
Payer: COMMERCIAL

## 2021-04-17 DIAGNOSIS — R07.9 CHEST PAIN, UNSPECIFIED TYPE: Primary | ICD-10-CM

## 2021-04-17 LAB
ALBUMIN SERPL-MCNC: 4 G/DL (ref 3.4–5)
ALP SERPL-CCNC: 157 U/L (ref 40–150)
ALT SERPL W P-5'-P-CCNC: 46 U/L (ref 0–70)
ANION GAP SERPL CALCULATED.3IONS-SCNC: 3 MMOL/L (ref 3–14)
AST SERPL W P-5'-P-CCNC: 61 U/L (ref 0–45)
BASOPHILS # BLD AUTO: 0.1 10E9/L (ref 0–0.2)
BASOPHILS NFR BLD AUTO: 0.8 %
BILIRUB SERPL-MCNC: 0.6 MG/DL (ref 0.2–1.3)
BUN SERPL-MCNC: 17 MG/DL (ref 7–30)
CALCIUM SERPL-MCNC: 8.9 MG/DL (ref 8.5–10.1)
CHLORIDE SERPL-SCNC: 106 MMOL/L (ref 94–109)
CO2 SERPL-SCNC: 27 MMOL/L (ref 20–32)
CREAT SERPL-MCNC: 0.94 MG/DL (ref 0.66–1.25)
DIFFERENTIAL METHOD BLD: ABNORMAL
EOSINOPHIL # BLD AUTO: 0.2 10E9/L (ref 0–0.7)
EOSINOPHIL NFR BLD AUTO: 1.6 %
ERYTHROCYTE [DISTWIDTH] IN BLOOD BY AUTOMATED COUNT: 14.5 % (ref 10–15)
GFR SERPL CREATININE-BSD FRML MDRD: 76 ML/MIN/{1.73_M2}
GLUCOSE SERPL-MCNC: 89 MG/DL (ref 70–99)
HCT VFR BLD AUTO: 41.7 % (ref 40–53)
HGB BLD-MCNC: 14.4 G/DL (ref 13.3–17.7)
IMM GRANULOCYTES # BLD: 0 10E9/L (ref 0–0.4)
IMM GRANULOCYTES NFR BLD: 0.4 %
INR PPP: 2.33 (ref 0.86–1.14)
INTERPRETATION ECG - MUSE: NORMAL
INTERPRETATION ECG - MUSE: NORMAL
LABORATORY COMMENT REPORT: NORMAL
LYMPHOCYTES # BLD AUTO: 1.8 10E9/L (ref 0.8–5.3)
LYMPHOCYTES NFR BLD AUTO: 17.3 %
MCH RBC QN AUTO: 32.6 PG (ref 26.5–33)
MCHC RBC AUTO-ENTMCNC: 34.5 G/DL (ref 31.5–36.5)
MCV RBC AUTO: 94 FL (ref 78–100)
MONOCYTES # BLD AUTO: 1.7 10E9/L (ref 0–1.3)
MONOCYTES NFR BLD AUTO: 16.4 %
NEUTROPHILS # BLD AUTO: 6.6 10E9/L (ref 1.6–8.3)
NEUTROPHILS NFR BLD AUTO: 63.5 %
NRBC # BLD AUTO: 0 10*3/UL
NRBC BLD AUTO-RTO: 0 /100
PLATELET # BLD AUTO: 289 10E9/L (ref 150–450)
POTASSIUM SERPL-SCNC: 3.7 MMOL/L (ref 3.4–5.3)
PROT SERPL-MCNC: 8.5 G/DL (ref 6.8–8.8)
RBC # BLD AUTO: 4.42 10E12/L (ref 4.4–5.9)
SARS-COV-2 RNA RESP QL NAA+PROBE: NEGATIVE
SODIUM SERPL-SCNC: 136 MMOL/L (ref 133–144)
SPECIMEN SOURCE: NORMAL
TROPONIN I SERPL-MCNC: <0.015 UG/L (ref 0–0.04)
TROPONIN I SERPL-MCNC: <0.015 UG/L (ref 0–0.04)
WBC # BLD AUTO: 10.5 10E9/L (ref 4–11)

## 2021-04-17 PROCEDURE — 84484 ASSAY OF TROPONIN QUANT: CPT | Performed by: EMERGENCY MEDICINE

## 2021-04-17 PROCEDURE — 93005 ELECTROCARDIOGRAM TRACING: CPT | Mod: 76

## 2021-04-17 PROCEDURE — 99220 PR INITIAL OBSERVATION CARE,LEVEL III: CPT | Performed by: HOSPITALIST

## 2021-04-17 PROCEDURE — 87635 SARS-COV-2 COVID-19 AMP PRB: CPT | Performed by: EMERGENCY MEDICINE

## 2021-04-17 PROCEDURE — 99285 EMERGENCY DEPT VISIT HI MDM: CPT | Mod: 25

## 2021-04-17 PROCEDURE — C9803 HOPD COVID-19 SPEC COLLECT: HCPCS

## 2021-04-17 PROCEDURE — 85025 COMPLETE CBC W/AUTO DIFF WBC: CPT | Performed by: EMERGENCY MEDICINE

## 2021-04-17 PROCEDURE — 85610 PROTHROMBIN TIME: CPT | Performed by: EMERGENCY MEDICINE

## 2021-04-17 PROCEDURE — 93005 ELECTROCARDIOGRAM TRACING: CPT

## 2021-04-17 PROCEDURE — 80053 COMPREHEN METABOLIC PANEL: CPT | Performed by: EMERGENCY MEDICINE

## 2021-04-17 PROCEDURE — G0378 HOSPITAL OBSERVATION PER HR: HCPCS

## 2021-04-17 PROCEDURE — 71046 X-RAY EXAM CHEST 2 VIEWS: CPT

## 2021-04-17 RX ORDER — MAGNESIUM HYDROXIDE/ALUMINUM HYDROXICE/SIMETHICONE 120; 1200; 1200 MG/30ML; MG/30ML; MG/30ML
30 SUSPENSION ORAL EVERY 4 HOURS PRN
Status: DISCONTINUED | OUTPATIENT
Start: 2021-04-17 | End: 2021-04-18 | Stop reason: HOSPADM

## 2021-04-17 RX ORDER — ASPIRIN 81 MG/1
81 TABLET ORAL DAILY
Status: DISCONTINUED | OUTPATIENT
Start: 2021-04-18 | End: 2021-04-18 | Stop reason: HOSPADM

## 2021-04-17 RX ORDER — ASPIRIN 81 MG/1
162 TABLET, CHEWABLE ORAL ONCE
Status: DISCONTINUED | OUTPATIENT
Start: 2021-04-17 | End: 2021-04-17 | Stop reason: CLARIF

## 2021-04-17 RX ORDER — ONDANSETRON 4 MG/1
4 TABLET, ORALLY DISINTEGRATING ORAL EVERY 6 HOURS PRN
Status: DISCONTINUED | OUTPATIENT
Start: 2021-04-17 | End: 2021-04-18 | Stop reason: HOSPADM

## 2021-04-17 RX ORDER — NITROGLYCERIN 0.4 MG/1
0.4 TABLET SUBLINGUAL EVERY 5 MIN PRN
Status: DISCONTINUED | OUTPATIENT
Start: 2021-04-17 | End: 2021-04-18 | Stop reason: HOSPADM

## 2021-04-17 RX ORDER — LIDOCAINE 40 MG/G
CREAM TOPICAL
Status: DISCONTINUED | OUTPATIENT
Start: 2021-04-17 | End: 2021-04-18 | Stop reason: HOSPADM

## 2021-04-17 RX ORDER — ASPIRIN 81 MG/1
162 TABLET, CHEWABLE ORAL ONCE
Status: DISCONTINUED | OUTPATIENT
Start: 2021-04-18 | End: 2021-04-18

## 2021-04-17 RX ORDER — ONDANSETRON 2 MG/ML
4 INJECTION INTRAMUSCULAR; INTRAVENOUS EVERY 6 HOURS PRN
Status: DISCONTINUED | OUTPATIENT
Start: 2021-04-17 | End: 2021-04-18 | Stop reason: HOSPADM

## 2021-04-17 RX ORDER — LANOLIN ALCOHOL/MO/W.PET/CERES
3 CREAM (GRAM) TOPICAL
Status: DISCONTINUED | OUTPATIENT
Start: 2021-04-17 | End: 2021-04-18 | Stop reason: HOSPADM

## 2021-04-17 RX ORDER — ACETAMINOPHEN 325 MG/1
650 TABLET ORAL EVERY 4 HOURS PRN
Status: DISCONTINUED | OUTPATIENT
Start: 2021-04-17 | End: 2021-04-18 | Stop reason: HOSPADM

## 2021-04-17 RX ORDER — ACETAMINOPHEN 650 MG/1
650 SUPPOSITORY RECTAL EVERY 4 HOURS PRN
Status: DISCONTINUED | OUTPATIENT
Start: 2021-04-17 | End: 2021-04-18 | Stop reason: HOSPADM

## 2021-04-17 ASSESSMENT — MIFFLIN-ST. JEOR: SCORE: 1483.76

## 2021-04-17 ASSESSMENT — ENCOUNTER SYMPTOMS: MYALGIAS: 1

## 2021-04-18 ENCOUNTER — APPOINTMENT (OUTPATIENT)
Dept: CARDIOLOGY | Facility: CLINIC | Age: 81
End: 2021-04-18
Attending: HOSPITALIST
Payer: COMMERCIAL

## 2021-04-18 VITALS
RESPIRATION RATE: 18 BRPM | SYSTOLIC BLOOD PRESSURE: 137 MMHG | HEIGHT: 72 IN | OXYGEN SATURATION: 98 % | BODY MASS INDEX: 21.68 KG/M2 | HEART RATE: 91 BPM | TEMPERATURE: 96.8 F | WEIGHT: 160.1 LBS | DIASTOLIC BLOOD PRESSURE: 84 MMHG

## 2021-04-18 LAB
INR PPP: 2.42 (ref 0.86–1.14)
INR PPP: 2.61 (ref 0.86–1.14)
TROPONIN I SERPL-MCNC: <0.015 UG/L (ref 0–0.04)
TROPONIN I SERPL-MCNC: <0.015 UG/L (ref 0–0.04)

## 2021-04-18 PROCEDURE — 250N000013 HC RX MED GY IP 250 OP 250 PS 637: Performed by: HOSPITALIST

## 2021-04-18 PROCEDURE — 84484 ASSAY OF TROPONIN QUANT: CPT | Performed by: HOSPITALIST

## 2021-04-18 PROCEDURE — 85610 PROTHROMBIN TIME: CPT | Mod: 91 | Performed by: HOSPITALIST

## 2021-04-18 PROCEDURE — 99207 PR APP CREDIT; MD BILLING SHARED VISIT: CPT | Performed by: PHYSICIAN ASSISTANT

## 2021-04-18 PROCEDURE — 255N000002 HC RX 255 OP 636: Performed by: HOSPITALIST

## 2021-04-18 PROCEDURE — 93306 TTE W/DOPPLER COMPLETE: CPT | Mod: 26 | Performed by: INTERNAL MEDICINE

## 2021-04-18 PROCEDURE — 36415 COLL VENOUS BLD VENIPUNCTURE: CPT | Performed by: HOSPITALIST

## 2021-04-18 PROCEDURE — 999N000208 ECHOCARDIOGRAM COMPLETE

## 2021-04-18 PROCEDURE — 99207 PR CDG-CODE CATEGORY CHANGED: CPT | Performed by: INTERNAL MEDICINE

## 2021-04-18 PROCEDURE — 99217 PR OBSERVATION CARE DISCHARGE: CPT | Performed by: INTERNAL MEDICINE

## 2021-04-18 PROCEDURE — G0378 HOSPITAL OBSERVATION PER HR: HCPCS

## 2021-04-18 PROCEDURE — 99214 OFFICE O/P EST MOD 30 MIN: CPT | Mod: 25 | Performed by: INTERNAL MEDICINE

## 2021-04-18 RX ORDER — METOPROLOL TARTRATE 50 MG
50 TABLET ORAL 2 TIMES DAILY
Status: DISCONTINUED | OUTPATIENT
Start: 2021-04-18 | End: 2021-04-18 | Stop reason: HOSPADM

## 2021-04-18 RX ORDER — WARFARIN SODIUM 2.5 MG/1
2.5 TABLET ORAL ONCE
Status: DISCONTINUED | OUTPATIENT
Start: 2021-04-18 | End: 2021-04-18

## 2021-04-18 RX ORDER — LORAZEPAM 0.5 MG/1
0.25 TABLET ORAL 2 TIMES DAILY PRN
Status: DISCONTINUED | OUTPATIENT
Start: 2021-04-18 | End: 2021-04-18 | Stop reason: HOSPADM

## 2021-04-18 RX ORDER — AMLODIPINE BESYLATE 2.5 MG/1
2.5 TABLET ORAL
Status: DISCONTINUED | OUTPATIENT
Start: 2021-04-18 | End: 2021-04-18 | Stop reason: HOSPADM

## 2021-04-18 RX ORDER — WARFARIN SODIUM 2.5 MG/1
2.5 TABLET ORAL
Status: DISCONTINUED | OUTPATIENT
Start: 2021-04-18 | End: 2021-04-18 | Stop reason: HOSPADM

## 2021-04-18 RX ORDER — ACETAMINOPHEN 325 MG/1
650 TABLET ORAL EVERY 4 HOURS PRN
COMMUNITY
Start: 2021-04-18 | End: 2022-01-01

## 2021-04-18 RX ADMIN — AMLODIPINE BESYLATE 2.5 MG: 2.5 TABLET ORAL at 07:43

## 2021-04-18 RX ADMIN — HUMAN ALBUMIN MICROSPHERES AND PERFLUTREN 9 ML: 10; .22 INJECTION, SOLUTION INTRAVENOUS at 10:51

## 2021-04-18 RX ADMIN — METOPROLOL TARTRATE 50 MG: 50 TABLET, FILM COATED ORAL at 07:43

## 2021-04-18 RX ADMIN — ASPIRIN 81 MG: 81 TABLET, DELAYED RELEASE ORAL at 07:43

## 2021-04-18 RX ADMIN — MELATONIN TAB 3 MG 3 MG: 3 TAB at 00:46

## 2021-04-18 ASSESSMENT — MIFFLIN-ST. JEOR: SCORE: 1484.21

## 2021-04-18 NOTE — PROGRESS NOTES
RECEIVING UNIT ED HANDOFF REVIEW    ED Nurse Handoff Report was reviewed by: Hugo Blanco RN on April 17, 2021 at 11:32 PM

## 2021-04-18 NOTE — ED PROVIDER NOTES
History   Chief Complaint:  Chest Pain       The history is provided by the patient.      Rm Villarreal is a 80 year old male anticoagulated on warfarin with history of atrial fibrillation, hypertension, CAD, and COPD who presents with chest pain. The patient reports he felt a strain throughout his chest and side today while his arms were over his head to lift a window. Later today after dinner he noticed that he the pain returned to his chest and was worse with movement. The pain decreased after he ate dinner. He took 2 nitroglycerins which he thinks helped his pain and he is currently not in any pain. He states his pain does feel similar to when he had a heart attack.     Review of Systems   Cardiovascular: Positive for chest pain.   Musculoskeletal: Positive for myalgias.   All other systems reviewed and are negative.      Allergies:  No Known Drug Allergies     Medications:  Amlodipine  Aspirin  Ativan   Lopressor  Nitrostat  Warfarin    Past Medical History:    Adjustment disorder  Carotid stenosis, bilateral  COPD  CAD  Hypertension    Atrial fibrillation  Peripheral artery disease     Past Surgical History:    Cardiac cath  Angiogram, stent to LAD  Angiogram, stent to RCA  Colonoscopy x2  Hemorrhoidectomy  Vascular surgery, left CEA     Social History:  The patient presents with his son in law.  .     Physical Exam     Patient Vitals for the past 24 hrs:   BP Pulse Resp SpO2 Height Weight   04/17/21 2200 (!) 142/85 78 -- -- -- --   04/17/21 2130 128/80 79 -- -- -- --   04/17/21 2100 139/86 93 -- -- -- --   04/17/21 2000 (!) 145/95 87 13 97 % -- --   04/17/21 1957 -- -- -- -- 1.829 m (6') 72.6 kg (160 lb)       Physical Exam  Vitals: reviewed by me  General: Pt seen on [facility], cooperative, and alert to conversation  Eyes: Tracking well, clear conjunctiva BL  ENT: MMM, midline trachea.   Lungs: No tachypnea, no accessory muscle use. No respiratory distress.   CV: Rate as above,  regular rhythm.    Abd: Soft, non tender, no guarding, no rebound. Non distended  MSK: no peripheral edema or joint effusion.  No evidence of trauma  Skin: No rash, normal turgor and temperature  Neuro: Clear speech and no facial droop.  Psych: Not RIS, no e/o AH/VH      Emergency Department Course     ECG #1:  ECG taken at 1955, ECG read by Dr. Curiel  Atrial fibrillation. Left axis deviation. Septal infarct, age undetermined. Possible lateral infarct, age undetermined. Abnormal ECG.   Rate 81 bpm. NE interval * ms. QRS duration 86 ms. QT/QTc 364/422 ms. P-R-T axes * -49 67.     ECG #2:  ECG taken at 2218, ECG read by Dr. Curiel  Atrial fibrillation. Left axis deviation. Anteroseptal infarct, age undetermined. Abnormal ECG.   Rate 86 bpm. NE interval * ms. QRS duration 82 ms. QT/QTc 372/445 ms. P-R-T axes * -49 66.     Imaging:  XR, Chest, 2 Views  IMPRESSION: There are no acute infiltrates. The cardiac silhouette is  not enlarged. Pulmonary vasculature is unremarkable.  Reading per radiology.      Laboratory:  CBC: WBC 10.5, HGB 14.4,       CMP: Alkaline phos: 157 (H), AST: 61 (H) o/w WNL (Creatinine 0.94)      INR: 2.33 (H)     Troponin (Collected 2000): <0.015    Troponin (Collected 2220): <0.015    Asymptomatic COVID-19 Virus by PCR: In process      Emergency Department Course:    Reviewed:  I reviewed nursing notes, vitals, and past medical history    Assessments:  2016 I obtained history and examined the patient as noted above.     Consults:   2302  I consulted with Dr. Reeves of the hospitalist services. They are in agreement to accept the patient for admission.     Disposition:  The patient was admitted to the hospital under the care of Dr. Reeves.       Impression & Plan     Medical Decision Making:  Rm Villarreal is a very pleasant 80 year old male who presents to the ER with what appears to be chest pain, in the setting of CAD and a HEART score of 5. I do think that he needs to come  into the hospital, I do appreciate that his troponins are negative x2, but his pain was nitroglycerin responsive and exertional and he has a significant cardiovascular history and I do think he should come into the hospital for provacative testing. Plan for admission to the care of Dr. Reeves who has kindly agreed to accept care of the patient. His pain presentation is not consistent with a dissection, he has no pain at this current time, no tachycardia, hypoxia, or pulmonary embolism. Will plan for continued cardiac workup as above     Covid-19  Rm Villarreal was evaluated during a global COVID-19 pandemic, which necessitated consideration that the patient might be at risk for infection with the SARS-CoV-2 virus that causes COVID-19.   Applicable protocols for evaluation were followed during the patient's care.   COVID-19 was considered as part of the patient's evaluation. The plan for testing is:  a test was obtained during this visit.    Diagnosis:    ICD-10-CM    1. Chest pain, unspecified type  R07.9        Scribe Disclosure:  I, Wanda Spaulding, am serving as a scribe at 8:11 PM on 4/17/2021 to document services personally performed by Mode Curiel MD based on my observations and the provider's statements to me.        Mode Curiel MD  04/17/21 [id number]
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
## 2021-04-18 NOTE — PHARMACY-ANTICOAGULATION SERVICE
Clinical Pharmacy - Warfarin Dosing Consult     Pharmacy has been consulted to manage this patient s warfarin therapy.  Indication: Atrial Fibrillation  Therapy Goal: INR 2-3  Warfarin Prior to Admission: Yes  Warfarin PTA Regimen: 2.5 mg daily  Significant drug interactions: asa  Recent documented change in oral intake/nutrition: No    INR   Date Value Ref Range Status   04/18/2021 2.61 (H) 0.86 - 1.14 Final   04/18/2021 2.42 (H) 0.86 - 1.14 Final       Last dose 4/16.  Recommend warfarin 2.5 mg today.  Pharmacy will monitor Rm Villarreal daily and order warfarin doses to achieve specified goal.      Please contact pharmacy as soon as possible if the warfarin needs to be held for a procedure or if the warfarin goals change.      Georgina Chauhan, PharmD, BCPS
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
## 2021-04-18 NOTE — PROGRESS NOTES
Observation goals PRIOR TO DISCHARGE    Comments: List all goals to be met before discharge home:     - Serial troponins and stress test complete- partially met   - Seen and cleared by consultant if applicable -Not met  - Adequate pain control on oral analgesia -Met  - Vital signs normal or at patient baseline-met   - Safe disposition plan has been identified -Not met    - Nurse to notify provider when observation goals have been met and patient is ready for discharge.

## 2021-04-18 NOTE — ED NOTES
[facility]  ED Nurse Handoff Report    ED Chief complaint: Chest Pain      ED Diagnosis:   Final diagnoses:   None       Code Status: Admitting provider to address    Allergies: No Known Allergies    Patient Story: Pt is a 80 year old male anticoagulated on warfarin with history of atrial fibrillation, hypertension, CAD, and COPD who presents with chest pain. The patient reportst he felt a strain throughout his chest and side today while his arms were over his head to lift a window. Later today after dinner he noticed that he the pain returned to his chest and was worse with movement. The pain decreased after he ate dinner. He took 2 nitroglycerins which he thinks helped his pain and he is currently not in any pain. He states his pain does feel similar to when he had a heart attack.      Focused Assessment:  Pt is awake, alert and orientated X 4. Pt reports pain with movement and deep breaths.     Treatments and/or interventions provided: Pt had labs, chest xray,     Patient's response to treatments and/or interventions: tolerated well.     To be done/followed up on inpatient unit:      Does this patient have any cognitive concerns?: Pt is awake, alert and orientated X 4.     Activity level - Baseline/Home:  Independent  Activity Level - Current:   Independent    Patient's Preferred language: English   Needed?: No    Isolation: None  Infection: Not Applicable  Patient tested for COVID 19 prior to admission: YES  Bariatric?: No    Vital Signs:   Vitals:    04/17/21 2000 04/17/21 2100 04/17/21 2130 04/17/21 2200   BP: (!) 145/95 139/86 128/80 (!) 142/85   Pulse: 87 93 79 78   Resp: 13      SpO2: 97%      Weight:       Height:           Cardiac Rhythm:     Was the PSS-3 completed:   Yes  What interventions are required if any?               Family Comments: Son in-law in room   OBS brochure/video discussed/provided to patient/family: Yes              Name of person given brochure if not  patient:               Relationship to patient:     For the majority of the shift this patient's behavior was Green.   Behavioral interventions performed were .    ED NURSE PHONE NUMBER: *[phone]

## 2021-04-18 NOTE — ED NOTES
Bed: ED23  Expected date:   Expected time:   Means of arrival:   Comments:  438 80M CP [id number]

## 2021-04-18 NOTE — PLAN OF CARE
Nursing note  Pt a/o x 4, up independently in the room. Pt denies CP/SOB/LE. VSS./84 (BP Location: Left arm)   Pulse 91   Temp 96.8  F (36  C) (Oral)   Resp 18   Ht 1.829 m (6')   Wt 72.6 kg (160 lb 1.6 oz)   SpO2 98%   BMI 21.71 kg/m   On RA. Denies any dizziness or lightheadedness. No c/o n/v.  Pt has been discharged home this afternoon. All the necessary informations was given to pt.
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
## 2021-04-18 NOTE — PHARMACY-ANTICOAGULATION SERVICE
Clinical Pharmacy - Warfarin Dosing Consult     Pharmacy has been consulted to manage this patient s warfarin therapy.  Indication: Atrial Fibrillation  Therapy Goal: INR 2-3  Warfarin Prior to Admission: Yes  Warfarin PTA Regimen: 2.5 mg daily  Significant drug interactions: asa  Recent documented change in oral intake/nutrition: No    INR   Date Value Ref Range Status   04/17/2021 2.33 (H) 0.86 - 1.14 Final   03/29/2021 2.00 (H) 0.86 - 1.14 Final     Comment:     This test is intended for monitoring Coumadin therapy.  Results are not   accurate in patients with prolonged INR due to factor deficiency.         Recommend warfarin 2.5 mg today.  Pharmacy will monitor Rm Villarreal daily and order warfarin doses to achieve specified goal.      Please contact pharmacy as soon as possible if the warfarin needs to be held for a procedure or if the warfarin goals change.      Marge Tsang, PharmD, BCCCP

## 2021-04-18 NOTE — PROGRESS NOTES
Observation goals PRIOR TO DISCHARGE    Comments: List all goals to be met before discharge home:   - Serial troponins and stress test complete-Partially met   - Seen and cleared by consultant if applicable -Not met  - Adequate pain control on oral analgesia -Met  - Vital signs normal or at patient baseline-Partially met   - Safe disposition plan has been identified -Not met  - Nurse to notify provider when observation goals have been met and patient is ready for discharge.

## 2021-04-18 NOTE — PLAN OF CARE
@0044 Received the patient from ED. No home medications. A&O X4. VSS on RA. Up SBA. PIV SL. Cardiac diet . Denies chest pain. Had 2 episode of diarrhea. PRN melatonin given for sleep. Cardiology consult today. Echo to be done today. Continue to monitor.     Observation goals PRIOR TO DISCHARGE    Comments: List all goals to be met before discharge home:   - Serial troponins and stress test complete-Partially met   - Seen and cleared by consultant if applicable -Not met  - Adequate pain control on oral analgesia -Met  - Vital signs normal or at patient baseline-Partially met   - Safe disposition plan has been identified -Not met  - Nurse to notify provider when observation goals have been met and patient is ready for discharge.

## 2021-04-18 NOTE — PHARMACY-ADMISSION MEDICATION HISTORY
Pharmacy Medication History  Admission medication history interview status for the 4/17/2021  admission is complete. See EPIC admission navigator for prior to admission medications     Location of Interview: Phone  Medication history sources: Patient    Significant changes made to the medication list:  Removed: Tylenol, ibuprofen, bacitracin, multivitamin, omeprazole.    In the past week, patient estimated taking medication this percent of the time: greater than 90%    Additional medication history information:   None    Medication reconciliation completed by provider prior to medication history? No    Time spent in this activity: 15 min    Prior to Admission medications    Medication Sig Last Dose Taking? Auth Provider   amLODIPine (NORVASC) 5 MG tablet Take 1 tablet (5 mg) by mouth 2 times daily  Patient taking differently: Take 2.5 mg by mouth 2 times daily  4/17/2021 at x1 Yes Héctor Solis MD   ASPIRIN ADULT LOW STRENGTH PO Take 81 mg by mouth daily. 4/17/2021 at Unknown time Yes Reported, Patient   LORazepam (ATIVAN) 0.5 MG tablet One half tablet twice daily  Patient taking differently: Take 0.25 mg by mouth 2 times daily as needed One half tablet twice daily  Yes Zach Pickering MD   melatonin 5 MG tablet Take 5 mg by mouth At Bedtime 4/16/2021 at Unknown time Yes Reported, Patient   metoprolol tartrate (LOPRESSOR) 100 MG tablet TAKE 1/2 TABLET BY MOUTH TWICE DAILY 12 HOURS APART  Patient taking differently: Take 50 mg by mouth 2 times daily TAKE 1/2 TABLET BY MOUTH TWICE DAILY 12 HOURS APART 4/17/2021 at x1 Yes Zach Pickering MD   NIACIN CR PO Take 1,000 mg by mouth 2 times daily (before meals)  4/17/2021 at x1 Yes Reported, Patient   nitroglycerin (NITROSTAT) 0.4 MG SL tablet For chest pain place 1 tablet under the tongue every 5 minutes for 3 doses. If symptoms persist 5 minutes after 1st dose call 911.  Yes Héctor Solis MD   VITAMIN D, CHOLECALCIFEROL, PO Take 1,000 Units by mouth daily.  4/17/2021 at Unknown time Yes Reported, Patient   warfarin ANTICOAGULANT (COUMADIN) 5 MG tablet Take 1 tablet (5 mg) by mouth daily  Patient taking differently: Take by mouth See Admin Instructions Current dose is 2.5mg daily 4/16/2021 at Unknown time Yes Madelin Banuelos MD   STATIN NOT PRESCRIBED, INTENTIONAL, Please choose reason not prescribed, below   Héctor Solis MD       The information provided in this note is only as accurate as the sources available at the time of update(s)

## 2021-04-18 NOTE — ED TRIAGE NOTES
"Pt was brought from home due to flank pain and midsternal chest pain.Pt states he was working at home, changing his house windows, worked on the yard. Pt states he fell down 3 months ago hitting his chest, he went to his provider who noted he has Afib, pt was put on coumadin.  Pt took 2 ,\"nitroglycerin\" at home which helped a little with some rest. Pt was given 4 baby aspirin by EMS. Pt denies any nausea, no diaphoretic   "

## 2021-04-19 ENCOUNTER — TELEPHONE (OUTPATIENT)
Dept: FAMILY MEDICINE | Facility: CLINIC | Age: 81
End: 2021-04-19

## 2021-04-19 ENCOUNTER — ANTICOAGULATION THERAPY VISIT (OUTPATIENT)
Dept: CARDIOLOGY | Facility: CLINIC | Age: 81
End: 2021-04-19

## 2021-04-19 DIAGNOSIS — Z79.01 LONG TERM CURRENT USE OF ANTICOAGULANT THERAPY: ICD-10-CM

## 2021-04-19 DIAGNOSIS — I25.5 ISCHEMIC CARDIOMYOPATHY: ICD-10-CM

## 2021-04-19 DIAGNOSIS — I48.0 PAROXYSMAL ATRIAL FIBRILLATION (H): ICD-10-CM

## 2021-04-19 NOTE — TELEPHONE ENCOUNTER
"Next 5 appointments (look out 90 days)    May 04, 2021  9:00 AM  Office Visit with Zach Pickering MD  [facility] ([facility] ) [address] [id number] [phone]   Jun 14, 2021 10:15 AM  Return Visit with Madelin Banuelos MD  [facility] ([facility] ) [address]  [facility] [id number] [phone]        ED / Discharge Outreach Protocol    Patient Contact    Attempt # 1    Was call answered?  Yes.  \"May I please speak with <Zoran >\"  Is patient available?   Yes    ED/Discharge Protocol    \"Hi, my name is Sabine Chand RN, a registered nurse, and I am calling on behalf of Dr. Nugent's office at [place].  I am calling to follow up and see how things are going for you after your recent visit.\"    \"I see that you were in the (ER/UC/IP) on 4/17.    How are you doing now that you are home?\" everything is fine. List of stuff to make a list - make an appt 2 weeks out with PCP. No pains. Will see cardiology in a few weeks     Is patient experiencing symptoms that may require a hospital visit?  No     Discharge Instructions    \"Let's review your discharge instructions.  What is/are the follow-up recommendations?  Pt. Response: follow up with PCP and cardiology     \"Were you instructed to make a follow-up appointment?\"  Pt. Response: Yes.  Has appointment been made?   Yes      \"When you see the provider, I would recommend that you bring your discharge instructions with you.    Medications    \"How many new medications are you on since your hospitalization/ED visit?\"    0-1  \"How many of your current medicines changed (dose, timing, name, etc.) while you were in the hospital/ED visit?\"   0-1  \"Do you have questions about your medications?\"   No  \"Were you newly diagnosed with heart failure, COPD, diabetes or did you have a heart attack?\"   No  For patients on insulin: \"Did you start on " "insulin in the hospital or did you have your insulin dose changed?\"   No  Post Discharge Medication Reconciliation Status: discharge medications reconciled, continue medications without change.    Was MTM referral placed (*Make sure to put transitions as reason for referral)?   No    Call Summary    \"Do you have any questions or concerns about your condition or care plan at the moment?\"    No  Triage nurse advice given    Patient was in ER 1x in the past year (assess appropriateness of ER visits.)      \"If you have questions or things don't continue to improve, we encourage you contact us through the main clinic number,  ([phone]).  Even if the clinic is not open, triage nurses are available 24/7 to help you.     We would like you to know that our clinic has extended hours (provide information).  We also have urgent care (provide details on closest location and hours/contact info)\"      \"Thank you for your time and take care!\"    Sabine SCHUMACHER RN    "

## 2021-04-19 NOTE — PROGRESS NOTES
ANTICOAGULATION FOLLOW-UP CLINIC VISIT    Patient Name:  Rm Villarreal  Date:  2021  Contact Type:  Telephone    SUBJECTIVE:  Patient Findings     Positives:  Hospital admission (hospitalized - for atypical chest pain)        Clinical Outcomes     Negatives:  Major bleeding event, Thromboembolic event, Anticoagulation-related hospital admission, Anticoagulation-related ED visit, Anticoagulation-related fatality           OBJECTIVE    Recent labs: (last 7 days)     21  0447   INR 2.61*       ASSESSMENT / PLAN  INR assessment THER    Recheck INR In: 4 WEEKS    INR Location Outside lab      Anticoagulation Summary  As of 2021    INR goal:  2.0-3.0   TTR:  74.4 % (2.7 mo)   INR used for dosin.61 (2021)   Warfarin maintenance plan:  2.5 mg (2.5 mg x 1) every day   Full warfarin instructions:  2.5 mg every day   Weekly warfarin total:  17.5 mg   No change documented:  Latisha Moore RN   Plan last modified:  Latisha Moore RN (2021)   Next INR check:  2021   Target end date:  Indefinite    Indications    Ischemic cardiomyopathy [I25.5]  Atrial fibrillation (H) [I48.91]  Long term current use of anticoagulant therapy [Z79.01]             Anticoagulation Episode Summary     INR check location:      Preferred lab:      Send INR reminders to:  [facility] HEART INR NURSE    Comments:        Anticoagulation Care Providers     Provider Role Specialty Phone number    Madelin Banuelos MD Referring Cardiovascular Disease [phone]            See the Encounter Report to view Anticoagulation Flowsheet and Dosing Calendar (Go to Encounters tab in chart review, and find the Anticoagulation Therapy Visit)    21 INR 2.61 (done during hospitalization for atypical chest pain thought to be musculoskeletal). He was told to take Tylenol PRN but states that he is no longer having the chest pain so hasn't had to take any Tylenol. No change in other meds yet (recommendation was made but pt  declined and will talk to his cardiologist about the meds at [facility] in June). No change in diet. He denies abnormal bleeding or bruising. Will continue current dosing of 2.5 mg daily and recheck in 4 weeks.  Latisha Moore RN

## 2021-05-04 ENCOUNTER — VIRTUAL VISIT (OUTPATIENT)
Dept: FAMILY MEDICINE | Facility: CLINIC | Age: 81
End: 2021-05-04
Attending: PHYSICIAN ASSISTANT
Payer: COMMERCIAL

## 2021-05-04 DIAGNOSIS — Z53.9 ERRONEOUS ENCOUNTER--DISREGARD: Primary | ICD-10-CM

## 2021-05-11 DIAGNOSIS — F43.23 ADJUSTMENT DISORDER WITH MIXED ANXIETY AND DEPRESSED MOOD: ICD-10-CM

## 2021-05-11 RX ORDER — LORAZEPAM 0.5 MG/1
TABLET ORAL
Qty: 30 TABLET | Refills: 0 | Status: SHIPPED | OUTPATIENT
Start: 2021-05-11 | End: 2021-06-14 | Stop reason: DRUGHIGH

## 2021-05-11 NOTE — TELEPHONE ENCOUNTER
Pending Prescriptions:                       Disp   Refills    LORazepam (ATIVAN) 0.5 MG tablet          30 tab*0            Sig: One half tablet twice daily          Last Written Prescription Date:  4-9-2021  Last Fill Quantity: 30,   # refills: 0  Last Office Visit: 2-3-2021 MW  Future Office visit:    Next 5 appointments (look out 90 days)    Jun 14, 2021 10:15 AM  Return Visit with Madelin Banuelos MD  [facility] ([facility] ) [address] [place] [id number]  [phone]   Aug 05, 2021 10:00 AM  PHYSICAL with Zach Pickering MD  [facility] ([facility] ) [address] [address]  [phone]           Routing refill request to provider for review/approval because:  Drug not on the [facility], [facility] or [facility] refill protocol or controlled substance    RT Robinson (R)

## 2021-05-17 ENCOUNTER — ANTICOAGULATION THERAPY VISIT (OUTPATIENT)
Dept: CARDIOLOGY | Facility: CLINIC | Age: 81
End: 2021-05-17
Payer: COMMERCIAL

## 2021-05-17 DIAGNOSIS — Z79.01 LONG TERM CURRENT USE OF ANTICOAGULANT THERAPY: ICD-10-CM

## 2021-05-17 DIAGNOSIS — I25.5 ISCHEMIC CARDIOMYOPATHY: ICD-10-CM

## 2021-05-17 DIAGNOSIS — I48.0 PAROXYSMAL ATRIAL FIBRILLATION (H): ICD-10-CM

## 2021-05-17 LAB
CAPILLARY BLOOD COLLECTION: NORMAL
INR PPP: 2.5 (ref 0.86–1.14)

## 2021-05-17 PROCEDURE — 36416 COLLJ CAPILLARY BLOOD SPEC: CPT | Performed by: INTERNAL MEDICINE

## 2021-05-17 PROCEDURE — 85610 PROTHROMBIN TIME: CPT | Performed by: INTERNAL MEDICINE

## 2021-05-17 PROCEDURE — 99207 PR NO CHARGE NURSE ONLY: CPT | Performed by: INTERNAL MEDICINE

## 2021-05-17 NOTE — PROGRESS NOTES
ANTICOAGULATION FOLLOW-UP CLINIC VISIT    Patient Name:  Rm Villarreal  Date:  2021  Contact Type:  Telephone    SUBJECTIVE:  Patient Findings     Positives:  Change in health (Dizziness recently, /80's; no recurrence of chest pain)        Clinical Outcomes     Negatives:  Major bleeding event, Thromboembolic event, Anticoagulation-related hospital admission, Anticoagulation-related ED visit, Anticoagulation-related fatality           OBJECTIVE    Recent labs: (last 7 days)     21  0914   INR 2.50*       ASSESSMENT / PLAN  INR assessment THER    Recheck INR In: 4 WEEKS    INR Location Outside lab      Anticoagulation Summary  As of 2021    INR goal:  2.0-3.0   TTR:  81.3 % (3.6 mo)   INR used for dosin.50 (2021)   Warfarin maintenance plan:  2.5 mg (2.5 mg x 1) every day   Full warfarin instructions:  2.5 mg every day   Weekly warfarin total:  17.5 mg   No change documented:  Sylvia Lopez RN   Plan last modified:  Latisha Moore RN (2021)   Next INR check:  2021   Target end date:  Indefinite    Indications    Ischemic cardiomyopathy [I25.5]  Atrial fibrillation (H) [I48.91]  Long term current use of anticoagulant therapy [Z79.01]             Anticoagulation Episode Summary     INR check location:      Preferred lab:      Send INR reminders to:  [facility] NURSE    Comments:        Anticoagulation Care Providers     Provider Role Specialty Phone number    Madelin Banuelos MD Referring Cardiovascular Disease [phone]            See the Encounter Report to view Anticoagulation Flowsheet and Dosing Calendar (Go to Encounters tab in chart review, and find the Anticoagulation Therapy Visit)    INR 2.5 today at outside clinic. Spoke to patient, no abnormal bleeding/bruising and no changes to meds or diet. No recurrence of chest pain. He does mention some dizziness recently, SBP in the 120's. He will monitor his symptoms and follow up with PCP or cardiology if  symptoms persist/worsen. He is scheduled to see Dr Banuelos in June. Reviewed that if he were to fall and hit his head he would need to call 911. Will continue on current warfarin maintenance dosing of 2.5mg daily and recheck in 4wks.     Sylvia Lopez RN

## 2021-05-31 DIAGNOSIS — I48.0 PAROXYSMAL ATRIAL FIBRILLATION (H): ICD-10-CM

## 2021-05-31 DIAGNOSIS — Z79.01 LONG TERM CURRENT USE OF ANTICOAGULANT THERAPY: ICD-10-CM

## 2021-06-01 ENCOUNTER — CARE COORDINATION (OUTPATIENT)
Dept: CARDIOLOGY | Facility: CLINIC | Age: 81
End: 2021-06-01

## 2021-06-01 RX ORDER — WARFARIN SODIUM 2.5 MG/1
TABLET ORAL
Qty: 90 TABLET | Refills: 0 | Status: SHIPPED | OUTPATIENT
Start: 2021-06-01 | End: 2021-09-01
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
## 2021-06-02 NOTE — PROGRESS NOTES
Doesn't need echo if one done in April   Thanks,   Dr. Banuelos        Message sent to scheduling in regards to above response.         AMBAR Pratt June 2, 2021 3:46 PM

## 2021-06-14 ENCOUNTER — OFFICE VISIT (OUTPATIENT)
Dept: CARDIOLOGY | Facility: CLINIC | Age: 81
End: 2021-06-14
Attending: INTERNAL MEDICINE
Payer: COMMERCIAL

## 2021-06-14 ENCOUNTER — ANTICOAGULATION THERAPY VISIT (OUTPATIENT)
Dept: CARDIOLOGY | Facility: CLINIC | Age: 81
End: 2021-06-14
Payer: COMMERCIAL

## 2021-06-14 VITALS — WEIGHT: 157 LBS | BODY MASS INDEX: 21.98 KG/M2 | OXYGEN SATURATION: 97 % | HEIGHT: 71 IN

## 2021-06-14 DIAGNOSIS — I48.0 PAROXYSMAL ATRIAL FIBRILLATION (H): ICD-10-CM

## 2021-06-14 DIAGNOSIS — Z79.01 LONG TERM CURRENT USE OF ANTICOAGULANT THERAPY: ICD-10-CM

## 2021-06-14 DIAGNOSIS — I25.5 ISCHEMIC CARDIOMYOPATHY: ICD-10-CM

## 2021-06-14 DIAGNOSIS — I10 BENIGN ESSENTIAL HYPERTENSION: ICD-10-CM

## 2021-06-14 DIAGNOSIS — I25.10 ATHEROSCLEROSIS OF NATIVE CORONARY ARTERY OF NATIVE HEART WITHOUT ANGINA PECTORIS: ICD-10-CM

## 2021-06-14 LAB
CAPILLARY BLOOD COLLECTION: NORMAL
INR PPP: 1.8 (ref 0.86–1.14)

## 2021-06-14 PROCEDURE — 85610 PROTHROMBIN TIME: CPT | Performed by: INTERNAL MEDICINE

## 2021-06-14 PROCEDURE — 99207 PR NO CHARGE NURSE ONLY: CPT

## 2021-06-14 PROCEDURE — 36416 COLLJ CAPILLARY BLOOD SPEC: CPT | Performed by: INTERNAL MEDICINE

## 2021-06-14 PROCEDURE — 99215 OFFICE O/P EST HI 40 MIN: CPT | Performed by: INTERNAL MEDICINE

## 2021-06-14 RX ORDER — LORAZEPAM 0.5 MG/1
0.5 TABLET ORAL EVERY 6 HOURS PRN
COMMUNITY
End: 2021-07-14

## 2021-06-14 RX ORDER — METOPROLOL TARTRATE 37.5 MG/1
37.5 TABLET, FILM COATED ORAL 2 TIMES DAILY
Qty: 60 TABLET | Refills: 3 | Status: SHIPPED | OUTPATIENT
Start: 2021-06-14 | End: 2021-10-18

## 2021-06-14 ASSESSMENT — MIFFLIN-ST. JEOR: SCORE: 1454.28

## 2021-06-14 NOTE — PATIENT INSTRUCTIONS
1. Orthostatic blood pressure measurements at home  2. Follow up in 6 months with Dr. Banuelos

## 2021-06-14 NOTE — PROGRESS NOTES
ANTICOAGULATION FOLLOW-UP CLINIC VISIT    Patient Name:  Rm Villarreal  Date:  2021  Contact Type:  Telephone    SUBJECTIVE:  Patient Findings     Positives:  Change in medications (Metoprolol decreased to 50mg BID)        Clinical Outcomes     Negatives:  Major bleeding event, Thromboembolic event, Anticoagulation-related hospital admission, Anticoagulation-related ED visit, Anticoagulation-related fatality           OBJECTIVE    Recent labs: (last 7 days)     21  0923   INR 1.80*       ASSESSMENT / PLAN  INR assessment SUB    Recheck INR In: 2 WEEKS    INR Location Outside lab      Anticoagulation Summary  As of 2021    INR goal:  2.0-3.0   TTR:  79.3 % (4.6 mo)   INR used for dosin.80 (2021)   Warfarin maintenance plan:  2.5 mg (2.5 mg x 1) every day   Full warfarin instructions:  : 3.75 mg; Otherwise 2.5 mg every day   Weekly warfarin total:  17.5 mg   Plan last modified:  Latisha Moore RN (2021)   Next INR check:  2021   Target end date:  Indefinite    Indications    Ischemic cardiomyopathy [I25.5]  Atrial fibrillation (H) [I48.91]  Long term current use of anticoagulant therapy [Z79.01]             Anticoagulation Episode Summary     INR check location:      Preferred lab:      Send INR reminders to:  [facility] INR NURSE    Comments:        Anticoagulation Care Providers     Provider Role Specialty Phone number    Madelin Banuelos MD Referring Cardiovascular Disease [phone]            See the Encounter Report to view Anticoagulation Flowsheet and Dosing Calendar (Go to Encounters tab in chart review, and find the Anticoagulation Therapy Visit)    INR 1.8 today at outside clinic. Spoke to patient, no abnormal bleeding/bruising. He saw Dr Banuelos today and his metoprolol was decreased to 50mg BID (no interactions per Micromed[employer]). No missed doses of warfarin. He does not keep track of his diet so he isn't sure if he had more greens or less alcohol lately. No  boost/ensure/V8. Will boost patient to 3.75mg today and then resume normal maintenance dosing of 2.5mg daily. Recheck in 2wks.     Sylvia Lopez RN
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
## 2021-06-21 ENCOUNTER — TELEPHONE (OUTPATIENT)
Dept: CARDIOLOGY | Facility: CLINIC | Age: 81
End: 2021-06-21

## 2021-06-21 NOTE — TELEPHONE ENCOUNTER
RN called patient to get update on how he was feeling and his BP and HR reading. Patient reported 's/70's and HR 50's-60's. Patient reports I feel great and has no further questions at this time. Patient will call clinic with any further questions/concerns prior to his f/u in 6 months.         From: Madelin Banuelos MD  Sent: 6/14/2021  11:38 AM CDT  To: [facility] Heart Team 4    Hi all,  Can someone call Zoran in one week to discuss BP and symptoms?     Dr. Banuelos

## 2021-06-28 ENCOUNTER — ANTICOAGULATION THERAPY VISIT (OUTPATIENT)
Dept: CARDIOLOGY | Facility: CLINIC | Age: 81
End: 2021-06-28
Payer: COMMERCIAL

## 2021-06-28 DIAGNOSIS — I48.0 PAROXYSMAL ATRIAL FIBRILLATION (H): ICD-10-CM

## 2021-06-28 DIAGNOSIS — I25.5 ISCHEMIC CARDIOMYOPATHY: ICD-10-CM

## 2021-06-28 DIAGNOSIS — Z79.01 LONG TERM CURRENT USE OF ANTICOAGULANT THERAPY: ICD-10-CM

## 2021-06-28 LAB
CAPILLARY BLOOD COLLECTION: NORMAL
INR PPP: 2 (ref 0.86–1.14)

## 2021-06-28 PROCEDURE — 36416 COLLJ CAPILLARY BLOOD SPEC: CPT | Performed by: INTERNAL MEDICINE

## 2021-06-28 PROCEDURE — 99207 PR NO CHARGE NURSE ONLY: CPT

## 2021-06-28 PROCEDURE — 85610 PROTHROMBIN TIME: CPT | Performed by: INTERNAL MEDICINE

## 2021-06-28 NOTE — PROGRESS NOTES
ANTICOAGULATION FOLLOW-UP CLINIC VISIT    Patient Name:  Rm Villarreal  Date:  2021  Contact Type:  Telephone    SUBJECTIVE:  Patient Findings         Clinical Outcomes     Negatives:  Major bleeding event, Thromboembolic event, Anticoagulation-related hospital admission, Anticoagulation-related ED visit, Anticoagulation-related fatality           OBJECTIVE    Recent labs: (last 7 days)     21  0842   INR 2.00*       ASSESSMENT / PLAN  INR assessment THER    Recheck INR In: 2 WEEKS    INR Location Outside lab      Anticoagulation Summary  As of 2021    INR goal:  2.0-3.0   TTR:  71.9 % (5 mo)   INR used for dosin.00 (2021)   Warfarin maintenance plan:  2.5 mg (2.5 mg x 1) every day   Full warfarin instructions:  2.5 mg every day   Weekly warfarin total:  17.5 mg   No change documented:  Krista Weber RN   Plan last modified:  Latisha Moore RN (2021)   Next INR check:  2021   Target end date:  Indefinite    Indications    Ischemic cardiomyopathy [I25.5]  Atrial fibrillation (H) [I48.91]  Long term current use of anticoagulant therapy [Z79.01]             Anticoagulation Episode Summary     INR check location:      Preferred lab:      Send INR reminders to:  [facility] HEART INR NURSE    Comments:        Anticoagulation Care Providers     Provider Role Specialty Phone number    Madelin Banuelos MD Referring Cardiovascular Disease [phone]            See the Encounter Report to view Anticoagulation Flowsheet and Dosing Calendar (Go to Encounters tab in chart review, and find the Anticoagulation Therapy Visit)    INR was 3.0 today. Pt denies any abnormal bleeding or bruising. Denies any recent medication or dietary changes or recent illness. Will continue current dosing of 2.5 mg daily. Next INR check is to be scheduled in 2-3 weeks. Pt verbalized understanding.    Krista Weber, AMBAR

## 2021-07-05 DIAGNOSIS — I48.0 PAROXYSMAL ATRIAL FIBRILLATION (H): Primary | ICD-10-CM

## 2021-07-05 DIAGNOSIS — Z79.01 LONG TERM CURRENT USE OF ANTICOAGULANT THERAPY: ICD-10-CM

## 2021-07-13 DIAGNOSIS — F43.23 ADJUSTMENT DISORDER WITH MIXED ANXIETY AND DEPRESSED MOOD: Primary | ICD-10-CM

## 2021-07-14 RX ORDER — LORAZEPAM 0.5 MG/1
TABLET ORAL
Qty: 30 TABLET | Refills: 0 | Status: SHIPPED | OUTPATIENT
Start: 2021-07-14 | End: 2021-08-17

## 2021-07-14 NOTE — TELEPHONE ENCOUNTER
Routing refill request to provider for review/approval because:  Medication is reported/historical.     Dina Matos RN  [facility] Triage

## 2021-07-19 ENCOUNTER — ANTICOAGULATION THERAPY VISIT (OUTPATIENT)
Dept: CARDIOLOGY | Facility: CLINIC | Age: 81
End: 2021-07-19
Payer: COMMERCIAL

## 2021-07-19 ENCOUNTER — LAB (OUTPATIENT)
Dept: LAB | Facility: CLINIC | Age: 81
End: 2021-07-19
Payer: COMMERCIAL

## 2021-07-19 DIAGNOSIS — I48.0 PAROXYSMAL ATRIAL FIBRILLATION (H): ICD-10-CM

## 2021-07-19 DIAGNOSIS — Z79.01 LONG TERM CURRENT USE OF ANTICOAGULANT THERAPY: ICD-10-CM

## 2021-07-19 DIAGNOSIS — I48.91 ATRIAL FIBRILLATION, UNSPECIFIED TYPE (H): ICD-10-CM

## 2021-07-19 DIAGNOSIS — I25.5 ISCHEMIC CARDIOMYOPATHY: Primary | ICD-10-CM

## 2021-07-19 LAB — INR BLD: 2.2 (ref 0.9–1.1)

## 2021-07-19 PROCEDURE — 85610 PROTHROMBIN TIME: CPT

## 2021-07-19 PROCEDURE — 99207 PR NO CHARGE NURSE ONLY: CPT

## 2021-07-19 PROCEDURE — 36416 COLLJ CAPILLARY BLOOD SPEC: CPT

## 2021-07-19 NOTE — PROGRESS NOTES
ANTICOAGULATION FOLLOW-UP CLINIC VISIT    Patient Name:  Rm Villarreal  Date:  2021  Contact Type:  Telephone    SUBJECTIVE:  Patient Findings         Clinical Outcomes     Negatives:  Major bleeding event, Thromboembolic event, Anticoagulation-related hospital admission, Anticoagulation-related ED visit, Anticoagulation-related fatality           OBJECTIVE    Recent labs: (last 7 days)     21  1253   INR 2.2*       ASSESSMENT / PLAN  INR assessment THER    Recheck INR In: 3 WEEKS    INR Location Outside lab      Anticoagulation Summary  As of 2021    INR goal:  2.0-3.0   TTR:  75.4 % (5.7 mo)   INR used for dosin.2 (2021)   Warfarin maintenance plan:  2.5 mg (2.5 mg x 1) every day   Full warfarin instructions:  2.5 mg every day   Weekly warfarin total:  17.5 mg   No change documented:  Edna Fuller RN   Plan last modified:  Latisha Moore RN (2021)   Next INR check:  2021   Target end date:  Indefinite    Indications    Ischemic cardiomyopathy [I25.5]  Atrial fibrillation (H) [I48.91]  Long term current use of anticoagulant therapy [Z79.01]             Anticoagulation Episode Summary     INR check location:      Preferred lab:      Send INR reminders to:  [facility] HEART INR NURSE    Comments:        Anticoagulation Care Providers     Provider Role Specialty Phone number    Madelin Banuelos MD Referring Cardiovascular Disease [phone]            See the Encounter Report to view Anticoagulation Flowsheet and Dosing Calendar (Go to Encounters tab in chart review, and find the Anticoagulation Therapy Visit)    INR 2.2.  Called and spoke to the patient.  No changes.  Continue same schedule and recheck in 3 weeks.  Jonathan Fuller, RN

## 2021-08-05 ENCOUNTER — OFFICE VISIT (OUTPATIENT)
Dept: FAMILY MEDICINE | Facility: CLINIC | Age: 81
End: 2021-08-05
Payer: COMMERCIAL

## 2021-08-05 VITALS
WEIGHT: 157.9 LBS | SYSTOLIC BLOOD PRESSURE: 148 MMHG | TEMPERATURE: 96.2 F | BODY MASS INDEX: 22.1 KG/M2 | DIASTOLIC BLOOD PRESSURE: 87 MMHG | HEART RATE: 85 BPM | HEIGHT: 71 IN | OXYGEN SATURATION: 98 %

## 2021-08-05 DIAGNOSIS — I48.20 CHRONIC ATRIAL FIBRILLATION (H): ICD-10-CM

## 2021-08-05 DIAGNOSIS — R21 RASH: ICD-10-CM

## 2021-08-05 DIAGNOSIS — Z00.00 ENCOUNTER FOR PREVENTIVE HEALTH EXAMINATION: Primary | ICD-10-CM

## 2021-08-05 DIAGNOSIS — E55.9 VITAMIN D DEFICIENCY, UNSPECIFIED: ICD-10-CM

## 2021-08-05 LAB
BASOPHILS # BLD AUTO: 0 10E3/UL (ref 0–0.2)
BASOPHILS NFR BLD AUTO: 1 %
EOSINOPHIL # BLD AUTO: 0.1 10E3/UL (ref 0–0.7)
EOSINOPHIL NFR BLD AUTO: 2 %
ERYTHROCYTE [DISTWIDTH] IN BLOOD BY AUTOMATED COUNT: 15.2 % (ref 10–15)
FOLATE SERPL-MCNC: 21 NG/ML
HCT VFR BLD AUTO: 41.9 % (ref 40–53)
HGB BLD-MCNC: 15.1 G/DL (ref 13.3–17.7)
LYMPHOCYTES # BLD AUTO: 1.3 10E3/UL (ref 0.8–5.3)
LYMPHOCYTES NFR BLD AUTO: 18 %
MCH RBC QN AUTO: 33.9 PG (ref 26.5–33)
MCHC RBC AUTO-ENTMCNC: 36 G/DL (ref 31.5–36.5)
MCV RBC AUTO: 94 FL (ref 78–100)
MONOCYTES # BLD AUTO: 1.2 10E3/UL (ref 0–1.3)
MONOCYTES NFR BLD AUTO: 17 %
NEUTROPHILS # BLD AUTO: 4.4 10E3/UL (ref 1.6–8.3)
NEUTROPHILS NFR BLD AUTO: 63 %
PLATELET # BLD AUTO: 258 10E3/UL (ref 150–450)
RBC # BLD AUTO: 4.45 10E6/UL (ref 4.4–5.9)
VIT B12 SERPL-MCNC: 415 PG/ML (ref 193–986)
WBC # BLD AUTO: 7 10E3/UL (ref 4–11)

## 2021-08-05 PROCEDURE — 36415 COLL VENOUS BLD VENIPUNCTURE: CPT | Performed by: INTERNAL MEDICINE

## 2021-08-05 PROCEDURE — 82746 ASSAY OF FOLIC ACID SERUM: CPT | Performed by: INTERNAL MEDICINE

## 2021-08-05 PROCEDURE — 82306 VITAMIN D 25 HYDROXY: CPT | Performed by: INTERNAL MEDICINE

## 2021-08-05 PROCEDURE — 85025 COMPLETE CBC W/AUTO DIFF WBC: CPT | Performed by: INTERNAL MEDICINE

## 2021-08-05 PROCEDURE — 80053 COMPREHEN METABOLIC PANEL: CPT | Performed by: INTERNAL MEDICINE

## 2021-08-05 PROCEDURE — 82607 VITAMIN B-12: CPT | Performed by: INTERNAL MEDICINE

## 2021-08-05 PROCEDURE — 84443 ASSAY THYROID STIM HORMONE: CPT | Performed by: INTERNAL MEDICINE

## 2021-08-05 PROCEDURE — 99397 PER PM REEVAL EST PAT 65+ YR: CPT | Performed by: INTERNAL MEDICINE

## 2021-08-05 PROCEDURE — 80061 LIPID PANEL: CPT | Performed by: INTERNAL MEDICINE

## 2021-08-05 RX ORDER — FLUOCINONIDE 0.5 MG/G
CREAM TOPICAL 2 TIMES DAILY
Qty: 30 G | Refills: 1 | Status: SHIPPED | OUTPATIENT
Start: 2021-08-05 | End: 2022-01-07

## 2021-08-05 ASSESSMENT — ACTIVITIES OF DAILY LIVING (ADL): CURRENT_FUNCTION: NO ASSISTANCE NEEDED

## 2021-08-05 ASSESSMENT — MIFFLIN-ST. JEOR: SCORE: 1453.36

## 2021-08-05 NOTE — PATIENT INSTRUCTIONS
Mr. Villarreal;    Overall you appear to be doing well.  The occasional lightheadedness that you feel likely relates to being on amlodipine and working hard.  This combination sometimes can lead to dizziness and fatigue.  Make certain that when you do work hard that you drink enough water.  You need to keep yourself hydrated while on amlodipine.    If your symptoms continue despite this.  You can take 50% of your amlodipine dose to see if this improves things.    The rash is consistent with a mite infection.  I will prescribe a medication to help you with these bumps as needed.    We will obtain some screening lab tests today.    Your blood pressure is superbly controlled.    Best regards  Zach

## 2021-08-05 NOTE — PROGRESS NOTES
"SUBJECTIVE:   Rm Villarreal is a 81 year old male who presents for Preventive Visit.  Overall doing relatively well.    Blood pressures have been very well controlled.  His heart rate is well controlled additionally.  At home blood pressures have ranged in the 1 10-1 30 range.    Patient has occasional lightheadedness.  He states that this happens usually in the mornings after he is worked hard the evening before.  We discussed the importance of hydration particularly in setting of taking amlodipine for blood pressure management.  I discussed with him, that should the symptoms continue despite adequate hydration that we may reduce or discontinue the amlodipine as long as he follows his blood pressure carefully    Patient's heart rate today is well controlled and he had some irregularity of his heart additionally.  He has been on warfarin for some time for atrial fibrillation.  We discussed the importance of this in the setting of his carotid stenosis and history of atrial fibrillation.    The patient does have a rash on his lower extremities and near his waistline.  The rash starts as a small red bump.  He states his wife does not have any similar rash.  The bumps are very pruritic initially.      Patient has been advised of split billing requirements and indicates understanding: Yes   Are you in the first 12 months of your Medicare coverage?  No    Healthy Habits:     In general, how would you rate your overall health?  Good    Frequency of exercise:  2-3 days/week    Duration of exercise:  30-45 minutes    Do you usually eat at least 4 servings of fruit and vegetables a day, include whole grains    & fiber and avoid regularly eating high fat or \"junk\" foods?  Yes    Taking medications regularly:  Yes    Barriers to taking medications:  Side effects    Medication side effects:  Lightheadedness (Amlodipine and Metoprolol )    Ability to successfully perform activities of daily living:  No assistance needed    " Home Safety:  No safety concerns identified    Hearing Impairment:  No hearing concerns    In the past 6 months, have you been bothered by leaking of urine?  No    In general, how would you rate your overall mental or emotional health?  Good      PHQ-2 Total Score: 0    Additional concerns today:  Yes (derm problem - rash in inner thigh )    Do you feel safe in your environment? Yes    Have you ever done Advance Care Planning? (For example, a Health Directive, POLST, or a discussion with a medical provider or your loved ones about your wishes): Yes, advance care planning is on file.       Fall risk  Fallen 2 or more times in the past year?: No  Any fall with injury in the past year?: No    Cognitive Screening   1) Repeat 3 items (Leader, Season, Table)    2) Clock draw: ABNORMAL - wrong time   3) 3 item recall: Recalls 2 objects   Results: ABNORMAL clock, 1-2 items recalled: PROBABLE COGNITIVE IMPAIRMENT, **INFORM PROVIDER**    Mini-CogTM Copyright EVANGELISTA Luz. Licensed by the author for use in [facility]; reprinted with permission (jimmy@[facility]). All rights reserved.      Do you have sleep apnea, excessive snoring or daytime drowsiness?: yes    Reviewed and updated as needed this visit by clinical staff  Tobacco  Allergies  Meds              Reviewed and updated as needed this visit by Provider                Social History     Tobacco Use     Smoking status: Former Smoker     Quit date: 2003     Years since quittin.0     Smokeless tobacco: Never Used   Substance Use Topics     Alcohol use: Yes     Alcohol/week: 0.0 standard drinks     Comment: one beer daily     If you drink alcohol do you typically have >3 drinks per day or >7 drinks per week? No    Alcohol Use 2019   Prescreen: >3 drinks/day or >7 drinks/week? -   AUDIT SCORE  6         Current providers sharing in care for this patient include:   Patient Care Team:  Zach Pickering MD as PCP - General (Internal Medicine)  Lakisha  "MD Madelin as Assigned Heart and Vascular Provider  Zach Pickering MD as Assigned PCP    The following health maintenance items are reviewed in Epic and correct as of today:  Health Maintenance Due   Topic Date Due     ANNUAL REVIEW OF  ORDERS  Never done     COPD ACTION PLAN  Never done     HEPATITIS C SCREENING  Never done     HEPATITIS B IMMUNIZATION (1 of 3 - Risk 3-dose series) Never done     ZOSTER IMMUNIZATION (1 of 2) Never done     Lab work is in process          Review of Systems  Constitutional, HEENT, cardiovascular, pulmonary, GI, , musculoskeletal, neuro, skin, endocrine and psych systems are negative, except as otherwise noted.    OBJECTIVE:   BP (!) 148/87 (BP Location: Right arm, Patient Position: Sitting, Cuff Size: Adult Regular)   Pulse 85   Temp (!) 96.2  F (35.7  C) (Temporal)   Ht 1.803 m (5' 11\")   Wt 71.6 kg (157 lb 14.4 oz)   SpO2 98%   BMI 22.02 kg/m   Estimated body mass index is 22.02 kg/m  as calculated from the following:    Height as of this encounter: 1.803 m (5' 11\").    Weight as of this encounter: 71.6 kg (157 lb 14.4 oz).  Physical Exam  GENERAL: healthy, alert and no distress  EYES: Eyes grossly normal to inspection, PERRL and conjunctivae and sclerae normal  HENT: ear canals and TM's normal, nose and mouth without ulcers or lesions  NECK: no adenopathy, no asymmetry, masses, or scars and thyroid normal to palpation  RESP: lungs clear to auscultation - no rales, rhonchi or wheezes  CV: regular rate and rhythm, normal S1 S2, no S3 or S4, no murmur, click or rub, no peripheral edema and peripheral pulses strong  ABDOMEN: soft, nontender, no hepatosplenomegaly, no masses and bowel sounds normal  MS: no gross musculoskeletal defects noted, no edema  SKIN: no suspicious lesions or rashes  NEURO: Normal strength and tone, mentation intact and speech normal  PSYCH: mentation appears normal, affect normal/bright    Diagnostic Test Results:  Labs reviewed in " "Epic    ASSESSMENT / PLAN:       ICD-10-CM    1. Encounter for preventive health examination  Z00.00 Comprehensive metabolic panel (BMP + Alb, Alk Phos, ALT, AST, Total. Bili, TP)     CBC with platelets and differential     TSH with free T4 reflex     Lipid panel reflex to direct LDL Fasting     Vitamin B12     Folate     Vitamin D Deficiency     Comprehensive metabolic panel (BMP + Alb, Alk Phos, ALT, AST, Total. Bili, TP)     CBC with platelets and differential     TSH with free T4 reflex     Lipid panel reflex to direct LDL Fasting     Vitamin B12     Folate     Vitamin D Deficiency   2. Vitamin D deficiency, unspecified   E55.9 Vitamin D Deficiency     Vitamin D Deficiency   3. Chronic atrial fibrillation (H)  I48.20    4. Rash  R21 fluocinonide (LIDEX) 0.05 % external cream       Patient has been advised of split billing requirements and indicates understanding: Yes  COUNSELING:  Reviewed preventive health counseling, as reflected in patient instructions    Estimated body mass index is 22.02 kg/m  as calculated from the following:    Height as of this encounter: 1.803 m (5' 11\").    Weight as of this encounter: 71.6 kg (157 lb 14.4 oz).        He reports that he quit smoking about 18 years ago. He has never used smokeless tobacco.      Appropriate preventive services were discussed with this patient, including applicable screening as appropriate for cardiovascular disease, diabetes, osteopenia/osteoporosis, and glaucoma.  As appropriate for age/gender, discussed screening for colorectal cancer, prostate cancer, breast cancer, and cervical cancer. Checklist reviewing preventive services available has been given to the patient.    Reviewed patients plan of care and provided an AVS. The Basic Care Plan (routine screening as documented in Health Maintenance) for Rm meets the Care Plan requirement. This Care Plan has been established and reviewed with the Patient.    Counseling Resources:  ATP IV " Guidelines  Pooled Cohorts Equation Calculator  Breast Cancer Risk Calculator  Breast Cancer: Medication to Reduce Risk  FRAX Risk Assessment  ICSI Preventive Guidelines  Dietary Guidelines for Americans, 2010  [employer]'s MyPlate  ASA Prophylaxis  Lung CA Screening    Zach Pickering MD  [facility]    Identified Health Risks:

## 2021-08-06 LAB
ALBUMIN SERPL-MCNC: 4.1 G/DL (ref 3.4–5)
ALP SERPL-CCNC: 160 U/L (ref 40–150)
ALT SERPL W P-5'-P-CCNC: 32 U/L (ref 0–70)
ANION GAP SERPL CALCULATED.3IONS-SCNC: 5 MMOL/L (ref 3–14)
AST SERPL W P-5'-P-CCNC: 27 U/L (ref 0–45)
BILIRUB SERPL-MCNC: 1.8 MG/DL (ref 0.2–1.3)
BUN SERPL-MCNC: 13 MG/DL (ref 7–30)
CALCIUM SERPL-MCNC: 8.9 MG/DL (ref 8.5–10.1)
CHLORIDE BLD-SCNC: 105 MMOL/L (ref 94–109)
CHOLEST SERPL-MCNC: 204 MG/DL
CO2 SERPL-SCNC: 25 MMOL/L (ref 20–32)
CREAT SERPL-MCNC: 0.89 MG/DL (ref 0.66–1.25)
DEPRECATED CALCIDIOL+CALCIFEROL SERPL-MC: 46 UG/L (ref 20–75)
FASTING STATUS PATIENT QL REPORTED: YES
GFR SERPL CREATININE-BSD FRML MDRD: 81 ML/MIN/1.73M2
GLUCOSE BLD-MCNC: 86 MG/DL (ref 70–99)
HDLC SERPL-MCNC: 82 MG/DL
LDLC SERPL CALC-MCNC: 104 MG/DL
NONHDLC SERPL-MCNC: 122 MG/DL
POTASSIUM BLD-SCNC: 4.2 MMOL/L (ref 3.4–5.3)
PROT SERPL-MCNC: 8 G/DL (ref 6.8–8.8)
SODIUM SERPL-SCNC: 135 MMOL/L (ref 133–144)
TRIGL SERPL-MCNC: 91 MG/DL
TSH SERPL DL<=0.005 MIU/L-ACNC: 3.31 MU/L (ref 0.4–4)

## 2021-08-09 ENCOUNTER — LAB (OUTPATIENT)
Dept: LAB | Facility: CLINIC | Age: 81
End: 2021-08-09
Payer: COMMERCIAL

## 2021-08-09 ENCOUNTER — ANTICOAGULATION THERAPY VISIT (OUTPATIENT)
Dept: CARDIOLOGY | Facility: CLINIC | Age: 81
End: 2021-08-09
Payer: COMMERCIAL

## 2021-08-09 DIAGNOSIS — I25.5 ISCHEMIC CARDIOMYOPATHY: Primary | ICD-10-CM

## 2021-08-09 DIAGNOSIS — Z79.01 LONG TERM CURRENT USE OF ANTICOAGULANT THERAPY: ICD-10-CM

## 2021-08-09 DIAGNOSIS — I48.91 ATRIAL FIBRILLATION (H): ICD-10-CM

## 2021-08-09 DIAGNOSIS — I48.0 PAROXYSMAL ATRIAL FIBRILLATION (H): ICD-10-CM

## 2021-08-09 LAB — INR BLD: 2.3 (ref 0.9–1.1)

## 2021-08-09 PROCEDURE — 85610 PROTHROMBIN TIME: CPT

## 2021-08-09 PROCEDURE — 99207 PR NO CHARGE NURSE ONLY: CPT | Performed by: INTERNAL MEDICINE

## 2021-08-09 PROCEDURE — 36416 COLLJ CAPILLARY BLOOD SPEC: CPT

## 2021-08-09 NOTE — PROGRESS NOTES
ANTICOAGULATION FOLLOW-UP CLINIC VISIT    Patient Name:  Rm Villarreal  Date:  2021  Contact Type:  Telephone    SUBJECTIVE:  Patient Findings         Clinical Outcomes     Negatives:  Major bleeding event, Thromboembolic event, Anticoagulation-related hospital admission, Anticoagulation-related ED visit, Anticoagulation-related fatality           OBJECTIVE    Recent labs: (last 7 days)     21  1003   INR 2.3*       ASSESSMENT / PLAN  INR assessment THER    Recheck INR In: 4 WEEKS    INR Location Outside lab      Anticoagulation Summary  As of 2021    INR goal:  2.0-3.0   TTR:  78.0 % (6.4 mo)   INR used for dosin.3 (2021)   Warfarin maintenance plan:  2.5 mg (2.5 mg x 1) every day   Full warfarin instructions:  2.5 mg every day   Weekly warfarin total:  17.5 mg   No change documented:  Sylvia Lopez RN   Plan last modified:  Latisha Moore RN (2021)   Next INR check:  2021   Target end date:  Indefinite    Indications    Ischemic cardiomyopathy [I25.5]  Atrial fibrillation (H) [I48.91]  Long term current use of anticoagulant therapy [Z79.01]             Anticoagulation Episode Summary     INR check location:      Preferred lab:      Send INR reminders to:  [facility] HEART INR NURSE    Comments:        Anticoagulation Care Providers     Provider Role Specialty Phone number    Madelin Banuelos MD Referring Cardiovascular Disease [phone]            See the Encounter Report to view Anticoagulation Flowsheet and Dosing Calendar (Go to Encounters tab in chart review, and find the Anticoagulation Therapy Visit)    INR 2.3 today at outside clinic. Spoke to patient, no abnormal bleeding/bruising. No changes to meds or diet. Continue on warfarin 2.5mg daily and recheck in 4wks.     Sylvia Lopez, RN

## 2021-08-16 DIAGNOSIS — F43.23 ADJUSTMENT DISORDER WITH MIXED ANXIETY AND DEPRESSED MOOD: ICD-10-CM

## 2021-08-16 NOTE — TELEPHONE ENCOUNTER
Pending Prescriptions:                       Disp   Refills    LORazepam (ATIVAN) 0.5 MG tablet [Pharmac*30 tab*             Sig: TAKE 1/2 TABLET BY MOUTH TWICE DAILY          Last Written Prescription Date:  7-  Last Fill Quantity: 30,   # refills: 0  Last Office Visit: 8-5-2021 [facility] for routine physical  Future Office visit:   1-4-2022 [facility]    Routing refill request to provider for review/approval because:  Drug not on the [facility], P or [facility] refill protocol or controlled substance    RT Robinson (R)

## 2021-08-17 RX ORDER — LORAZEPAM 0.5 MG/1
TABLET ORAL
Qty: 30 TABLET | Refills: 0 | Status: SHIPPED | OUTPATIENT
Start: 2021-08-17 | End: 2021-09-22

## 2021-09-01 ENCOUNTER — ANTICOAGULATION THERAPY VISIT (OUTPATIENT)
Dept: CARDIOLOGY | Facility: CLINIC | Age: 81
End: 2021-09-01
Payer: COMMERCIAL

## 2021-09-01 ENCOUNTER — LAB (OUTPATIENT)
Dept: LAB | Facility: CLINIC | Age: 81
End: 2021-09-01
Payer: COMMERCIAL

## 2021-09-01 DIAGNOSIS — Z79.01 LONG TERM CURRENT USE OF ANTICOAGULANT THERAPY: ICD-10-CM

## 2021-09-01 DIAGNOSIS — I48.0 PAROXYSMAL ATRIAL FIBRILLATION (H): ICD-10-CM

## 2021-09-01 DIAGNOSIS — I48.91 ATRIAL FIBRILLATION (H): ICD-10-CM

## 2021-09-01 DIAGNOSIS — I25.5 ISCHEMIC CARDIOMYOPATHY: Primary | ICD-10-CM

## 2021-09-01 LAB — INR BLD: 2.2 (ref 0.9–1.1)

## 2021-09-01 PROCEDURE — 85610 PROTHROMBIN TIME: CPT

## 2021-09-01 PROCEDURE — 36416 COLLJ CAPILLARY BLOOD SPEC: CPT

## 2021-09-01 PROCEDURE — 99207 PR NO CHARGE NURSE ONLY: CPT

## 2021-09-01 NOTE — PROGRESS NOTES
ANTICOAGULATION FOLLOW-UP CLINIC VISIT    Patient Name:  Rm Villarreal  Date:  2021  Contact Type:  Telephone    SUBJECTIVE:  Patient Findings     Positives:  Bruising (small areas of bruising on arms)        Clinical Outcomes     Negatives:  Major bleeding event, Thromboembolic event, Anticoagulation-related hospital admission, Anticoagulation-related ED visit, Anticoagulation-related fatality           OBJECTIVE    Recent labs: (last 7 days)     21  0945   INR 2.2*       ASSESSMENT / PLAN  INR assessment THER    Recheck INR In: 4 WEEKS    INR Location Outside lab      Anticoagulation Summary  As of 2021    INR goal:  2.0-3.0   TTR:  80.4 % (7.2 mo)   INR used for dosin.2 (2021)   Warfarin maintenance plan:  2.5 mg (2.5 mg x 1) every day   Full warfarin instructions:  2.5 mg every day   Weekly warfarin total:  17.5 mg   No change documented:  Latisha Moore RN   Plan last modified:  Latisha Moore RN (2021)   Next INR check:  2021   Target end date:  Indefinite    Indications    Ischemic cardiomyopathy [I25.5]  Atrial fibrillation (H) [I48.91]  Long term current use of anticoagulant therapy [Z79.01]             Anticoagulation Episode Summary     INR check location:      Preferred lab:      Send INR reminders to:  [facility] HEART INR NURSE    Comments:        Anticoagulation Care Providers     Provider Role Specialty Phone number    Madelin Banuelos MD Referring Cardiovascular Disease [phone]            See the Encounter Report to view Anticoagulation Flowsheet and Dosing Calendar (Go to Encounters tab in chart review, and find the Anticoagulation Therapy Visit)    INR 2.2 No change in meds or diet. He has small areas of bruising on his arms. He still has occasional dizziness after taking Amlodipine. He says he is drinking enough liquids as advised by PMD in August. Advised that he should check his BP when he has the dizziness and that he should contact his PMD concerning  the ongoing symptom of occasional dizziness.  Will continue current dosing of 2.5 mg daily and recheck in 4 weeks.  Latisha Moore RN

## 2021-09-21 DIAGNOSIS — F43.23 ADJUSTMENT DISORDER WITH MIXED ANXIETY AND DEPRESSED MOOD: ICD-10-CM

## 2021-09-21 NOTE — TELEPHONE ENCOUNTER
Pending Prescriptions:                       Disp   Refills    LORazepam (ATIVAN) 0.5 MG tablet [Pharmac*30 tab*             Sig: TAKE 1/2 TABLET BY MOUTH TWICE DAILY          Last Written Prescription Date:  8-  Last Fill Quantity: 30,   # refills: 0  Last Office Visit: 8-5-2021 [facility]  Future Office visit:   1-4-2022 [facility]    Routing refill request to provider for review/approval because:  Drug not on the [facility], P or [facility] refill protocol or controlled substance    RT Robinson (R)

## 2021-09-22 RX ORDER — LORAZEPAM 0.5 MG/1
TABLET ORAL
Qty: 30 TABLET | Refills: 0 | Status: SHIPPED | OUTPATIENT
Start: 2021-09-22 | End: 2021-12-07

## 2021-09-29 ENCOUNTER — LAB (OUTPATIENT)
Dept: LAB | Facility: CLINIC | Age: 81
End: 2021-09-29
Payer: COMMERCIAL

## 2021-09-29 ENCOUNTER — ANTICOAGULATION THERAPY VISIT (OUTPATIENT)
Dept: CARDIOLOGY | Facility: CLINIC | Age: 81
End: 2021-09-29
Payer: COMMERCIAL

## 2021-09-29 DIAGNOSIS — Z79.01 LONG TERM CURRENT USE OF ANTICOAGULANT THERAPY: ICD-10-CM

## 2021-09-29 DIAGNOSIS — I48.0 PAROXYSMAL ATRIAL FIBRILLATION (H): ICD-10-CM

## 2021-09-29 DIAGNOSIS — I25.5 ISCHEMIC CARDIOMYOPATHY: Primary | ICD-10-CM

## 2021-09-29 DIAGNOSIS — I48.91 ATRIAL FIBRILLATION (H): ICD-10-CM

## 2021-09-29 LAB — INR BLD: 2.4 (ref 0.9–1.1)

## 2021-09-29 PROCEDURE — 99207 PR NO CHARGE NURSE ONLY: CPT | Performed by: INTERNAL MEDICINE

## 2021-09-29 PROCEDURE — 85610 PROTHROMBIN TIME: CPT

## 2021-09-29 PROCEDURE — 36416 COLLJ CAPILLARY BLOOD SPEC: CPT

## 2021-09-29 NOTE — PROGRESS NOTES
ANTICOAGULATION FOLLOW-UP CLINIC VISIT    Patient Name:  Rm Villarreal  Date:  2021  Contact Type:  Telephone    SUBJECTIVE:  Patient Findings     Positives:  Bruising (Bruises easily if bumps limbs/works in the yard)             OBJECTIVE    Recent labs: (last 7 days)     21  1001   INR 2.4*       ASSESSMENT / PLAN  INR assessment THER    Recheck INR In: 4 WEEKS    INR Location Outside lab      Anticoagulation Summary  As of 2021    INR goal:  2.0-3.0   TTR:  82.6 % (8.1 mo)   INR used for dosin.4 (2021)   Warfarin maintenance plan:  2.5 mg (2.5 mg x 1) every day   Full warfarin instructions:  2.5 mg every day   Weekly warfarin total:  17.5 mg   No change documented:  Sylvia Lopez RN   Plan last modified:  Latisha Moore RN (2021)   Next INR check:  10/27/2021   Target end date:  Indefinite    Indications    Ischemic cardiomyopathy [I25.5]  Atrial fibrillation (H) [I48.91]  Long term current use of anticoagulant therapy [Z79.01]             Anticoagulation Episode Summary     INR check location:      Preferred lab:      Send INR reminders to:  [facility] HEART INR NURSE    Comments:        Anticoagulation Care Providers     Provider Role Specialty Phone number    Madelin Banuelos MD Referring Cardiovascular Disease [phone]            See the Encounter Report to view Anticoagulation Flowsheet and Dosing Calendar (Go to Encounters tab in chart review, and find the Anticoagulation Therapy Visit)    INR 2.4 today at outside clinic. Spoke to patient, states he bruises easily on the warfarin but they heal. No other signs of bleeding. No changes to meds or diet. Will continue on current warfarin 2.5mg daily and recheck INR in 4wks.     Pt asks about switching to eliquis, recommended he contact his cardiologist.     Sylvia Lopez, RN

## 2021-10-18 DIAGNOSIS — I10 BENIGN ESSENTIAL HYPERTENSION: ICD-10-CM

## 2021-10-18 DIAGNOSIS — I25.5 ISCHEMIC CARDIOMYOPATHY: ICD-10-CM

## 2021-10-18 RX ORDER — METOPROLOL TARTRATE 37.5 MG/1
37.5 TABLET, FILM COATED ORAL 2 TIMES DAILY
Qty: 60 TABLET | Refills: 3 | Status: SHIPPED | OUTPATIENT
Start: 2021-10-18 | End: 2022-03-28

## 2021-10-18 RX ORDER — METOPROLOL TARTRATE 37.5 MG/1
TABLET, FILM COATED ORAL
Qty: 60 TABLET | Refills: 3 | OUTPATIENT
Start: 2021-10-18

## 2021-10-23 ENCOUNTER — HEALTH MAINTENANCE LETTER (OUTPATIENT)
Age: 81
End: 2021-10-23

## 2021-10-27 ENCOUNTER — ANTICOAGULATION THERAPY VISIT (OUTPATIENT)
Dept: CARDIOLOGY | Facility: CLINIC | Age: 81
End: 2021-10-27
Payer: COMMERCIAL

## 2021-10-27 ENCOUNTER — LAB (OUTPATIENT)
Dept: LAB | Facility: CLINIC | Age: 81
End: 2021-10-27
Payer: COMMERCIAL

## 2021-10-27 DIAGNOSIS — Z79.01 LONG TERM CURRENT USE OF ANTICOAGULANT THERAPY: ICD-10-CM

## 2021-10-27 DIAGNOSIS — I25.5 ISCHEMIC CARDIOMYOPATHY: Primary | ICD-10-CM

## 2021-10-27 DIAGNOSIS — I48.91 ATRIAL FIBRILLATION (H): ICD-10-CM

## 2021-10-27 DIAGNOSIS — I48.0 PAROXYSMAL ATRIAL FIBRILLATION (H): ICD-10-CM

## 2021-10-27 LAB — INR BLD: 2.3 (ref 0.9–1.1)

## 2021-10-27 PROCEDURE — 99207 PR NO CHARGE NURSE ONLY: CPT

## 2021-10-27 PROCEDURE — 36416 COLLJ CAPILLARY BLOOD SPEC: CPT

## 2021-10-27 PROCEDURE — 85610 PROTHROMBIN TIME: CPT

## 2021-10-27 NOTE — PROGRESS NOTES
ANTICOAGULATION FOLLOW-UP CLINIC VISIT    Patient Name:  Rm Villarreal  Date:  10/27/2021  Contact Type:  Telephone    SUBJECTIVE:  Patient Findings         Clinical Outcomes     Negatives:  Major bleeding event, Thromboembolic event, Anticoagulation-related hospital admission, Anticoagulation-related ED visit, Anticoagulation-related fatality           OBJECTIVE    Recent labs: (last 7 days)     10/27/21  1016   INR 2.3*       ASSESSMENT / PLAN  INR assessment THER    Recheck INR In: 5 WEEKS    INR Location Outside lab      Anticoagulation Summary  As of 10/27/2021    INR goal:  2.0-3.0   TTR:  84.4 % (9.1 mo)   INR used for dosin.3 (10/27/2021)   Warfarin maintenance plan:  2.5 mg (2.5 mg x 1) every day   Full warfarin instructions:  2.5 mg every day   Weekly warfarin total:  17.5 mg   No change documented:  Sylvia Lopez RN   Plan last modified:  Latisha Moore RN (2021)   Next INR check:  2021   Target end date:  Indefinite    Indications    Ischemic cardiomyopathy [I25.5]  Atrial fibrillation (H) [I48.91]  Long term current use of anticoagulant therapy [Z79.01]             Anticoagulation Episode Summary     INR check location:      Preferred lab:      Send INR reminders to:  [facility] HEART INR NURSE    Comments:        Anticoagulation Care Providers     Provider Role Specialty Phone number    Madelin Banuelos MD Referring Cardiovascular Disease [phone]            See the Encounter Report to view Anticoagulation Flowsheet and Dosing Calendar (Go to Encounters tab in chart review, and find the Anticoagulation Therapy Visit)    INR 2.3 today at outside clinic. No abnormal bleeding/bruising. No changes to diet. He has been self-adjusting his amlodipine and metoprolol due to dizziness, otherwise no med changes. He will reach out to his PCP about this. Will continue on current warfarin maintenance dosing of 2.5mg daily and recheck in 5wks. Reviewed that if he were to fall and hit his  head he should call 911. He plans to check with his insurance/Dr Banuelos about switching to eliquis after the January 1st.     Sylvia Lopez RN

## 2021-12-01 ENCOUNTER — ANTICOAGULATION THERAPY VISIT (OUTPATIENT)
Dept: CARDIOLOGY | Facility: CLINIC | Age: 81
End: 2021-12-01
Payer: COMMERCIAL

## 2021-12-01 ENCOUNTER — LAB (OUTPATIENT)
Dept: LAB | Facility: CLINIC | Age: 81
End: 2021-12-01
Payer: COMMERCIAL

## 2021-12-01 DIAGNOSIS — Z79.01 LONG TERM CURRENT USE OF ANTICOAGULANT THERAPY: ICD-10-CM

## 2021-12-01 DIAGNOSIS — I48.91 ATRIAL FIBRILLATION (H): ICD-10-CM

## 2021-12-01 DIAGNOSIS — I48.0 PAROXYSMAL ATRIAL FIBRILLATION (H): ICD-10-CM

## 2021-12-01 DIAGNOSIS — I25.5 ISCHEMIC CARDIOMYOPATHY: Primary | ICD-10-CM

## 2021-12-01 LAB — INR BLD: 2.8 (ref 0.9–1.1)

## 2021-12-01 PROCEDURE — 85610 PROTHROMBIN TIME: CPT

## 2021-12-01 PROCEDURE — 99207 PR NO CHARGE NURSE ONLY: CPT | Performed by: INTERNAL MEDICINE

## 2021-12-01 PROCEDURE — 36416 COLLJ CAPILLARY BLOOD SPEC: CPT

## 2021-12-01 NOTE — PROGRESS NOTES
ANTICOAGULATION FOLLOW-UP CLINIC VISIT    Patient Name:  Rm Villarreal  Date:  2021  Contact Type:  Telephone    SUBJECTIVE:  Patient Findings         Clinical Outcomes     Negatives:  Major bleeding event, Thromboembolic event, Anticoagulation-related hospital admission, Anticoagulation-related ED visit, Anticoagulation-related fatality           OBJECTIVE    Recent labs: (last 7 days)     21  1025   INR 2.8*       ASSESSMENT / PLAN  INR assessment THER    Recheck INR In: 5 WEEKS    INR Location Outside lab      Anticoagulation Summary  As of 2021    INR goal:  2.0-3.0   TTR:  86.2 % (10.2 mo)   INR used for dosin.8 (2021)   Warfarin maintenance plan:  2.5 mg (2.5 mg x 1) every day   Full warfarin instructions:  2.5 mg every day   Weekly warfarin total:  17.5 mg   No change documented:  Krista Weber RN   Plan last modified:  Latisha Moore RN (2021)   Next INR check:  2022   Target end date:  Indefinite    Indications    Ischemic cardiomyopathy [I25.5]  Atrial fibrillation (H) [I48.91]  Long term current use of anticoagulant therapy [Z79.01]             Anticoagulation Episode Summary     INR check location:      Preferred lab:      Send INR reminders to:  [facility] HEART INR NURSE    Comments:        Anticoagulation Care Providers     Provider Role Specialty Phone number    Madelin Banuelos MD Referring Cardiovascular Disease [phone]            See the Encounter Report to view Anticoagulation Flowsheet and Dosing Calendar (Go to Encounters tab in chart review, and find the Anticoagulation Therapy Visit)    INR was 2.8 today. Pt denies any abnormal bleeding or bruising. Denies any recent medication or dietary changes or recent illness. Pt does c/o dizziness and asked if warfarin could be causing dizziness, or Metoprolol and Norvasc. States dizziness has been chronic over many months and daily SBP averages in the 120's. No other symptoms except for occasional episodes  "of \"blurry vision\" that he has had, again, for months. Instructed pt that INR is within therapeutic range. Encouraged pt to drink plenty of fluids and make position changes slowly. Discuss further with PMD. Will continue current dosing of 2.5 mg daily. Next INR check is scheduled in 4 weeks and then again in 8 weeks as pt is leaving for [place] from 2/1/21 thru 3/7/21. Pt verbalized understanding.    Krista Weber RN                 "

## 2021-12-07 DIAGNOSIS — F43.23 ADJUSTMENT DISORDER WITH MIXED ANXIETY AND DEPRESSED MOOD: ICD-10-CM

## 2021-12-07 RX ORDER — LORAZEPAM 0.5 MG/1
TABLET ORAL
Qty: 30 TABLET | Refills: 0 | Status: SHIPPED | OUTPATIENT
Start: 2021-12-07 | End: 2022-02-18

## 2021-12-29 ENCOUNTER — ANTICOAGULATION THERAPY VISIT (OUTPATIENT)
Dept: CARDIOLOGY | Facility: CLINIC | Age: 81
End: 2021-12-29
Payer: COMMERCIAL

## 2021-12-29 ENCOUNTER — LAB (OUTPATIENT)
Dept: LAB | Facility: CLINIC | Age: 81
End: 2021-12-29
Payer: COMMERCIAL

## 2021-12-29 DIAGNOSIS — Z79.01 LONG TERM CURRENT USE OF ANTICOAGULANT THERAPY: ICD-10-CM

## 2021-12-29 DIAGNOSIS — I25.5 ISCHEMIC CARDIOMYOPATHY: Primary | ICD-10-CM

## 2021-12-29 DIAGNOSIS — I48.0 PAROXYSMAL ATRIAL FIBRILLATION (H): ICD-10-CM

## 2021-12-29 DIAGNOSIS — I48.91 ATRIAL FIBRILLATION (H): ICD-10-CM

## 2021-12-29 LAB — INR BLD: 2 (ref 0.9–1.1)

## 2021-12-29 PROCEDURE — 99207 PR NO CHARGE NURSE ONLY: CPT | Performed by: INTERNAL MEDICINE

## 2021-12-29 PROCEDURE — 85610 PROTHROMBIN TIME: CPT

## 2021-12-29 PROCEDURE — 36416 COLLJ CAPILLARY BLOOD SPEC: CPT

## 2021-12-29 NOTE — PROGRESS NOTES
ANTICOAGULATION FOLLOW-UP CLINIC VISIT    Patient Name:  Rm Villarreal  Date:  2021  Contact Type:  Telephone    SUBJECTIVE:  Patient Findings     Positives:  Change in health (Seeing PCP on 22 for dizziness ), Bruising (Bruises but healing)        Clinical Outcomes     Negatives:  Major bleeding event, Thromboembolic event, Anticoagulation-related hospital admission, Anticoagulation-related ED visit, Anticoagulation-related fatality           OBJECTIVE    Recent labs: (last 7 days)     21  1011   INR 2.0*       ASSESSMENT / PLAN  INR assessment THER    Recheck INR In: 4 WEEKS    INR Location Outside lab      Anticoagulation Summary  As of 2021    INR goal:  2.0-3.0   TTR:  87.3 % (11.2 mo)   INR used for dosin.0 (2021)   Warfarin maintenance plan:  2.5 mg (2.5 mg x 1) every day   Full warfarin instructions:  2.5 mg every day   Weekly warfarin total:  17.5 mg   No change documented:  Sylvia Lopez RN   Plan last modified:  Latisha Moore RN (2021)   Next INR check:  2022   Target end date:  Indefinite    Indications    Ischemic cardiomyopathy [I25.5]  Atrial fibrillation (H) [I48.91]  Long term current use of anticoagulant therapy [Z79.01]             Anticoagulation Episode Summary     INR check location:      Preferred lab:      Send INR reminders to:  [facility] HEART INR NURSE    Comments:        Anticoagulation Care Providers     Provider Role Specialty Phone number    Madelin Banuelos MD Referring Cardiovascular Disease [phone]            See the Encounter Report to view Anticoagulation Flowsheet and Dosing Calendar (Go to Encounters tab in chart review, and find the Anticoagulation Therapy Visit)    INR 2.0 today at outside clinic. Spoke to patient, has some bruises but they heal. No other signs of bleeding. No med changes or missed doses of warfarin. His dizziness has continued and he is scheduled to see his PCP on 22 to discuss this. Reviewed that  if he were to fall and hit his head that he would need to call 911. No dietary changes, does not really eat greens. No changes to ETOH consumption and does not drink boost/ensure/V8. Will continue on current warfarin maintenance dosing of 2.5mg daily and recheck in 4wks.     Sylvia Lopez RN

## 2022-01-01 ENCOUNTER — TELEPHONE (OUTPATIENT)
Dept: PHARMACY | Facility: CLINIC | Age: 82
End: 2022-01-01

## 2022-01-01 ENCOUNTER — APPOINTMENT (OUTPATIENT)
Dept: OCCUPATIONAL THERAPY | Facility: CLINIC | Age: 82
DRG: 177 | End: 2022-01-01
Attending: HOSPITALIST
Payer: COMMERCIAL

## 2022-01-01 ENCOUNTER — HEALTH MAINTENANCE LETTER (OUTPATIENT)
Age: 82
End: 2022-01-01

## 2022-01-01 ENCOUNTER — APPOINTMENT (OUTPATIENT)
Dept: ULTRASOUND IMAGING | Facility: CLINIC | Age: 82
DRG: 177 | End: 2022-01-01
Attending: INTERNAL MEDICINE
Payer: COMMERCIAL

## 2022-01-01 ENCOUNTER — TELEPHONE (OUTPATIENT)
Dept: CARDIOLOGY | Facility: CLINIC | Age: 82
End: 2022-01-01

## 2022-01-01 ENCOUNTER — PATIENT OUTREACH (OUTPATIENT)
Dept: CARE COORDINATION | Facility: CLINIC | Age: 82
End: 2022-01-01

## 2022-01-01 ENCOUNTER — OFFICE VISIT (OUTPATIENT)
Dept: FAMILY MEDICINE | Facility: CLINIC | Age: 82
End: 2022-01-01
Payer: COMMERCIAL

## 2022-01-01 ENCOUNTER — APPOINTMENT (OUTPATIENT)
Dept: GENERAL RADIOLOGY | Facility: CLINIC | Age: 82
DRG: 177 | End: 2022-01-01
Attending: EMERGENCY MEDICINE
Payer: COMMERCIAL

## 2022-01-01 ENCOUNTER — NURSE TRIAGE (OUTPATIENT)
Dept: FAMILY MEDICINE | Facility: CLINIC | Age: 82
End: 2022-01-01

## 2022-01-01 ENCOUNTER — OFFICE VISIT (OUTPATIENT)
Dept: CARDIOLOGY | Facility: CLINIC | Age: 82
End: 2022-01-01
Attending: NURSE PRACTITIONER
Payer: COMMERCIAL

## 2022-01-01 ENCOUNTER — OFFICE VISIT (OUTPATIENT)
Dept: CARDIOLOGY | Facility: CLINIC | Age: 82
End: 2022-01-01
Payer: COMMERCIAL

## 2022-01-01 ENCOUNTER — APPOINTMENT (OUTPATIENT)
Dept: CT IMAGING | Facility: CLINIC | Age: 82
DRG: 177 | End: 2022-01-01
Attending: EMERGENCY MEDICINE
Payer: COMMERCIAL

## 2022-01-01 ENCOUNTER — ANTICOAGULATION THERAPY VISIT (OUTPATIENT)
Dept: ANTICOAGULATION | Facility: CLINIC | Age: 82
End: 2022-01-01

## 2022-01-01 ENCOUNTER — APPOINTMENT (OUTPATIENT)
Dept: PHYSICAL THERAPY | Facility: CLINIC | Age: 82
DRG: 177 | End: 2022-01-01
Attending: HOSPITALIST
Payer: COMMERCIAL

## 2022-01-01 ENCOUNTER — TELEPHONE (OUTPATIENT)
Dept: CARDIOLOGY | Facility: CLINIC | Age: 82
End: 2022-01-01
Payer: COMMERCIAL

## 2022-01-01 ENCOUNTER — HOSPITAL ENCOUNTER (OUTPATIENT)
Dept: CARDIOLOGY | Facility: CLINIC | Age: 82
Discharge: HOME | End: 2022-10-17
Attending: INTERNAL MEDICINE
Payer: COMMERCIAL

## 2022-01-01 ENCOUNTER — OFFICE VISIT (OUTPATIENT)
Dept: CARDIOLOGY | Facility: CLINIC | Age: 82
End: 2022-01-01
Attending: INTERNAL MEDICINE
Payer: COMMERCIAL

## 2022-01-01 ENCOUNTER — LAB (OUTPATIENT)
Dept: LAB | Facility: CLINIC | Age: 82
End: 2022-01-01
Payer: COMMERCIAL

## 2022-01-01 ENCOUNTER — OFFICE VISIT (OUTPATIENT)
Dept: PHARMACY | Facility: CLINIC | Age: 82
End: 2022-01-01
Attending: INTERNAL MEDICINE
Payer: COMMERCIAL

## 2022-01-01 ENCOUNTER — APPOINTMENT (OUTPATIENT)
Dept: PHYSICAL THERAPY | Facility: CLINIC | Age: 82
DRG: 177 | End: 2022-01-01
Payer: COMMERCIAL

## 2022-01-01 ENCOUNTER — HOSPITAL ENCOUNTER (INPATIENT)
Facility: CLINIC | Age: 82
LOS: 5 days | Discharge: HOME | DRG: 177 | End: 2022-07-04
Attending: EMERGENCY MEDICINE | Admitting: HOSPITALIST
Payer: COMMERCIAL

## 2022-01-01 ENCOUNTER — VIRTUAL VISIT (OUTPATIENT)
Dept: FAMILY MEDICINE | Facility: CLINIC | Age: 82
End: 2022-01-01
Payer: COMMERCIAL

## 2022-01-01 ENCOUNTER — HOSPITAL ENCOUNTER (OUTPATIENT)
Dept: ULTRASOUND IMAGING | Facility: CLINIC | Age: 82
Discharge: HOME | End: 2022-10-17
Attending: INTERNAL MEDICINE
Payer: COMMERCIAL

## 2022-01-01 VITALS
HEART RATE: 97 BPM | SYSTOLIC BLOOD PRESSURE: 131 MMHG | BODY MASS INDEX: 21.56 KG/M2 | TEMPERATURE: 97.9 F | RESPIRATION RATE: 15 BRPM | OXYGEN SATURATION: 94 % | WEIGHT: 137.4 LBS | HEIGHT: 67 IN | DIASTOLIC BLOOD PRESSURE: 87 MMHG

## 2022-01-01 VITALS
BODY MASS INDEX: 20.89 KG/M2 | OXYGEN SATURATION: 99 % | HEART RATE: 95 BPM | DIASTOLIC BLOOD PRESSURE: 105 MMHG | WEIGHT: 149.2 LBS | HEIGHT: 71 IN | SYSTOLIC BLOOD PRESSURE: 165 MMHG

## 2022-01-01 VITALS
HEART RATE: 82 BPM | SYSTOLIC BLOOD PRESSURE: 124 MMHG | DIASTOLIC BLOOD PRESSURE: 82 MMHG | OXYGEN SATURATION: 99 % | RESPIRATION RATE: 16 BRPM | HEIGHT: 71 IN | BODY MASS INDEX: 21.11 KG/M2 | WEIGHT: 150.8 LBS

## 2022-01-01 VITALS
HEART RATE: 80 BPM | WEIGHT: 151 LBS | SYSTOLIC BLOOD PRESSURE: 144 MMHG | HEIGHT: 71 IN | BODY MASS INDEX: 21.14 KG/M2 | DIASTOLIC BLOOD PRESSURE: 97 MMHG

## 2022-01-01 VITALS
OXYGEN SATURATION: 98 % | SYSTOLIC BLOOD PRESSURE: 138 MMHG | DIASTOLIC BLOOD PRESSURE: 92 MMHG | BODY MASS INDEX: 20.58 KG/M2 | HEART RATE: 89 BPM | WEIGHT: 147 LBS | HEIGHT: 71 IN

## 2022-01-01 VITALS — HEART RATE: 77 BPM | DIASTOLIC BLOOD PRESSURE: 92 MMHG | SYSTOLIC BLOOD PRESSURE: 137 MMHG

## 2022-01-01 DIAGNOSIS — I50.22 CHRONIC SYSTOLIC CONGESTIVE HEART FAILURE (H): Primary | ICD-10-CM

## 2022-01-01 DIAGNOSIS — I48.19 PERSISTENT ATRIAL FIBRILLATION (H): ICD-10-CM

## 2022-01-01 DIAGNOSIS — I25.5 ISCHEMIC CARDIOMYOPATHY: ICD-10-CM

## 2022-01-01 DIAGNOSIS — Z23 HIGH PRIORITY FOR 2019-NCOV VACCINE: ICD-10-CM

## 2022-01-01 DIAGNOSIS — I10 BENIGN ESSENTIAL HYPERTENSION: ICD-10-CM

## 2022-01-01 DIAGNOSIS — R19.7 DIARRHEA, UNSPECIFIED TYPE: ICD-10-CM

## 2022-01-01 DIAGNOSIS — I65.23 OCCLUSION AND STENOSIS OF BILATERAL CAROTID ARTERIES: Primary | ICD-10-CM

## 2022-01-01 DIAGNOSIS — F03.918 DEMENTIA WITH BEHAVIORAL DISTURBANCE (H): ICD-10-CM

## 2022-01-01 DIAGNOSIS — Z79.01 LONG TERM CURRENT USE OF ANTICOAGULANT THERAPY: ICD-10-CM

## 2022-01-01 DIAGNOSIS — J43.9 PULMONARY EMPHYSEMA, UNSPECIFIED EMPHYSEMA TYPE (H): ICD-10-CM

## 2022-01-01 DIAGNOSIS — F43.22 ADJUSTMENT DISORDER WITH ANXIOUS MOOD: ICD-10-CM

## 2022-01-01 DIAGNOSIS — I25.5 ISCHEMIC CARDIOMYOPATHY: Primary | ICD-10-CM

## 2022-01-01 DIAGNOSIS — F03.91 DEMENTIA WITH BEHAVIORAL DISTURBANCE, UNSPECIFIED DEMENTIA TYPE: ICD-10-CM

## 2022-01-01 DIAGNOSIS — I50.22 CHRONIC SYSTOLIC CONGESTIVE HEART FAILURE (H): ICD-10-CM

## 2022-01-01 DIAGNOSIS — I48.91 ATRIAL FIBRILLATION, UNSPECIFIED TYPE (H): ICD-10-CM

## 2022-01-01 DIAGNOSIS — U07.1 INFECTION DUE TO 2019 NOVEL CORONAVIRUS: Primary | ICD-10-CM

## 2022-01-01 DIAGNOSIS — I73.9 PAD (PERIPHERAL ARTERY DISEASE) (H): ICD-10-CM

## 2022-01-01 DIAGNOSIS — Z78.9 MEDICALLY COMPLEX PATIENT: ICD-10-CM

## 2022-01-01 DIAGNOSIS — I48.20 CHRONIC ATRIAL FIBRILLATION (H): ICD-10-CM

## 2022-01-01 DIAGNOSIS — M62.81 GENERALIZED MUSCLE WEAKNESS: Primary | ICD-10-CM

## 2022-01-01 DIAGNOSIS — I48.91 ATRIAL FIBRILLATION, UNSPECIFIED TYPE (H): Primary | ICD-10-CM

## 2022-01-01 DIAGNOSIS — J44.9 COPD (CHRONIC OBSTRUCTIVE PULMONARY DISEASE) (H): ICD-10-CM

## 2022-01-01 DIAGNOSIS — F43.23 ADJUSTMENT DISORDER WITH MIXED ANXIETY AND DEPRESSED MOOD: ICD-10-CM

## 2022-01-01 DIAGNOSIS — G47.00 INSOMNIA, UNSPECIFIED TYPE: ICD-10-CM

## 2022-01-01 DIAGNOSIS — U07.1 INFECTION DUE TO 2019 NOVEL CORONAVIRUS: ICD-10-CM

## 2022-01-01 DIAGNOSIS — Z78.9 TAKES DIETARY SUPPLEMENTS: ICD-10-CM

## 2022-01-01 DIAGNOSIS — I65.23 OCCLUSION AND STENOSIS OF BILATERAL CAROTID ARTERIES: ICD-10-CM

## 2022-01-01 DIAGNOSIS — G31.84 MCI (MILD COGNITIVE IMPAIRMENT): ICD-10-CM

## 2022-01-01 DIAGNOSIS — R41.0 DELIRIUM: ICD-10-CM

## 2022-01-01 DIAGNOSIS — I65.23 CAROTID STENOSIS, BILATERAL: ICD-10-CM

## 2022-01-01 DIAGNOSIS — I10 BENIGN ESSENTIAL HYPERTENSION: Primary | ICD-10-CM

## 2022-01-01 DIAGNOSIS — I25.10 ATHEROSCLEROSIS OF NATIVE CORONARY ARTERY OF NATIVE HEART WITHOUT ANGINA PECTORIS: ICD-10-CM
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
## 2022-01-01 DIAGNOSIS — E78.5 HYPERLIPIDEMIA LDL GOAL <100: ICD-10-CM

## 2022-01-01 DIAGNOSIS — Z23 NEED FOR PROPHYLACTIC VACCINATION AND INOCULATION AGAINST INFLUENZA: ICD-10-CM

## 2022-01-01 LAB
ALBUMIN SERPL-MCNC: 2.9 G/DL (ref 3.4–5)
ALBUMIN SERPL-MCNC: 3.3 G/DL (ref 3.4–5)
ALBUMIN UR-MCNC: 100 MG/DL
ALP SERPL-CCNC: 129 U/L (ref 40–150)
ALP SERPL-CCNC: 153 U/L (ref 40–150)
ALT SERPL W P-5'-P-CCNC: 41 U/L (ref 0–70)
ALT SERPL W P-5'-P-CCNC: 50 U/L (ref 0–70)
ANION GAP SERPL CALCULATED.3IONS-SCNC: 10 MMOL/L (ref 3–14)
ANION GAP SERPL CALCULATED.3IONS-SCNC: 4 MMOL/L (ref 3–14)
ANION GAP SERPL CALCULATED.3IONS-SCNC: 7 MMOL/L (ref 3–14)
ANION GAP SERPL CALCULATED.3IONS-SCNC: 9 MMOL/L (ref 3–14)
APPEARANCE UR: CLEAR
AST SERPL W P-5'-P-CCNC: 44 U/L (ref 0–45)
AST SERPL W P-5'-P-CCNC: 56 U/L (ref 0–45)
ATRIAL RATE - MUSE: 227 BPM
BACTERIA BLD CULT: NO GROWTH
BACTERIA BLD CULT: NO GROWTH
BASOPHILS # BLD AUTO: 0 10E3/UL (ref 0–0.2)
BASOPHILS # BLD AUTO: 0 10E3/UL (ref 0–0.2)
BASOPHILS NFR BLD AUTO: 0 %
BASOPHILS NFR BLD AUTO: 0 %
BILIRUB SERPL-MCNC: 1.6 MG/DL (ref 0.2–1.3)
BILIRUB SERPL-MCNC: 1.8 MG/DL (ref 0.2–1.3)
BILIRUB UR QL STRIP: NEGATIVE
BUN SERPL-MCNC: 11 MG/DL (ref 7–30)
BUN SERPL-MCNC: 12 MG/DL (ref 7–30)
CALCIUM SERPL-MCNC: 8.7 MG/DL (ref 8.5–10.1)
CALCIUM SERPL-MCNC: 8.9 MG/DL (ref 8.5–10.1)
CHLORIDE BLD-SCNC: 101 MMOL/L (ref 94–109)
CHLORIDE BLD-SCNC: 104 MMOL/L (ref 94–109)
CO2 SERPL-SCNC: 21 MMOL/L (ref 20–32)
CO2 SERPL-SCNC: 24 MMOL/L (ref 20–32)
CO2 SERPL-SCNC: 24 MMOL/L (ref 20–32)
CO2 SERPL-SCNC: 26 MMOL/L (ref 20–32)
COLOR UR AUTO: YELLOW
CREAT SERPL-MCNC: 0.71 MG/DL (ref 0.66–1.25)
CREAT SERPL-MCNC: 0.71 MG/DL (ref 0.66–1.25)
CREAT SERPL-MCNC: 0.78 MG/DL (ref 0.66–1.25)
CREAT SERPL-MCNC: 0.83 MG/DL (ref 0.66–1.25)
CRP SERPL-MCNC: 28.7 MG/L (ref 0–8)
CRP SERPL-MCNC: 32.3 MG/L (ref 0–8)
DIASTOLIC BLOOD PRESSURE - MUSE: NORMAL MMHG
EOSINOPHIL # BLD AUTO: 0.1 10E3/UL (ref 0–0.7)
EOSINOPHIL # BLD AUTO: 0.1 10E3/UL (ref 0–0.7)
EOSINOPHIL NFR BLD AUTO: 2 %
EOSINOPHIL NFR BLD AUTO: 3 %
ERYTHROCYTE [DISTWIDTH] IN BLOOD BY AUTOMATED COUNT: 13.2 % (ref 10–15)
ERYTHROCYTE [DISTWIDTH] IN BLOOD BY AUTOMATED COUNT: 13.5 % (ref 10–15)
FLUAV RNA SPEC QL NAA+PROBE: NEGATIVE
FLUBV RNA RESP QL NAA+PROBE: NEGATIVE
GFR SERPL CREATININE-BSD FRML MDRD: 89 ML/MIN/1.73M2
GFR SERPL CREATININE-BSD FRML MDRD: >90 ML/MIN/1.73M2
GLUCOSE BLD-MCNC: 103 MG/DL (ref 70–99)
GLUCOSE BLD-MCNC: 108 MG/DL (ref 70–99)
GLUCOSE BLD-MCNC: 89 MG/DL (ref 70–99)
GLUCOSE BLD-MCNC: 98 MG/DL (ref 70–99)
GLUCOSE UR STRIP-MCNC: NEGATIVE MG/DL
HCO3 BLDV-SCNC: 24 MMOL/L (ref 21–28)
HCT VFR BLD AUTO: 42.6 % (ref 40–53)
HCT VFR BLD AUTO: 43 % (ref 40–53)
HGB BLD-MCNC: 14.5 G/DL (ref 13.3–17.7)
HGB BLD-MCNC: 14.7 G/DL (ref 13.3–17.7)
HGB UR QL STRIP: ABNORMAL
HYALINE CASTS: 1 /LPF
IMM GRANULOCYTES # BLD: 0 10E3/UL
IMM GRANULOCYTES # BLD: 0 10E3/UL
IMM GRANULOCYTES NFR BLD: 0 %
IMM GRANULOCYTES NFR BLD: 0 %
INR BLD: 1.1 (ref 0.9–1.1)
INTERPRETATION ECG - MUSE: NORMAL
KETONES UR STRIP-MCNC: NEGATIVE MG/DL
LACTATE BLD-SCNC: 0.8 MMOL/L
LACTATE SERPL-SCNC: 0.9 MMOL/L (ref 0.7–2)
LEUKOCYTE ESTERASE UR QL STRIP: NEGATIVE
LVEF ECHO: NORMAL
LYMPHOCYTES # BLD AUTO: 0.7 10E3/UL (ref 0.8–5.3)
LYMPHOCYTES # BLD AUTO: 0.8 10E3/UL (ref 0.8–5.3)
LYMPHOCYTES NFR BLD AUTO: 14 %
LYMPHOCYTES NFR BLD AUTO: 17 %
MCH RBC QN AUTO: 33 PG (ref 26.5–33)
MCH RBC QN AUTO: 33.3 PG (ref 26.5–33)
MCHC RBC AUTO-ENTMCNC: 34 G/DL (ref 31.5–36.5)
MCHC RBC AUTO-ENTMCNC: 34.2 G/DL (ref 31.5–36.5)
MCV RBC AUTO: 97 FL (ref 78–100)
MCV RBC AUTO: 98 FL (ref 78–100)
MONOCYTES # BLD AUTO: 0.8 10E3/UL (ref 0–1.3)
MONOCYTES # BLD AUTO: 0.8 10E3/UL (ref 0–1.3)
MONOCYTES NFR BLD AUTO: 15 %
MONOCYTES NFR BLD AUTO: 17 %
MUCOUS THREADS #/AREA URNS LPF: PRESENT /LPF
NEUTROPHILS # BLD AUTO: 3 10E3/UL (ref 1.6–8.3)
NEUTROPHILS # BLD AUTO: 3.6 10E3/UL (ref 1.6–8.3)
NEUTROPHILS NFR BLD AUTO: 64 %
NEUTROPHILS NFR BLD AUTO: 68 %
NITRATE UR QL: NEGATIVE
NRBC # BLD AUTO: 0 10E3/UL
NRBC # BLD AUTO: 0 10E3/UL
NRBC BLD AUTO-RTO: 0 /100
NRBC BLD AUTO-RTO: 0 /100
NT-PROBNP SERPL-MCNC: 6690 PG/ML (ref 0–1800)
P AXIS - MUSE: NORMAL DEGREES
PCO2 BLDV: 42 MM HG (ref 40–50)
PH BLDV: 7.36 [PH] (ref 7.32–7.43)
PH UR STRIP: 6 [PH] (ref 5–7)
PLATELET # BLD AUTO: 167 10E3/UL (ref 150–450)
PLATELET # BLD AUTO: 179 10E3/UL (ref 150–450)
PO2 BLDV: <15 MM HG (ref 25–47)
POTASSIUM BLD-SCNC: 3.4 MMOL/L (ref 3.4–5.3)
POTASSIUM BLD-SCNC: 3.8 MMOL/L (ref 3.4–5.3)
POTASSIUM BLD-SCNC: 3.8 MMOL/L (ref 3.4–5.3)
POTASSIUM BLD-SCNC: 3.9 MMOL/L (ref 3.4–5.3)
PR INTERVAL - MUSE: NORMAL MS
PROT SERPL-MCNC: 7 G/DL (ref 6.8–8.8)
PROT SERPL-MCNC: 7.1 G/DL (ref 6.8–8.8)
QRS DURATION - MUSE: 80 MS
QT - MUSE: 380 MS
QTC - MUSE: 441 MS
R AXIS - MUSE: -70 DEGREES
RBC # BLD AUTO: 4.36 10E6/UL (ref 4.4–5.9)
RBC # BLD AUTO: 4.45 10E6/UL (ref 4.4–5.9)
RBC URINE: 3 /HPF
RSV RNA SPEC NAA+PROBE: NEGATIVE
SAO2 % BLDV: ABNORMAL %
SARS-COV-2 RNA RESP QL NAA+PROBE: POSITIVE
SODIUM SERPL-SCNC: 134 MMOL/L (ref 133–144)
SODIUM SERPL-SCNC: 134 MMOL/L (ref 133–144)
SODIUM SERPL-SCNC: 135 MMOL/L (ref 133–144)
SODIUM SERPL-SCNC: 135 MMOL/L (ref 133–144)
SP GR UR STRIP: 1.02 (ref 1–1.03)
SYSTOLIC BLOOD PRESSURE - MUSE: NORMAL MMHG
T AXIS - MUSE: -15 DEGREES
TROPONIN I SERPL HS-MCNC: 12 NG/L
UROBILINOGEN UR STRIP-MCNC: 2 MG/DL
VENTRICULAR RATE- MUSE: 81 BPM
WBC # BLD AUTO: 4.8 10E3/UL (ref 4–11)
WBC # BLD AUTO: 5.3 10E3/UL (ref 4–11)
WBC URINE: 1 /HPF

## 2022-01-01 PROCEDURE — C9803 HOPD COVID-19 SPEC COLLECT: HCPCS

## 2022-01-01 PROCEDURE — 255N000002 HC RX 255 OP 636: Performed by: INTERNAL MEDICINE

## 2022-01-01 PROCEDURE — 99213 OFFICE O/P EST LOW 20 MIN: CPT | Mod: 95 | Performed by: PHYSICIAN ASSISTANT

## 2022-01-01 PROCEDURE — 76705 ECHO EXAM OF ABDOMEN: CPT

## 2022-01-01 PROCEDURE — 120N000001 HC R&B MED SURG/OB

## 2022-01-01 PROCEDURE — 93306 TTE W/DOPPLER COMPLETE: CPT | Mod: 26 | Performed by: INTERNAL MEDICINE

## 2022-01-01 PROCEDURE — 85025 COMPLETE CBC W/AUTO DIFF WBC: CPT | Performed by: HOSPITALIST

## 2022-01-01 PROCEDURE — 250N000013 HC RX MED GY IP 250 OP 250 PS 637: Performed by: INTERNAL MEDICINE

## 2022-01-01 PROCEDURE — 80053 COMPREHEN METABOLIC PANEL: CPT | Performed by: INTERNAL MEDICINE

## 2022-01-01 PROCEDURE — 99215 OFFICE O/P EST HI 40 MIN: CPT | Performed by: INTERNAL MEDICINE

## 2022-01-01 PROCEDURE — 86140 C-REACTIVE PROTEIN: CPT | Performed by: INTERNAL MEDICINE

## 2022-01-01 PROCEDURE — 99605 MTMS BY PHARM NP 15 MIN: CPT | Performed by: PHARMACIST

## 2022-01-01 PROCEDURE — 93005 ELECTROCARDIOGRAM TRACING: CPT

## 2022-01-01 PROCEDURE — 87040 BLOOD CULTURE FOR BACTERIA: CPT | Performed by: EMERGENCY MEDICINE

## 2022-01-01 PROCEDURE — 250N000013 HC RX MED GY IP 250 OP 250 PS 637: Performed by: HOSPITALIST

## 2022-01-01 PROCEDURE — 80051 ELECTROLYTE PANEL: CPT | Performed by: EMERGENCY MEDICINE

## 2022-01-01 PROCEDURE — 82947 ASSAY GLUCOSE BLOOD QUANT: CPT | Performed by: EMERGENCY MEDICINE

## 2022-01-01 PROCEDURE — 82374 ASSAY BLOOD CARBON DIOXIDE: CPT | Performed by: INTERNAL MEDICINE

## 2022-01-01 PROCEDURE — 99214 OFFICE O/P EST MOD 30 MIN: CPT | Mod: 25 | Performed by: NURSE PRACTITIONER

## 2022-01-01 PROCEDURE — 36416 COLLJ CAPILLARY BLOOD SPEC: CPT

## 2022-01-01 PROCEDURE — 90662 IIV NO PRSV INCREASED AG IM: CPT | Performed by: INTERNAL MEDICINE

## 2022-01-01 PROCEDURE — 99215 OFFICE O/P EST HI 40 MIN: CPT | Mod: 25 | Performed by: INTERNAL MEDICINE

## 2022-01-01 PROCEDURE — 97530 THERAPEUTIC ACTIVITIES: CPT | Mod: GP | Performed by: PHYSICAL THERAPIST

## 2022-01-01 PROCEDURE — 97535 SELF CARE MNGMENT TRAINING: CPT | Mod: GO | Performed by: OCCUPATIONAL THERAPIST

## 2022-01-01 PROCEDURE — 97165 OT EVAL LOW COMPLEX 30 MIN: CPT | Mod: GO | Performed by: OCCUPATIONAL THERAPIST

## 2022-01-01 PROCEDURE — 99232 SBSQ HOSP IP/OBS MODERATE 35: CPT | Performed by: INTERNAL MEDICINE

## 2022-01-01 PROCEDURE — 80048 BASIC METABOLIC PNL TOTAL CA: CPT | Performed by: HOSPITALIST

## 2022-01-01 PROCEDURE — 97116 GAIT TRAINING THERAPY: CPT | Mod: GP

## 2022-01-01 PROCEDURE — 93880 EXTRACRANIAL BILAT STUDY: CPT

## 2022-01-01 PROCEDURE — G0008 ADMIN INFLUENZA VIRUS VAC: HCPCS | Performed by: INTERNAL MEDICINE

## 2022-01-01 PROCEDURE — 99607 MTMS BY PHARM ADDL 15 MIN: CPT | Performed by: PHARMACIST

## 2022-01-01 PROCEDURE — 99232 SBSQ HOSP IP/OBS MODERATE 35: CPT | Performed by: HOSPITALIST

## 2022-01-01 PROCEDURE — 70450 CT HEAD/BRAIN W/O DYE: CPT

## 2022-01-01 PROCEDURE — 36415 COLL VENOUS BLD VENIPUNCTURE: CPT | Performed by: INTERNAL MEDICINE

## 2022-01-01 PROCEDURE — 99233 SBSQ HOSP IP/OBS HIGH 50: CPT | Performed by: INTERNAL MEDICINE

## 2022-01-01 PROCEDURE — 999N000208 ECHOCARDIOGRAM COMPLETE

## 2022-01-01 PROCEDURE — 97161 PT EVAL LOW COMPLEX 20 MIN: CPT | Mod: GP | Performed by: PHYSICAL THERAPIST

## 2022-01-01 PROCEDURE — 99222 1ST HOSP IP/OBS MODERATE 55: CPT | Mod: AI | Performed by: HOSPITALIST

## 2022-01-01 PROCEDURE — 0124A COVID-19,PF,PFIZER BOOSTER BIVALENT: CPT | Performed by: INTERNAL MEDICINE

## 2022-01-01 PROCEDURE — 83605 ASSAY OF LACTIC ACID: CPT | Performed by: EMERGENCY MEDICINE

## 2022-01-01 PROCEDURE — 99285 EMERGENCY DEPT VISIT HI MDM: CPT | Mod: 25

## 2022-01-01 PROCEDURE — 71045 X-RAY EXAM CHEST 1 VIEW: CPT

## 2022-01-01 PROCEDURE — 93880 EXTRACRANIAL BILAT STUDY: CPT | Mod: 26 | Performed by: INTERNAL MEDICINE

## 2022-01-01 PROCEDURE — 99239 HOSP IP/OBS DSCHRG MGMT >30: CPT | Performed by: HOSPITALIST

## 2022-01-01 PROCEDURE — 84484 ASSAY OF TROPONIN QUANT: CPT | Performed by: EMERGENCY MEDICINE

## 2022-01-01 PROCEDURE — 85610 PROTHROMBIN TIME: CPT

## 2022-01-01 PROCEDURE — 97116 GAIT TRAINING THERAPY: CPT | Mod: GP | Performed by: PHYSICAL THERAPIST

## 2022-01-01 PROCEDURE — 81001 URINALYSIS AUTO W/SCOPE: CPT | Performed by: EMERGENCY MEDICINE

## 2022-01-01 PROCEDURE — 99203 OFFICE O/P NEW LOW 30 MIN: CPT | Performed by: INTERNAL MEDICINE

## 2022-01-01 PROCEDURE — 91312 COVID-19,PF,PFIZER BOOSTER BIVALENT: CPT | Performed by: INTERNAL MEDICINE

## 2022-01-01 PROCEDURE — 82803 BLOOD GASES ANY COMBINATION: CPT

## 2022-01-01 PROCEDURE — 85025 COMPLETE CBC W/AUTO DIFF WBC: CPT | Performed by: EMERGENCY MEDICINE

## 2022-01-01 PROCEDURE — 36415 COLL VENOUS BLD VENIPUNCTURE: CPT | Performed by: HOSPITALIST

## 2022-01-01 PROCEDURE — 83880 ASSAY OF NATRIURETIC PEPTIDE: CPT | Performed by: EMERGENCY MEDICINE

## 2022-01-01 PROCEDURE — 36415 COLL VENOUS BLD VENIPUNCTURE: CPT | Performed by: EMERGENCY MEDICINE

## 2022-01-01 PROCEDURE — 87637 SARSCOV2&INF A&B&RSV AMP PRB: CPT | Performed by: EMERGENCY MEDICINE

## 2022-01-01 RX ORDER — ALBUTEROL SULFATE 90 UG/1
1-2 AEROSOL, METERED RESPIRATORY (INHALATION) EVERY 4 HOURS PRN
Status: DISCONTINUED | OUTPATIENT
Start: 2022-01-01 | End: 2022-01-01 | Stop reason: HOSPADM

## 2022-01-01 RX ORDER — METOPROLOL SUCCINATE 25 MG/1
25 TABLET, EXTENDED RELEASE ORAL DAILY
Qty: 90 TABLET | Refills: 1 | Status: ON HOLD | OUTPATIENT
Start: 2022-01-01 | End: 2023-01-01

## 2022-01-01 RX ORDER — GUAIFENESIN/DEXTROMETHORPHAN 100-10MG/5
10 SYRUP ORAL EVERY 4 HOURS PRN
Status: DISCONTINUED | OUTPATIENT
Start: 2022-01-01 | End: 2022-01-01 | Stop reason: HOSPADM

## 2022-01-01 RX ORDER — SODIUM CHLORIDE 9 MG/ML
INJECTION, SOLUTION INTRAVENOUS CONTINUOUS
Status: DISCONTINUED | OUTPATIENT
Start: 2022-01-01 | End: 2022-01-01

## 2022-01-01 RX ORDER — ONDANSETRON 4 MG/1
4 TABLET, ORALLY DISINTEGRATING ORAL EVERY 6 HOURS PRN
Status: DISCONTINUED | OUTPATIENT
Start: 2022-01-01 | End: 2022-01-01 | Stop reason: HOSPADM

## 2022-01-01 RX ORDER — QUETIAPINE FUMARATE 25 MG/1
25 TABLET, FILM COATED ORAL AT BEDTIME
Status: DISCONTINUED | OUTPATIENT
Start: 2022-01-01 | End: 2022-01-01 | Stop reason: HOSPADM

## 2022-01-01 RX ORDER — LORAZEPAM 0.5 MG/1
TABLET ORAL
Qty: 30 TABLET | Refills: 0 | OUTPATIENT
Start: 2022-01-01

## 2022-01-01 RX ORDER — ACETAMINOPHEN 650 MG/1
650 SUPPOSITORY RECTAL EVERY 6 HOURS PRN
Status: DISCONTINUED | OUTPATIENT
Start: 2022-01-01 | End: 2022-01-01 | Stop reason: HOSPADM

## 2022-01-01 RX ORDER — LORAZEPAM 0.5 MG/1
TABLET ORAL
Qty: 30 TABLET | Refills: 0 | Status: ON HOLD | OUTPATIENT
Start: 2022-01-01 | End: 2022-01-01

## 2022-01-01 RX ORDER — QUETIAPINE FUMARATE 25 MG/1
25 TABLET, FILM COATED ORAL 2 TIMES DAILY PRN
Status: DISCONTINUED | OUTPATIENT
Start: 2022-01-01 | End: 2022-01-01 | Stop reason: HOSPADM

## 2022-01-01 RX ORDER — MAGNESIUM CARB/ALUMINUM HYDROX 105-160MG
296 TABLET,CHEWABLE ORAL ONCE
COMMUNITY
End: 2022-01-01

## 2022-01-01 RX ORDER — METOPROLOL TARTRATE 37.5 MG/1
18.75 TABLET, FILM COATED ORAL 2 TIMES DAILY
Qty: 90 TABLET | Refills: 3 | Status: SHIPPED | OUTPATIENT
Start: 2022-01-01 | End: 2022-01-01

## 2022-01-01 RX ORDER — METOPROLOL TARTRATE 37.5 MG/1
18.75 TABLET, FILM COATED ORAL 2 TIMES DAILY
Qty: 90 TABLET | Refills: 3 | Status: SHIPPED | OUTPATIENT
Start: 2022-01-01 | End: 2022-01-01 | Stop reason: ALTCHOICE

## 2022-01-01 RX ORDER — LOPERAMIDE HCL 2 MG
1-2 CAPSULE ORAL PRN
Status: ON HOLD | COMMUNITY
End: 2023-01-01

## 2022-01-01 RX ORDER — ONDANSETRON 2 MG/ML
4 INJECTION INTRAMUSCULAR; INTRAVENOUS EVERY 6 HOURS PRN
Status: DISCONTINUED | OUTPATIENT
Start: 2022-01-01 | End: 2022-01-01 | Stop reason: HOSPADM

## 2022-01-01 RX ORDER — ACETAMINOPHEN 325 MG/1
650 TABLET ORAL EVERY 6 HOURS PRN
Status: DISCONTINUED | OUTPATIENT
Start: 2022-01-01 | End: 2022-01-01 | Stop reason: HOSPADM

## 2022-01-01 RX ORDER — ASPIRIN 81 MG/1
81 TABLET ORAL DAILY
Status: DISCONTINUED | OUTPATIENT
Start: 2022-01-01 | End: 2022-01-01 | Stop reason: HOSPADM

## 2022-01-01 RX ORDER — METOPROLOL TARTRATE 37.5 MG/1
18.75 TABLET, FILM COATED ORAL 2 TIMES DAILY
Status: DISCONTINUED | OUTPATIENT
Start: 2022-01-01 | End: 2022-01-01

## 2022-01-01 RX ORDER — QUETIAPINE FUMARATE 25 MG/1
TABLET, FILM COATED ORAL
Qty: 30 TABLET | Refills: 1 | Status: SHIPPED | OUTPATIENT
Start: 2022-01-01 | End: 2022-01-01

## 2022-01-01 RX ORDER — QUETIAPINE FUMARATE 25 MG/1
TABLET, FILM COATED ORAL
Qty: 30 TABLET | Refills: 0 | Status: SHIPPED | OUTPATIENT
Start: 2022-01-01 | End: 2022-01-01

## 2022-01-01 RX ORDER — AMOXICILLIN 250 MG
1 CAPSULE ORAL 2 TIMES DAILY PRN
Status: DISCONTINUED | OUTPATIENT
Start: 2022-01-01 | End: 2022-01-01 | Stop reason: HOSPADM

## 2022-01-01 RX ORDER — QUETIAPINE FUMARATE 25 MG/1
25 TABLET, FILM COATED ORAL AT BEDTIME
Qty: 30 TABLET | Refills: 0 | Status: SHIPPED | OUTPATIENT
Start: 2022-01-01 | End: 2022-01-01

## 2022-01-01 RX ORDER — NITROGLYCERIN 0.4 MG/1
0.4 TABLET SUBLINGUAL EVERY 5 MIN PRN
Status: DISCONTINUED | OUTPATIENT
Start: 2022-01-01 | End: 2022-01-01 | Stop reason: HOSPADM

## 2022-01-01 RX ORDER — METOPROLOL TARTRATE 37.5 MG/1
18.75 TABLET, FILM COATED ORAL 2 TIMES DAILY
Qty: 45 TABLET | Refills: 3 | Status: SHIPPED | OUTPATIENT
Start: 2022-01-01 | End: 2022-01-01

## 2022-01-01 RX ORDER — AMOXICILLIN 250 MG
2 CAPSULE ORAL 2 TIMES DAILY PRN
Status: DISCONTINUED | OUTPATIENT
Start: 2022-01-01 | End: 2022-01-01 | Stop reason: HOSPADM

## 2022-01-01 RX ADMIN — APIXABAN 5 MG: 5 TABLET, FILM COATED ORAL at 21:41

## 2022-01-01 RX ADMIN — METOPROLOL TARTRATE 18.75 MG: 25 TABLET, FILM COATED ORAL at 08:21

## 2022-01-01 RX ADMIN — METOPROLOL TARTRATE 18.75 MG: 25 TABLET, FILM COATED ORAL at 10:48

## 2022-01-01 RX ADMIN — HUMAN ALBUMIN MICROSPHERES AND PERFLUTREN 3 ML: 10; .22 INJECTION, SOLUTION INTRAVENOUS at 11:36

## 2022-01-01 RX ADMIN — APIXABAN 5 MG: 5 TABLET, FILM COATED ORAL at 08:22

## 2022-01-01 RX ADMIN — Medication 1 MG: at 21:52

## 2022-01-01 RX ADMIN — APIXABAN 5 MG: 5 TABLET, FILM COATED ORAL at 19:41

## 2022-01-01 RX ADMIN — QUETIAPINE FUMARATE 25 MG: 25 TABLET ORAL at 21:52

## 2022-01-01 RX ADMIN — ASPIRIN 81 MG: 81 TABLET, COATED ORAL at 08:05

## 2022-01-01 RX ADMIN — APIXABAN 5 MG: 5 TABLET, FILM COATED ORAL at 08:05

## 2022-01-01 RX ADMIN — GUAIFENESIN AND DEXTROMETHORPHAN 10 ML: 100; 10 SYRUP ORAL at 16:37

## 2022-01-01 RX ADMIN — METOPROLOL TARTRATE 18.75 MG: 25 TABLET, FILM COATED ORAL at 21:41

## 2022-01-01 RX ADMIN — QUETIAPINE FUMARATE 25 MG: 25 TABLET ORAL at 21:41

## 2022-01-01 RX ADMIN — ASPIRIN 81 MG: 81 TABLET, COATED ORAL at 08:21

## 2022-01-01 RX ADMIN — METOPROLOL TARTRATE 18.75 MG: 25 TABLET, FILM COATED ORAL at 09:35

## 2022-01-01 RX ADMIN — QUETIAPINE FUMARATE 25 MG: 25 TABLET ORAL at 21:01

## 2022-01-01 RX ADMIN — APIXABAN 5 MG: 5 TABLET, FILM COATED ORAL at 09:36

## 2022-01-01 RX ADMIN — METOPROLOL TARTRATE 18.75 MG: 25 TABLET, FILM COATED ORAL at 21:51

## 2022-01-01 RX ADMIN — APIXABAN 5 MG: 5 TABLET, FILM COATED ORAL at 21:52

## 2022-01-01 RX ADMIN — METOPROLOL TARTRATE 18.75 MG: 25 TABLET, FILM COATED ORAL at 20:41

## 2022-01-01 RX ADMIN — APIXABAN 5 MG: 5 TABLET, FILM COATED ORAL at 09:46

## 2022-01-01 RX ADMIN — ASPIRIN 81 MG: 81 TABLET, COATED ORAL at 09:35

## 2022-01-01 RX ADMIN — METOPROLOL TARTRATE 18.75 MG: 25 TABLET, FILM COATED ORAL at 19:42

## 2022-01-01 RX ADMIN — METOPROLOL TARTRATE 18.75 MG: 25 TABLET, FILM COATED ORAL at 20:53

## 2022-01-01 RX ADMIN — GUAIFENESIN AND DEXTROMETHORPHAN 10 ML: 100; 10 SYRUP ORAL at 21:51

## 2022-01-01 RX ADMIN — QUETIAPINE FUMARATE 25 MG: 25 TABLET ORAL at 21:57

## 2022-01-01 RX ADMIN — Medication 1 MG: at 23:58

## 2022-01-01 RX ADMIN — ASPIRIN 81 MG: 81 TABLET, COATED ORAL at 08:36

## 2022-01-01 RX ADMIN — QUETIAPINE FUMARATE 25 MG: 25 TABLET ORAL at 23:58

## 2022-01-01 RX ADMIN — QUETIAPINE FUMARATE 25 MG: 25 TABLET ORAL at 21:24

## 2022-01-01 RX ADMIN — METOPROLOL TARTRATE 18.75 MG: 25 TABLET, FILM COATED ORAL at 08:05

## 2022-01-01 RX ADMIN — METOPROLOL TARTRATE 18.75 MG: 25 TABLET, FILM COATED ORAL at 08:36

## 2022-01-01 RX ADMIN — APIXABAN 5 MG: 5 TABLET, FILM COATED ORAL at 08:36

## 2022-01-01 RX ADMIN — ACETAMINOPHEN 650 MG: 325 TABLET ORAL at 00:22

## 2022-01-01 RX ADMIN — APIXABAN 5 MG: 5 TABLET, FILM COATED ORAL at 19:44

## 2022-01-01 RX ADMIN — APIXABAN 5 MG: 5 TABLET, FILM COATED ORAL at 20:41

## 2022-01-01 RX ADMIN — ASPIRIN 81 MG: 81 TABLET, COATED ORAL at 09:46

## 2022-01-01 ASSESSMENT — ACTIVITIES OF DAILY LIVING (ADL)
ADLS_ACUITY_SCORE: 35
ADLS_ACUITY_SCORE: 41
ADLS_ACUITY_SCORE: 41
ADLS_ACUITY_SCORE: 32
ADLS_ACUITY_SCORE: 34
ADLS_ACUITY_SCORE: 36
ADLS_ACUITY_SCORE: 35
ADLS_ACUITY_SCORE: 34
ADLS_ACUITY_SCORE: 34
ADLS_ACUITY_SCORE: 32
ADLS_ACUITY_SCORE: 33
ADLS_ACUITY_SCORE: 42
ADLS_ACUITY_SCORE: 32
ADLS_ACUITY_SCORE: 34
ADLS_ACUITY_SCORE: 34
ADLS_ACUITY_SCORE: 36
ADLS_ACUITY_SCORE: 32
ADLS_ACUITY_SCORE: 33
DIFFICULTY_COMMUNICATING: NO
ADLS_ACUITY_SCORE: 34
DOING_ERRANDS_INDEPENDENTLY_DIFFICULTY: YES
ADLS_ACUITY_SCORE: 34
ADLS_ACUITY_SCORE: 36
ADLS_ACUITY_SCORE: 36
ADLS_ACUITY_SCORE: 32
ADLS_ACUITY_SCORE: 33
ADLS_ACUITY_SCORE: 34
ADLS_ACUITY_SCORE: 32
ADLS_ACUITY_SCORE: 38
ADLS_ACUITY_SCORE: 36
ADLS_ACUITY_SCORE: 42
ADLS_ACUITY_SCORE: 32
ADLS_ACUITY_SCORE: 41
ADLS_ACUITY_SCORE: 36
ADLS_ACUITY_SCORE: 34
WALKING_OR_CLIMBING_STAIRS_DIFFICULTY: YES
CONCENTRATING,_REMEMBERING_OR_MAKING_DECISIONS_DIFFICULTY: YES
ADLS_ACUITY_SCORE: 32
DIFFICULTY_EATING/SWALLOWING: NO
ADLS_ACUITY_SCORE: 33
ADLS_ACUITY_SCORE: 42
ADLS_ACUITY_SCORE: 32
HEARING_DIFFICULTY_OR_DEAF: NO
ADLS_ACUITY_SCORE: 42
ADLS_ACUITY_SCORE: 36
ADLS_ACUITY_SCORE: 33
ADLS_ACUITY_SCORE: 36
ADLS_ACUITY_SCORE: 42
FALL_HISTORY_WITHIN_LAST_SIX_MONTHS: YES
ADLS_ACUITY_SCORE: 32
ADLS_ACUITY_SCORE: 36
ADLS_ACUITY_SCORE: 32
ADLS_ACUITY_SCORE: 35
ADLS_ACUITY_SCORE: 35
ADLS_ACUITY_SCORE: 38
ADLS_ACUITY_SCORE: 34
ADLS_ACUITY_SCORE: 36
ADLS_ACUITY_SCORE: 36
ADLS_ACUITY_SCORE: 41
ADLS_ACUITY_SCORE: 35
ADLS_ACUITY_SCORE: 34
ADLS_ACUITY_SCORE: 39
ADLS_ACUITY_SCORE: 36
ADLS_ACUITY_SCORE: 34
WEAR_GLASSES_OR_BLIND: YES
VISION_MANAGEMENT: GLASSES

## 2022-01-01 ASSESSMENT — PATIENT HEALTH QUESTIONNAIRE - PHQ9
10. IF YOU CHECKED OFF ANY PROBLEMS, HOW DIFFICULT HAVE THESE PROBLEMS MADE IT FOR YOU TO DO YOUR WORK, TAKE CARE OF THINGS AT HOME, OR GET ALONG WITH OTHER PEOPLE: SOMEWHAT DIFFICULT
SUM OF ALL RESPONSES TO PHQ QUESTIONS 1-9: 6
SUM OF ALL RESPONSES TO PHQ QUESTIONS 1-9: 6

## 2022-01-01 ASSESSMENT — PAIN SCALES - GENERAL: PAINLEVEL: NO PAIN (0)

## 2022-01-07 ENCOUNTER — VIRTUAL VISIT (OUTPATIENT)
Dept: FAMILY MEDICINE | Facility: CLINIC | Age: 82
End: 2022-01-07
Payer: COMMERCIAL

## 2022-01-07 DIAGNOSIS — R05.9 COUGH: Primary | ICD-10-CM

## 2022-01-07 DIAGNOSIS — R91.8 PULMONARY NODULES: ICD-10-CM

## 2022-01-07 DIAGNOSIS — J06.9 UPPER RESPIRATORY TRACT INFECTION, UNSPECIFIED TYPE: ICD-10-CM

## 2022-01-07 DIAGNOSIS — R74.8 ELEVATED ALKALINE PHOSPHATASE LEVEL: ICD-10-CM

## 2022-01-07 PROCEDURE — 99213 OFFICE O/P EST LOW 20 MIN: CPT | Mod: 95 | Performed by: INTERNAL MEDICINE

## 2022-01-07 RX ORDER — ALBUTEROL SULFATE 90 UG/1
AEROSOL, METERED RESPIRATORY (INHALATION)
Qty: 18 G | Refills: 0 | Status: SHIPPED | OUTPATIENT
Start: 2022-01-07 | End: 2022-02-07

## 2022-01-07 NOTE — PROGRESS NOTES
Zoran is a 81 year old who is being evaluated via a billable telephone visit.      What phone number would you like to be contacted at? [phone]  How would you like to obtain your AVS? MyChart    Assessment & Plan     Cough  Patient has similar cough every year.  Suspect this is allergy related.  Recommend a trial of albuterol to reduce his congestion.    Upper respiratory tract infection, unspecified type  As noted  - albuterol (PROAIR HFA/PROVENTIL HFA/VENTOLIN HFA) 108 (90 Base) MCG/ACT inhaler; One puff every 4 hours as needed for cough/congestion.    Pulmonary nodules  Has a small pulmonary nodule.  Has a multi pack smoking history.  We will repeat his CT to make certain there has been no change in this nodule.  He would be considered high risk    - CT Chest w/o Contrast; Future    Elevated alkaline phosphatase level  Sporadic elevation possibly related to sporadic obstruction of biliary tree.  Would like to reassess  We will recheck liver function tests today.  Patient denies pruritus or falloff in appetite    - Comprehensive metabolic panel (BMP + Alb, Alk Phos, ALT, AST, Total. Bili, TP); Future             See Patient Instructions    No follow-ups on file.    Zach Pickering MD  [facility]   Zoran is a 81 year old who presents for the following health issues     HPI 81-year-old male presents clinic today for follow-up.  This is a telephone visit today.    He has not discussed his CT findings from last year.  He has a 3 mm nodule.  There is a multiple pack-year smoking history as well.  We discussed repeating a CT scan to be safe.    Recent laboratory work this summer revealed a slight elevation of bilirubin as well as alkaline phosphatase.  He has no symptoms of right upper quadrant pain.  I discussed with him the importance of rechecking the liver function test to assure resolution.    Occasionally has dizziness.  His average systolic pressure at home is in the 120 range.   I recommend he hold his amlodipine for couple weeks to see how he does regarding his symptoms and to his    He has a cough currently.  He states that every winter he develops this cough.  We discussed the underlying possibilities.  In the past he has done well with a inhaler.          Review of Systems   Constitutional, HEENT, cardiovascular, pulmonary, gi and gu systems are negative, except as otherwise noted.      Objective    Vitals - Patient Reported  Systolic (Patient Reported): 120  Diastolic (Patient Reported): 83  Weight (Patient Reported): 68.9 kg (152 lb)  Pulse (Patient Reported): 73        Physical Exam   healthy, alert and no distress  PSYCH: Alert and oriented times 3; coherent speech, normal   rate and volume, able to articulate logical thoughts, able   to abstract reason, no tangential thoughts, no hallucinations   or delusions  His affect is normal  RESP: No cough, no audible wheezing, able to talk in full sentences  Remainder of exam unable to be completed due to telephone visits      Lab on 12/29/2021   Component Date Value Ref Range Status     INR 12/29/2021 2.0* 0.9 - 1.1 Final               Phone call duration: 10 minutes

## 2022-01-12 ENCOUNTER — HOSPITAL ENCOUNTER (OUTPATIENT)
Dept: CT IMAGING | Facility: CLINIC | Age: 82
Discharge: HOME | End: 2022-01-12
Attending: INTERNAL MEDICINE | Admitting: INTERNAL MEDICINE
Payer: COMMERCIAL

## 2022-01-12 DIAGNOSIS — R91.8 PULMONARY NODULES: ICD-10-CM

## 2022-01-12 PROCEDURE — 71250 CT THORAX DX C-: CPT

## 2022-01-13 ENCOUNTER — LAB (OUTPATIENT)
Dept: LAB | Facility: CLINIC | Age: 82
End: 2022-01-13
Payer: COMMERCIAL

## 2022-01-13 ENCOUNTER — TELEPHONE (OUTPATIENT)
Dept: FAMILY MEDICINE | Facility: CLINIC | Age: 82
End: 2022-01-13

## 2022-01-13 DIAGNOSIS — R74.8 ELEVATED ALKALINE PHOSPHATASE LEVEL: ICD-10-CM

## 2022-01-13 DIAGNOSIS — R05.9 COUGH: Primary | ICD-10-CM

## 2022-01-13 PROCEDURE — 80053 COMPREHEN METABOLIC PANEL: CPT

## 2022-01-13 PROCEDURE — 36415 COLL VENOUS BLD VENIPUNCTURE: CPT

## 2022-01-13 RX ORDER — AZITHROMYCIN 250 MG/1
TABLET, FILM COATED ORAL
Qty: 6 TABLET | Refills: 0 | Status: SHIPPED | OUTPATIENT
Start: 2022-01-13 | End: 2022-01-18

## 2022-01-13 NOTE — TELEPHONE ENCOUNTER
Pt was called and advised to start Augmentin. After call, triage noted two prescriptions were approved today. Augmentin & Azithromycin  Is pt to take both or should  Azithromycin be discontinued? Please advice.

## 2022-01-13 NOTE — TELEPHONE ENCOUNTER
"Pt called to follow up on CT from yesterday - saw results on Anaid Abdalla;  Your nodules are completely stable.  These nodules are benign and do not require any follow-up.     Regarding your lungs there are evidence for an infection in your right middle and lower lobe.  This could be a resolving infection or persistent infection.  If you continue to have a cough and congestion I would recommend an antibiotic called azithromycin.     Please keep me up-to-date.     Regards  Zach   Written by Zach Pickering MD on 1/13/2022 10:36 AM CST  Seen by patient Zoran Villarreal on 1/13/2022 11:26 AM    Patient is continuing to have cough/congestion   \"throttling\" sound in lungs over a year, wondering if this could be a persistent infection?     Asking if PCP would be willing to start the abx now?     Also did not do the CMP yesterday so scheduled for this afternoon     Pt would like a call back     Sabine SCHUMACHER Triage RN  [facility]     "

## 2022-01-14 ENCOUNTER — TELEPHONE (OUTPATIENT)
Dept: ANTICOAGULATION | Facility: CLINIC | Age: 82
End: 2022-01-14
Payer: COMMERCIAL

## 2022-01-14 LAB
ALBUMIN SERPL-MCNC: 3.3 G/DL (ref 3.4–5)
ALP SERPL-CCNC: 140 U/L (ref 40–150)
ALT SERPL W P-5'-P-CCNC: 32 U/L (ref 0–70)
ANION GAP SERPL CALCULATED.3IONS-SCNC: 6 MMOL/L (ref 3–14)
AST SERPL W P-5'-P-CCNC: 26 U/L (ref 0–45)
BILIRUB SERPL-MCNC: 0.6 MG/DL (ref 0.2–1.3)
BUN SERPL-MCNC: 16 MG/DL (ref 7–30)
CALCIUM SERPL-MCNC: 8.6 MG/DL (ref 8.5–10.1)
CHLORIDE BLD-SCNC: 103 MMOL/L (ref 94–109)
CO2 SERPL-SCNC: 25 MMOL/L (ref 20–32)
CREAT SERPL-MCNC: 1.07 MG/DL (ref 0.66–1.25)
GFR SERPL CREATININE-BSD FRML MDRD: 71 ML/MIN/1.73M2
GLUCOSE BLD-MCNC: 89 MG/DL (ref 70–99)
POTASSIUM BLD-SCNC: 4 MMOL/L (ref 3.4–5.3)
PROT SERPL-MCNC: 8 G/DL (ref 6.8–8.8)
SODIUM SERPL-SCNC: 134 MMOL/L (ref 133–144)

## 2022-01-14 NOTE — TELEPHONE ENCOUNTER
ANTICOAGULATION  MANAGEMENT     Interacting Medication Review    Interacting medication(s): Augmentin with warfarin.  No Zithromax, just the Augmentin per Dr. Pickering    Duration: 7 days (1/13 to 1/19)    Indication: infection in right middle and lower lobe of lungs    New medication?: Yes, interaction may increase INR and risk of bleeding       PLAN     Continue current warfarin dose. Recommend to check INR on 1/17, 1/18, or 1/19 next week per Carmen Mendoza    Left a detailed message for Rm    No adjustment to Anticoagulation Calendar was required    Edna Fuller, RN

## 2022-01-17 ENCOUNTER — LAB (OUTPATIENT)
Dept: LAB | Facility: CLINIC | Age: 82
End: 2022-01-17
Payer: COMMERCIAL

## 2022-01-17 ENCOUNTER — ANTICOAGULATION THERAPY VISIT (OUTPATIENT)
Dept: ANTICOAGULATION | Facility: CLINIC | Age: 82
End: 2022-01-17

## 2022-01-17 DIAGNOSIS — Z79.01 LONG TERM (CURRENT) USE OF ANTICOAGULANTS: ICD-10-CM

## 2022-01-17 DIAGNOSIS — I48.0 PAROXYSMAL ATRIAL FIBRILLATION (H): ICD-10-CM

## 2022-01-17 DIAGNOSIS — Z79.01 LONG TERM CURRENT USE OF ANTICOAGULANT THERAPY: ICD-10-CM

## 2022-01-17 DIAGNOSIS — I48.0 PAROXYSMAL ATRIAL FIBRILLATION (H): Primary | ICD-10-CM

## 2022-01-17 DIAGNOSIS — I25.5 ISCHEMIC CARDIOMYOPATHY: Primary | ICD-10-CM

## 2022-01-17 DIAGNOSIS — I48.91 ATRIAL FIBRILLATION, UNSPECIFIED TYPE (H): ICD-10-CM

## 2022-01-17 LAB
INR BLD: 2.4 (ref 0.9–1.1)
INR PPP: 2.08 (ref 0.85–1.15)

## 2022-01-17 PROCEDURE — 85610 PROTHROMBIN TIME: CPT

## 2022-01-17 PROCEDURE — 36416 COLLJ CAPILLARY BLOOD SPEC: CPT

## 2022-01-17 PROCEDURE — 36415 COLL VENOUS BLD VENIPUNCTURE: CPT

## 2022-01-17 NOTE — PROGRESS NOTES
ANTICOAGULATION MANAGEMENT     Rm Villarreal 81 year old male is on warfarin with therapeutic INR result. (Goal INR 2.0-3.0)    Recent labs: (last 7 days)     01/17/22  0918   INR 2.08*       ASSESSMENT     Source(s): Chart Review and Patient/Caregiver Call       Warfarin doses taken: Warfarin taken as instructed    Diet: No new diet changes identified    New illness, injury, or hospitalization: Yes, infection in lungs    Medication/supplement changes: Augmentin 7 day course (dates: 1/13 to 1/19) which has potential for interaction; increasing INR    Signs or symptoms of bleeding or clotting: No    Previous INR: Therapeutic last 2(+) visits    Additional findings: None     PLAN     Recommended plan for no diet, medication or health factor changes affecting INR     Dosing Instructions: Continue your current warfarin dose with next INR in 2 weeks.  He is then leaving until 3/5 so INR on 3/7 was also scheduled.         Summary  As of 1/17/2022    Full warfarin instructions:  2.5 mg every day   Next INR check:  2/1/2022             Telephone call with Rm who verbalizes understanding and agrees to plan    Lab visit scheduled    Education provided: Goal range and significance of current result, Importance of therapeutic range and Contact [phone] with any changes, questions or concerns.     Plan made per [facility] anticoagulation protocol    Edna Fuller RN  Anticoagulation Clinic  1/17/2022    _______________________________________________________________________     Anticoagulation Episode Summary     Current INR goal:  2.0-3.0   TTR:  88.0 % (11.8 mo)   Target end date:  Indefinite   Send INR reminders to:  [facility] HEART INR NURSE    Indications    Ischemic cardiomyopathy [I25.5]  Atrial fibrillation (H) [I48.91]  Long term current use of anticoagulant therapy [Z79.01]           Comments:           Anticoagulation Care Providers     Provider Role Specialty Phone number    Madelin Banuelos MD Referring  Cardiovascular Disease [phone]

## 2022-02-01 ENCOUNTER — LAB (OUTPATIENT)
Dept: LAB | Facility: CLINIC | Age: 82
End: 2022-02-01
Payer: COMMERCIAL

## 2022-02-01 ENCOUNTER — TELEPHONE (OUTPATIENT)
Dept: FAMILY MEDICINE | Facility: CLINIC | Age: 82
End: 2022-02-01

## 2022-02-01 ENCOUNTER — ANTICOAGULATION THERAPY VISIT (OUTPATIENT)
Dept: ANTICOAGULATION | Facility: CLINIC | Age: 82
End: 2022-02-01

## 2022-02-01 DIAGNOSIS — I48.0 PAROXYSMAL ATRIAL FIBRILLATION (H): ICD-10-CM

## 2022-02-01 DIAGNOSIS — I25.5 ISCHEMIC CARDIOMYOPATHY: Primary | ICD-10-CM

## 2022-02-01 DIAGNOSIS — Z79.01 LONG TERM CURRENT USE OF ANTICOAGULANT THERAPY: ICD-10-CM

## 2022-02-01 DIAGNOSIS — I48.91 ATRIAL FIBRILLATION, UNSPECIFIED TYPE (H): ICD-10-CM

## 2022-02-01 LAB — INR BLD: 3.3 (ref 0.9–1.1)

## 2022-02-01 PROCEDURE — 85610 PROTHROMBIN TIME: CPT

## 2022-02-01 PROCEDURE — 36416 COLLJ CAPILLARY BLOOD SPEC: CPT

## 2022-02-01 NOTE — TELEPHONE ENCOUNTER
Pt wants to talk to INR nurse. Wants to ask if abx can affect INR result. He is requesting a call back at [phone].

## 2022-02-01 NOTE — PROGRESS NOTES
ANTICOAGULATION MANAGEMENT     Rm Villarreal 81 year old male is on warfarin with supratherapeutic INR result. (Goal INR 2.0-3.0)    Recent labs: (last 7 days)     02/01/22  0918   INR 3.3*       ASSESSMENT     Source(s): Chart Review and Patient/Caregiver Call       Warfarin doses taken: Warfarin taken as instructed    Diet: No new diet changes identified    New illness, injury, or hospitalization: No    Medication/supplement changes: took more tylenol recently but will try to cut back     Signs or symptoms of bleeding or clotting: No    Previous INR: Therapeutic last 2(+) visits    Additional findings: None     PLAN     Recommended plan for temporary change(s) affecting INR     Dosing Instructions: Partial hold then continue your current warfarin dose with next INR in 3 weeks       Summary  As of 2/1/2022    Full warfarin instructions:  2/1: 1.25 mg; Otherwise 2.5 mg every day   Next INR check:  2/22/2022             Telephone call with Rm who verbalizes understanding and agrees to plan    Lab visit scheduled    Education provided: Impact of vitamin K foods on INR, Goal range and significance of current result, Importance of therapeutic range and Contact [phone] with any changes, questions or concerns.     Plan made per [facility] anticoagulation protocol    Edna Fuller, RN  Anticoagulation Clinic  2/1/2022    _______________________________________________________________________     Anticoagulation Episode Summary     Current INR goal:  2.0-3.0   TTR:  87.8 % (1 y)   Target end date:  Indefinite   Send INR reminders to:  [facility] HEART INR NURSE    Indications    Ischemic cardiomyopathy [I25.5]  Atrial fibrillation (H) [I48.91]  Long term current use of anticoagulant therapy [Z79.01]           Comments:           Anticoagulation Care Providers     Provider Role Specialty Phone number    Madelin Banuelos MD Referring Cardiovascular Disease [phone]

## 2022-02-09 ENCOUNTER — VIRTUAL VISIT (OUTPATIENT)
Dept: FAMILY MEDICINE | Facility: CLINIC | Age: 82
End: 2022-02-09
Payer: COMMERCIAL

## 2022-02-09 DIAGNOSIS — R42 DIZZINESS: Primary | ICD-10-CM

## 2022-02-09 DIAGNOSIS — R53.83 FATIGUE, UNSPECIFIED TYPE: ICD-10-CM

## 2022-02-09 PROCEDURE — 99213 OFFICE O/P EST LOW 20 MIN: CPT | Mod: 95 | Performed by: INTERNAL MEDICINE

## 2022-02-09 ASSESSMENT — PATIENT HEALTH QUESTIONNAIRE - PHQ9: SUM OF ALL RESPONSES TO PHQ QUESTIONS 1-9: 8

## 2022-02-09 NOTE — PROGRESS NOTES
Zoran is a 81 year old who is being evaluated via a billable video visit.      How would you like to obtain your AVS? MyChart  If the video visit is dropped, the invitation should be resent by: Text to cell phone: [phone]  Will anyone else be joining your video visit? Yes: Wife. How would they like to receive their invitation? Text to cell phone: [phone]    Video Start Time: 4:00 PM    Assessment & Plan     Dizziness  Possible relationship to rate.  Currently is on a very small dose of metoprolol.  Obviously this patient should be seen in clinic.  Orthostatic blood pressures need to be assessed.  Additionally cardiac monitor and transthoracic echo would be prudent    Fatigue, unspecified type  As above very likely related to rate blood pressure variation.  The patient's cool extremities are consistent with excessive beta-blockade.               See Patient Instructions    No follow-ups on file.    Zach Pickering MD  [facility]   Zoran is a 81 year old who presents for the following health issues     HPI 81-year-old male presents for virtual visit.  He has been feeling fatigued and dizzy at times.  He has cold hands and feet.  He has a sensation of lightheadedness and in conjunction with fatigue issues with memory.    Chief Complaint   Patient presents with     Recheck Medication     metoprolol side effects- pt states experiencing cold hands and feet, lightheaded, memory problem, depression, dizziness              Review of Systems   Constitutional, HEENT, cardiovascular, pulmonary, gi and gu systems are negative, except as otherwise noted.      Objective    Vitals - Patient Reported  Systolic (Patient Reported): 125 (average)  Diastolic (Patient Reported): 72 (average)      Vitals:  No vitals were obtained today due to virtual visit.    Physical Exam   GENERAL: Healthy, alert and no distress  EYES: Eyes grossly normal to inspection.  No discharge or erythema, or obvious  scleral/conjunctival abnormalities.  RESP: No audible wheeze, cough, or visible cyanosis.  No visible retractions or increased work of breathing.    SKIN: Visible skin clear. No significant rash, abnormal pigmentation or lesions.  NEURO: Cranial nerves grossly intact.  Mentation and speech appropriate for age.  PSYCH: Mentation appears normal, affect normal/bright, judgement and insight intact, normal speech and appearance well-groomed.    Lab on 02/01/2022   Component Date Value Ref Range Status     INR 02/01/2022 3.3 (A) 0.9 - 1.1 Final               Video-Visit Details    Type of service:  Video Visit    Video End Time: 4:15 PM    Originating Location (pt. Location): Home    Distant Location (provider location):  [facility]     Platform used for Video Visit: [employer]

## 2022-02-15 ENCOUNTER — TELEPHONE (OUTPATIENT)
Dept: FAMILY MEDICINE | Facility: CLINIC | Age: 82
End: 2022-02-15
Payer: COMMERCIAL

## 2022-02-15 NOTE — TELEPHONE ENCOUNTER
Patient needs ov 2/23/22 in the AM. May use open or overbook 1200PM  Zach Pickering MD on 2/15/2022 at 5:10 PM

## 2022-02-17 ENCOUNTER — DOCUMENTATION ONLY (OUTPATIENT)
Dept: ANTICOAGULATION | Facility: CLINIC | Age: 82
End: 2022-02-17
Payer: COMMERCIAL

## 2022-02-17 DIAGNOSIS — F43.23 ADJUSTMENT DISORDER WITH MIXED ANXIETY AND DEPRESSED MOOD: ICD-10-CM

## 2022-02-17 DIAGNOSIS — I48.91 ATRIAL FIBRILLATION, UNSPECIFIED TYPE (H): ICD-10-CM

## 2022-02-17 DIAGNOSIS — I25.5 ISCHEMIC CARDIOMYOPATHY: Primary | ICD-10-CM

## 2022-02-17 RX ORDER — LORAZEPAM 0.5 MG/1
TABLET ORAL
Qty: 30 TABLET | Refills: 0 | Status: CANCELLED | OUTPATIENT
Start: 2022-02-17

## 2022-02-17 NOTE — PROGRESS NOTES
ANTICOAGULATION MANAGEMENT      Rm Villarreal due for annual renewal of referral to anticoagulation monitoring. Order pended for your review and signature.      ANTICOAGULATION SUMMARY      Warfarin indication(s)     Atrial fibrillation  ischemic cardiomyopathy    Heart valve present?  NO       Current goal range   INR: 2.0-3.0     Goal appropriate for indication? Yes, INR 2-3 appropriate for hx of DVT, PE, hypercoagulable state, Afib, LVAD, or bileaflet AVR without risk factors     Current duration of therapy Indefinite/long term therapy   Time in Therapeutic Range (TTR)  (Goal > 60%) 87.8%       Office visit with referring provider's group within last year yes on 6/14/21       Isabella Beal RN

## 2022-02-17 NOTE — TELEPHONE ENCOUNTER
Future Office Visit:   Next 5 appointments (look out 90 days)    Feb 18, 2022 10:30 AM  (Arrive by 10:10 AM)  Provider Visit with Zach Pickering MD  [facility] ([facility] ) [address], [address]  [facility] [address]  [phone]

## 2022-02-18 ENCOUNTER — OFFICE VISIT (OUTPATIENT)
Dept: FAMILY MEDICINE | Facility: CLINIC | Age: 82
End: 2022-02-18
Payer: COMMERCIAL

## 2022-02-18 VITALS
TEMPERATURE: 97 F | SYSTOLIC BLOOD PRESSURE: 126 MMHG | BODY MASS INDEX: 22.12 KG/M2 | OXYGEN SATURATION: 99 % | RESPIRATION RATE: 16 BRPM | HEART RATE: 62 BPM | DIASTOLIC BLOOD PRESSURE: 70 MMHG | WEIGHT: 158 LBS | HEIGHT: 71 IN

## 2022-02-18 DIAGNOSIS — I48.20 CHRONIC ATRIAL FIBRILLATION (H): ICD-10-CM

## 2022-02-18 DIAGNOSIS — F43.23 ADJUSTMENT DISORDER WITH MIXED ANXIETY AND DEPRESSED MOOD: ICD-10-CM

## 2022-02-18 DIAGNOSIS — R42 DIZZINESS: Primary | ICD-10-CM

## 2022-02-18 PROCEDURE — 99213 OFFICE O/P EST LOW 20 MIN: CPT | Performed by: INTERNAL MEDICINE

## 2022-02-18 RX ORDER — LORAZEPAM 0.5 MG/1
TABLET ORAL
Qty: 30 TABLET | Refills: 0 | Status: SHIPPED | OUTPATIENT
Start: 2022-02-18 | End: 2022-01-01

## 2022-02-18 ASSESSMENT — PAIN SCALES - GENERAL: PAINLEVEL: NO PAIN (0)

## 2022-02-18 NOTE — PROGRESS NOTES
"  Assessment & Plan     Adjustment disorder with mixed anxiety and depressed mood  Patient using lorazepam.  States he feels better with his utilization.  - LORazepam (ATIVAN) 0.5 MG tablet; TAKE 1/2 TABLET BY MOUTH TWICE DAILY    Dizziness  Uncertain etiology.  Has had this for years duration.  Recently has had coolness of extremities, conjunction with the same.  Would like to rule out significant bradycardia.  Orthostatics completely normal today  - Leadless EKG Monitor 3 to 7 Days; Future    Chronic atrial fibrillation (H)  Need to check rate currently on 37.5 mg of metoprolol daily.  Symptoms of depression, cool extremities, fatigue.  - Leadless EKG Monitor 3 to 7 Days; Future     See Patient Instructions    Return if symptoms worsen or fail to improve, for pcp.    Zach Pickering MD  [facility] MILLI Meehan is a 81 year old who presents for the following health issues     HPI 81-year-old male with a history of ischemic cardiomyopathy.  His ejection fraction of roughly 25%.  Called me roughly 10 days ago with symptoms of fatigue, cool extremities lightheadedness, and dizziness with a rotational quality.    Patient cannot feel his lorazepam prescription in January.  He has restarted it feels better.    Ability to exert himself is not changed appreciably.    Follow up      Review of Systems   Constitutional, HEENT, cardiovascular, pulmonary, gi and gu systems are negative, except as otherwise noted.      Objective    /70 (BP Location: Right arm, Patient Position: Chair, Cuff Size: Adult Large)   Pulse 62   Temp 97  F (36.1  C) (Temporal)   Resp 16   Ht 1.803 m (5' 11\")   Wt 71.7 kg (158 lb)   SpO2 99%   BMI 22.04 kg/m    Body mass index is 22.04 kg/m .  Physical Exam   Orthostatic pressures are negative today    Heart irregularly irregular rate is between 62 and 100    Lung fields are clear    Lower extremities no edema    Lab on 02/01/2022   Component Date Value Ref Range " Status     INR 02/01/2022 3.3 (A) 0.9 - 1.1 Final

## 2022-02-18 NOTE — TELEPHONE ENCOUNTER
LORazepam (ATIVAN) 0.5 MG tablet          The original prescription was reordered on 2/18/2022 by Zach Pickering MD. Renewing this prescription may not be appropriate.     Duplicate - approved today     Den Obrien RN  [facility]

## 2022-02-18 NOTE — PATIENT INSTRUCTIONS
I am happy to see that your blood pressure is stable.    I am somewhat concerned about your pulse rate.  For this reason, I would recommend Holter monitor for a three day period.    Best regards  Zach

## 2022-02-22 ENCOUNTER — ANTICOAGULATION THERAPY VISIT (OUTPATIENT)
Dept: ANTICOAGULATION | Facility: CLINIC | Age: 82
End: 2022-02-22

## 2022-02-22 ENCOUNTER — HOSPITAL ENCOUNTER (OUTPATIENT)
Dept: CARDIOLOGY | Facility: CLINIC | Age: 82
Discharge: HOME | End: 2022-02-22
Attending: INTERNAL MEDICINE | Admitting: INTERNAL MEDICINE
Payer: COMMERCIAL

## 2022-02-22 ENCOUNTER — LAB (OUTPATIENT)
Dept: LAB | Facility: CLINIC | Age: 82
End: 2022-02-22
Payer: COMMERCIAL

## 2022-02-22 DIAGNOSIS — Z79.01 LONG TERM CURRENT USE OF ANTICOAGULANT THERAPY: ICD-10-CM

## 2022-02-22 DIAGNOSIS — R42 DIZZINESS: ICD-10-CM

## 2022-02-22 DIAGNOSIS — I25.5 ISCHEMIC CARDIOMYOPATHY: ICD-10-CM

## 2022-02-22 DIAGNOSIS — I48.91 ATRIAL FIBRILLATION, UNSPECIFIED TYPE (H): ICD-10-CM

## 2022-02-22 DIAGNOSIS — I25.5 ISCHEMIC CARDIOMYOPATHY: Primary | ICD-10-CM

## 2022-02-22 DIAGNOSIS — I48.20 CHRONIC ATRIAL FIBRILLATION (H): ICD-10-CM

## 2022-02-22 LAB — INR BLD: 2 (ref 0.9–1.1)

## 2022-02-22 PROCEDURE — 93242 EXT ECG>48HR<7D RECORDING: CPT

## 2022-02-22 PROCEDURE — 36416 COLLJ CAPILLARY BLOOD SPEC: CPT

## 2022-02-22 PROCEDURE — 93244 EXT ECG>48HR<7D REV&INTERPJ: CPT | Performed by: INTERNAL MEDICINE

## 2022-02-22 PROCEDURE — 85610 PROTHROMBIN TIME: CPT

## 2022-02-22 NOTE — PROGRESS NOTES
Received call from lab.  Patient is currently there to get an INR.  Updated standing INR order per protocol.

## 2022-02-22 NOTE — PROGRESS NOTES
ANTICOAGULATION MANAGEMENT     Rm Villarreal 81 year old male is on warfarin with therapeutic INR result. (Goal INR 2.0-3.0)    Recent labs: (last 7 days)     02/22/22  0948   INR 2.0*       ASSESSMENT     Source(s): Chart Review and Patient/Caregiver Call       Warfarin doses taken: Warfarin taken as instructed    Diet: No new diet changes identified    New illness, injury, or hospitalization: No    Medication/supplement changes: None noted    Signs or symptoms of bleeding or clotting: No    Previous INR: Supratherapeutic    Additional findings: None     PLAN     Recommended plan for no diet, medication or health factor changes affecting INR     Dosing Instructions: Continue your current warfarin dose with next INR in 2 weeks       Summary  As of 2/22/2022    Full warfarin instructions:  2.5 mg every day   Next INR check:  3/7/2022             Telephone call with Rm who verbalizes understanding and agrees to plan    Patient elected to schedule next visit 3/7    Education provided: Goal range and significance of current result and Contact [phone] with any changes, questions or concerns.     Plan made per [facility] anticoagulation protocol    Edna Fuller RN  Anticoagulation Clinic  2/22/2022    _______________________________________________________________________     Anticoagulation Episode Summary     Current INR goal:  2.0-3.0   TTR:  91.3 % (1 y)   Target end date:  Indefinite   Send INR reminders to:  [facility] HEART INR NURSE    Indications    Ischemic cardiomyopathy [I25.5]  Atrial fibrillation (H) [I48.91]  Long term current use of anticoagulant therapy [Z79.01]           Comments:           Anticoagulation Care Providers     Provider Role Specialty Phone number    Madelin Banuelos MD Referring Cardiovascular Disease [phone]

## 2022-02-22 NOTE — LETTER
March 18, 2022      Zoran Villarreal  [address] [place] [address]        Dear EberPhil,    I have reviewed your most recent heart monitor results.  They are not significantly changed, however you do have a bit more twitchiness of the ventricle.  You are occasionally having ventricular contractions or short runs of ventricular tachycardia.  The short runs are usually asymptomatic, yet they do point to the fact that your ventricle may be of a bit more irritated.    At this point, I would refer you back to your cardiology team.  I do believe you have the discussion previously regarding treatment of ventricular tachycardia.  They may wish to change your medications as well to soothe the ventricle a bit.    I hope this letter finds you well.    If you have any questions or concerns, please call the clinic at the number listed above.       Regards,      Zach Pickering MD

## 2022-02-24 DIAGNOSIS — I48.0 PAROXYSMAL ATRIAL FIBRILLATION (H): ICD-10-CM

## 2022-02-24 DIAGNOSIS — Z79.01 LONG TERM CURRENT USE OF ANTICOAGULANT THERAPY: ICD-10-CM

## 2022-02-24 RX ORDER — WARFARIN SODIUM 2.5 MG/1
TABLET ORAL
Qty: 90 TABLET | Refills: 1 | Status: SHIPPED | OUTPATIENT
Start: 2022-02-24 | End: 2022-03-28

## 2022-03-07 ENCOUNTER — ANTICOAGULATION THERAPY VISIT (OUTPATIENT)
Dept: ANTICOAGULATION | Facility: CLINIC | Age: 82
End: 2022-03-07

## 2022-03-07 ENCOUNTER — LAB (OUTPATIENT)
Dept: LAB | Facility: CLINIC | Age: 82
End: 2022-03-07
Payer: COMMERCIAL

## 2022-03-07 DIAGNOSIS — I48.91 ATRIAL FIBRILLATION, UNSPECIFIED TYPE (H): ICD-10-CM

## 2022-03-07 DIAGNOSIS — I25.5 ISCHEMIC CARDIOMYOPATHY: ICD-10-CM

## 2022-03-07 LAB — INR BLD: 2.2 (ref 0.9–1.1)

## 2022-03-07 PROCEDURE — 36416 COLLJ CAPILLARY BLOOD SPEC: CPT

## 2022-03-07 PROCEDURE — 85610 PROTHROMBIN TIME: CPT

## 2022-03-07 NOTE — PROGRESS NOTES
ANTICOAGULATION MANAGEMENT     Rm Villarreal 81 year old male is on warfarin with therapeutic INR result. (Goal INR 2.0-3.0)    Recent labs: (last 7 days)     03/07/22  0908   INR 2.2*       ASSESSMENT       Source(s): Chart Review and Patient/Caregiver Call       Warfarin doses taken: Warfarin taken as instructed    Diet: No new diet changes identified    New illness, injury, or hospitalization:  No. Patient continues to have dizziness, has had for years.  Saw provider on 2/18 regarding dizziness.    Medication/supplement changes: None noted    Signs or symptoms of bleeding or clotting: No    Previous INR: Therapeutic last visit; previously outside of goal range    Additional findings: None       PLAN     Recommended plan for no diet, medication or health factor changes affecting INR     Dosing Instructions: Continue your current warfarin dose with next INR in 4 weeks       Summary  As of 3/7/2022    Full warfarin instructions:  2.5 mg every day   Next INR check:  4/4/2022             Telephone call with Rm who verbalizes understanding and agrees to plan    Lab visit scheduled    Education provided: Please call back if any changes to your diet, medications or how you've been taking warfarin, Goal range and significance of current result, Importance of therapeutic range and Importance of following up at instructed interval    Plan made per [facility] anticoagulation protocol    Candie Pittman, AMBAR  Anticoagulation Clinic  3/7/2022    _______________________________________________________________________     Anticoagulation Episode Summary     Current INR goal:  2.0-3.0   TTR:  91.3 % (1 y)   Target end date:  Indefinite   Send INR reminders to:  [facility] HEART INR NURSE    Indications    Ischemic cardiomyopathy [I25.5]  Atrial fibrillation (H) [I48.91]  Long term current use of anticoagulant therapy [Z79.01]           Comments:           Anticoagulation Care Providers     Provider Role Specialty Phone number    Lakisha  MD [facility] Referring Cardiovascular Disease [phone]

## 2022-03-28 ENCOUNTER — OFFICE VISIT (OUTPATIENT)
Dept: CARDIOLOGY | Facility: CLINIC | Age: 82
End: 2022-03-28
Payer: COMMERCIAL

## 2022-03-28 VITALS
BODY MASS INDEX: 21.98 KG/M2 | HEART RATE: 94 BPM | WEIGHT: 157 LBS | HEIGHT: 71 IN | SYSTOLIC BLOOD PRESSURE: 136 MMHG | DIASTOLIC BLOOD PRESSURE: 83 MMHG | OXYGEN SATURATION: 98 %

## 2022-03-28 DIAGNOSIS — I25.10 ATHEROSCLEROSIS OF NATIVE CORONARY ARTERY OF NATIVE HEART WITHOUT ANGINA PECTORIS: ICD-10-CM

## 2022-03-28 DIAGNOSIS — I25.5 ISCHEMIC CARDIOMYOPATHY: ICD-10-CM

## 2022-03-28 DIAGNOSIS — I48.19 PERSISTENT ATRIAL FIBRILLATION (H): ICD-10-CM

## 2022-03-28 DIAGNOSIS — I48.0 PAROXYSMAL ATRIAL FIBRILLATION (H): Primary | ICD-10-CM

## 2022-03-28 PROCEDURE — 99214 OFFICE O/P EST MOD 30 MIN: CPT | Performed by: NURSE PRACTITIONER
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
## 2022-03-28 NOTE — PATIENT INSTRUCTIONS
Today's Recommendations    1. STOP Warfarin as of today.   2. INR check on 4/4/2022, if below 2.0 start Eliquis 5 mg twice a day  3. If not below 2.0 and recheck INR and start when below 2.0  4. Continue all other medications without changes.  5. Please follow up with Dr. Banuelos in 4-6 months and continue discussion of internal cardiac defibrillator (ICD).    Please send a [employer] message or call [phone] to the RN team with questions or concerns.     Scheduling number [phone]    SAMANTHA Daley, CNP
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
## 2022-04-04 NOTE — PROGRESS NOTES
ANTICOAGULATION MANAGEMENT     Rm Villarreal 81 year old male is on warfarin with subtherapeutic INR result. (Goal INR No longer on warfarin. To start Eliquis.)    Recent labs: (last 7 days)     04/04/22  1029   INR 1.1       ASSESSMENT       Source(s): Chart Review and Patient/Caregiver Call       Warfarin doses taken: Warfarin discontinued (see visit note from 3/28/22)    Diet: No new diet changes identified    New illness, injury, or hospitalization: No    Medication/supplement changes: Warfarn discontinued. Pt to start Eliquis.    Signs or symptoms of bleeding or clotting: No    Previous INR: NA    Additional findings: Pt states he will start Eliquis after he has a tooth pulled and this his doctor is aware of this.       PLAN         Informed pt no further INR's are needed if he is off Warfarin. ASked that he call back with questions, concerns, changes. Discussed sign or symptom of clot and when to seek medical attn.     Claudia Priest RN  Anticoagulation Clinic  4/4/2022
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
## 2022-05-03 NOTE — TELEPHONE ENCOUNTER
I am a bit confused on the 1/2 tablet of 37.5 mg (the tablets do not come in that size). Please double check his dose. Ideally, I would like him on an ACE/ARB. Is there a chance he could come in or do a virtual visit? If not refill the metoprolol at what he is currently taking.

## 2022-05-03 NOTE — TELEPHONE ENCOUNTER
Contacted patient to review Inge's recommendations. Unable to connect to patient via phone. Phone rings once, and then disconnects. Sent patient [place] message to see if there is an alternative phone number to reach him at. Will await response.

## 2022-05-03 NOTE — TELEPHONE ENCOUNTER
Spoke with patient. Patient confirmed that he indeed does have 37.5 mg tablets in a bottle at home, and that's the dose that he had before. Patient would prefer to stay on the 1/2 tablet of 37.5 mg BID because that is what is working for him right now. Patient politely declined a visit at the time, as he doesn't feel that he needs one. Will update Inge.

## 2022-05-20 NOTE — TELEPHONE ENCOUNTER
LORazepam (ATIVAN) 0.5 MG tablet      Last Written Prescription Date:  2/18/22  Last Fill Quantity: 30 tablet,   # refills: 0  Last Office Visit: 2/18/22 with Ladan  Future Office visit:       Sig: TAKE 1/2 TABLET BY MOUTH TWICE DAILY    Routing refill request to provider for review/approval because:  Drug not on the [facility] or [facility] refill protocol or controlled substance

## 2022-05-23 NOTE — TELEPHONE ENCOUNTER
Routing refill request to provider for review/approval because:  Drug not on the [facility] refill protocol   Wendy DUPREE RN  [facility]

## 2022-06-01 NOTE — TELEPHONE ENCOUNTER
"Pharmacy faxing again seeking refill of lorazepam  Rx was denied as \"duplicate\" 5-    Do not see where this is duplicate entry.  Per pharmacy, last filled 2- #30.  Unless  shows something else?    LOV 2- Ladan Nava future OV scheduled    Needs phone call to patient about denial.    Naya Marroquin, RT (R)    "

## 2022-06-23 NOTE — TELEPHONE ENCOUNTER
Patient will follow home advice . If diarrhea persist, he will call and make appointment.     He will start OTC antidiarrheal medication.     Reason for Disposition    [1] MODERATE diarrhea (e.g., 4-6 times / day more than normal) AND [2] present > 48 hours (2 days)    Additional Information    Negative: Shock suspected (e.g., cold/pale/clammy skin, too weak to stand, low BP, rapid pulse)    Negative: Difficult to awaken or acting confused (e.g., disoriented, slurred speech)    Negative: Sounds like a life-threatening emergency to the triager    Negative: Vomiting also present and worse than the diarrhea    Negative: [1] Blood in stool AND [2] without diarrhea    Negative: Diarrhea in a cancer patient who is currently (or recently) receiving chemotherapy or radiation therapy, or cancer patient who has metastatic or end-stage cancer and is receiving palliative care    Negative: [1] SEVERE abdominal pain (e.g., excruciating) AND [2] present > 1 hour    Negative: [1] SEVERE abdominal pain AND [2] age > 60    Negative: [1] Blood in the stool AND [2] moderate or large amount of blood    Negative: Black or tarry bowel movements  (Exception: chronic-unchanged  black-grey bowel movements AND is taking iron pills or Pepto-bismol)    Negative: [1] Drinking very little AND [2] dehydration suspected (e.g., no urine > 12 hours, very dry mouth, very lightheaded)    Negative: Patient sounds very sick or weak to the triager    Negative: [1] SEVERE diarrhea (e.g., 7 or more times / day more than normal) AND [2] age > 60 years    Negative: [1] Constant abdominal pain AND [2] present > 2 hours    Negative: [1] Fever > 103 F (39.4 C) AND [2] not able to get the fever down using Fever Care Advice    Negative: [1] SEVERE diarrhea (e.g., 7 or more times / day more than normal) AND [2] present > 24 hours (1 day)    Negative: [1] MODERATE diarrhea (e.g., 4-6 times / day more than normal) AND [2] age > 70 years    Negative: Fever > 101 F  "(38.3 C)    Negative: Fever present > 3 days (72 hours)    Negative: Abdominal pain  (Exception: Pain clears with each passage of diarrhea stool)    Negative: [1] Blood in the stool AND [2] small amount of blood   (Exception: only on toilet paper. Reason: diarrhea can cause rectal irritation with blood on wiping)    Negative: [1] Mucus or pus in stool AND [2] present > 2 days AND [3] diarrhea is more than mild    Negative: [1] Recent antibiotic therapy (i.e., within last 2 months) AND [2] diarrhea present > 3 days since antibiotic was stopped    Negative: [1] Recent hospitalization AND [2] diarrhea present > 3 days    Negative: Weak immune system (e.g., HIV positive, cancer chemo, splenectomy, organ transplant, chronic steroids)    Negative: Tube feedings (e.g., nasogastric, g-tube, j-tube)    Negative: Travel to a foreign country in past month    Negative: [1] MILD diarrhea (e.g., 1-3 or more stools than normal in past 24 hours) without known cause AND [2] present >  7 days    Negative: Diarrhea is a chronic symptom (recurrent or ongoing AND present > 4 weeks)    Negative: SEVERE diarrhea (e.g., 7 or more times / day more than normal)    Negative: MILD-MODERATE diarrhea (e.g., 1-6 times / day more than normal)    Negative: [1] MILD diarrhea AND [2] taking antibiotics    Answer Assessment - Initial Assessment Questions  1. DIARRHEA SEVERITY: \"How bad is the diarrhea?\" \"How many extra stools have you had in the past 24 hours than normal?\"     - NO DIARRHEA (SCALE 0)    - MILD (SCALE 1-3): Few loose or mushy BMs; increase of 1-3 stools over normal daily number of stools; mild increase in ostomy output.    -  MODERATE (SCALE 4-7): Increase of 4-6 stools daily over normal; moderate increase in ostomy output.  * SEVERE (SCALE 8-10; OR 'WORST POSSIBLE'): Increase of 7 or more stools daily over normal; moderate increase in ostomy output; incontinence.      Moderate  2. ONSET: \"When did the diarrhea begin?\"       @ days " "ago  3. BM CONSISTENCY: \"How loose or watery is the diarrhea?\"       Watery  4. VOMITING: \"Are you also vomiting?\" If so, ask: \"How many times in the past 24 hours?\"       No  5. ABDOMINAL PAIN: \"Are you having any abdominal pain?\" If yes: \"What does it feel like?\" (e.g., crampy, dull, intermittent, constant)       No  6. ABDOMINAL PAIN SEVERITY: If present, ask: \"How bad is the pain?\"  (e.g., Scale 1-10; mild, moderate, or severe)    - MILD (1-3): doesn't interfere with normal activities, abdomen soft and not tender to touch     - MODERATE (4-7): interferes with normal activities or awakens from sleep, tender to touch     - SEVERE (8-10): excruciating pain, doubled over, unable to do any normal activities        No  7. ORAL INTAKE: If vomiting, \"Have you been able to drink liquids?\" \"How much fluids have you had in the past 24 hours?\"      Yes  8. HYDRATION: \"Any signs of dehydration?\" (e.g., dry mouth [not just dry lips], too weak to stand, dizziness, new weight loss) \"When did you last urinate?\"      No  9. EXPOSURE: \"Have you traveled to a foreign country recently?\" \"Have you been exposed to anyone with diarrhea?\" \"Could you have eaten any food that was spoiled?\"      No  10. ANTIBIOTIC USE: \"Are you taking antibiotics now or have you taken antibiotics in the past 2 months?\"        No  11. OTHER SYMPTOMS: \"Do you have any other symptoms?\" (e.g., fever, blood in stool)        No  12. PREGNANCY: \"Is there any chance you are pregnant?\" \"When was your last menstrual period?\"        No    Protocols used: DIARRHEA-DUC-AMBAR [facility]     "

## 2022-06-27 NOTE — TELEPHONE ENCOUNTER
"CC: Patient's wife Queenie (C2C) called reporting that patient had tested positive for covid-19 this morning    COVID-19 DIAGNOSIS: tested positive at home this morning with self at home test   Exposure: known exposure last weekend   Onset: middle of last week   Worst symptom: feeling fatigued and weak   Cough: some coughing, not severe   Fever: denies checking temp, not feeling feverish currently   Respiratory status: denies SOB or difficulty breathing   Better-same-worse: \"It seems to be getting better\"   High risk disease: hx of Afib, cardiomyopathy, HTN   Vaccine: vaccinated and 1 booster   Other symptoms: denies chest pain, chills, headache, sore throat, muscle pain. Reports loss of taste and fatigue. Queenie reports that patient has baseline confusion. Not new symptom with covid-19 symptoms.   O2 Saturation: 98%     Writer reviewed below information with patient and wife. Expressed verbal understanding of emergent symptoms and when to call 911. Writer transferred patient and wife to central scheduling to schedule virtual covid treatment appointment.    Suzanne Dykes RN  [facility]    IWt are the symptoms of COVID-19?  Symptoms can include fever, cough, shortness of breath, chills, headache, muscle pain sore throat, fatigue, runny or stuffy nose, and loss of taste and smell. Other less common symptoms include nausea, vomiting, or diarrhea (watery stools).    Know when to call 911. Emergency warning signs include:    Trouble breathing or shortness of breath    Pain or pressure in the chest that doesn't go away    Feeling confused like you haven't felt before, or not being able to wake up    Bluish-colored lips or face    How can I take care of myself?  1. Get lots of rest. Drink extra fluids (unless a doctor has told you not to).  2. Take Tylenol (acetaminophen) for fever or pain. If you have liver or kidney problems, ask your family doctor if it's okay to take Tylenol   Adults:   650 mg " (two 325 mg pills or tablets) every 4 to 6 hours, or...   1,000 mg (two 500 mg pills or tablets) every 8 hours as needed.  Note: Don't take more than 3,000 mg in one day. Acetaminophen is found in many medicines (both prescribed and over the counter). Read all labels to be sure you don't take too much.  For children, check the Tylenol bottle for the right dose. The dose is based on the child's age or weight.  3. Take over the counter medicines for your symptoms as needed. Talk to your pharmacist.  4. If you have other health problems (like cancer, heart failure, an organ transplant, or severe kidney disease): Call your specialty clinic if you don't feel better in the next 2 days.    How do I self-isolate?  You isolate when you have symptoms of COVID or a test shows you have COVID, even if you don't have symptoms.     If you DO have symptoms:  o Stay home and away from others  - For at least 5 days after your symptoms started, AND   - You are fever free for 24 hours (with no medicine that reduces fever), AND  - Your other symptoms are better.  o Wear a mask for 10 full days any time you are around others.    If you DON'T have symptoms:  o Stay at home and away from others for at least 5 days after your positive test.  o Wear a mask for 10 full days any time you are around others.    Where can I get more information?    [facility] COVID-19 Resource Hub: [email or web address]     CDC Quarantine & Isolation: https://www.cdc.gov/coronavirus/2019-ncov/your-health/quarantine-isolation.html     CDC - What to Do If You're Sick: https://www.cdc.gov/coronavirus/2019-ncov/if-you-are-sick/index.html    [facility] clinical trials (COVID-19 research studies): clinicalaffairs.[email or web address]/umn-clinical-trials    [place] Department of Health COVID-19 Public Hotline: 1-714.780.7663      Additional Information    Negative: SEVERE difficulty breathing (e.g., struggling for each breath, speaks in single  words)    Negative: Difficult to awaken or acting confused (e.g., disoriented, slurred speech)    Negative: Bluish (or gray) lips or face now    Negative: Shock suspected (e.g., cold/pale/clammy skin, too weak to stand, low BP, rapid pulse)    Negative: Sounds like a life-threatening emergency to the triager    Negative: [1] Diagnosed or suspected COVID-19 AND [2] symptoms lasting 3 or more weeks    Negative: [1] COVID-19 exposure AND [2] no symptoms    Negative: COVID-19 vaccine reaction suspected (e.g., fever, headache, muscle aches) occurring 1 to 3 days after getting vaccine    Negative: COVID-19 vaccine, questions about    Negative: [1] Lives with someone known to have influenza (flu test positive) AND [2] flu-like symptoms (e.g., cough, runny nose, sore throat, SOB; with or without fever)    Negative: [1] Adult with possible COVID-19 symptoms AND [2] triager concerned about severity of symptoms or other causes    Negative: COVID-19 and breastfeeding, questions about    Negative: SEVERE or constant chest pain or pressure  (Exception: Mild central chest pain, present only when coughing.)    Negative: MODERATE difficulty breathing (e.g., speaks in phrases, SOB even at rest, pulse 100-120)    Negative: Headache and stiff neck (can't touch chin to chest)    Negative: Oxygen level (e.g., pulse oximetry) 90 percent or lower    Negative: Chest pain or pressure    Negative: Patient sounds very sick or weak to the triager    Protocols used: CORONAVIRUS (COVID-19) DIAGNOSED OR [id number] 1.18.2022

## 2022-06-29 NOTE — H&P
[facility]    History and Physical - Hospitalist Service       Date of Admission:  6/29/2022    Assessment & Plan      Rm Villarreal is a 81 year old male admitted on 6/29/2022.     # Genralized weakness  # metabolic encephalopathy  # Confirmed COVID-19 infection, mild           Symptom Onset 7 days ago   Date of 1st Positive Test 4 days ago   Vaccination Status Fully Vaccinated       - COVID-19 special precautions, continuous pulse-ox  - Oxygen: continue current support with room air; titrate to keep SpO2 between 90-96%  - Labs: BMP and cbc in the AM  - Imaging: no additional imaging needed at this time  - Breathing treatments: no inhalers needed; avoid nebulizers in favor of MDIs   - IV fluids:none  - Antibiotics: not indicated. Will add on UA  - COVID-Focused Medications: we discussed remdesivir with patient and his wife. Given his symptoms started at least  7 days ago, he is unlikely to benefit and they do not feel strongly about starting the 3 day course. They had also declined paxlovid recently. Thankfully, no e/o moderate or severe disease  - will plan on stopping the ativan and starting seroquel at bedtime  -       Hyperbilirubinemia, elevated alkaline phosphatase  Has been elevated in the past, unclear etiology  Plan  - repeat, consider US of the abdomen    Chronic medical symptoms   CAD with ischemic CM with EF of 25-30%: resume home medications  Atrial fibrillation: continue eliquis  PAD:  Carotid artery stenosis  Plan  - resume home meds: eliquis, metoprolol, niacin      - DVT Prophylaxis:       - At high risk of thrombotic complications due to COVID-19 (DDimer = N/A )         - PROPHYLACTIC dosing: lovenox 40mg daily     Diet:  regular  DVT Prophylaxis: Pneumatic Compression Devices  Velazquez Catheter: Not present  Central Lines: None  Cardiac Monitoring: None  Code Status:   full code    Clinically Significant Risk Factors Present on Admission             # Hypoalbuminemia:  Albumin = 3.3 g/dL (Ref range: 3.4 - 5.0 g/dL) on admission, will monitor as appropriate   # Coagulation Defect: home medication list includes an anticoagulant medication            Disposition Plan         The patient's care was discussed with the Patient and Patient's Family.    Kavon Christian DO  Hospitalist Service  [facility]  Securely message with the Vocera Web Console (learn more here)  Text page via "[employer]" Paging/Directory         ______________________________________________________________________    Chief Complaint   Altered mental status    History is obtained from the patient    History of Present Illness   Rm Villarreal is a 81 year old male with past medical history of of coronary artery disease with ischemic cardiomyopathy with a EF of 25 to 30%, atrial fibrillation on Eliquis, history of peripheral arterial disease and carotid artery stenosis status post carotid endarterectomy who presents with approximately 5 to 7 days of altered mental status in the setting of COVID-19 infection.    Patient apparently had a acute onset of diarrhea about 1 week ago on 6/22 was began to subside 2 days late, however he started to make delirious comments.  He was falling out of bed with increasing somnolence.  On the 25th he tested positive at home for COVID.  He started to have some improvements and so the patient declined antivirals for acute COVID infection.    Review of Systems    The 10 point Review of Systems is negative other than noted in the HPI or here.     Past Medical History    I have reviewed this patient's medical history and updated it with pertinent information if needed.   Past Medical History:   Diagnosis Date     Adjustment disorder      Carotid stenosis, bilateral     left CEA 2007     Chronic atrial fibrillation (H)     warfarin     COPD (chronic obstructive pulmonary disease) (H) 02/03/2021     Coronary artery disease     cardiac cath 2014: chronic occlusion  of circumflex and apical LAD: medical management; cath : stent to LAD, historical: stent to RCA     History of blood transfusion      Hypertension      Ischemic cardiomyopathy     ef 25%     Pulmonary nodule        Past Surgical History   I have reviewed this patient's surgical history and updated it with pertinent information if needed.  Past Surgical History:   Procedure Laterality Date     angiogram  2014    cardiac cath 2014: chronic occlusion of circumflex and apical LAD: medical management     ANGIOGRAM      stent to LAD     ANGIOGRAM      stent to RCA     COLONOSCOPY           COLONOSCOPY N/A 2018    Procedure: COMBINED COLONOSCOPY, SINGLE OR MULTIPLE BIOPSY/POLYPECTOMY BY BIOPSY;  colonoscopy;  Surgeon: Tyler Dee MD;  Location:  GI     GI SURGERY      hemorrhoidectomy     IR VISCERAL ANGIOGRAM  2019     VASCULAR SURGERY      left CEA       Social History   I have reviewed this patient's social history and updated it with pertinent information if needed.  Social History     Tobacco Use     Smoking status: Former Smoker     Quit date: 2003     Years since quittin.9     Smokeless tobacco: Never Used   Substance Use Topics     Alcohol use: Yes     Comment: 1-2 beer daily     Drug use: No       Family History   I have reviewed this patient's family history and updated it with pertinent information if needed.  Family History   Problem Relation Age of Onset     Heart Disease Father        Prior to Admission Medications   Prior to Admission Medications   Prescriptions Last Dose Informant Patient Reported? Taking?   LORazepam (ATIVAN) 0.5 MG tablet 2022 at AM Spouse/Significant Other No Yes   Sig: TAKE 1/2 TABLET BY MOUTH TWICE DAILY   Metoprolol Tartrate 37.5 MG TABS 2022 at AM Spouse/Significant Other No Yes   Sig: Take 18.75 mg by mouth 2 times daily (1/2 tab 2 times daily)   NIACIN CR PO 2022 at AM Spouse/Significant Other Yes Yes   Sig: Take  1,000 mg by mouth 2 times daily (before meals)    Vitamin D (Cholecalciferol) 25 MCG (1000 UT) TABS 6/29/2022 at AM Spouse/Significant Other Yes Yes   Sig: Take 1,000 Units by mouth daily.   albuterol (PROAIR HFA/PROVENTIL HFA/VENTOLIN HFA) 108 (90 Base) MCG/ACT inhaler  at PRN Spouse/Significant Other No Yes   Sig: INHALE 1 PUFF BY MOUTH EVERY 4 HOURS AS NEEDED FOR COUGH OR CONGESTION   apixaban ANTICOAGULANT (ELIQUIS) 5 MG tablet 6/29/2022 at AM Spouse/Significant Other No Yes   Sig: Take 1 tablet (5 mg) by mouth 2 times daily   aspirin (ASA) 81 MG EC tablet 6/29/2022 at AM Spouse/Significant Other Yes Yes   Sig: Take 81 mg by mouth daily.   melatonin 5 MG tablet  at PRN Spouse/Significant Other Yes Yes   Sig: Take 5 mg by mouth nightly as needed for sleep   nitroglycerin (NITROSTAT) 0.4 MG SL tablet  at PRN Spouse/Significant Other No Yes   Sig: For chest pain place 1 tablet under the tongue every 5 minutes for 3 doses. If symptoms persist 5 minutes after 1st dose call 911.      Facility-Administered Medications: None     Allergies   No Known Allergies    Physical Exam   Vital Signs: Temp: 97.3  F (36.3  C) Temp src: Temporal BP: 120/86 Pulse: 87   Resp: 22 SpO2: 98 % O2 Device: None (Room air)    Weight: 158 lbs 0 oz    Physical Exam  Constitutional:       General: He is not in acute distress.     Appearance: Normal appearance. He is not ill-appearing.   HENT:      Head: Normocephalic and atraumatic.      Mouth/Throat:      Mouth: Mucous membranes are moist.      Pharynx: Oropharynx is clear.   Eyes:      Extraocular Movements: Extraocular movements intact.      Conjunctiva/sclera: Conjunctivae normal.      Pupils: Pupils are equal, round, and reactive to light.   Cardiovascular:      Rate and Rhythm: Normal rate and regular rhythm.      Pulses: Normal pulses.      Heart sounds: Normal heart sounds.   Pulmonary:      Effort: Pulmonary effort is normal.      Breath sounds: Normal breath sounds.   Abdominal:       General: Abdomen is flat. Bowel sounds are normal.      Palpations: Abdomen is soft.   Musculoskeletal:         General: No swelling or tenderness. Normal range of motion.   Skin:     General: Skin is warm and dry.      Capillary Refill: Capillary refill takes less than 2 seconds.   Neurological:      General: No focal deficit present.      Mental Status: He is alert. He is disoriented.      Cranial Nerves: No cranial nerve deficit.      Comments: Generalized weakness, inability to follow complex directions   Psychiatric:         Mood and Affect: Mood normal.         Behavior: Behavior normal.      Comments: Mildly delirious but redirectable           Data   Data reviewed today: I reviewed all medications, new labs and imaging results over the last 24 hours. I personally reviewed     CXR: enlarged cardiac silhouette, but no pumonary opacities or pleural effusion    EKG: afib with PVC    Recent Labs   Lab 06/29/22  1132   WBC 5.3   HGB 14.5   MCV 98         POTASSIUM 3.9   CHLORIDE 104   CO2 26   BUN 12   CR 0.83   ANIONGAP 4   RANDY 8.7   *   ALBUMIN 3.3*   PROTTOTAL 7.0   BILITOTAL 1.6*   ALKPHOS 153*   ALT 50   AST 56*
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
## 2022-06-29 NOTE — ED TRIAGE NOTES
Pt was diagnosed with a home test 2 days; with covid.  Per spouse, pt has been having a cognitive decline over the last week.  For instance, today he went to the bathroom on the top of the toilet instead of in.       Triage Assessment     Row Name 06/29/22 1051       Triage Assessment (Adult)    Airway WDL WDL       Respiratory WDL    Respiratory WDL WDL       Skin Circulation/Temperature WDL    Skin Circulation/Temperature WDL WDL       Cardiac WDL    Cardiac WDL WDL       Peripheral/Neurovascular WDL    Peripheral Neurovascular WDL WDL       Cognitive/Neuro/Behavioral WDL    Cognitive/Neuro/Behavioral WDL X;orientation

## 2022-06-29 NOTE — PHARMACY-ADMISSION MEDICATION HISTORY
Pharmacy Medication History  Admission medication history interview status for the 6/29/2022  admission is complete. See EPIC admission navigator for prior to admission medications     Location of Interview: Phone  Medication history sources: Patient's family/friend (Spouse) and Surescripts    Significant changes made to the medication list:  Changed:   -Melatonin 5mg at bedtime --> 5mg at bedtime PRN    In the past week, patient estimated taking medication this percent of the time: greater than 90%    Additional medication history information:   none    Medication reconciliation completed by provider prior to medication history? No    Time spent in this activity: 15 mins    Prior to Admission medications    Medication Sig Last Dose Taking? Auth Provider Long Term End Date   albuterol (PROAIR HFA/PROVENTIL HFA/VENTOLIN HFA) 108 (90 Base) MCG/ACT inhaler INHALE 1 PUFF BY MOUTH EVERY 4 HOURS AS NEEDED FOR COUGH OR CONGESTION  at PRN Yes Zach Pickering MD Yes    apixaban ANTICOAGULANT (ELIQUIS) 5 MG tablet Take 1 tablet (5 mg) by mouth 2 times daily 6/29/2022 at AM Yes Inge Hollis APRN CNP     aspirin (ASA) 81 MG EC tablet Take 81 mg by mouth daily. 6/29/2022 at AM Yes Reported, Patient     LORazepam (ATIVAN) 0.5 MG tablet TAKE 1/2 TABLET BY MOUTH TWICE DAILY 6/29/2022 at AM Yes Zach Pickering MD     melatonin 5 MG tablet Take 5 mg by mouth nightly as needed for sleep  at PRN Yes Reported, Patient     Metoprolol Tartrate 37.5 MG TABS Take 18.75 mg by mouth 2 times daily (1/2 tab 2 times daily) 6/29/2022 at AM Yes Inge Hollis APRN CNP Yes    NIACIN CR PO Take 1,000 mg by mouth 2 times daily (before meals)  6/29/2022 at AM Yes Reported, Patient     nitroglycerin (NITROSTAT) 0.4 MG SL tablet For chest pain place 1 tablet under the tongue every 5 minutes for 3 doses. If symptoms persist 5 minutes after 1st dose call 911.  at PRN Yes Héctor Solis MD Yes    Vitamin D (Cholecalciferol) 25 MCG (1000 UT) TABS  Take 1,000 Units by mouth daily. 6/29/2022 at AM Yes Reported, Patient         The information provided in this note is only as accurate as the sources available at the time of update(s)     Skyler MoraD

## 2022-06-29 NOTE — PROGRESS NOTES
RECEIVING UNIT ED HANDOFF REVIEW    ED Nurse Handoff Report was reviewed by: Olivia Lundberg RN on June 29, 2022 at 4:49 PM

## 2022-06-29 NOTE — ED NOTES
[facility]  ED Nurse Handoff Report    ED Chief complaint: Altered Mental Status      ED Diagnosis:   Final diagnoses:   Infection due to 2019 novel coronavirus   Generalized muscle weakness   Delirium       Code Status: Hospitalist to address    Allergies: No Known Allergies    Patient Story: Presents with known COVID. Positive home test 4 days ago. Symptom onset 7 days ago. Wife reports increased confusion over past couple of days with delusions. Fall at home 5 days ago.     Focused Assessment:  A&Ox4. Left forearm skin tear-bandaged. Atrial fibrillation. Vital signs stable. On Room air. Breath sounds clear bilaterally. Generalized weakness, needs support moving self in gurney. Continent.    Treatments and/or interventions provided: Labs, urine sample pending. Blood cultures x 2 done in ER  Patient's response to treatments and/or interventions: Stable     To be done/followed up on inpatient unit:  Admission orders    Does this patient have any cognitive concerns?: none    Activity level - Baseline/Home:  Independent  Activity Level - Current:   bedrest    Patient's Preferred language: English   Needed?: No    Isolation: None and COVID r/o and special precautions  Infection: Not Applicable  COVID r/o and special precautions  Patient tested for COVID 19 prior to admission: YES  Bariatric?: No    Vital Signs:   Vitals:    06/29/22 1140 06/29/22 1300 06/29/22 1330 06/29/22 1400   BP: 109/74  118/85 120/86   Pulse: 88 95 100 87   Resp: 24 18 12 22   Temp:       TempSrc:       SpO2: 96%  98%    Weight:           Labs Ordered and Resulted from Time of ED Arrival to Time of ED Departure   COMPREHENSIVE METABOLIC PANEL - Abnormal       Result Value    Sodium 134      Potassium 3.9      Chloride 104      Carbon Dioxide (CO2) 26      Anion Gap 4      Urea Nitrogen 12      Creatinine 0.83      Calcium 8.7      Glucose 108 (*)     Alkaline Phosphatase 153 (*)     AST 56 (*)     ALT 50      Protein  Total 7.0      Albumin 3.3 (*)     Bilirubin Total 1.6 (*)     GFR Estimate 89     INFLUENZA A/B & SARS-COV2 PCR MULTIPLEX - Abnormal    Influenza A PCR Negative      Influenza B PCR Negative      RSV PCR Negative      SARS CoV2 PCR Positive (*)    NT PROBNP INPATIENT - Abnormal    N terminal Pro BNP Inpatient 6,690 (*)    CBC WITH PLATELETS AND DIFFERENTIAL - Abnormal    WBC Count 5.3      RBC Count 4.36 (*)     Hemoglobin 14.5      Hematocrit 42.6      MCV 98      MCH 33.3 (*)     MCHC 34.0      RDW 13.5      Platelet Count 167      % Neutrophils 68      % Lymphocytes 14      % Monocytes 15      % Eosinophils 3      % Basophils 0      % Immature Granulocytes 0      NRBCs per 100 WBC 0      Absolute Neutrophils 3.6      Absolute Lymphocytes 0.7 (*)     Absolute Monocytes 0.8      Absolute Eosinophils 0.1      Absolute Basophils 0.0      Absolute Immature Granulocytes 0.0      Absolute NRBCs 0.0     LACTIC ACID WHOLE BLOOD - Normal    Lactic Acid 0.9     TROPONIN I - Normal    Troponin I High Sensitivity 12     ROUTINE UA WITH MICROSCOPIC REFLEX TO CULTURE   UA MACROSCOPIC WITH REFLEX TO MICRO AND CULTURE   BLOOD CULTURE   BLOOD CULTURE       Cardiac Rhythm:Cardiac Rhythm: Atrial fibrillation    Was the PSS-3 completed:   Yes  What interventions are required if any?               Family Comments: wife at bedsid  OBS brochure/video discussed/provided to patient/family: N/A              Name of person given brochure if not patient: n/a              Relationship to patient: n/a    For the majority of the shift this patient's behavior was Green.   Behavioral interventions performed were none.    ED NURSE PHONE NUMBER: *[phone]

## 2022-06-29 NOTE — ED PROVIDER NOTES
History   Chief Complaint:  Altered Mental Status     The history is provided by the spouse, the patient and medical records.      Rm Villarreal is a 81 year old male with history of coronary artery disease, atrial fibrillation, ischemic cardiomyopathy, hypertension, and COPD who presents in an altered mental status. According to the wife, the patient had an onset of diarrhea on 06/22, one week ago. On the 23rd, he developed severe diarrhea and was informed of a COVID exposure. On the 24th, his diarrhea began to subside, however was noted to be more confused and would make delirious comments. He also fell out of his bed during that day. He slept most of the day on the 25th, however seemed more confused at night and had a positive home COVID test. On the 27th, they spoke to a nurse regarding antiviral medications, however the patient would have to lower his Eliquis dosage. Due to improvement in sick symptoms and normal oxygen saturations, they opted not to start the antiviral medication. The wife brought the patient to the emergency department today due to continued and worsening confusion compared to his baseline. She states the patient is able to ambulate on his own at baseline. The patient denies headaches and has been maintaining good PO intake.     Review of Systems   Unable to perform ROS: Mental status change     Allergies:  The patient has no known allergies.     Medications:  Albuterol inhaler  Eliquis  Ativan  Metoprolol   Nitrostat     Past Medical History:     Coronary atherosclerosis of native coronary artery   Hyperlipidemia  Hypertension  Adjustment disorder with anxious mood  Eczema  Sedative, hypnotic or anxiolytic dependence   Ischemic cardiomyopathy   Carotid stenosis, bilateral  Pulmonary nodule  Anemia  Peripheral artery disease   Atrial fibrillation  COPD     Past Surgical History:    Coronary angiogram  Coronary angioplasty, stent to LAD  Coronary angioplasty, stent to RCA  Colonoscopy  (x2)  Hemorrhoidectomy   Vascular surgery, left CEA     Social History:  The patient presents to the ED with his wife.  PCP:  Zach Pickering    Physical Exam     Patient Vitals for the past 24 hrs:   BP Temp Temp src Pulse Resp SpO2 Weight   06/29/22 1616 (!) 147/98 -- -- 113 20 98 % --   06/29/22 1400 120/86 -- -- 87 22 -- --   06/29/22 1330 118/85 -- -- 100 12 98 % --   06/29/22 1300 -- -- -- 95 18 -- --   06/29/22 1140 109/74 -- -- 88 24 96 % --   06/29/22 1054 125/81 97.3  F (36.3  C) Temporal 112 16 (!) 89 % 71.7 kg (158 lb)     Physical Exam  General: Patient is alert and generally weak appearing.  Alert and oriented to person, place, time but generalized confusion and delusions.  HEENT: Head atraumatic    Eyes: pupils equal and reactive. Conjunctiva clear   Nares: patent   Oropharynx: no lesions, uvula midline, no palatal draping, normal voice, no trismus  Neck: Supple without lymphadenopathy, no meningismus  Chest: Heart regular rate and rhythm.   Lungs: Equal clear to auscultation with no wheeze.  Rales bilaterally and coarse wet cough  Abdomen: Soft, non tender, nondistended, normal bowel sounds  Back: No costovertebral angle tenderness, no midline C, T or L spine tenderness  Neuro: Grossly nonfocal, normal speech, strength equal bilaterally, CN 2-12 intact  Extremities: No deformities, equal radial and DP pulses. No clubbing, cyanosis.  No edema  Skin: Warm and dry with no rash.       Emergency Department Course   ECG  ECG results from 06/29/22   EKG 12-lead, tracing only     Value    Systolic Blood Pressure     Diastolic Blood Pressure     Ventricular Rate 81    Atrial Rate 227    DC Interval     QRS Duration 80        QTc 441    P Axis     R AXIS -70    T Axis -15    Interpretation ECG      Atrial fibrillation with premature ventricular or aberrantly conducted complexes  Left anterior fascicular block  Anteroseptal infarct (cited on or before 24-JAN-2001)  T wave abnormality, consider inferior  ischemia  Abnormal ECG  When compared with ECG of 17-APR-2021 22:18,  Left anterior fascicular block is now Present  T wave inversion now evident in Inferior leads  Nonspecific T wave abnormality now evident in Lateral leads  Confirmed by GENERATED REPORT, COMPUTER ([id number]),  Nina Charles ([id number]) on 6/29/2022 11:38:11 AM       Imaging:  CT Head w/o Contrast   Final Result   IMPRESSION:   Unchanged age-related changes with no acute intracranial abnormality.      YVROSE CARABALLO MD            SYSTEM ID:  [id number]      XR Chest Port 1 View   Final Result   IMPRESSION: Single AP view of the chest was obtained. Mildly enlarged   cardiac silhouette. No suspicious focal pulmonary opacities. No   significant pleural effusion or pneumothorax.      MELLISA WATERS MD            SYSTEM ID:  [id number]      Report per radiology    Laboratory:  Labs Ordered and Resulted from Time of ED Arrival to Time of ED Departure   COMPREHENSIVE METABOLIC PANEL - Abnormal       Result Value    Sodium 134      Potassium 3.9      Chloride 104      Carbon Dioxide (CO2) 26      Anion Gap 4      Urea Nitrogen 12      Creatinine 0.83      Calcium 8.7      Glucose 108 (*)     Alkaline Phosphatase 153 (*)     AST 56 (*)     ALT 50      Protein Total 7.0      Albumin 3.3 (*)     Bilirubin Total 1.6 (*)     GFR Estimate 89     INFLUENZA A/B & SARS-COV2 PCR MULTIPLEX - Abnormal    Influenza A PCR Negative      Influenza B PCR Negative      RSV PCR Negative      SARS CoV2 PCR Positive (*)    NT PROBNP INPATIENT - Abnormal    N terminal Pro BNP Inpatient 6,690 (*)    CBC WITH PLATELETS AND DIFFERENTIAL - Abnormal    WBC Count 5.3      RBC Count 4.36 (*)     Hemoglobin 14.5      Hematocrit 42.6      MCV 98      MCH 33.3 (*)     MCHC 34.0      RDW 13.5      Platelet Count 167      % Neutrophils 68      % Lymphocytes 14      % Monocytes 15      % Eosinophils 3      % Basophils 0      % Immature Granulocytes 0      NRBCs per 100 WBC 0      Absolute  Neutrophils 3.6      Absolute Lymphocytes 0.7 (*)     Absolute Monocytes 0.8      Absolute Eosinophils 0.1      Absolute Basophils 0.0      Absolute Immature Granulocytes 0.0      Absolute NRBCs 0.0     LACTIC ACID WHOLE BLOOD - Normal    Lactic Acid 0.9     TROPONIN I - Normal    Troponin I High Sensitivity 12     ROUTINE UA WITH MICROSCOPIC REFLEX TO CULTURE   BLOOD CULTURE   BLOOD CULTURE      Emergency Department Course:     Reviewed:  I reviewed nursing notes, vitals, past medical history and Care Everywhere    Assessments:  1101 I obtained history and examined the patient as noted above.   1327 I rechecked the patient and explained findings.     Consults:  2122 I spoke to Dr. Christian of the hospitalist service who accepts the patient for admission.     Disposition:  The patient was admitted to the hospital under the care of Dr. Christian.     Impression & Plan   Medical Decision Making:  Patient is a 81-year-old male with history of COPD,'s coronary artery disease, A. fib and some mild dementia who presents the emergency department with delirium and hallucinations.  Patient was diagnosed with COVID-19 on June 25 4 days ago.  He declined Paxil bid as an outpatient due to having to decrease his Eliquis and seeming like he was feeling better.  His diarrhea had stopped prior to this.  O2 saturations have been maintained at home.  Patient's been difficult for his wife to take care of given his delirium and hallucinations.  He has been very confused and is worsening at nighttime.  In addition his weakness is worsening and she does not feel she can keep him safe from falling.  Work-up in the emergency department was undertaken as noted above.  He did have a fall out of bed a couple of days ago but thankfully head CT is negative for acute intracranial hemorrhage.  There is no evidence of mass lesion or tumor.  Doubt stroke as patient has equal strength bilaterally and no other stroke signs.  Patient is  hemodynamically stable and afebrile.  He has no hypoxia except for his initial triage oxygen saturation.  But while resting in bed he is not requiring supplemental oxygen.  No evidence of myocarditis or acute coronary syndrome.  Doubt PE.  No evidence of pneumonia.  Given patient's weakness and delirium which is likely exacerbated by his infection we will plan to admit him to the hospital.  Consideration for remdesivir while admitted.  Continued supportive care given COVID-19 infection in a medically compromised patient    Diagnosis:    ICD-10-CM    1. Infection due to 2019 novel coronavirus  U07.1    2. Generalized muscle weakness  M62.81    3. Delirium  R41.0      Scribe Disclosure:  I, Lorin Chino, am serving as a scribe at 10:58 AM on 6/29/2022 to document services personally performed by Chrissy Hay MD based on my observations and the provider's statements to me.     Chrissy Hay MD  06/29/22 1294

## 2022-06-30 NOTE — PROGRESS NOTES
Observation goals  PRIOR TO DISCHARGE       Comments: -diagnostic tests and consults completed and resulted - not met - PT/OT nick today  -vital signs normal or at patient baseline - not met, HR tachy.  Nurse to notify provider when observation goals have been met and patient is ready for discharge.    Orientation/Cognitive: A&Ox1. Disoriented to place, time and situation. Very forgetful. When awake pt getting out of bed every 2-10 minutes - at times setting off bed alarm before staff even leaves room.   Observation Goals (Met/ Not Met): Inpatient  Mobility Level/Assist Equipment: Assist X1 w/gb.  Fall Risk (Y/N): Yes  Behavior Concerns: Confusion. Getting out of bed frequently.   Pain Management: Denied pain, then endorsed pain. Unable to say what the pain was, but pointed at upper abdomen first on the right side then on the left. Tylenol given x1.  Tele/VS/O2: VSS on RA. No tele  ABNL Lab/BG: AST- 56, BNP- 6690, Alk phos- 153.  Diet: Reg  Bowel/Bladder: Continet. Up to bathroom  Skin Concerns: L arm skin tear - bleeding overnight - mepilex changed.  Drains/Devices: Pulled out PIV - no access. Pt too confused to place new IV without sitter in room to prevent removal.   Tests/Procedures for next shift: PT/OT nick tomorrow. AM labs.  Anticipated DC date & active delays: TBD  Patient Stated Goal for Today: to go home

## 2022-06-30 NOTE — PLAN OF CARE
Orientation/Cognitive: Alert to self. Confused. Forgetful. Seroquel available, not given since last night.   Observation Goals (Met/ Not Met): Inpat  Mobility Level/Assist Equipment: A1 gb/w  Fall Risk (Y/N): Y  Behavior Concerns: Y  Pain Management: denies  Tele/VS/O2: VSS on RA, except HR in 90s. No tele, Afib on elequis  ABNL Lab/BG:  Covid. Blood cultures pending  Diet: reg, poor diet. Encourage fluids  Bowel/Bladder: cont  Skin Concerns: Ric bilateral LE.  L arm skin tear, mepilex  Drains/Devices: No IV, provider aware. Encourage Fluids instead of IV fluids  Tests/Procedures for next shift: PT  Anticipated DC date & active delays: TBD. OT rec TCU  Patient Stated Goal for Today: yessy

## 2022-06-30 NOTE — PROGRESS NOTES
Observation goals  PRIOR TO DISCHARGE       Comments: -diagnostic tests and consults completed and resulted - not met - PT/OT eval today  -vital signs normal or at patient baseline - not met, HR tachy.  Nurse to notify provider when observation goals have been met and patient is ready for discharge.

## 2022-06-30 NOTE — PROGRESS NOTES
Observation goals  PRIOR TO DISCHARGE        Comments: -diagnostic tests and consults completed and resulted - not met  -vital signs normal or at patient baseline - not met, hrt rate tachy.  Nurse to notify provider when observation goals have been met and patient is ready for discharge.     Orientation/Cognitive: AOX2. Disoriented to time & situation.  Observation Goals (Met/ Not Met): Inpatient  Mobility Level/Assist Equipment: Assist X1 w/gb.  Fall Risk (Y/N): Yes  Behavior Concerns: Confusion. Getting out of bed.   Pain Management: Denies.  Tele/VS/O2: VSS on RA. No tele  ABNL Lab/BG: AST- 56, BNP- 6690, Alk phos- 153.  Diet: Reg  Bowel/Bladder: Continet. Up to bathroom  Skin Concerns: L arm skin tear, mapilex in place, CDI.  Drains/Devices: PIV SL  Tests/Procedures for next shift: PT/OT eval tomorrow. AM labs.  Anticipated DC date & active delays: TBD  Patient Stated Goal for Today: to go home

## 2022-06-30 NOTE — PROGRESS NOTES
06/30/22 0900   Quick Adds   Type of Visit Initial Occupational Therapy Evaluation   Living Environment   People in Home spouse   Current Living Arrangements house   Home Accessibility stairs to enter home;stairs within home   Number of Stairs, Main Entrance 3   Stair Railings, Main Entrance none   Number of Stairs, Within Home, Primary greater than 10 stairs   Stair Railings, Within Home, Primary railings safe and in good condition   Transportation Anticipated family or friend will provide   Living Environment Comments Pt home information provided by spouse via phone as pt presents with significant confusion   Self-Care   Usual Activity Tolerance good   Current Activity Tolerance moderate   Regular Exercise No   Equipment Currently Used at Home none  (pt owns a walker from previous injury)   Fall history within last six months yes   Number of times patient has fallen within last six months 3   Activity/Exercise/Self-Care Comment Per wife, pt is previously independent with aDLs and mobility without a gait aid, pt has had a significant decline in the past 2 weeks   Instrumental Activities of Daily Living (IADL)   IADL Comments Pt spouse completes IADLS at baseline   General Information   Onset of Illness/Injury or Date of Surgery 06/29/22   Referring Physician Kavon Christian, DO   Patient/Family Therapy Goal Statement (OT) Patient unable to state, pt spouse would like pt to return to baseline   Additional Occupational Profile Info/Pertinent History of Current Problem Patient brought to hospital by wife due to weakness and AMS over the past 2 weeks, pt fell getting out of bed. Found to have covid   Existing Precautions/Restrictions fall   Limitations/Impairments safety/cognitive   Cognitive Status Examination   Orientation Status person  (Pt able to state year, unable to report location, date, month or day of the week)   Affect/Mental Status (Cognitive) confused   Follows Commands follows one-step  commands;50-74% accuracy;physical/tactile prompts required;repetition of directions required;verbal cues/prompting required   Safety Deficit ability to follow commands;at risk behavior observed;awareness of need for assistance;impulsivity;insight into deficits/self-awareness;severe deficit   Memory Deficit severe deficit   Attention Deficit severe deficit   Executive Function Deficit severe deficit   Cognitive Status Comments Pt presenting with severe impairment scoring -26 on Short blessed indicating severe cognitive impairment. Pt is positive for delirium. Pt spouse reports he was diagnosed with mild cognitive impairment in the past, this is an acute decline from his previous cognitive state   Visual Perception   Visual Impairment/Limitations corrective lenses full-time   Sensory   Sensory Quick Adds No deficits were identified   Pain Assessment   Patient Currently in Pain No   Integumentary/Edema   Integumentary/Edema no deficits were identifed   Range of Motion Comprehensive   General Range of Motion no range of motion deficits identified   Strength Comprehensive (MMT)   Comment, General Manual Muscle Testing (MMT) Assessment Poor activity tolerance, general weakness   Coordination   Coordination Comments Impaired due to cognition   Bed Mobility   Comment (Bed Mobility) SBA   Transfers   Transfer Comments CGA   Balance   Balance Comments Impaired in standing   Activities of Daily Living   BADL Assessment/Intervention bathing;lower body dressing;toileting   Bathing Assessment/Intervention   Comment, (Bathing) Decreased tolerance and safety with showerng   Lower Body Dressing Assessment/Training   Comment, (Lower Body Dressing) Max assist due to cognition/poor command following   Toileting   Comment, (Toileting) Decreased tolerance, assist needed for safety   Clinical Impression   Criteria for Skilled Therapeutic Interventions Met (OT) Yes, treatment indicated   OT Diagnosis Decline in ADL independence and safety    Influenced by the following impairments Cognitive decline, covid   OT Problem List-Impairments impacting ADL problems related to;activity tolerance impaired;balance;cognition;coordination;strength   Assessment of Occupational Performance 5 or more Performance Deficits   Identified Performance Deficits Impaired ADL independence and safety   Planned Therapy Interventions (OT) ADL retraining;cognition;progressive activity/exercise   Clinical Decision Making Complexity (OT) low complexity   Anticipated Equipment Needs Upon Discharge (OT)   (TBD)   Risk & Benefits of therapy have been explained evaluation/treatment results reviewed;risks/benefits reviewed;care plan/treatment goals reviewed;current/potential barriers reviewed;participants voiced agreement with care plan;participants included;patient;spouse/significant other  (Spouse consented via phone call)   OT Discharge Planning   OT Discharge Recommendation (DC Rec) Transitional Care Facility   OT Rationale for DC Rec Patient presenting below his baseline with balance, cognition and ADL safety. Pt requires continued OT in TCU to return to prior level of function. If pt discharges home, he requires 24/7 assist from a capable caregiver due to cognition and balance impairment. Pt spouse reports shes unable to provide this level assist   OT Brief overview of current status -26 on Short Blessed indicating severe cognitive impairment   Total Evaluation Time (Minutes)   Total Evaluation Time (Minutes) 10   OT Goals   Therapy Frequency (OT) 3 times/wk   OT Predicted Duration/Target Date for Goal Attainment 07/04/22   OT Goals Lower Body Dressing;Hygiene/Grooming;Toilet Transfer/Toileting;Cognition   OT: Hygiene/Grooming supervision/stand-by assist;while standing   OT: Lower Body Dressing Supervision/stand-by assist;using adaptive equipment   OT: Toilet Transfer/Toileting Modified independent;toilet transfer;cleaning and garment management;using adaptive equipment   OT:  Cognitive Patient/caregiver will verbalize understanding of cognitive assessment results/recommendations as needed for safe discharge planning

## 2022-06-30 NOTE — UTILIZATION REVIEW
"  Admission Status; Secondary Review Determination       Under the authority of the Utilization Management Committee, the utilization review process indicated a secondary review on the above patient. The review outcome is based on review of the medical records, discussions with staff, and applying clinical experience noted on the date of the review.     (x) Inpatient Status Appropriate - This patient's medical care is consistent with medical management for inpatient care and reasonable inpatient medical practice.     RATIONALE FOR DETERMINATION   81 year old male admitted on 6/29/2022. Presenting with genralized weakness,  metabolic encephalopathy and found to have confirmed COVID-19 infection  Patient requires inpatient admission versus short stay observation or outpatient treatment for the following reasons: high risk patient medical history of of coronary artery disease with ischemic cardiomyopathy with a EF of 25 to 30%, atrial fibrillation on Eliquis, history of peripheral arterial disease and carotid artery stenosis status post carotid endarterectomy. OT eval: \" PT presenting with severe impairment scoring -26 on Short blessed indicating severe cognitive impairment. Pt is positive for delirium. Patient presenting below his baseline with balance, cognition and ADL safety. \"  The expected length of stay at the time of admission was more than 2 nights because of the severity of illness, intensity of service provided, and risk for adverse outcome. Inpatient admission is appropriate.         This document was produced using voice recognition software       The information on this document is developed by the utilization review team in order for the business office to ensure compliance. This only denotes the appropriateness of proper admission status and does not reflect the quality of care rendered.   The definitions of Inpatient Status and Observation Status used in making the determination above are those " provided in the CMS Coverage Manual, Chapter 1 and Chapter 6, section 70.4.   Sincerely,   ADDIE LARSON MD   System Medical Director   Utilization Management   [facility].

## 2022-06-30 NOTE — PROGRESS NOTES
[facility]    Hospitalist Progress Note    Date of Service (when I saw the patient): 06/30/2022    Assessment & Plan   Rm Villarreal is a 81 year old male who was admitted on 6/29/2022.    Rm Villarreal is a 81 year old male admitted on 6/29/2022.      # Genralized weakness  # metabolic encephalopathy  # Confirmed COVID-19 infection, mild             Symptom Onset 7 days ago   Date of 1st Positive Test 4 days ago   Vaccination Status Fully Vaccinated         - COVID-19 special precautions, continuous pulse-ox  - Oxygen: continue current support with room air; titrate to keep SpO2 between 90-96%  - Imaging: no additional imaging needed at this time  - Breathing treatments: no inhalers needed; avoid nebulizers in favor of MDIs   - IV fluids:none  - Antibiotics: not indicated. UA negative   - COVID-Focused Medications: we discussed remdesivir with patient and his wife. Given his symptoms started at least  7 days ago, he is unlikely to benefit and they do not feel strongly about starting the 3 day course. They had also declined paxlovid recently. Thankfully, no e/o moderate or severe disease  - will plan on stopping the ativan and starting seroquel at bedtime  -         Hyperbilirubinemia, elevated alkaline phosphatase  Has been elevated in the past, unclear etiology  Plan  - repeat, consider US of the abdomen     Chronic medical symptoms   CAD with ischemic CM with EF of 25-30%: resume home medications  Atrial fibrillation: continue eliquis  PAD:  Carotid artery stenosis  Plan  - resume home meds: eliquis, metoprolol, niacin        - DVT Prophylaxis:    on Eliquis         Diet:  regular  DVT Prophylaxis: Pneumatic Compression Devices  Velazquez Catheter: Not present  Central Lines: None  Cardiac Monitoring: None  Code Status:   full code        Clinically Significant Risk Factors Present on Admission                 # Hypoalbuminemia: Albumin = 3.3 g/dL (Ref range: 3.4 - 5.0 g/dL) on  admission, will monitor as appropriate   # Coagulation Defect: home medication list includes an anticoagulant medication               Disposition Plan to TCU . Social work consulted           Kadie Strong MD, MD  [phone] (P)      Interval History   Resting comfortably in bed with bedside sitter in place. Pleasantly confused      -Data reviewed today: I reviewed all new labs and imaging results over the last 24 hours. I personally reviewed no images or EKG's today.    Physical Exam   Temp: 97.6  F (36.4  C) Temp src: Axillary BP: 106/69 Pulse: 93   Resp: 18 SpO2: 97 % O2 Device: None (Room air)    Vitals:    06/29/22 1054 06/29/22 1800   Weight: 71.7 kg (158 lb) 64.2 kg (141 lb 9.6 oz)     Vital Signs with Ranges  Temp:  [97.4  F (36.3  C)-98.2  F (36.8  C)] 97.6  F (36.4  C)  Pulse:  [] 93  Resp:  [12-22] 18  BP: (106-147)/(69-98) 106/69  SpO2:  [95 %-98 %] 97 %  I/O last 3 completed shifts:  In: 240 [P.O.:240]  Out: 300 [Urine:300]    Constitutional: Awake, alert, cooperative,pleasantly confused   Respiratory: Clear to auscultation bilaterally, no crackles or wheezing  Cardiovascular: Regular rate and rhythm, normal S1 and S2, and no murmur noted  GI: Normal bowel sounds, soft, non-distended, non-tender  Skin/Integumen: No rashes, no cyanosis, no edema  Other:     Medications     sodium chloride         apixaban ANTICOAGULANT  5 mg Oral BID     aspirin  81 mg Oral Daily     metoprolol tartrate  18.75 mg Oral BID     QUEtiapine  25 mg Oral At Bedtime       Data   Recent Labs   Lab 06/30/22  0636 06/29/22  1132   WBC 4.8 5.3   HGB 14.7 14.5   MCV 97 98    167    134   POTASSIUM 3.4 3.9   CHLORIDE 101 104   CO2 24 26   BUN 12 12   CR 0.71 0.83   ANIONGAP 9 4   RANDY 8.9 8.7   * 108*   ALBUMIN  --  3.3*   PROTTOTAL  --  7.0   BILITOTAL  --  1.6*   ALKPHOS  --  153*   ALT  --  50   AST  --  56*       No results found for this or any previous visit (from the past 24 hour(s)).

## 2022-06-30 NOTE — PROVIDER NOTIFICATION
MD Notification    Notified Person: MD    Notified Person Name:  Kadie Strong         Notification Date/Time:6-30 11:02    Notification Interaction:web page    Purpose of Notification:Pt pulled out IV last night. Does not have IV access. Pt currently asleep and calm. Putting in IV may agitate pt. Pt is able to drink. Do we need IV access and bolus? Will attempt if needed.     Orders Received: Okay not to have IV access. Encourage fluid    Comments:

## 2022-07-01 NOTE — PLAN OF CARE
Goal Outcome Evaluation:      Orientation/CognitiveA&O x2-3 disoriented to situation and place  Observation Goals (Met/ Not Met):Inpatient status  Mobility Level/Assist Equipment: Ax1,GB  Fall Risk (Y/N): Y  Behavior Concerns:Needs constant queuing   Pain Management: Denies pain   Tele/VS/O2: VSS on RA  ABNL Lab/BG:BNP:6,690 Alkaline phos:152  Diet: Regular  Bowel/Bladder: Continent   Skin Concerns: Scattered bruising   Drains/Devices:NA  Tests/Procedures for next shift: PT/OT/SW consult  Anticipated DC date & active delays: Pt was recommended to go to a TCU, awaiting placement   Patient Stated Goal for Today: unable to state goal

## 2022-07-01 NOTE — PROGRESS NOTES
[facility]    Hospitalist Progress Note    Interval History   - Pleasantly confused, denies any pain or discomfort, mild productive cough  - REquiring 1:1 curerntly, due to impulsivity when no one is in the room. May be able to get off of it    Assessment & Plan   Summary: Rm Villarreal is a 81 year old male with PMH  CAD, afib, PAD, carotid artery stenosis, dementia, who was admitted on 6/29/2022 with COVID-19 and infectious encephalopathy.    Confirmed COVID-19 infection, mild    Generalized weakness  Vaccinated against COVID-19. Declined Paxlovid and Remdesivir due to symptom onset several days prior to admission. Patient has a mild cough but is not needing any oxygen. He is expected to continue to improve.  - CRP is 32 today trend one more tomorrow  - COVID-19 special precautions  - On room air  - Not on any COVID meds    Acute metabolic versus infectious encephalopathy  History of dementia  History of mild dementia unclear severity. Continues to require 1:1. Wife is reportedly unable to take care of patient at home. PT/OT currently recommending TCU.   - TCU discharge pending  - If patient makes some further improvements with PT/OT I can potentially foresee the patient going home instead of TCU, however I will defer to therapies     Hyperbilirubinemia, elevated alkaline phosphatase  Has been elevated in the past, unclear etiology  - Recheck LFTs tomorrow  - US of abdomen ordered    Chronic medical symptoms     CAD with ischemic CM with EF of 25-30%: resume home medications    Atrial fibrillation: continue eliquis    PAD  Carotid artery stenosis  - PTA metoprolol, niacin     DVT Prophylaxis: Eliquis  Code Status: Full Code  PT/OT: ordered  Diet: Regular Diet Adult      Disposition: Expected discharge to TCU when found    Adan Escobedo MD, MD  Text Page  (7am to 6pm)  -Data reviewed today: I reviewed all new labs and imaging results over the last 24 hours.    Physical Exam    Temp: 98.3  F (36.8  C) Temp src: Oral BP: 104/73 Pulse: 103   Resp: 20 SpO2: 96 % O2 Device: None (Room air)    Vitals:    06/29/22 1054 06/29/22 1800   Weight: 71.7 kg (158 lb) 64.2 kg (141 lb 9.6 oz)     Vital Signs with Ranges  Temp:  [97.7  F (36.5  C)-98.9  F (37.2  C)] 98.3  F (36.8  C)  Pulse:  [] 103  Resp:  [18-20] 20  BP: ()/(53-91) 104/73  SpO2:  [96 %-99 %] 96 %  I/O last 3 completed shifts:  In: 600 [P.O.:600]  Out: 200 [Urine:200]  O2 requirements: none    Constitutional: Male in NAD  HEENT: Eyes nonicteric, oral mucosa moist  Cardiovascular: RRR, normal S1/2, no m/r/g  Respiratory: CTAB, no wheezing or crackles  Vascular: No LE pitting edema  GI: Normoactive bowel sounds, nontender  Skin/Integumen: No rashes  Neuro/Psych: Appropriate affect and mood. A&O to self only, moves all extremities    Medications       apixaban ANTICOAGULANT  5 mg Oral BID     aspirin  81 mg Oral Daily     metoprolol tartrate  18.75 mg Oral BID     QUEtiapine  25 mg Oral At Bedtime       Data   Recent Labs   Lab 07/01/22  0905 06/30/22  0636 06/29/22  1132   WBC  --  4.8 5.3   HGB  --  14.7 14.5   MCV  --  97 98   PLT  --  179 167    134 134   POTASSIUM 3.8 3.4 3.9   CHLORIDE 104 101 104   CO2 21 24 26   BUN 11 12 12   CR 0.71 0.71 0.83   ANIONGAP 10 9 4   RANDY 8.7 8.9 8.7   GLC 98 103* 108*   ALBUMIN  --   --  3.3*   PROTTOTAL  --   --  7.0   BILITOTAL  --   --  1.6*   ALKPHOS  --   --  153*   ALT  --   --  50   AST  --   --  56*       Imaging:   No results found for this or any previous visit (from the past 24 hour(s)).

## 2022-07-01 NOTE — PLAN OF CARE
Shift:[id number] 7/1/22  Summary: COVID positive, encephalopathy  Orientation/Cognitive: confused, A/Ox2-3, disoriented to situation   Observation Goals (Met/ Not Met): Not MET   Mobility Level/Assist Equipment: A1 GB/walker  Fall Risk (Y/N): Yes  Behavior Concerns: impulsive, sitter at bedside   Pain Management: denies pain   Tele/VS/O2: VSS on RA, BP soft, MD aware   ABNL Lab/BG: CRP 32.3, UA abnormal  Diet: NPO until US of abdomen is completed   Bowel/Bladder: Continent  Skin Concerns: scattered bruising, skin tear to left elbow, dressing changed today   Drains/Devices: No IV access  Tests/Procedures for next shift: US of abdomen   Anticipated DC date & active delays: PT/SW following for placement   Other important info:

## 2022-07-01 NOTE — CONSULTS
Care Management Initial Consult    General Information  Assessment completed with: Spouse or significant otherQueenie  Type of CM/SW Visit: Initial Assessment    Primary Care Provider verified and updated as needed: Yes   Readmission within the last 30 days: no previous admission in last 30 days      Reason for Consult: discharge planning  Advance Care Planning: Advance Care Planning Reviewed: other (see comments) (Has [facility] docs)          Communication Assessment  Patient's communication style: spoken language (English or Bilingual)    Hearing Difficulty or Deaf: no   Wear Glasses or Blind: yes    Cognitive  Cognitive/Neuro/Behavioral: .WDL except  Level of Consciousness: alert, confused  Arousal Level: opens eyes spontaneously  Orientation: disoriented to, place, situation  Mood/Behavior: calm  Best Language: 0 - No aphasia  Speech: clear    Living Environment:   People in home: spouse  Queenie  Current living Arrangements: house      Able to return to prior arrangements: yes       Family/Social Support:  Care provided by: self  Provides care for: no one  Marital Status:   Children, Wife  Queenie       Description of Support System: Involved, Supportive    Support Assessment: Adequate social supports, Adequate family and caregiver support    Current Resources:   Patient receiving home care services: No     Community Resources: None  Equipment currently used at home: none  Supplies currently used at home: None    Employment/Financial:  Employment Status: retired        Financial Concerns:             Lifestyle & Psychosocial Needs:  Social Determinants of Health     Tobacco Use: Medium Risk     Smoking Tobacco Use: Former Smoker     Smokeless Tobacco Use: Never Used   Alcohol Use: Not on file   Financial Resource Strain: Not on file   Food Insecurity: Not on file   Transportation Needs: Not on file   Physical Activity: Not on file   Stress: Not on file   Social Connections: Not on file   Intimate Partner  Violence: Not on file   Depression: At risk     PHQ-2 Score: 3   Housing Stability: Not on file       Functional Status:  Prior to admission patient needed assistance:              Mental Health Status:          Chemical Dependency Status:                Values/Beliefs:  Spiritual, Cultural Beliefs, Yazdanism Practices, Values that affect care: no               Additional Information:  Per care management/social work consult for discharge planning, patient was admitted on 6/29/2022 with generalized weakness, covid19 infection. Tentative date of discharge is 7/10/2022. Writer called Queenie (spouse) and she said that patient lives in house with her. Patient was independent before coming to the hospital. Patient doesn't use any equipment at home. Mentioned tcu recommendation and she said she thinks that would be best for patient as she's not able to take care of patient at home. She said that once patient is covid recovered, she would like a referral sent to [facility].    Will continue to follow.    DARRYL Mercado

## 2022-07-01 NOTE — PROVIDER NOTIFICATION
MD Notification    Notified Person: MD    Notified Person Name: Dr Escobedo     Notification Date/Time: 0847 7/1/22    Notification Interaction:web page    Purpose of Notification:/53 this morning. Metoprolol ordered but no BP parameters in place. Ok to give or need to hold?     Orders Received:Per Dr Escobedo, Ok to give metoprolol    Comments:

## 2022-07-01 NOTE — PROVIDER NOTIFICATION
MD Notification    Notified Person: MD    Notified Person Name:  Gregg    Notification Date/Time: 7/1/22 1030    Notification Interaction:  Web page     Purpose of Notification:FYI, BP recheck 97/57, pulse 62. Please put BP/Pulse parameters for metoprolol, thanks     Orders Received: order parameters To hold for BP < 90/60 or HR < 55    Comments:

## 2022-07-01 NOTE — PROGRESS NOTES
"   07/01/22 0800   Quick Adds   Type of Visit Initial PT Evaluation       Present no   Living Environment   People in Home spouse   Current Living Arrangements house   Home Accessibility stairs to enter home;stairs within home   Number of Stairs, Main Entrance 3   Stair Railings, Main Entrance none   Number of Stairs, Within Home, Primary greater than 10 stairs   Stair Railings, Within Home, Primary railings on both sides of stairs   Transportation Anticipated family or friend will provide   Living Environment Comments Pt is a poor historian, reports he is in a hotel. Information obtained from chart review.   Self-Care   Usual Activity Tolerance good   Current Activity Tolerance moderate   Regular Exercise No   Equipment Currently Used at Home none  (Pt owns walker but does not use)   Fall history within last six months yes   Number of times patient has fallen within last six months 3   Activity/Exercise/Self-Care Comment Per chart review, pt is IND at baseline with all ADLs. Pt ambulates without an AD but does have a FWW if needed. Pt has had a significant delcine in past 2 weeks.   General Information   Onset of Illness/Injury or Date of Surgery 07/01/22   Referring Physician Kavon Christian, DO   Patient/Family Therapy Goals Statement (PT) \"To go home\"   Pertinent History of Current Problem (include personal factors and/or comorbidities that impact the POC) Per Chart: Zoran is a 81 y.o. male admitted for COVID and generalized weakness.   Existing Precautions/Restrictions fall   Weight-Bearing Status - LLE full weight-bearing   Weight-Bearing Status - RLE full weight-bearing   Cognition   Affect/Mental Status (Cognition) confused   Orientation Status (Cognition) disoriented to;situation;time;place   Follows Commands (Cognition) follows one-step commands;50-74% accuracy   Pain Assessment   Patient Currently in Pain No   Integumentary/Edema   Integumentary/Edema no deficits were " identifed   Posture    Posture Forward head position;Protracted shoulders   Range of Motion (ROM)   Range of Motion ROM is WFL   Strength (Manual Muscle Testing)   Strength (Manual Muscle Testing) Deficits observed during functional mobility   Strength Comments Bilateral Hip Flexion: 3/5   Bed Mobility   Comment, (Bed Mobility) Supine>sit w/ CGA   Transfers   Comment, (Transfers) Sit>stand w/ CGA   Gait/Stairs (Locomotion)   Sauk Level (Gait) minimum assist (75% patient effort)   Assistive Device (Gait)   (no AD)   Distance in Feet (Required for LE Total Joints) 75'  (10' eval)   Comment, (Gait/Stairs) Pt ambulated ~10' w/o AD and min A x 1 for safety. Pt was unsteady with turns and not consistently following directions.   Balance   Balance Comments Pt able to sit at EOB unsupported without LOB. Pt was unsteady with ambulation.   Sensory Examination   Sensory Perception patient reports no sensory changes   Clinical Impression   Criteria for Skilled Therapeutic Intervention Yes, treatment indicated   PT Diagnosis (PT) Impaired gait   Influenced by the following impairments Decreased activity tolerance; decreased balance; decreased strength   Functional limitations due to impairments Impaired functional mobility   Clinical Presentation (PT Evaluation Complexity) Stable/Uncomplicated   Clinical Presentation Rationale Clinical Judgement   Clinical Decision Making (Complexity) low complexity   Planned Therapy Interventions (PT) balance training;bed mobility training;gait training;patient/family education;stair training;strengthening;transfer training   Risk & Benefits of therapy have been explained evaluation/treatment results reviewed;care plan/treatment goals reviewed;risks/benefits reviewed;current/potential barriers reviewed;participants voiced agreement with care plan;participants included;patient   PT Discharge Planning   PT Discharge Recommendation (DC Rec) Transitional Care Facility;home with home care  physical therapy;home with assist   PT Rationale for DC Rec Pt is below baseline. Pt currently requires assist with all functional mobility. Pt currently presenting with deficits in activity tolerance, balance, and impaired cognition. Due to these deficits, pt would benefit from continued skilled PT services via TCU to address deficits and improve IND with safety and functional mobility. If pt were to return home, pt would require 24/7 A x 1 with all functional mobility and ADLs, and HHPT. Per chart, pt's spouse would be unable to assist pt at his current level of function at this time.   PT Brief overview of current status Supine>sit w/ CGA; sit>stand w/o AD and CGA; gait w/o AD and min A x 1   Total Evaluation Time   Total Evaluation Time (Minutes) 10   Physical Therapy Goals   PT Frequency 3x/week   PT Predicted Duration/Target Date for Goal Attainment 07/06/22   PT Goals Bed Mobility;Transfers;Stairs;Gait   PT: Bed Mobility Supervision/stand-by assist;Supine to/from sit   PT: Transfers Supervision/stand-by assist;Sit to/from stand   PT: Gait Supervision/stand-by assist;150 feet   PT: Stairs Minimal assist;Greater than 10 stairs

## 2022-07-02 NOTE — PLAN OF CARE
"Orientation/Cognitive: disoriented to situation, redirectable  Observation Goals (Met/ Not Met): inpatient   Mobility Level/Assist Equipment: AX1 with GB and walker  Fall Risk (Y/N): Yes  Behavior Concerns: None  Pain Management: Denies pain    Tele/VS/O2: VSS on RA, ex soft BP   ABNL Lab/BG: CRP: 32.3  Diet: Regular diet  Bowel/Bladder: Incontinent at times  Skin Concerns: Abrasion on the L arm, scattered bruising  Drains/Devices: None, no IV access  Tests/Procedures for next shift: none   Anticipated DC date & active delays: TCU vs Home pending improvement    Patient Stated Goal for Today: rest, \" to go home\"                         "

## 2022-07-02 NOTE — PLAN OF CARE
Goal Outcome Evaluation:   Observation goals  PRIOR TO DISCHARGE        Comments: -diagnostic tests and consults completed and resulted ,met  -vital signs normal or at patient baseline met  Nurse to notify provider when observation goals have been met and patient is ready for discharge.

## 2022-07-02 NOTE — PLAN OF CARE
Shift:[id number] 7/2/22  Summary: COVID positive, encephalopathy  Orientation/Cognitive: confused, A/Ox3, disoriented to time. Redirectable  Observation Goals (Met/ Not Met): N/A  Mobility Level/Assist Equipment: SBA  Fall Risk (Y/N): Yes  Behavior Concerns: impulsive, sitter at bedside   Pain Management: denies pain   Tele/VS/O2: VSS on RA   ABNL Lab/BG: CRP 28.7, UA abnormal  Diet: NPO until US of abdomen is completed   Bowel/Bladder: Continent  Skin Concerns: scattered bruising, skin tear to left elbow, dressing changed today.   Drains/Devices: No IV access  Tests/Procedures for next shift: US of abdomen   Anticipated DC date & active delays: PT/SW following for placement   Other important info: Ambulated in hallway. No sitter at bedside.

## 2022-07-02 NOTE — PROGRESS NOTES
[facility]    Medicine Progress Note - Hospitalist Service    Date of Admission:  6/29/2022    Assessment & Plan        Rm Villarreal is a 81 year old male with PMH  CAD, afib, PAD, carotid artery stenosis, dementia, who was admitted on 6/29/2022 with COVID-19 and infectious encephalopathy.    Confirmed COVID-19 infection, mild    Generalized weakness  Vaccinated against COVID-19. Declined Paxlovid and Remdesivir due to symptom onset several days prior to admission. Patient has a mild cough but is not needing any oxygen. He is expected to continue to improve.  - CRP is 28 today.  - COVID-19 special precautions.  - On room air.  - Not on any COVID meds.    Acute metabolic versus infectious encephalopathy  History of dementia  History of mild dementia unclear severity. Continues to require 1:1. Wife is reportedly unable to take care of patient at home. PT/OT currently recommending TCU.   - TCU discharge pending.  - If patient makes some further improvements with PT/OT I can potentially foresee the patient going home instead of TCU, however I will defer to therapies.     Hyperbilirubinemia, elevated alkaline phosphatase  Has been elevated in the past, unclear etiology  - Recheck LFTs tomorrow improved except bili slightly higher.  - Asymptomatic.  - US of abdomen gallstones, but no signs of obstruction or acute cholecystitis.    CAD with ischemic CM with EF of 25-30%  - Continue PTA metoprolol.  - Not on an ACE-I/ARB or diuretic at baseline.    Atrial fibrillation  - Continue PTA Eliquis.    PAD  Carotid artery stenosis  - Continue PTA metoprolol, niacin.       Diet: Regular Diet Adult    DVT Prophylaxis: DOAC  Velazquez Catheter: Not present  Central Lines: None  Cardiac Monitoring: None  Code Status: Full Code      Disposition Plan      Expected Discharge Date: 07/08/2022    Discharge Delays: PT Disposition recs needed  Isolation - find facility that will accept    Discharge Comments: TCU  +  Covid until 7/8        The patient's care was discussed with the Bedside Nurse and Patient.    Bautista Montes MD  Hospitalist Service  [facility]  Securely message with the Vocera Web Console (learn more here)  Text page via [facility] Paging/Directory         Clinically Significant Risk Factors Present on Admission                     ______________________________________________________________________    Interval History   Rm Villarreal was seen this morning. History limited by dementia. Denies fevers, chest pain, shortness of breath, nausea, abdominal pain. Discussed with nursing, no new concerns.    Data reviewed today: I reviewed all medications, new labs and imaging results over the last 24 hours. I personally reviewed no images or EKG's today.    Physical Exam   Vital Signs: Temp: 97.9  F (36.6  C) Temp src: Oral BP: 110/54 Pulse: 95   Resp: 16 SpO2: 96 % O2 Device: None (Room air)    Weight: 137 lbs 6.4 oz  Constitutional: awake, alert, cooperative, no apparent distress, sitting up in a chair  Respiratory: clear to auscultation bilaterally, no crackles or wheezing  Cardiovascular: regular rate and rhythm, normal S1 and S2, no murmur noted  GI: normal bowel sounds, soft, non-distended, non-tender  Skin: warm, dry  Musculoskeletal: no lower extremity pitting edema present  Neurologic: awake, alert, forgetful, moves all extremities    Data   Recent Labs   Lab 07/02/22  0710 07/01/22  0905 06/30/22  0636 06/29/22  1132   WBC  --   --  4.8 5.3   HGB  --   --  14.7 14.5   MCV  --   --  97 98   PLT  --   --  179 167    135 134 134   POTASSIUM 3.8 3.8 3.4 3.9   CHLORIDE 104 104 101 104   CO2 24 21 24 26   BUN 12 11 12 12   CR 0.78 0.71 0.71 0.83   ANIONGAP 7 10 9 4   RANDY 8.7 8.7 8.9 8.7   GLC 89 98 103* 108*   ALBUMIN 2.9*  --   --  3.3*   PROTTOTAL 7.1  --   --  7.0   BILITOTAL 1.8*  --   --  1.6*   ALKPHOS 129  --   --  153*   ALT 41  --   --  50   AST 44  --   --  56*      Medications       apixaban ANTICOAGULANT  5 mg Oral BID     aspirin  81 mg Oral Daily     metoprolol tartrate  18.75 mg Oral BID     QUEtiapine  25 mg Oral At Bedtime

## 2022-07-02 NOTE — PLAN OF CARE
"Goal Outcome Evaluation:  Orientation/Cognitive: A&OX2-3, disoriented to time and place, redirectable  Observation Goals (Met/ Not Met): NA/ inpt status  Mobility Level/Assist Equipment: AX1 with GB and walker  Fall Risk (Y/N): Yes  Behavior Concerns: None, calm and pleasant  Pain Management: Denies, Robitussin given for frequent dry coughs   Tele/VS/O2: VSS on RA, no tele  ABNL Lab/BG: CRP elevated at 32.3  Diet: Regular diet  Bowel/Bladder: Incontinent at times, still with ongoing diarrhea  Skin Concerns: Abrasion on the L arm, scattered bruising's  Drains/Devices: None, no IV access  Tests/Procedures for next shift: US of the abd with cholelithiasis without cholecystitis  Anticipated DC date & active delays: Pending completion of COVID isolation if TCU still indicated for discharge or sooner if able to manage at home.   Patient Stated Goal for Today: \" to go home\"                        "

## 2022-07-02 NOTE — PROGRESS NOTES
Observation goals  PRIOR TO DISCHARGE        Comments:   -diagnostic tests and consults completed and resulted: not met   -vital signs normal or at patient baseline: met    Nurse to notify provider when observation goals have been met and patient is ready for discharge.

## 2022-07-03 NOTE — PLAN OF CARE
Shift:[id number] 7/3/22  Summary: Encephalopathy, COVID recovered   Orientation/Cognitive: confused, A/Ox3, disoriented to time. Redirectable  Observation Goals (Met/ Not Met): N/A  Mobility Level/Assist Equipment: Independent  Fall Risk (Y/N): Yes  Behavior Concerns: none  Pain Management: denies pain   Tele/VS/O2: VSS on RA   ABNL Lab/BG: CRP 28.7  Diet: Regular   Bowel/Bladder: Continent  Skin Concerns: scattered bruising, skin tear to left elbow, dressing changed today.   Drains/Devices: No IV access  Tests/Procedures for next shift: none   Anticipated DC date & active delays: PT/SW following, possible discharge home tomorrow   Other important info: Ambulated in hallway. No sitter at bedside.

## 2022-07-03 NOTE — PROGRESS NOTES
Care Management Follow Up    Length of Stay (days): 4    Expected Discharge Date: 07/08/2022     Concerns to be Addressed:  Discharge plan, patient no longer requires TCU for physical therapy.  Based on RN notes, patient remains disoriented to situation.  Discussed with Dr Montes that it is unlikely patient's insurance will cover SNF/TCU stay given patient's only skilled need is Occupational therapy.  Dr Montes will discuss with wife.   Patient plan of care discussed at interdisciplinary rounds: Yes    Anticipated Discharge Disposition: home with supervision for cognition vs TCU once out of COVID Restrictions     Anticipated Discharge Services: None  Anticipated Discharge DME: None    Patient/family educated on Medicare website which has current facility and service quality ratings: yes  Education Provided on the Discharge Plan:    Patient/Family in Agreement with the Plan: yes    Referrals Placed by CM/SW:    Private pay costs discussed:     Additional Information:        MARYELLEN Carr

## 2022-07-03 NOTE — PLAN OF CARE
Orientation/Cognitive: disoriented to time  Observation Goals (Met/ Not Met): inpatient   Mobility Level/Assist Equipment: SBA/ind  Fall Risk (Y/N): Yes  Behavior Concerns: None  Pain Management: Denies pain    Tele/VS/O2: VSS on RA,    ABNL Lab/BG: CRP: 28.7  Diet: Regular diet  Bowel/Bladder: continent  Skin Concerns: L elbow skin tear, dressing changed, CDI. scattered bruising  Drains/Devices: no IV access  Tests/Procedures for next shift: none   Anticipated DC date & active delays: TCU vs Home pending improvement    Patient Stated Goal for Today: rest

## 2022-07-03 NOTE — PLAN OF CARE
Goal Outcome Evaluation:      Orientation/Cognitive: confused to time situation and place  Observation Goals (Met/ Not Met): n  Mobility Level/Assist Equipment: independent  Fall Risk (Y/N): n  Behavior Concerns: n  Pain Management: n  Tele/VS/O2: vss  ABNL Lab/BG: n  Diet: r  Bowel/Bladder: continent  Skin Concerns: n  Drains/Devices: n  Tests/Procedures for next shift: n  Anticipated DC date & active delays: TBD  Patient Stated Goal for Today: n

## 2022-07-03 NOTE — PLAN OF CARE
Physical Therapy Discharge Summary    Reason for therapy discharge:    All goals and outcomes met, no further needs identified.    Progress towards therapy goal(s). See goals on Care Plan in [facility] electronic health record for goal details.  Goals met    Therapy recommendation(s):    No further therapy is recommended.

## 2022-07-03 NOTE — PROGRESS NOTES
Care Management Follow Up    Length of Stay (days): 4    Expected Discharge Date: 07/08/2022     Concerns to be Addressed:   Disposition    Patient plan of care discussed at interdisciplinary rounds: Yes    Anticipated Discharge Disposition:  Home with wife likely 7/4     Anticipated Discharge Services: None  Anticipated Discharge DME: None    Patient/family educated on Medicare website which has current facility and service quality ratings: yes  Education Provided on the Discharge Plan:    Patient/Family in Agreement with the Plan: yes    Referrals Placed by CM/SW:    Private pay costs discussed:   Additional Information:  Dr Montes updated wife that TCU is no longer recommended for PT and that patient's cognition appears to be improving. . Wife requested for patient to stay thru tonight to watch if patient's cognition.     Writer updated wife that patient has been taken out of COVID precautions and explained she can now visit patient and see how patient is doing.   Also explained Dr Montes will round in the morning and if he discharges patient to home, the hospital requests discharge by 1100.  Wife expressed pleasure that patient has improved to the extent that TCU no longer recommended.  Also pleased patient is out of COVID precautions.  Plan Will follow up tomorrow morning.         MARYELLEN Carr

## 2022-07-03 NOTE — PROGRESS NOTES
[facility]    Medicine Progress Note - Hospitalist Service    Date of Admission:  6/29/2022    Assessment & Plan        Rm Villarreal is a 81 year old male with PMH  CAD, afib, PAD, carotid artery stenosis, dementia, who was admitted on 6/29/2022 with COVID-19 and infectious encephalopathy.    Confirmed COVID-19 infection, mild    Generalized weakness  Vaccinated against COVID-19. Declined Paxlovid and Remdesivir due to symptom onset several days prior to admission. Patient has a mild cough but is not needing any oxygen. He is expected to continue to improve.  - Symptom onset: 6/22/22. Date of first positive test: 6/25/22 - positive home test.  - CRP was 28 on 7/2/22.  - COVID-19 special precautions, will check with infection prevention regarding when this can be discontinued.  - On room air.  - Not on any COVID meds.    Acute metabolic versus infectious encephalopathy  History of dementia  History of mild dementia unclear severity. Continues to require 1:1. Wife is reportedly unable to take care of patient at home. PT/OT currently recommending TCU.   - Encephalopathy improving.  No longer requires one-to-one sitter.  Oriented to person, city, month, year today.  - If mental status continues to improve, possible discharge home with supervision from his wife.     Hyperbilirubinemia, elevated alkaline phosphatase  Has been elevated in the past, unclear etiology  - Recheck LFTs improved except bili slightly higher.  - Asymptomatic.  - US of abdomen gallstones, but no signs of obstruction or acute cholecystitis.    CAD with ischemic CM with EF of 25-30%  - Continue PTA metoprolol.  - Not on an ACE-I/ARB or diuretic at baseline.    Atrial fibrillation  - Continue PTA Eliquis.    PAD  Carotid artery stenosis  - Continue PTA metoprolol, niacin.       Diet: Regular Diet Adult    DVT Prophylaxis: DOAC  Velazquez Catheter: Not present  Central Lines: None  Cardiac Monitoring: None  Code Status: Full  Code      Disposition Plan      Expected Discharge Date: 07/08/2022    Discharge Delays: PT Disposition recs needed  Isolation - find facility that will accept    Discharge Comments: TCU  + Covid until 7/8       Given improvement with PT and in cognition, possible discharge home tomorrow with assistance from his wife.  The patient's care was discussed with the Bedside Nurse, Care Coordinator/, Patient and Patient's Family.    Bautista Montes MD  Hospitalist Service  [facility]  Securely message with the Vocera Web Console (learn more here)  Text page via [employer] Paging/Directory         Clinically Significant Risk Factors Present on Admission                     ______________________________________________________________________    Interval History   Rm Villarreal was seen today.  He feels pretty good today.  No new complaints/concerns.  Denies fevers, chest pain, shortness of breath, nausea, abdominal pain.  Oriented to person, city, month, year.  Did not know the current [place] president.  Doing better with PT, no further PT indicated.  Updated his wife by phone today.  TCU likely not an option going forward given his improvement.  Consider discharge home with his wife in the near future.  We will see how he does today, if mental status continues to improve, possible discharge home tomorrow.    Data reviewed today: I reviewed all medications, new labs and imaging results over the last 24 hours. I personally reviewed no images or EKG's today.    Physical Exam   Vital Signs: Temp: 97.7  F (36.5  C) Temp src: Oral BP: 98/64 Pulse: 61   Resp: 18 SpO2: 97 % O2 Device: None (Room air)    Weight: 137 lbs 6.4 oz  Constitutional: awake, alert, cooperative, no apparent distress, up ambulating independently in the room  Respiratory: clear to auscultation bilaterally, no crackles or wheezing  Cardiovascular: regular rate and rhythm, normal S1 and S2, no murmur noted  GI: normal bowel  sounds, soft, non-distended, non-tender  Skin: warm, dry  Musculoskeletal: no lower extremity pitting edema present  Neurologic: awake, alert, oriented to person, city, month, year; answered questions appropriately, moves all extremities    Data   Recent Labs   Lab 07/02/22  0710 07/01/22  0905 06/30/22  0636 06/29/22  1132   WBC  --   --  4.8 5.3   HGB  --   --  14.7 14.5   MCV  --   --  97 98   PLT  --   --  179 167    135 134 134   POTASSIUM 3.8 3.8 3.4 3.9   CHLORIDE 104 104 101 104   CO2 24 21 24 26   BUN 12 11 12 12   CR 0.78 0.71 0.71 0.83   ANIONGAP 7 10 9 4   RANDY 8.7 8.7 8.9 8.7   GLC 89 98 103* 108*   ALBUMIN 2.9*  --   --  3.3*   PROTTOTAL 7.1  --   --  7.0   BILITOTAL 1.8*  --   --  1.6*   ALKPHOS 129  --   --  153*   ALT 41  --   --  50   AST 44  --   --  56*     Medications       apixaban ANTICOAGULANT  5 mg Oral BID     aspirin  81 mg Oral Daily     metoprolol tartrate  18.75 mg Oral BID     QUEtiapine  25 mg Oral At Bedtime

## 2022-07-04 NOTE — PROGRESS NOTES
Care Management Discharge Note    Discharge Date: 07/04/2022       Discharge Disposition: Home    Discharge Services:  (gave resources for home companions and adult day care)    Discharge DME: None    Discharge Transportation: family or friend will provide    Private pay costs discussed: did not discuss cost of home companion or day care     PAS Confirmation Code:  (n/a)  Patient/family educated on Medicare website which has current facility and service quality ratings: yes    Education Provided on the Discharge Plan:  yes  Persons Notified of Discharge Plans: wife here and discussed discharge to home with Dr Montes  Patient/Family in Agreement with the Plan: yes    Handoff Referral Completed: Yes    Additional Information:  Wife requested information about home companion services.  Resources entered into the discharge instructions and reviewed with wife.         SHIRLEY CarrSW

## 2022-07-04 NOTE — DISCHARGE INSTRUCTIONS
[facility] [phone]    [facility] [phone]    [email or web address] has a large list of home care agency list with a grid showing which agency provides  services.    [facility] [phone] can offer names of  agencies also.

## 2022-07-04 NOTE — DISCHARGE SUMMARY
[facility]  Hospitalist Discharge Summary      Date of Admission:  6/29/2022  Date of Discharge:  7/4/2022  Discharging Provider: Bautista Montes MD  Discharge Service: Hospitalist Service    Discharge Diagnoses   Confirmed COVID-19 infection, mild    Generalized weakness  Acute metabolic versus infectious encephalopathy  History of dementia  Hyperbilirubinemia, elevated alkaline phosphatase  CAD with ischemic CM with EF of 25-30%  Atrial fibrillation  PAD  Carotid artery stenosis    Follow-ups Needed After Discharge   Follow-up Appointments     Follow-up and recommended labs and tests       Follow up with primary care provider, Zach Pickering, within 7 days for   hospital follow-up with CMP prior to the appointment.             Unresulted Labs Ordered in the Past 30 Days of this Admission     Date and Time Order Name Status Description    6/29/2022 11:00 AM Blood Culture Arm, Right Preliminary     6/29/2022 11:00 AM Blood Culture Peripheral Blood Preliminary       These results will be followed up by Dr. Montes    Discharge Disposition   Discharged to home  Condition at discharge: Stable    Hospital Course     mR Villarreal is a 81 year old male with PMH  CAD, afib, PAD, carotid artery stenosis, dementia, who was admitted on 6/29/2022 with COVID-19 and infectious encephalopathy.    Confirmed COVID-19 infection, mild    Generalized weakness  Vaccinated against COVID-19. Declined Paxlovid and Remdesivir due to symptom onset several days prior to admission. Patient has a mild cough but is not needing any oxygen. He is expected to continue to improve.  - Symptom onset: 6/22/22. Date of first positive test: 6/25/22 - positive home test.  - CRP was 28 on 7/2/22.  - COVID-19 special precautions discontinued on 7/3/22.  - On room air.  - Did not receive any COVID meds while in the hospital.  - PT consulted, weakness has improved significantly and he does not require any further PT.    Acute  metabolic versus infectious encephalopathy  History of dementia  History of mild dementia unclear severity. Continues to require 1:1. Wife is reportedly unable to take care of patient at home. PT/OT currently recommending TCU.   - Encephalopathy improving.  No longer requires one-to-one sitter.  Oriented to person, city, month, year today.  - Given improvement in mental status, plan discharge home with assistance from his wife.  - Social work will meet with patient and his wife, provide resources for outpatient services if needed.     Hyperbilirubinemia, elevated alkaline phosphatase  Has been elevated in the past, unclear etiology  - Recheck LFTs improved except bili slightly higher.  - Asymptomatic.  - US of abdomen gallstones, but no signs of obstruction or acute cholecystitis.  - Can recheck at follow-up appointment with PCP.    CAD with ischemic CM with EF of 25-30%  - Continue PTA metoprolol.  - Not on an ACE-I/ARB or diuretic at baseline.  - Outpatient follow-up.    Atrial fibrillation  - Continue PTA Eliquis.    PAD  Carotid artery stenosis  - Continue PTA metoprolol, niacin.    Consultations This Hospital Stay   PHYSICAL THERAPY ADULT IP CONSULT  OCCUPATIONAL THERAPY ADULT IP CONSULT  VASCULAR ACCESS ADULT IP CONSULT  CARE MANAGEMENT / SOCIAL WORK IP CONSULT  INFECTION PREVENTION IP CONSULT    Code Status   Full Code    Time Spent on this Encounter   I, Bautista Montes MD, personally saw the patient today and spent greater than 30 minutes discharging this patient.       Bautista Montes MD  [facility] EXTENDED RECOVERY AND SHORT STAY  [address] [place] [id number]  Phone: [phone]  ______________________________________________________________________    Physical Exam   Vital Signs: Temp: 97.9  F (36.6  C) Temp src: Axillary BP: 131/87 Pulse: 97   Resp: 15 SpO2: 94 % O2 Device: None (Room air)    Weight: 137 lbs 6.4 oz  Constitutional: awake, alert, cooperative, no  apparent distress, ambulating independently in the room  Respiratory: clear to auscultation bilaterally, no crackles or wheezing  Cardiovascular: regular rate and rhythm, normal S1 and S2, no murmur noted  GI: normal bowel sounds, soft, non-distended, non-tender  Skin: warm, dry  Musculoskeletal: no lower extremity pitting edema present  Neurologic: awake, alert, oriented to person, city, month, year, moves all extremities       Primary Care Physician   Zach Pickering    Discharge Orders      Reason for your hospital stay    COVID-19 infection, delirium, weakness     Activity    Your activity upon discharge: activity as tolerated     Follow-up and recommended labs and tests     Follow up with primary care provider, Zach Pickering, within 7 days for hospital follow-up with CMP prior to the appointment.     Diet    Follow this diet upon discharge: Regular Diet Adult     Significant Results and Procedures   Most Recent 3 CBC's:  Recent Labs   Lab Test 06/30/22  0636 06/29/22  1132 08/05/21  1110   WBC 4.8 5.3 7.0   HGB 14.7 14.5 15.1   MCV 97 98 94    167 258     Most Recent 3 BMP's:  Recent Labs   Lab Test 07/02/22  0710 07/01/22  0905 06/30/22  0636    135 134   POTASSIUM 3.8 3.8 3.4   CHLORIDE 104 104 101   CO2 24 21 24   BUN 12 11 12   CR 0.78 0.71 0.71   ANIONGAP 7 10 9   RANDY 8.7 8.7 8.9   GLC 89 98 103*     Most Recent 2 LFT's:  Recent Labs   Lab Test 07/02/22  0710 06/29/22  1132   AST 44 56*   ALT 41 50   ALKPHOS 129 153*   BILITOTAL 1.8* 1.6*     Results for orders placed or performed during the hospital encounter of 06/29/22   XR Chest Port 1 View    Narrative    CHEST PORTABLE ONE VIEW   6/29/2022 11:50 AM     HISTORY: Shortness of breath.    COMPARISON: Chest CT on 1/12/2022.      Impression    IMPRESSION: Single AP view of the chest was obtained. Mildly enlarged  cardiac silhouette. No suspicious focal pulmonary opacities. No  significant pleural effusion or pneumothorax.    MAD  MD EVELIN         SYSTEM ID:  [id number]   CT Head w/o Contrast    Narrative    CT HEAD W/O CONTRAST 6/29/2022 12:00 PM    INDICATION: AMS, COVID +  TECHNIQUE: CT scan of the head without contrast. Dose reduction  techniques were used.  CONTRAST: None.  COMPARISON: 7/25/2019 head CT.    FINDINGS:   No intracranial hemorrhage, extraaxial collection, mass effect or CT  evidence of acute infarct.  Mild presumed chronic small vessel  ischemic changes. Moderate generalized volume loss. The ventricles are  proportional to the sulci. Osseous structures are intact. Unremarkable  orbits. Paranasal sinuses are free of significant disease. Clear  mastoid air cells.      Impression    IMPRESSION:  Unchanged age-related changes with no acute intracranial abnormality.    YVROSE CARABALLO MD         SYSTEM ID:  [id number]   US Abdomen Limited    Narrative    EXAM: US ABDOMEN LIMITED  LOCATION: [facility]  DATE/TIME: 7/1/2022 4:48 PM    INDICATION: Elevated LFTs.  COMPARISON: None.  TECHNIQUE: Limited abdominal ultrasound.    FINDINGS:    GALLBLADDER: Stones are present within the gallbladder. No Larkin's sign.    BILE DUCTS: No biliary dilatation. The common duct measures 6 mm.    LIVER: Normal parenchyma with smooth contour. No focal mass.    RIGHT KIDNEY: No hydronephrosis.    PANCREAS: The visualized portions are normal.    No ascites.      Impression    IMPRESSION: Cholelithiasis without acute cholecystitis.         Discharge Medications   Current Discharge Medication List      START taking these medications    Details   QUEtiapine (SEROQUEL) 25 MG tablet Take 1 tablet (25 mg) by mouth At Bedtime  Qty: 30 tablet, Refills: 0    Associated Diagnoses: Dementia with behavioral disturbance, unspecified dementia type (H)         CONTINUE these medications which have NOT CHANGED    Details   albuterol (PROAIR HFA/PROVENTIL HFA/VENTOLIN HFA) 108 (90 Base) MCG/ACT inhaler INHALE 1 PUFF BY MOUTH EVERY 4 HOURS  AS NEEDED FOR COUGH OR CONGESTION  Qty: 18 g, Refills: 0    Associated Diagnoses: Upper respiratory tract infection, unspecified type      apixaban ANTICOAGULANT (ELIQUIS) 5 MG tablet Take 1 tablet (5 mg) by mouth 2 times daily  Qty: 60 tablet, Refills: 11    Associated Diagnoses: Persistent atrial fibrillation (H)      aspirin (ASA) 81 MG EC tablet Take 81 mg by mouth daily.      melatonin 5 MG tablet Take 5 mg by mouth nightly as needed for sleep      Metoprolol Tartrate 37.5 MG TABS Take 18.75 mg by mouth 2 times daily (1/2 tab 2 times daily)  Qty: 90 tablet, Refills: 3    Associated Diagnoses: Benign essential hypertension      NIACIN CR PO Take 1,000 mg by mouth 2 times daily (before meals)       nitroglycerin (NITROSTAT) 0.4 MG SL tablet For chest pain place 1 tablet under the tongue every 5 minutes for 3 doses. If symptoms persist 5 minutes after 1st dose call 911.  Qty: 25 tablet, Refills: 3    Associated Diagnoses: Chronic ischemic heart disease      Vitamin D (Cholecalciferol) 25 MCG (1000 UT) TABS Take 1,000 Units by mouth daily.         STOP taking these medications       LORazepam (ATIVAN) 0.5 MG tablet Comments:   Reason for Stopping:             Allergies   No Known Allergies

## 2022-07-04 NOTE — PLAN OF CARE
Orientation/Cognitive: Disoriented to sit. Alert, conf at times  Observation Goals (Met/ Not Met): Inpat  Mobility Level/Assist Equipment: Indep  Fall Risk (Y/N): N  Behavior Concerns: N  Pain Management: denies  Tele/VS/O2: VSS on RA  ABNL Lab/BG: CRP  Diet: reg  Bowel/Bladder: cont  Skin Concerns: skin tear L elbow. Mepilex changed last night. CDI  Drains/Devices: no  Tests/Procedures for next shift: no  Anticipated DC date & active delays: 7-4  Patient Stated Goal for Today: go home with wife    AVS reviewed. Pt in agreement. E-script. Discharged home with wife.  Homecare resources provided in AVS by ZEINAB

## 2022-07-04 NOTE — COMMUNITY RESOURCES LIST (ENGLISH)
07/04/2022   [facility] Care Management  N/A Phone: N/A   Email: [email or web address]   Address: [address] [place] [id number]   Hours: N/A        Independent Living       Adult day care services  1  [facility] Distance: 1.65 miles      COVID-19 Status: Regular Operations   [address] [place] [address]  Language: English  Hours: Mon - Fri 7:30 AM - 5:30 PM  Fees: Insurance, Self Pay   Phone: (377) [phone] Email: [email or web address] [email or web address] Website: [email or web address]-day-services     2  [facility] Distance: 4.71 miles      COVID-19 Status: Regular Operations   [address] [address]  Language: English  Hours: Mon - Fri 9:00 AM - 3:00 PM  Fees: Insurance, Self Pay   Phone: (720) 744-4093 Website: http://www.[address]/          Important Numbers & Websites       Emergency Services   911  [facility]   311  Poison Control   (299) 377-7965  Suicide Prevention Lifeline   (469) 975-9693 (TALK)  Child Abuse Hotline   (920) 621-9444 (4-A-Child)  Sexual Assault Hotline   (957) 932-7506 (HOPE)  National Runaway Safeline   (870) 876-2487 (RUNAWAY)  All-Options Talkline   (396) 890-8151  Substance Abuse Referral   (444) 656-3302 (HELP)

## 2022-07-04 NOTE — PLAN OF CARE
Date/Time: July 4, 2022 1900-0730   Reason for Admission: Delirium    Cognitive/Mentation: AOx3, disoriented to situation.   Neuros/CMS: CMS intact  VS:VSS, on RA. B/P: 124/74, T: 98.1, P: 96, R: 18     GI: denies n/v.  : continent.  Pain: Pt denied pain during this shift.      Skin: Left elbow skin tear, mepilex in place.   Activity: Ind  Diet: Regular      Discharge: Pending discharge back home.    End of shift summary: No other events overnight.

## 2022-07-05 NOTE — PROGRESS NOTES
Clinic Care Coordination Contact  [facility]      Clinical Data: CHW Outreach    Outreach attempted x 1 Briefly spoke to Patient regarding CCC enrollment.    Patient requested to be called at a later time. Further requesting that tomorrow (07/06/2022) is better. CHW acknowledged.    Plan: CHW will attempt another outreach to the patient via phone regarding enrollment into CCC in 1-2 business days.    JACEY Hinojosa  Clinic Care Coordination   [facility]   Phone: [phone]  [email or web address]

## 2022-07-05 NOTE — LETTER
[facility] CARE COORDINATION  [address]  [place] [address]    July 6, 2022    Rm Villarreal  [address] [place] [address]      Dear Rm,    I am a clinic community health worker who works with Zach Pickering MD with the [facility]. I wanted to thank you for spending the time to talk with me.  Below is a description of clinic care coordination and how I can further assist you.       The clinic care coordination team is made up of a registered nurse, , financial resource worker and community health worker who understand the health care system. The goal of clinic care coordination is to help you manage your health and improve access to the health care system. Our team works alongside your provider to assist you in determining your health and social needs. We can help you obtain health care and community resources, providing you with necessary information and education. We can work with you through any barriers and develop a care plan that helps coordinate and strengthen the communication between you and your care team.    Please feel free to contact Community Health WorkerLatisha at [phone] with any questions or concerns regarding care coordination and what we can offer.      We are focused on providing you with the highest-quality healthcare experience possible.    Sincerely,     Halle Carlton CHW  Clinic Care Coordination   [facility]   Phone: [phone]  [email or web address]

## 2022-07-06 NOTE — PLAN OF CARE
Occupational Therapy Discharge Summary    Reason for therapy discharge:    Discharged to home.    Progress towards therapy goal(s). See goals on Care Plan in [facility] electronic health record for goal details.  Goals partially met.  Barriers to achieving goals:   discharge from facility.    Therapy recommendation(s):    Patient presenting below his baseline with balance, cognition and ADL safety. Pt requires continued OT in TCU to return to prior level of function. If pt discharges home, he requires 24/7 assist from a capable caregiver due to cognition and balance impairment. Spouse decided to have pt come home with her as pt was meeting PT goals for home and cognition improving.

## 2022-07-06 NOTE — PROGRESS NOTES
Clinic Care Coordination Contact  Community Health Worker Initial Outreach    Spoke to Patient (Zoran)    CHW Introduced intent of call regarding recent discharge.    Patients reports he is doing fine. Further expressing that he is not scheduling an ED/ Post Hospital Appointment with PCP due to expressing that he is meeting with Cardiology soon. CHW acknowledged.     CHW introduce Clinic Care Coordination and Patient expressed that he does not need additional support at this time. CHW acknowledged.    CHW provided Patient with CHW contact information for future reference. However, Patient requested for information to be sent via My Chart.     CHW Initial Information Gathering:  Referral Source: IP Handoff  Preferred Urgent Care: [facility], [phone]  Current living arrangement:: Not Assessed  Informal Support system:: Family, Spouse  No PCP office visit in Past Year: No  CHW Additional Questions  If ED/Hospital discharge, follow-up appointment scheduled as recommended?: Other (Patient refused to schedule PCP Follow up (per Discharge Instructions), further expressing that he is already gonna follow up with Cardiology.)  Medication changes made following ED/Hospital discharge?: Yes, patient to be scheduled with CC RN/SW within approx 1 business day  [facility] active?: Yes  Patient sent Social Determinants of Health questionnaire?: No    Patient accepts CC: No, Patient expressed no outstanding needs at this time. Patient will be sent Care Coordination introduction letter for future reference.     [facility]: Post-Discharge Note  SITUATION                                                      Admission:    Admission Date: 06/29/22   Reason for Admission: Rm Villarreal is a 81 year old male with PMH  CAD, afib, PAD, carotid artery stenosis, dementia, who was admitted on 6/29/2022 with COVID-19 and infectious encephalopathy.  Discharge:   Discharge Date: 07/04/22  Discharge Diagnosis:  Confirmed COVID-19 infection, mild    Generalized weakness  Acute metabolic versus infectious encephalopathy  History of dementia  Hyperbilirubinemia, elevated alkaline phosphatase  CAD with ischemic CM with EF of 25-30%  Atrial fibrillation  PAD  Carotid artery stenosis    BACKGROUND                                                      Per hospital discharge summary and inpatient provider notes:    Rm Villarreal is a 81 year old male with PMH  CAD, afib, PAD, carotid artery stenosis, dementia, who was admitted on 6/29/2022 with COVID-19 and infectious encephalopathy.     Confirmed COVID-19 infection, mild    Generalized weakness  Vaccinated against COVID-19. Declined Paxlovid and Remdesivir due to symptom onset several days prior to admission. Patient has a mild cough but is not needing any oxygen. He is expected to continue to improve.  - Symptom onset: 6/22/22. Date of first positive test: 6/25/22 - positive home test.  - CRP was 28 on 7/2/22.  - COVID-19 special precautions discontinued on 7/3/22.  - On room air.  - Did not receive any COVID meds while in the hospital.  - PT consulted, weakness has improved significantly and he does not require any further PT.     Acute metabolic versus infectious encephalopathy  History of dementia  History of mild dementia unclear severity. Continues to require 1:1. Wife is reportedly unable to take care of patient at home. PT/OT currently recommending TCU.   - Encephalopathy improving.  No longer requires one-to-one sitter.  Oriented to person, city, month, year today.  - Given improvement in mental status, plan discharge home with assistance from his wife.  - Social work will meet with patient and his wife, provide resources for outpatient services if needed.     Hyperbilirubinemia, elevated alkaline phosphatase  Has been elevated in the past, unclear etiology  - Recheck LFTs improved except bili slightly higher.  - Asymptomatic.  - US of abdomen gallstones, but no signs  of obstruction or acute cholecystitis.  - Can recheck at follow-up appointment with PCP.     CAD with ischemic CM with EF of 25-30%  - Continue PTA metoprolol.  - Not on an ACE-I/ARB or diuretic at baseline.  - Outpatient follow-up.     Atrial fibrillation  - Continue PTA Eliquis.     PAD  Carotid artery stenosis  - Continue PTA metoprolol, niacin.    ASSESSMENT      Enrollment  Primary Care Care Coordination Status: Declined    Discharge Assessment  How are you doing now that you are home?: Patient expressed that he is doing fine  How are your symptoms? (Red Flag symptoms escalate to triage hotline per guidelines): Improved  Do you feel your condition is stable enough to be safe at home until your provider visit?: Yes  Does the patient have their discharge instructions? : Yes  Does the patient have questions regarding their discharge instructions? : No  Were you started on any new medications or were there changes to any of your previous medications? : Yes  Does the patient have all of their medications?: Yes  Do you have questions regarding any of your medications? : No  Do you have all of your needed medical supplies or equipment (DME)?  (i.e. oxygen tank, CPAP, cane, etc.): Yes  Discharge follow-up appointment scheduled within 14 calendar days? : Yes  Discharge Follow Up Appointment Date: 07/28/22 (Patient refused to schedule PCP Follow up (per Discharge Instructions), further expressing that he is already gonna follow up with Cardiology.)  Discharge Follow Up Appointment Scheduled with?: Specialty Care Provider    Post-op (CHW CTA Only)  If the patient had a surgery or procedure, do they have any questions for a nurse?: No     PLAN                                                      Outpatient Plan:  CC Above    Future Appointments   Date Time Provider Department Center   7/28/2022 10:15 AM Madelin Banuelos MD [facility] PSA CLIN   9/6/2022  1:00 PM Zach Pickering MD [facility] CS         For any urgent  concerns, please contact our 24 hour nurse triage line: [phone] ([phone])       Halle Carlton RICARDO  Clinic Care Coordination   [facility]   Phone: [phone]  [email or web address]
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
## 2022-07-28 NOTE — PATIENT INSTRUCTIONS
Echocardiogram and follow up with Inge Hollis (can be same day appointment as echo)  Carotid ultrasound
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
## 2022-08-01 NOTE — TELEPHONE ENCOUNTER
Spoke to patient and his wife and informed him that RN has refilled the Eliquis but the Seroquel will come from PCP. Pt verbalized understanding. No further questions or concerns at this time.

## 2022-08-01 NOTE — TELEPHONE ENCOUNTER
[facility] Call Center    Phone Message    May a detailed message be left on voicemail: yes     Reason for Call: Medication Refill Request    Has the patient contacted the pharmacy for the refill? Yes   Name of medication being requested: apixaban ANTICOAGULANT (ELIQUIS) 5 MG tablet  QUEtiapine (SEROQUEL) 25 MG tablet  Provider who prescribed the medication: Requesting both from Inge Hollis  Pharmacy: [id number] [address] AT [facility] [address]  Date medication is needed: 08.04.22    Zoran has a few days of medication left. He will be leaving town this weekend and is needing refills. Please call him once his refills have been sent over. Thank you!    Action Taken: Other: Cardiology    Travel Screening: Not Applicable

## 2022-08-03 NOTE — TELEPHONE ENCOUNTER
Routing refill request to provider for review/approval because:  BP Readings from Last 6 Encounters:   07/28/22 (!) 138/92   07/04/22 131/87   03/28/22 136/83   02/18/22 126/70   08/05/21 (!) 148/87   04/18/21 137/84       Lisa Smith RN

## 2022-09-01 NOTE — TELEPHONE ENCOUNTER
Routing refill request to provider for review/approval because:  Failed protocol due to:   BP Readings from Last 3 Encounters:   07/28/22 (!) 138/92   07/04/22 131/87   03/28/22 136/83     Lipid Panel out of date, has not been seen in the past 6 months  Patient is scheduled for an appt with Dr. Royal 11/2/22-med pended for 2 months    Yaritza Quintero RN

## 2022-10-14 NOTE — TELEPHONE ENCOUNTER
Reason for Call:  Home Health Care    Swathi with [facility] called regarding (reason for call): PT, OT, Social Work Assessment     Orders are needed for this patient.     PT: 6 sessions over 30 days for strengthening and mobility.    OT: Evaluation, and Social Work assessment      Skilled Nursing: Evaluation, HHA two times a week for 4 weeks .    Pt Provider: Will    Phone Number Homecare Nurse can be reached at: [phone]    Can we leave a detailed message on this number? YES    Phone number patient can be reached at: Home number on file [phone] (home)    Best Time: Anytime     Call taken on 8/27/2019 at 11:05 AM by Jackelyn Diop       Yes
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
## 2022-10-17 NOTE — PATIENT INSTRUCTIONS
Today's Recommendations    Restart metoprolol 1/2 tablet twice a day for blood pressure, to support weak heart and to help with HR while in atrial fibrillation  In the future prior to stopping the metoprolol, please call and we can discuss an alternative such as carvedilol.   We will call with echocardiogram results and recommendation for referral for ICD.     Please send a Zaiseoul message or call [phone] to the RN team with questions or concerns.     Scheduling number [phone]    SAMANTHA Daley, CNP

## 2022-10-19 NOTE — TELEPHONE ENCOUNTER
Please get the patient results.  No change in medications at this time, but do recommend referral to electrophysiology to discuss ICD plus or minus pacemaker.    Also, please assess how he is doing on metoprolol and if his blood pressure is improved as well as his symptoms.    SAMANTHA Daley, CNP

## 2022-10-19 NOTE — TELEPHONE ENCOUNTER
Tried to call patient to give him his results and see how he was doing on the metoprolol. EP referral placed. Pt did not answer so left VM to call team 4 back with direct number.

## 2022-10-19 NOTE — TELEPHONE ENCOUNTER
Carotid US results noted:    Impression:  No hemodynamically significant obstructive internal carotid artery  disease bilaterally (less than 50% stenosis by peak systolic Doppler  criteria, ~50% by ICA/CCA ratio). There is dense, calcified plaque  throughout the carotid arterial system, but nonobstructive in the  internal carotid arteries. Right external carotid artery with elevated  velocities (222 cm/s) consistent with greater than 50% stenosis.     Velocities are similar to previous study 1/14/2021.    Echocardiogram results noted:    Interpretation Summary     1. Left ventricular systolic function is severely reduced. The visual ejection  fraction is 25-30%.  2. Severe mid to distal anteroseptal, distal anterior, mid to distal inferior  and apical wall hypokinesis.  3. The right ventricle is normal in structure, function and size.  4. The left atrium is moderately dilated.  5. There is mild to moderate (1-2+) tricuspid regurgitation.  6. Severe pulmonary hypertensin; The right ventricular systolic pressure is  approximated at 66mmHg plus the right atrial pressure. IVC diameter >2.1 cm  collapsing <50% with sniff suggests a high RA pressure estimated at 15 mmHg or  greater.  7. In comparison with the prior report, findings are similar.    Patient had visit with Inge on 10/17, however results were not available at the time of visit. Will route to providers for review.

## 2022-10-20 NOTE — TELEPHONE ENCOUNTER
Patient's wife returned call and reports that patient has had a couple episodes of diarrhea since starting the metoprolol, but BP's remain 120's systolic (unable to report diastolic). Patient's wife reports no worsening of dizziness or fatigue on metoprolol. She wants to have patient continue on it for a few more days and will call us with update if any further concerns.    RN subsequently reviewed with patient and patient's wife the test results and FITZ Alcazar's recommendations. Patient and patient's wife verbalized understanding and are in agreement with plan. RN transferred patient and patient's wife to scheduling to arrange EP apt.

## 2022-10-25 NOTE — PROGRESS NOTES
HPI and Plan:   See dictation      No orders of the defined types were placed in this encounter.      No orders of the defined types were placed in this encounter.      Medications Discontinued During This Encounter   Medication Reason     aspirin (ASA) 81 MG EC tablet          Encounter Diagnosis   Name Primary?     Ischemic cardiomyopathy        CURRENT MEDICATIONS:  Current Outpatient Medications   Medication Sig Dispense Refill     albuterol (PROAIR HFA/PROVENTIL HFA/VENTOLIN HFA) 108 (90 Base) MCG/ACT inhaler INHALE 1 PUFF BY MOUTH EVERY 4 HOURS AS NEEDED FOR COUGH OR CONGESTION 18 g 0     apixaban ANTICOAGULANT (ELIQUIS) 5 MG tablet Take 1 tablet (5 mg) by mouth 2 times daily 60 tablet 11     magnesium citrate 1.745 GM/30ML solution (NOT currently available) Take 296 mLs by mouth once As needed       melatonin 5 MG tablet Take 5 mg by mouth nightly as needed for sleep       Metoprolol Tartrate 37.5 MG TABS Take 18.75 mg by mouth 2 times daily (1/2 tab 2 times daily) 90 tablet 3     NIACIN CR PO Take 1,000 mg by mouth 2 times daily (before meals)        nitroglycerin (NITROSTAT) 0.4 MG SL tablet For chest pain place 1 tablet under the tongue every 5 minutes for 3 doses. If symptoms persist 5 minutes after 1st dose call 911. 25 tablet 3     QUEtiapine (SEROQUEL) 25 MG tablet TAKE 1 TABLET(25 MG) BY MOUTH AT BEDTIME 30 tablet 1     Vitamin D (Cholecalciferol) 25 MCG (1000 UT) TABS Take 1,000 Units by mouth daily.         ALLERGIES   No Known Allergies    PAST MEDICAL HISTORY:  Past Medical History:   Diagnosis Date     Adjustment disorder      Carotid stenosis, bilateral     left CEA 2007     Chronic atrial fibrillation (H)     warfarin     COPD (chronic obstructive pulmonary disease) (H) 02/03/2021     Coronary artery disease     cardiac cath 2014: chronic occlusion of circumflex and apical LAD: medical management; cath 2005: stent to LAD, historical: stent to RCA     History of blood transfusion       "Hypertension      Ischemic cardiomyopathy     ef 25%     Pulmonary nodule        PAST SURGICAL HISTORY:  Past Surgical History:   Procedure Laterality Date     angiogram  2014    cardiac cath 2014: chronic occlusion of circumflex and apical LAD: medical management     ANGIOGRAM      stent to LAD     ANGIOGRAM      stent to RCA     COLONOSCOPY           COLONOSCOPY N/A 2018    Procedure: COMBINED COLONOSCOPY, SINGLE OR MULTIPLE BIOPSY/POLYPECTOMY BY BIOPSY;  colonoscopy;  Surgeon: Tyler Dee MD;  Location:  GI     GI SURGERY      hemorrhoidectomy     IR VISCERAL ANGIOGRAM  2019     VASCULAR SURGERY  2007    left CEA       FAMILY HISTORY:  Family History   Problem Relation Age of Onset     Heart Disease Father        SOCIAL HISTORY:  Social History     Socioeconomic History     Marital status:      Spouse name: None     Number of children: None     Years of education: None     Highest education level: None   Tobacco Use     Smoking status: Former     Types: Cigarettes     Quit date: 2003     Years since quittin.2     Smokeless tobacco: Never   Substance and Sexual Activity     Alcohol use: Yes     Comment: 1-2 beer daily     Drug use: No     Sexual activity: Not Currently     Partners: Female       Review of Systems:  Skin:  Positive for   hair loss   Eyes:  Positive for visual blurring;glasses    ENT:         Respiratory:  Positive for dyspnea on exertion     Cardiovascular:  Negative;palpitations Positive for;lightheadedness;dizziness;fatigue;chest pain;edema chest pain: goes away when he sits down and rests  Gastroenterology:   diarrhea    Genitourinary:         Musculoskeletal:         Neurologic:         Psychiatric:         Heme/Lymph/Imm:         Endocrine:  Negative        Physical Exam:  Vitals: BP (!) 144/97 (BP Location: Left arm, Patient Position: Sitting)   Pulse 80   Ht 1.803 m (5' 11\")   Wt 68.5 kg (151 lb)   BMI 21.06 kg/m      Constitutional:     "       Skin:             Head:           Eyes:           Lymph:      ENT:           Neck:           Respiratory:            Cardiac:                                                           GI:           Extremities and Muscular Skeletal:                 Neurological:           Psych:           CC  Inge Hollis, APRN CNP  [address]  [place] [id number]

## 2022-10-25 NOTE — PROGRESS NOTES
Service Date: 10/25/2022    CLINIC NOTE    HISTORY OF PRESENT ILLNESS:  I saw Mr. Villarreal in for evaluation of possible ICD implantation.  He is an 82-year-old white male with ischemic cardiomyopathy and current ejection fraction of 25% per echocardiography.  He also has chronic atrial fibrillation with controlled heart rate.  The patient has been complaining of dizziness and fatigue.  With exertion, he has some chest tightness, but no pain.  He denies palpitation.  There is no evidence of anemia or thyroid problems.  The patient has not had recent hospitalization for CHF decompensation.    There is no history of syncope.    He was a former smoker.  At the present time, he gave me the impression that it is quite slow in his mental reaction.  He came to the clinic with his wife.    PAST MEDICAL HISTORY:  Remarkable for adjustment disorder, carotid artery stenosis, COPD and hypertension.    He is full code.    PHYSICAL EXAMINATION:    VITAL SIGNS:  Blood pressure 144/97, heart rate 80 beats per minute, body weight 151 pounds.  HEENT:  His nose looked somewhat cyanotic.  LUNGS:  Clear.  CARDIAC:  Rhythm was irregularly irregular.  Heart sounds were reduced.  There was no murmur.  ABDOMEN:  No hepatomegaly.  EXTREMITIES:  No pedal edema.    ASSESSMENT AND RECOMMENDATIONS:  Mr. Villarreal is an 82-year-old white male with ischemic cardiomyopathy.  Current ejection fraction is 25%, and he is in NYHA functional class III for heart failure.  He is full code.  Medically, he is qualified for ICD implantation as primary prevention of sudden cardiac death.  The patient and his wife both have difficulty to digest the information about ICD.  After extensive discussion in the clinic about the risks and benefits, they remained undecided at this point.  They will go home to do more online searches and literature reading before a final decision.  If they do agree for ICD implantation, they can contact our office for scheduling of  the procedure.  For the time being, he will continue the same medications.    cc:  [place] [address]    Yun Hernandez MD        D: 10/25/2022   T: 10/25/2022   MT: [place]    Name:     AMEE BAILEY  MRN:      [id number]        Account:      [id number]   :      1940           Service Date: 10/25/2022       Document: [id number]
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
## 2022-10-25 NOTE — LETTER
10/25/2022    Greg Royal MD  [address]  [facility] [address]    RE: Rm Armandoon       Dear Colleague,     I had the pleasure of seeing Rm Villarreal in the [facility].  Service Date: 10/25/2022    CLINIC NOTE    HISTORY OF PRESENT ILLNESS:  I saw Mr. Villarreal in for evaluation of possible ICD implantation.  He is an 82-year-old white male with ischemic cardiomyopathy and current ejection fraction of 25% per echocardiography.  He also has chronic atrial fibrillation with controlled heart rate.  The patient has been complaining of dizziness and fatigue.  With exertion, he has some chest tightness, but no pain.  He denies palpitation.  There is no evidence of anemia or thyroid problems.  The patient has not had recent hospitalization for CHF decompensation.    There is no history of syncope.    He was a former smoker.  At the present time, he gave me the impression that it is quite slow his mental reaction.  He came to the clinic with his wife.    PAST MEDICAL HISTORY:  Remarkable for adjustment disorder, carotid artery stenosis, COPD and hypertension.    He is full code.    PHYSICAL EXAMINATION:    VITAL SIGNS:  Blood pressure 144/97, heart rate 80 beats per minute, body weight 151 pounds.  HEENT:  His nose looked somewhat cyanotic.  LUNGS:  Clear.  CARDIAC:  Rhythm was irregularly irregular.  Heart sounds were reduced.  There was no murmur.  ABDOMEN:  No hepatomegaly.  EXTREMITIES:  No pedal edema.    ASSESSMENT AND RECOMMENDATIONS:  Mr. Villarreal is an 82-year-old white male with ischemic cardiomyopathy.  Current ejection fraction is 25%, and he is in NYHA functional class III for heart failure.  He is full code.  Medically, he is qualified for ICD implantation as primary prevention of sudden cardiac death.  The patient and his wife both have difficulty to digest the information.  After extensive discussion in the clinic about the risks and benefits, they remain  undecided at this point.  They will go home to do more online searches and literature reading before a final decision.  If they do agree for ICD implantation, they can contact our office for scheduling of the procedure.  For the time being, he will continue the same medications.    cc:  Greg Royal MD   [address] [address]    Yun Hernandez MD        D: 10/25/2022   T: 10/25/2022   MT: [place]    Name:     AMEE BAILEY  MRN:      [id number]        Account:      [id number]   :      1940           Service Date: 10/25/2022       Document: [id number]    HPI and Plan:   See dictation      No orders of the defined types were placed in this encounter.      No orders of the defined types were placed in this encounter.      Medications Discontinued During This Encounter   Medication Reason     aspirin (ASA) 81 MG EC tablet          Encounter Diagnosis   Name Primary?     Ischemic cardiomyopathy        CURRENT MEDICATIONS:  Current Outpatient Medications   Medication Sig Dispense Refill     albuterol (PROAIR HFA/PROVENTIL HFA/VENTOLIN HFA) 108 (90 Base) MCG/ACT inhaler INHALE 1 PUFF BY MOUTH EVERY 4 HOURS AS NEEDED FOR COUGH OR CONGESTION 18 g 0     apixaban ANTICOAGULANT (ELIQUIS) 5 MG tablet Take 1 tablet (5 mg) by mouth 2 times daily 60 tablet 11     magnesium citrate 1.745 GM/30ML solution (NOT currently available) Take 296 mLs by mouth once As needed       melatonin 5 MG tablet Take 5 mg by mouth nightly as needed for sleep       Metoprolol Tartrate 37.5 MG TABS Take 18.75 mg by mouth 2 times daily (1/2 tab 2 times daily) 90 tablet 3     NIACIN CR PO Take 1,000 mg by mouth 2 times daily (before meals)        nitroglycerin (NITROSTAT) 0.4 MG SL tablet For chest pain place 1 tablet under the tongue every 5 minutes for 3 doses. If symptoms persist 5 minutes after 1st dose call 911. 25 tablet 3     QUEtiapine (SEROQUEL) 25 MG tablet TAKE 1 TABLET(25 MG) BY  MOUTH AT BEDTIME 30 tablet 1     Vitamin D (Cholecalciferol) 25 MCG (1000 UT) TABS Take 1,000 Units by mouth daily.         ALLERGIES   No Known Allergies    PAST MEDICAL HISTORY:  Past Medical History:   Diagnosis Date     Adjustment disorder      Carotid stenosis, bilateral     left CEA      Chronic atrial fibrillation (H)     warfarin     COPD (chronic obstructive pulmonary disease) (H) 2021     Coronary artery disease     cardiac cath : chronic occlusion of circumflex and apical LAD: medical management; cath : stent to LAD, historical: stent to RCA     History of blood transfusion      Hypertension      Ischemic cardiomyopathy     ef 25%     Pulmonary nodule        PAST SURGICAL HISTORY:  Past Surgical History:   Procedure Laterality Date     angiogram  2014    cardiac cath 2014: chronic occlusion of circumflex and apical LAD: medical management     ANGIOGRAM      stent to LAD     ANGIOGRAM      stent to RCA     COLONOSCOPY           COLONOSCOPY N/A 2018    Procedure: COMBINED COLONOSCOPY, SINGLE OR MULTIPLE BIOPSY/POLYPECTOMY BY BIOPSY;  colonoscopy;  Surgeon: Tyler Dee MD;  Location:  GI     GI SURGERY      hemorrhoidectomy     IR VISCERAL ANGIOGRAM  2019     VASCULAR SURGERY  2007    left CEA       FAMILY HISTORY:  Family History   Problem Relation Age of Onset     Heart Disease Father        SOCIAL HISTORY:  Social History     Socioeconomic History     Marital status:      Spouse name: None     Number of children: None     Years of education: None     Highest education level: None   Tobacco Use     Smoking status: Former     Types: Cigarettes     Quit date: 2003     Years since quittin.2     Smokeless tobacco: Never   Substance and Sexual Activity     Alcohol use: Yes     Comment: 1-2 beer daily     Drug use: No     Sexual activity: Not Currently     Partners: Female       Review of Systems:  Skin:  Positive for   hair loss   Eyes:   "Positive for visual blurring;glasses    ENT:         Respiratory:  Positive for dyspnea on exertion     Cardiovascular:  Negative;palpitations Positive for;lightheadedness;dizziness;fatigue;chest pain;edema chest pain: goes away when he sits down and rests  Gastroenterology:   diarrhea    Genitourinary:         Musculoskeletal:         Neurologic:         Psychiatric:         Heme/Lymph/Imm:         Endocrine:  Negative        Physical Exam:  Vitals: BP (!) 144/97 (BP Location: Left arm, Patient Position: Sitting)   Pulse 80   Ht 1.803 m (5' 11\")   Wt 68.5 kg (151 lb)   BMI 21.06 kg/m      Constitutional:           Skin:             Head:           Eyes:           Lymph:      ENT:           Neck:           Respiratory:            Cardiac:                                                           GI:           Extremities and Muscular Skeletal:                 Neurological:           Psych:           CC  Inge Hollis, APRN CNP  [address] [facility] [address]  [place] [address]      Thank you for allowing me to participate in the care of your patient.      Sincerely,     Yun Hernandez MD     [facility]  "

## 2022-11-01 NOTE — TELEPHONE ENCOUNTER
"Routing refill request to provider for review/approval because:      LOV: 2/18/22 with Dr. Pickering     Patient has upcoming in person with Dr. Royal with appointment notes \"memory issues\" - routing to Dr. Royal for review     Suzanne Dykes RN  [facility]              "

## 2022-11-02 PROBLEM — I50.22 CHRONIC SYSTOLIC CONGESTIVE HEART FAILURE (H): Status: ACTIVE | Noted: 2022-01-01

## 2022-11-02 PROBLEM — F13.20 SEDATIVE, HYPNOTIC OR ANXIOLYTIC DEPENDENCE (H): Status: RESOLVED | Noted: 2017-01-26 | Resolved: 2022-01-01
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
## 2022-11-21 NOTE — Clinical Note
Berhane Alcazar,   I had the pleasure of seeing Zoran and his wife yesterday. I was wondering if you

## 2022-11-21 NOTE — PATIENT INSTRUCTIONS
"Recommendations from today's MTM visit:                                                    MTM (medication therapy management) is a service provided by a clinical pharmacist designed to help you get the most of out of your medicines.   Today we reviewed what your medicines are for, how to know if they are working, that your medicines are safe and how to make your medicine regimen as easy as possible.      1. Here is the number for [facility] - [phone] to schedule an appointment   2. Decrease quetiapine to 1/2 tablet (12.5mg) once daily for 1 week then decrease to 1/4 tablet (6.25mg) daily for 1 week then discontinue.   3. I will talk to cardiology about switching metoprolol tartrate to metoprolol succinate.   4. Schedule a follow up appointment with SAMANTHA Daley CNP phone is [phone]    Future considerations: If you feel like you want to stop the niacin we can connect with cardiology about stopping this medication.      Follow-up: Will call with updates after speaking to cardiology & check back in a week to see how quetiapine taper is going.     It was great speaking with you today.  I value your experience and would be very thankful for your time in providing feedback in our clinic survey. In the next few days, you may receive an email or text message from [employer] with a link to a survey related to your  clinical pharmacist.\"     To schedule another MTM appointment, please call the clinic directly or you may call the MTM scheduling line at [phone] or toll-free at [phone].     My Clinical Pharmacist's contact information:                                                      Please feel free to contact me with any questions or concerns you have.     Roseann Crowder, PharmD  Medication Therapy Management Resident  [phone]  "

## 2022-11-21 NOTE — LETTER
"Recommended To-Do List      Prepared on: Nov 21, 2022       You can get the best results from your medications by completing the items on this \"To-Do List.\"      Bring your To-Do List when you go to your doctor. And, share it with your family or caregivers.    My To-Do List:  What we talked about: What I should do:   A medication that is not working    Wait to hear from me after I speak to SAMANTHA Daley CNP about changing the medication you are taking from Metoprolol Tartrate to Metoprolol Succinate          What we talked about: What I should do:   An issue with your medication    Start quetiapine taper: decrease quetiapine to 1/2 tablet (12.5mg) once at bedtime for 1 week then decrease to 1/4 tablet (6.25mg) once at bedtime for 1 week then discontinue.           What we talked about: What I should do:   Make a follow up appointment with SAMANTHA Daley CNP    Call [phone] to schedule an appointment                   What we talked about: What I should do:   Make an appointment with the memory care clinic    Call [phone] to schedule an appointment                   "

## 2022-11-21 NOTE — PROGRESS NOTES
Medication Therapy Management (MTM) Encounter    ASSESSMENT:                            Medication Adherence/Access: No issues identified. Zoran and his wife Queenie have a good system in place for managing medications.     Afib/Hypertension/Heart failure/CAD: Given the patient's heart failure diagnosis the patient would be indicated for guideline recommended mediations that have proven to be effective for morbidity and/or mortality from heart failure (BB, ACEi/ARB/ARNi, MRA, SGLT2i). Currently the patient is taking metoprolol tartrate which is not a beta blocker that has shown to reduce the risk of death in patients with HFrEF. Would recommend switching to metoprolol succinate which has been shown to reduce the above risks. Additionally may consider adding ACEi/ARB, MRA, and maybe SGLT2i in the future to help reduce risk of morbidity and/or mortality from heart failure- plan to reach out to cardiology. Would recommend ICD device, advised patient follow up with SAMANTHA Daley CNP. Was not able to assess the patient's current weight, edema, or salt intake- plan to at follow up. Eliquis dose is appropriate for the patient's current kidney function.     Hyperlipidemia: Had a discussion of risk vs benefit with statin therapy. Given Zoran's CAD history he would be a candidate for statin therapy however at this time Zoran declines statin therapy because of his history with statin medications in the past causing diarrhea (Epic allergy/intolerance list updated). Niacin likely is not doing much to help control lipids and could be discontinued, patient wishes to continue.     Anxiety/Sleep: Recommend to discontinue quetiapine and gradually taper off to reduce the risk of decline in cognition/memory. Unable to fully assess patient current state of anxiety/insomnia however risks outweigh benefits as quetiapine may be leading to memory impairment and generally should be avoided in the elderly due to CNS effects. While Zoran does not  have a formal diagnosis of dementia, the diagnosis has been referenced in other provider notes and quetiapine does has a black box warning of an increased risk of mortality in elderly patients with dementia related psychosis. Quetiapine may also be related to ongoing dizziness. Plan in place to gradually discontinue quetiapine with verbal approval from Dr. Royal. I agree with Dr. oRyal's recommendation to schedule an appointment with [facility].     Breathing: Patient's ongoing shortness of breath could be from HF or diagnosis of COPD. After discussion with Dr. Royal, he feels shortness of breath is due to HF because the patient's O2 levels have not been low. No maintenance inhaler recommended at this time. Will continue to monitor.     GI: Recommended to continue to use imodium as needed for episodes of diarrhea. May consider using metamucil daily to see if that can help with bowel regulation and forming stools.     Supplements: Stable.     PLAN:                            1. Here is the number for [facility] [place] - [phone] to schedule an appointment   2. I will talk to Dr. Royal about stopping quetiapine & about a maintenance inhaler and if that would be an option for you (see Addendum below)  3. I will talk to cardiology about switching metoprolol tartrate to metoprolol succinate.   4. Schedule a follow up appointment with SAMANTHA Daley CNP phone is [phone]  5. Recommended statin therapy but patient declined     Future considerations: If you feel like you want to stop the niacin we can connect with cardiology about stopping this medication.     Follow-up: Will call with updates after speaking to Dr. Royal and cardiology     Addendum:   11/21: Verbal approval from Dr. Royal to taper quetiapine dose down. Would recommend cutting dose in half first and having a gradual taper versus discontinuing abruptly to avoid physical withdrawal & rebound symptoms. Dr. Royal thinks shortness of  breath is more related to heart function versus COPD. Plan to update patient with suggestions.     Plan: decrease quetiapine to 1/2 tablet (12.5mg) once at bedtime for 1 week then decrease to 1/4 tablet (6.25mg) once at bedtime for 1 week then discontinue.     SUBJECTIVE/OBJECTIVE:                          Rm Villarreal is a 82 year old male coming in for an initial visit. He was referred to me from Dr. Royal. Accompined by his wife Queenie.       Reason for visit: Medication review.     Allergies/ADRs: Reviewed in chart  Past Medical History: Reviewed in chart  Tobacco: He reports that he quit smoking about 19 years ago. His smoking use included cigarettes. He has never used smokeless tobacco.  Alcohol: about 2 beers a day  Caffeine: None    Medication Adherence/Access: Zoran's wife helps with medications, she will put in his medications in pill pouches 2 times a day (one pouch for AM and one for PM). In the past has taken double dose before when wife wasn't helping    Afib/Hypertension/Heart failure/CAD:   Patient is currently taking Eliquis 5mg twice daily for anticoagulation. Patient reports minor bruising but this is normal. Patient is also taking metoprolol tartrate 18.75mg twice daily, nitroglycerin as needed (no needed use). Patient reports the following medication side effects: Patients states he is dizzy a lot, almost all the time, and tired.   Ejection fraction: 25%: candidate for ICD implantation- still discussing if would like to proceed with ICD implantation.  BP Readings from Last 6 Encounters:   01/16/23 122/84   01/08/23 (!) 142/94   01/06/23 (!) 135/96   01/04/23 129/85   01/01/23 (!) 100/94   11/21/22 (!) 137/92     Hyperlipidemia: Current therapy includes Niacin 1000mg twice daily.  Patient reports no significant myalgias or other side effects. He states he has tried almost all statin medications in the past and had diarrhea with all of them.   Recent Labs   Lab Test 08/05/21  1110 06/10/19  0751    CHOL 204* 223*   HDL 82 96   * 109*   TRIG 91 89     Mood/Sleep: Current therapy includes quetiapine 25mg at bedtime and melatonin 5mg as needed. His wife is wondering if there is still a place for quetiapine, Zoran was on lorazepam for anxiety in the past but was replaced with quetiapine due to adverse events with lorazepam. Patient has been experiencing some cognitive decline, ongoing dizziness, and blurred vision. Patient does not have a formal diagnosis of dementia however upon a chart review it is referenced in other provider notes. Was referred to the memory clinic by Dr. Royal earlier this month but no one has reached out yet to make an appointment.     Breathing: Current therapy includes albuterol as needed. States that he has shortness of breath almost every day but hardly ever uses his albuterol.      GI: Current therapy includes imodium as needed for diarrhea- has been using one tablet for an episode which seems to help. He was recommended to try Metamucil but hasn't started yet. Continues to have some ongoing diarrhea.      Supplements: Current therapy includes vitamin D 5000 units daily. No side effects.   Vitamin D Deficiency Screening Results:  Lab Results   Component Value Date    VITDT 46 08/05/2021    VITDT 28 12/17/2019    VITDT 24 06/10/2019     Today's Vitals: BP (!) 137/92   Pulse 77   ----------------  I spent 60 minutes with this patient today. All changes were made via verbal approval with Dr. Royal. A copy of the visit note was provided to the patient's provider(s).    The patient was given a summary of these recommendations.     Roseann Crowder PharmD  Medication Therapy Management Resident  [phone]    The patient was seen independently by Dr. Crowder  I have read the note and agree with the assessment and plan.  Inge Gilmore PharmD, [facility]  Medication Therapy Management Provider  Pager: [phone]       Medication Therapy Recommendations  Adjustment disorder with anxious  mood    Current Medication: QUEtiapine (SEROQUEL) 25 MG tablet (Discontinued)   Rationale: Unsafe medication for the patient - Adverse medication event - Safety   Recommendation: Discontinue Medication   Status: Accepted per Provider         Chronic systolic congestive heart failure (H)    Current Medication: Metoprolol Tartrate 37.5 MG TABS (Discontinued)   Rationale: More effective medication available - Ineffective medication - Effectiveness   Recommendation: Change Medication Formulation  - METOPROLOL SUCCINATE   Status: Accepted per Provider         Hyperlipidemia LDL goal <100    Current Medication: NIACIN CR PO (Discontinued)   Rationale: More effective medication available - Ineffective medication - Effectiveness   Recommendation: Change Medication   Status: Declined per Patient   Note: Recommended statin therapy

## 2022-11-21 NOTE — Clinical Note
Lelia Hollis,   I had the pleasure of meeting with Zoran and his wife Queenie yesterday. I was wondering if it would be okay to  change his current metoprolol tartrate to metoprolol succinate given his heart failure diagnosis. Hoping we can maybe add on additional medications in the future to help as well, I know his dizziness has been a barrier. What are your thoughts?   Thanks,  Roseann Crowder, PharmD Medication Therapy Management Resident [phone]

## 2022-11-21 NOTE — LETTER
_  Medication List        Prepared on: Nov 21, 2022     Bring your Medication List when you go to the doctor, hospital, or   emergency room. And, share it with your family or caregivers.     Note any changes to how you take your medications.  Cross out medications when you no longer use them.    Medication How I take it Why I use it Prescriber   albuterol (PROAIR HFA/PROVENTIL HFA/VENTOLIN HFA) 108 (90 Base) MCG/ACT inhaler INHALE 1 PUFF BY MOUTH EVERY 4 HOURS AS NEEDED FOR COUGH OR CONGESTION Breathing Zach Pickering MD   apixaban ANTICOAGULANT (ELIQUIS) 5 MG tablet Take 1 tablet (5 mg) by mouth 2 times daily Atrial Fibrillation  Madelin Banuelos MD   loperamide (IMODIUM) 2 MG capsule Take 1-2 mg by mouth as needed for diarrhea Diarrhea Patient Reported   melatonin 5 MG tablet Take 5 mg by mouth nightly as needed for sleep Sleep Patient Reported   Metoprolol Tartrate 37.5 MG TABS Take 18.75 mg by mouth 2 times daily (1/2 tab 2 times daily) Heart rate & Blood pressure Inge Hollis, APRN CNP   NIACIN CR PO Take 1,000 mg by mouth 2 times daily (before meals)  Cholesterol  Patient Reported   nitroglycerin (NITROSTAT) 0.4 MG SL tablet For chest pain place 1 tablet under the tongue every 5 minutes for 3 doses. If symptoms persist 5 minutes after 1st dose call 911. Chest pain Héctor Solis MD   QUEtiapine (SEROQUEL) 25 MG tablet Start taper: Decrease quetiapine to 1/2 tablet (12.5mg) once at bedtime for 1 week, then decrease to 1/4 tablet (6.25mg) once at bedtime for 1 week, then discontinue Sleep & Mood Bharath Mitchell MD   Vitamin D3 (CHOLECALCIFEROL) 125 MCG (5000 UT) tablet Take 1 tablet by mouth daily General Health  Patient Reported         Add new medications, over-the-counter drugs, herbals, vitamins, or  minerals in the blank rows below.    Medication How I take it Why I use it Prescriber                          Allergies:      No Known Allergies        Side effects I have had:               Other  Information:              My notes and questions:

## 2022-11-21 NOTE — LETTER
November 22, 2022  Rm Villarreal  [address] [place] [address]    Dear Mr. Villarreal, [facility]     Thank you for talking with me on Nov 21, 2022 about your health and medications. As a follow-up to our conversation, I have included two documents:      1. Your Recommended To-Do List has steps you should take to get the best results from your medications.  2. Your Medication List will help you keep track of your medications and how to take them.    If you want to talk about these documents, please call Madi Crowder RPH at phone: [phone], Monday-Friday 8-4:30pm.    I look forward to working with you and your doctors to make sure your medications work well for you.    Sincerely,  Madi Crowder RPH  [facility] Pharmacist, [facility]

## 2022-11-28 NOTE — PROGRESS NOTES
Approval to change metoprolol tartrate to metoprolol succinate (Toprol XL) from Inge Hollis APRN CNP.      Roseann Crowder, PharmD  Medication Therapy Management Resident  [phone]

## 2022-11-29 NOTE — TELEPHONE ENCOUNTER
Spoke with Zoran and his wife Queenie. Zoran has been using quetiapine 1/2 tablet (12.5mg) for about a week now and has not noticed any difference in sleep/mood. He does note that his dizziness has decreased. They also confirmed switching to metoprolol succinate and stopping metoprolol tartrate. Will check back in next week to see how quetiapine 1/4 tablet (6.25mg) is going.     Roseann Crowder, PharmD  Medication Therapy Management Resident  [phone]    I have read the note as written by Dr. Crowder and agree with the plan.  Inge Gilmore, SkylerD, [facility]  Medication Therapy Management Provider  Pager: [phone]

## 2022-12-09 NOTE — TELEPHONE ENCOUNTER
Spoke with Zoran and his wife Queenie. Zoran has been using quetiapine 1/4 tablet (6.25mg) since 11/29 Zoran says that his mood is okay but still having some dizziness (some days are better than others). He notices that he will often feel worse in the mornings (more tired/groggy). Queenie mentions that some nights Zoran will sleep just fine and other nights he may get up in the middle of the night and occasionally will have hallucinations. She mentions that the hallucinations started when Zoran was diagnosed with COVID, this was also when Zoran was started on quetiapine and they never went away. The hallucinations will happen right after Zoran wakes up about 2-3 times a week and Queenie describes them as like a continuation of Zoran's dream. Queenie also mentions that Zoran still has confusion daily. Quetiapine may be contributing to these abnormal dreams and feeling of tiredness/groggyness in the morning. Recommend to discontinue completely to see if symptoms improve.     Plan:   1. Discontinue quetiapine   2. Make an appointment with the memory clinic and cardiology     Follow up:   Will call Zoran and Queenie again in 1 week.     Roseann Crowder PharmD  Medication Therapy Management Resident  [phone]    I have read the note as written by Dr. Crowder and agree with the plan.  Inge Gilmore PharmD, [facility]  Medication Therapy Management Provider  Pager: [phone]

## 2022-12-16 NOTE — TELEPHONE ENCOUNTER
[facility] Call Center    Phone Message    May a detailed message be left on voicemail: yes     Reason for Call: Other: patient states that he has decided to have the ICD and is wondering what the next steps are. Please call patient to discuss.      Action Taken: Other: cardiology    Travel Screening: Not Applicable   Thank you!  [facility]

## 2022-12-16 NOTE — TELEPHONE ENCOUNTER
Spoke with Zoran and Queenie. Zoran is having occasional confusion sometimes mixing up day and night by getting mixed up with a sleeping pattern (sometimes takes long naps during the day) but overall dizziness and cognition seem to have improved a bit since being off quetiapine for about a week. Suspect that 1 more week off of the medication will ensure the medication is mostly cleared     Plan:   1. Queenie is working to schedule an appointment at the memory care clinic   2. Continue off quetiapine    Follow up:   Will call Jazmin again in 1 week.      Skyler DonovanD  Medication Therapy Management Resident  [phone]     I have read the note as written by Dr. Crowder and agree with the plan.  Inge Gilmore PharmD, [facility]  Medication Therapy Management Provider  Pager: [phone]

## 2022-12-20 NOTE — TELEPHONE ENCOUNTER
Spoke with patient who states he would like to go forward with the ICD placement but still has many questions and would prefer to have another appointment (H&P) prior to procedure date. Orders placed for H&P and procedure. Routed to Sabine to assist with scheduling.  MIRLANDE VILLALTA

## 2022-12-23 NOTE — TELEPHONE ENCOUNTER
Talked to Queenie and Zoran, To check in to see how Zoran was doing. Overall Zoran is still having some confusion despite stopping quetiapine for 2 weeks.- His sleep has been on and off, some nights are good some are not- educated to try using melatonin more regularly to see if this helps with sleep. Since quetiapine has a black box warning for increased risk of mortality in elderly patients with dementia (Zoran does not have a formal diagnosis but has been referenced in provider notes) would recommend he remain off this medication.    He does have an appointment scheduled with the memory clinic in February and has a follow up appointment with cardiology on 1/6/2023. Zoran did mention that he had an increase in wheezing- recommended that he could use his albuterol inhaler when he was having these symptoms. Also recommended Zoran start to check his blood pressure and weight daily to help monitor his heart failure.     Plan:   1. Follow up with memory clinic & cardiology   2. Zoran will start checking his blood pressure and weight daily and recording these values   3. Call the clinic if weight increases 2-3 pounds in 24 hours or increase of 5 pounds in a week  4. Try to use melatonin every night 1 hour before bed to see if this helps with sleep   5. Start using your albuterol inhaler 1 puff every 4 hours as needed   6. Continue to stay off quetiapine    Follow up:   Roseann to call next week to follow up on weight and blood pressures     I have read the note as written by Dr. Crowder and agree with the plan.  Inge Gilmore, SkylerD, [facility]  Medication Therapy Management Provider  Pager: [phone]

## 2022-12-30 NOTE — TELEPHONE ENCOUNTER
Called Zoran and Queenie,     Zoran has not been able to weigh himself daily but did weigh himself one time since we last spoke and it was 150lbs. Additionally he has checked his blood pressure at home and reported via [employer] message to Inge Gilmore PharmD, that it was 130/84 with a pulse of 96. Will continue to monitor.    Scheduled follow up appointment for 1/16/2023.     Skyler DonovanD  Medication Therapy Management Resident  [phone]

## 2023-01-01 ENCOUNTER — APPOINTMENT (OUTPATIENT)
Dept: GENERAL RADIOLOGY | Facility: CLINIC | Age: 83
DRG: 480 | End: 2023-01-01
Attending: NURSE PRACTITIONER
Payer: COMMERCIAL

## 2023-01-01 ENCOUNTER — MEDICAL CORRESPONDENCE (OUTPATIENT)
Dept: HEALTH INFORMATION MANAGEMENT | Facility: CLINIC | Age: 83
End: 2023-01-01

## 2023-01-01 ENCOUNTER — TELEPHONE (OUTPATIENT)
Dept: FAMILY MEDICINE | Facility: CLINIC | Age: 83
End: 2023-01-01
Payer: COMMERCIAL

## 2023-01-01 ENCOUNTER — HOSPITAL ENCOUNTER (INPATIENT)
Facility: CLINIC | Age: 83
LOS: 16 days | Discharge: HOSPICE | DRG: 480 | End: 2023-03-31
Attending: EMERGENCY MEDICINE | Admitting: HOSPITALIST
Payer: COMMERCIAL

## 2023-01-01 ENCOUNTER — APPOINTMENT (OUTPATIENT)
Dept: PHYSICAL THERAPY | Facility: CLINIC | Age: 83
DRG: 480 | End: 2023-01-01
Attending: PSYCHIATRY & NEUROLOGY
Payer: COMMERCIAL

## 2023-01-01 ENCOUNTER — APPOINTMENT (OUTPATIENT)
Dept: GENERAL RADIOLOGY | Facility: CLINIC | Age: 83
DRG: 480 | End: 2023-01-01
Attending: EMERGENCY MEDICINE
Payer: COMMERCIAL

## 2023-01-01 ENCOUNTER — APPOINTMENT (OUTPATIENT)
Dept: CT IMAGING | Facility: CLINIC | Age: 83
DRG: 480 | End: 2023-01-01
Attending: EMERGENCY MEDICINE
Payer: COMMERCIAL

## 2023-01-01 ENCOUNTER — OFFICE VISIT (OUTPATIENT)
Dept: URGENT CARE | Facility: URGENT CARE | Age: 83
End: 2023-01-01
Payer: COMMERCIAL

## 2023-01-01 ENCOUNTER — OFFICE VISIT (OUTPATIENT)
Dept: CARDIOLOGY | Facility: CLINIC | Age: 83
End: 2023-01-01
Payer: COMMERCIAL

## 2023-01-01 ENCOUNTER — TELEPHONE (OUTPATIENT)
Dept: FAMILY MEDICINE | Facility: CLINIC | Age: 83
End: 2023-01-01

## 2023-01-01 ENCOUNTER — NURSE TRIAGE (OUTPATIENT)
Dept: FAMILY MEDICINE | Facility: CLINIC | Age: 83
End: 2023-01-01
Payer: COMMERCIAL

## 2023-01-01 ENCOUNTER — APPOINTMENT (OUTPATIENT)
Dept: GENERAL RADIOLOGY | Facility: CLINIC | Age: 83
DRG: 480 | End: 2023-01-01
Payer: COMMERCIAL

## 2023-01-01 ENCOUNTER — LAB (OUTPATIENT)
Dept: LAB | Facility: CLINIC | Age: 83
End: 2023-01-01
Payer: COMMERCIAL

## 2023-01-01 ENCOUNTER — APPOINTMENT (OUTPATIENT)
Dept: GENERAL RADIOLOGY | Facility: CLINIC | Age: 83
DRG: 480 | End: 2023-01-01
Attending: STUDENT IN AN ORGANIZED HEALTH CARE EDUCATION/TRAINING PROGRAM
Payer: COMMERCIAL

## 2023-01-01 ENCOUNTER — APPOINTMENT (OUTPATIENT)
Dept: GENERAL RADIOLOGY | Facility: CLINIC | Age: 83
DRG: 480 | End: 2023-01-01
Attending: HOSPITALIST
Payer: COMMERCIAL

## 2023-01-01 ENCOUNTER — VIRTUAL VISIT (OUTPATIENT)
Dept: FAMILY MEDICINE | Facility: CLINIC | Age: 83
End: 2023-01-01
Payer: COMMERCIAL

## 2023-01-01 ENCOUNTER — APPOINTMENT (OUTPATIENT)
Dept: GENERAL RADIOLOGY | Facility: CLINIC | Age: 83
DRG: 480 | End: 2023-01-01
Attending: PHYSICIAN ASSISTANT
Payer: COMMERCIAL

## 2023-01-01 ENCOUNTER — OFFICE VISIT (OUTPATIENT)
Dept: FAMILY MEDICINE | Facility: CLINIC | Age: 83
End: 2023-01-01
Payer: COMMERCIAL

## 2023-01-01 ENCOUNTER — OFFICE VISIT (OUTPATIENT)
Dept: PHARMACY | Facility: CLINIC | Age: 83
End: 2023-01-01
Payer: COMMERCIAL

## 2023-01-01 ENCOUNTER — TELEPHONE (OUTPATIENT)
Dept: PHARMACY | Facility: CLINIC | Age: 83
End: 2023-01-01
Payer: COMMERCIAL

## 2023-01-01 ENCOUNTER — HOSPITAL ENCOUNTER (INPATIENT)
Dept: NEUROLOGY | Facility: CLINIC | Age: 83
Discharge: HOME | DRG: 480 | End: 2023-03-25
Attending: PSYCHIATRY & NEUROLOGY
Payer: COMMERCIAL

## 2023-01-01 ENCOUNTER — ANCILLARY PROCEDURE (OUTPATIENT)
Dept: GENERAL RADIOLOGY | Facility: CLINIC | Age: 83
End: 2023-01-01
Attending: NURSE PRACTITIONER
Payer: COMMERCIAL

## 2023-01-01 ENCOUNTER — APPOINTMENT (OUTPATIENT)
Dept: CARDIOLOGY | Facility: CLINIC | Age: 83
DRG: 480 | End: 2023-01-01
Attending: PHYSICIAN ASSISTANT
Payer: COMMERCIAL

## 2023-01-01 ENCOUNTER — TELEPHONE (OUTPATIENT)
Dept: CARDIOLOGY | Facility: CLINIC | Age: 83
End: 2023-01-01
Payer: COMMERCIAL

## 2023-01-01 ENCOUNTER — APPOINTMENT (OUTPATIENT)
Dept: PHYSICAL THERAPY | Facility: CLINIC | Age: 83
DRG: 480 | End: 2023-01-01
Attending: STUDENT IN AN ORGANIZED HEALTH CARE EDUCATION/TRAINING PROGRAM
Payer: COMMERCIAL

## 2023-01-01 ENCOUNTER — MYC MEDICAL ADVICE (OUTPATIENT)
Dept: FAMILY MEDICINE | Facility: CLINIC | Age: 83
End: 2023-01-01
Payer: COMMERCIAL

## 2023-01-01 ENCOUNTER — APPOINTMENT (OUTPATIENT)
Dept: CT IMAGING | Facility: CLINIC | Age: 83
DRG: 480 | End: 2023-01-01
Attending: HOSPITALIST
Payer: COMMERCIAL

## 2023-01-01 ENCOUNTER — APPOINTMENT (OUTPATIENT)
Dept: SPEECH THERAPY | Facility: CLINIC | Age: 83
DRG: 480 | End: 2023-01-01
Attending: PSYCHIATRY & NEUROLOGY
Payer: COMMERCIAL

## 2023-01-01 ENCOUNTER — ANCILLARY PROCEDURE (OUTPATIENT)
Dept: ULTRASOUND IMAGING | Facility: CLINIC | Age: 83
End: 2023-01-01
Attending: INTERNAL MEDICINE
Payer: COMMERCIAL

## 2023-01-01 ENCOUNTER — TELEPHONE (OUTPATIENT)
Dept: CARDIOLOGY | Facility: CLINIC | Age: 83
End: 2023-01-01

## 2023-01-01 ENCOUNTER — VIRTUAL VISIT (OUTPATIENT)
Dept: ONCOLOGY | Facility: CLINIC | Age: 83
End: 2023-01-01
Attending: STUDENT IN AN ORGANIZED HEALTH CARE EDUCATION/TRAINING PROGRAM
Payer: COMMERCIAL

## 2023-01-01 ENCOUNTER — APPOINTMENT (OUTPATIENT)
Dept: ULTRASOUND IMAGING | Facility: CLINIC | Age: 83
End: 2023-01-01
Attending: EMERGENCY MEDICINE
Payer: COMMERCIAL

## 2023-01-01 ENCOUNTER — HOSPITAL ENCOUNTER (EMERGENCY)
Facility: CLINIC | Age: 83
Discharge: HOME | End: 2023-01-01
Attending: EMERGENCY MEDICINE | Admitting: EMERGENCY MEDICINE
Payer: COMMERCIAL

## 2023-01-01 ENCOUNTER — HOSPITAL ENCOUNTER (INPATIENT)
Facility: CLINIC | Age: 83
LOS: 1 days | End: 2023-04-01
Attending: HOSPITALIST | Admitting: HOSPITALIST

## 2023-01-01 ENCOUNTER — HOSPITAL ENCOUNTER (EMERGENCY)
Facility: CLINIC | Age: 83
Discharge: HOME | End: 2023-01-08
Attending: EMERGENCY MEDICINE | Admitting: EMERGENCY MEDICINE
Payer: COMMERCIAL

## 2023-01-01 ENCOUNTER — HOSPITAL ENCOUNTER (OUTPATIENT)
Dept: CT IMAGING | Facility: CLINIC | Age: 83
Discharge: HOME | End: 2023-01-26
Attending: INTERNAL MEDICINE
Payer: COMMERCIAL

## 2023-01-01 ENCOUNTER — APPOINTMENT (OUTPATIENT)
Dept: SPEECH THERAPY | Facility: CLINIC | Age: 83
DRG: 480 | End: 2023-01-01
Attending: HOSPITALIST
Payer: COMMERCIAL

## 2023-01-01 ENCOUNTER — ANESTHESIA (OUTPATIENT)
Dept: SURGERY | Facility: CLINIC | Age: 83
DRG: 480 | End: 2023-01-01
Payer: COMMERCIAL

## 2023-01-01 ENCOUNTER — TRANSFERRED RECORDS (OUTPATIENT)
Dept: HEALTH INFORMATION MANAGEMENT | Facility: CLINIC | Age: 83
End: 2023-01-01

## 2023-01-01 ENCOUNTER — ANESTHESIA EVENT (OUTPATIENT)
Dept: SURGERY | Facility: CLINIC | Age: 83
DRG: 480 | End: 2023-01-01
Payer: COMMERCIAL

## 2023-01-01 VITALS
RESPIRATION RATE: 17 BRPM | HEIGHT: 71 IN | HEART RATE: 64 BPM | BODY MASS INDEX: 18.86 KG/M2 | OXYGEN SATURATION: 99 % | SYSTOLIC BLOOD PRESSURE: 107 MMHG | DIASTOLIC BLOOD PRESSURE: 76 MMHG | WEIGHT: 134.7 LBS

## 2023-01-01 VITALS
BODY MASS INDEX: 21.14 KG/M2 | RESPIRATION RATE: 18 BRPM | SYSTOLIC BLOOD PRESSURE: 100 MMHG | OXYGEN SATURATION: 95 % | WEIGHT: 151 LBS | DIASTOLIC BLOOD PRESSURE: 94 MMHG | TEMPERATURE: 97.2 F | HEIGHT: 71 IN | HEART RATE: 92 BPM

## 2023-01-01 VITALS
BODY MASS INDEX: 18.41 KG/M2 | RESPIRATION RATE: 20 BRPM | HEART RATE: 142 BPM | TEMPERATURE: 97 F | DIASTOLIC BLOOD PRESSURE: 87 MMHG | WEIGHT: 132 LBS | SYSTOLIC BLOOD PRESSURE: 119 MMHG

## 2023-01-01 VITALS
HEIGHT: 71 IN | WEIGHT: 149.5 LBS | OXYGEN SATURATION: 98 % | BODY MASS INDEX: 20.93 KG/M2 | HEART RATE: 61 BPM | DIASTOLIC BLOOD PRESSURE: 96 MMHG | SYSTOLIC BLOOD PRESSURE: 135 MMHG

## 2023-01-01 VITALS
BODY MASS INDEX: 21.14 KG/M2 | DIASTOLIC BLOOD PRESSURE: 85 MMHG | SYSTOLIC BLOOD PRESSURE: 129 MMHG | HEIGHT: 71 IN | TEMPERATURE: 97.7 F | WEIGHT: 151 LBS | HEART RATE: 112 BPM

## 2023-01-01 VITALS
OXYGEN SATURATION: 95 % | DIASTOLIC BLOOD PRESSURE: 87 MMHG | SYSTOLIC BLOOD PRESSURE: 132 MMHG | TEMPERATURE: 97.4 F | HEART RATE: 105 BPM | WEIGHT: 131 LBS | BODY MASS INDEX: 18.27 KG/M2

## 2023-01-01 VITALS
WEIGHT: 139.5 LBS | HEIGHT: 71 IN | DIASTOLIC BLOOD PRESSURE: 75 MMHG | HEART RATE: 86 BPM | OXYGEN SATURATION: 97 % | BODY MASS INDEX: 19.53 KG/M2 | SYSTOLIC BLOOD PRESSURE: 108 MMHG

## 2023-01-01 VITALS
RESPIRATION RATE: 18 BRPM | BODY MASS INDEX: 19.46 KG/M2 | HEART RATE: 72 BPM | TEMPERATURE: 97.5 F | WEIGHT: 139 LBS | SYSTOLIC BLOOD PRESSURE: 134 MMHG | DIASTOLIC BLOOD PRESSURE: 88 MMHG | HEIGHT: 71 IN

## 2023-01-01 VITALS
BODY MASS INDEX: 21.14 KG/M2 | DIASTOLIC BLOOD PRESSURE: 94 MMHG | RESPIRATION RATE: 20 BRPM | HEIGHT: 71 IN | OXYGEN SATURATION: 97 % | SYSTOLIC BLOOD PRESSURE: 142 MMHG | WEIGHT: 151 LBS | HEART RATE: 45 BPM | TEMPERATURE: 97.7 F

## 2023-01-01 VITALS
DIASTOLIC BLOOD PRESSURE: 90 MMHG | WEIGHT: 141.09 LBS | HEIGHT: 72 IN | OXYGEN SATURATION: 100 % | TEMPERATURE: 101.3 F | HEART RATE: 104 BPM | BODY MASS INDEX: 19.11 KG/M2 | SYSTOLIC BLOOD PRESSURE: 135 MMHG | RESPIRATION RATE: 20 BRPM

## 2023-01-01 VITALS
DIASTOLIC BLOOD PRESSURE: 84 MMHG | HEART RATE: 106 BPM | BODY MASS INDEX: 18.42 KG/M2 | SYSTOLIC BLOOD PRESSURE: 122 MMHG | WEIGHT: 132.1 LBS

## 2023-01-01 VITALS
BODY MASS INDEX: 18.33 KG/M2 | TEMPERATURE: 97.8 F | OXYGEN SATURATION: 95 % | DIASTOLIC BLOOD PRESSURE: 88 MMHG | WEIGHT: 131.4 LBS | SYSTOLIC BLOOD PRESSURE: 118 MMHG | RESPIRATION RATE: 20 BRPM | HEART RATE: 87 BPM

## 2023-01-01 VITALS
RESPIRATION RATE: 16 BRPM | HEART RATE: 66 BPM | OXYGEN SATURATION: 83 % | TEMPERATURE: 98.2 F | SYSTOLIC BLOOD PRESSURE: 129 MMHG | BODY MASS INDEX: 18.62 KG/M2 | DIASTOLIC BLOOD PRESSURE: 89 MMHG | WEIGHT: 133 LBS | HEIGHT: 71 IN

## 2023-01-01 DIAGNOSIS — I48.91 ATRIAL FIBRILLATION, UNSPECIFIED TYPE (H): ICD-10-CM

## 2023-01-01 DIAGNOSIS — I25.5 ISCHEMIC CARDIOMYOPATHY: ICD-10-CM

## 2023-01-01 DIAGNOSIS — M79.89 LEG SWELLING: ICD-10-CM

## 2023-01-01 DIAGNOSIS — I25.10 ATHEROSCLEROSIS OF NATIVE CORONARY ARTERY OF NATIVE HEART WITHOUT ANGINA PECTORIS: ICD-10-CM

## 2023-01-01 DIAGNOSIS — J90 PLEURAL EFFUSION: ICD-10-CM

## 2023-01-01 DIAGNOSIS — Z79.01 LONG TERM CURRENT USE OF ANTICOAGULANT THERAPY: ICD-10-CM

## 2023-01-01 DIAGNOSIS — E78.5 HYPERLIPIDEMIA LDL GOAL <100: Primary | ICD-10-CM

## 2023-01-01 DIAGNOSIS — I50.22 CHRONIC SYSTOLIC CONGESTIVE HEART FAILURE (H): ICD-10-CM

## 2023-01-01 DIAGNOSIS — R63.4 WEIGHT LOSS: ICD-10-CM

## 2023-01-01 DIAGNOSIS — K76.89 OTHER SPECIFIED DISEASES OF LIVER: ICD-10-CM

## 2023-01-01 DIAGNOSIS — R09.02 HYPOXIA: ICD-10-CM

## 2023-01-01 DIAGNOSIS — I25.5 ISCHEMIC CARDIOMYOPATHY: Primary | ICD-10-CM

## 2023-01-01 DIAGNOSIS — I48.19 PERSISTENT ATRIAL FIBRILLATION (H): ICD-10-CM

## 2023-01-01 DIAGNOSIS — I25.9 CHRONIC ISCHEMIC HEART DISEASE: ICD-10-CM

## 2023-01-01 DIAGNOSIS — I50.22 CHRONIC SYSTOLIC CONGESTIVE HEART FAILURE (H): Primary | ICD-10-CM

## 2023-01-01 DIAGNOSIS — Z51.5 HOSPICE CARE PATIENT: ICD-10-CM

## 2023-01-01 DIAGNOSIS — M79.89 LEFT ARM SWELLING: ICD-10-CM

## 2023-01-01 DIAGNOSIS — R19.7 DIARRHEA, UNSPECIFIED TYPE: ICD-10-CM

## 2023-01-01 DIAGNOSIS — J43.9 PULMONARY EMPHYSEMA, UNSPECIFIED EMPHYSEMA TYPE (H): ICD-10-CM

## 2023-01-01 DIAGNOSIS — E03.9 HYPOTHYROIDISM, UNSPECIFIED TYPE: ICD-10-CM

## 2023-01-01 DIAGNOSIS — Z71.89 GOALS OF CARE, COUNSELING/DISCUSSION: ICD-10-CM

## 2023-01-01 DIAGNOSIS — R41.89 COGNITIVE IMPAIRMENT: ICD-10-CM

## 2023-01-01 DIAGNOSIS — R63.4 UNINTENTIONAL WEIGHT LOSS: Primary | ICD-10-CM

## 2023-01-01 DIAGNOSIS — I50.21 ACUTE HFREF (HEART FAILURE WITH REDUCED EJECTION FRACTION) (H): ICD-10-CM

## 2023-01-01 DIAGNOSIS — Z78.9 TAKES DIETARY SUPPLEMENTS: ICD-10-CM

## 2023-01-01 DIAGNOSIS — E80.6 HYPERBILIRUBINEMIA: ICD-10-CM

## 2023-01-01 DIAGNOSIS — R79.89 ELEVATED LIVER FUNCTION TESTS: ICD-10-CM

## 2023-01-01 DIAGNOSIS — R79.89 ELEVATED LACTIC ACID LEVEL: ICD-10-CM

## 2023-01-01 DIAGNOSIS — F41.9 ANXIETY: ICD-10-CM

## 2023-01-01 DIAGNOSIS — Z53.9 DIAGNOSIS NOT YET DEFINED: Primary | ICD-10-CM

## 2023-01-01 DIAGNOSIS — I10 BENIGN ESSENTIAL HYPERTENSION: Primary | ICD-10-CM

## 2023-01-01 DIAGNOSIS — T14.8XXA WOUND, OPEN WITH COMPLICATION: ICD-10-CM

## 2023-01-01 DIAGNOSIS — R63.4 WEIGHT LOSS: Primary | ICD-10-CM

## 2023-01-01 DIAGNOSIS — R41.89 COGNITIVE DECLINE: ICD-10-CM

## 2023-01-01 DIAGNOSIS — R60.0 BILATERAL LOWER EXTREMITY EDEMA: ICD-10-CM

## 2023-01-01 DIAGNOSIS — F43.22 ADJUSTMENT DISORDER WITH ANXIOUS MOOD: ICD-10-CM

## 2023-01-01 DIAGNOSIS — R63.4 LOSS OF WEIGHT: ICD-10-CM

## 2023-01-01 DIAGNOSIS — R47.9 SPEECH DISTURBANCE, UNSPECIFIED TYPE: Primary | ICD-10-CM

## 2023-01-01 DIAGNOSIS — L03.116 CELLULITIS OF LEFT LOWER EXTREMITY: ICD-10-CM

## 2023-01-01 DIAGNOSIS — F03.94 DEMENTIA WITH ANXIETY, UNSPECIFIED DEMENTIA SEVERITY, UNSPECIFIED DEMENTIA TYPE (H): ICD-10-CM

## 2023-01-01 DIAGNOSIS — Z78.9 MEDICALLY COMPLEX PATIENT: ICD-10-CM

## 2023-01-01 DIAGNOSIS — E46 MALNUTRITION, UNSPECIFIED TYPE (H): ICD-10-CM

## 2023-01-01 DIAGNOSIS — Z09 HOSPITAL DISCHARGE FOLLOW-UP: Primary | ICD-10-CM

## 2023-01-01 DIAGNOSIS — I50.22 CHRONIC SYSTOLIC (CONGESTIVE) HEART FAILURE (H): ICD-10-CM

## 2023-01-01 DIAGNOSIS — I89.0 LYMPHEDEMA: Primary | ICD-10-CM

## 2023-01-01 DIAGNOSIS — R30.0 DYSURIA: ICD-10-CM

## 2023-01-01 DIAGNOSIS — F32.9 REACTIVE DEPRESSION: ICD-10-CM

## 2023-01-01 DIAGNOSIS — R41.3 MEMORY LOSS: ICD-10-CM

## 2023-01-01 DIAGNOSIS — R47.81 SLURRED SPEECH: ICD-10-CM

## 2023-01-01 DIAGNOSIS — I10 BENIGN ESSENTIAL HYPERTENSION: ICD-10-CM

## 2023-01-01 DIAGNOSIS — Z51.5 ENCOUNTER FOR PALLIATIVE CARE: ICD-10-CM

## 2023-01-01 DIAGNOSIS — F03.94 DEMENTIA WITH ANXIETY, UNSPECIFIED DEMENTIA SEVERITY, UNSPECIFIED DEMENTIA TYPE (H): Primary | ICD-10-CM

## 2023-01-01 DIAGNOSIS — S72.141A CLOSED INTERTROCHANTERIC FRACTURE OF HIP, RIGHT, INITIAL ENCOUNTER (H): Primary | ICD-10-CM

## 2023-01-01 DIAGNOSIS — K76.9 LIVER DISEASE, UNSPECIFIED: ICD-10-CM

## 2023-01-01 DIAGNOSIS — R44.3 HALLUCINATIONS: ICD-10-CM

## 2023-01-01 DIAGNOSIS — L03.90 CELLULITIS, UNSPECIFIED CELLULITIS SITE: ICD-10-CM

## 2023-01-01 DIAGNOSIS — G47.00 INSOMNIA, UNSPECIFIED TYPE: ICD-10-CM

## 2023-01-01 DIAGNOSIS — I73.9 PAD (PERIPHERAL ARTERY DISEASE) (H): ICD-10-CM

## 2023-01-01 DIAGNOSIS — G47.9 DIFFICULTY SLEEPING: ICD-10-CM

## 2023-01-01 DIAGNOSIS — S72.141A CLOSED DISPLACED INTERTROCHANTERIC FRACTURE OF RIGHT FEMUR, INITIAL ENCOUNTER (H): ICD-10-CM

## 2023-01-01 DIAGNOSIS — E78.5 HYPERLIPIDEMIA LDL GOAL <100: ICD-10-CM

## 2023-01-01 DIAGNOSIS — K62.89 RECTAL PAIN: Primary | ICD-10-CM

## 2023-01-01 DIAGNOSIS — R60.0 BILATERAL LOWER EXTREMITY EDEMA: Primary | ICD-10-CM

## 2023-01-01 DIAGNOSIS — G31.84 MCI (MILD COGNITIVE IMPAIRMENT): ICD-10-CM

## 2023-01-01 DIAGNOSIS — R21 RASH: Primary | ICD-10-CM

## 2023-01-01 LAB
ABO/RH(D): NORMAL
ALBUMIN SERPL BCG-MCNC: 2.6 G/DL (ref 3.5–5.2)
ALBUMIN SERPL BCG-MCNC: 3 G/DL (ref 3.5–5.2)
ALBUMIN SERPL BCG-MCNC: 3.1 G/DL (ref 3.5–5.2)
ALBUMIN SERPL BCG-MCNC: 3.2 G/DL (ref 3.5–5.2)
ALBUMIN SERPL BCG-MCNC: 3.2 G/DL (ref 3.5–5.2)
ALBUMIN SERPL BCG-MCNC: 3.3 G/DL (ref 3.5–5.2)
ALBUMIN SERPL BCG-MCNC: 3.4 G/DL (ref 3.5–5.2)
ALBUMIN SERPL BCG-MCNC: 3.4 G/DL (ref 3.5–5.2)
ALBUMIN SERPL BCG-MCNC: 3.8 G/DL (ref 3.5–5.2)
ALBUMIN SERPL-MCNC: 3.1 G/DL (ref 3.4–5)
ALBUMIN UR-MCNC: 100 MG/DL
ALBUMIN UR-MCNC: NEGATIVE MG/DL
ALLEN'S TEST: YES
ALP SERPL-CCNC: 108 U/L (ref 40–129)
ALP SERPL-CCNC: 112 U/L (ref 40–129)
ALP SERPL-CCNC: 117 U/L (ref 40–129)
ALP SERPL-CCNC: 118 U/L (ref 40–129)
ALP SERPL-CCNC: 121 U/L (ref 40–129)
ALP SERPL-CCNC: 122 U/L (ref 40–129)
ALP SERPL-CCNC: 123 U/L (ref 40–129)
ALP SERPL-CCNC: 129 U/L (ref 40–129)
ALP SERPL-CCNC: 131 U/L (ref 40–129)
ALP SERPL-CCNC: 146 U/L (ref 40–150)
ALP SERPL-CCNC: 175 U/L (ref 40–129)
ALP SERPL-CCNC: 216 U/L (ref 40–129)
ALT SERPL W P-5'-P-CCNC: 16 U/L (ref 10–50)
ALT SERPL W P-5'-P-CCNC: 19 U/L (ref 10–50)
ALT SERPL W P-5'-P-CCNC: 21 U/L (ref 10–50)
ALT SERPL W P-5'-P-CCNC: 25 U/L (ref 10–50)
ALT SERPL W P-5'-P-CCNC: 26 U/L (ref 10–50)
ALT SERPL W P-5'-P-CCNC: 28 U/L (ref 0–70)
ALT SERPL W P-5'-P-CCNC: 31 U/L (ref 10–50)
ALT SERPL W P-5'-P-CCNC: 34 U/L (ref 10–50)
ALT SERPL W P-5'-P-CCNC: 36 U/L (ref 10–50)
ALT SERPL W P-5'-P-CCNC: 46 U/L (ref 10–50)
AMMONIA PLAS-SCNC: 16 UMOL/L (ref 16–60)
AMMONIA PLAS-SCNC: <10 UMOL/L (ref 16–60)
ANION GAP SERPL CALCULATED.3IONS-SCNC: 10 MMOL/L (ref 7–15)
ANION GAP SERPL CALCULATED.3IONS-SCNC: 11 MMOL/L (ref 7–15)
ANION GAP SERPL CALCULATED.3IONS-SCNC: 12 MMOL/L (ref 7–15)
ANION GAP SERPL CALCULATED.3IONS-SCNC: 14 MMOL/L (ref 7–15)
ANION GAP SERPL CALCULATED.3IONS-SCNC: 15 MMOL/L (ref 7–15)
ANION GAP SERPL CALCULATED.3IONS-SCNC: 17 MMOL/L (ref 7–15)
ANION GAP SERPL CALCULATED.3IONS-SCNC: 5 MMOL/L (ref 7–15)
ANION GAP SERPL CALCULATED.3IONS-SCNC: 5 MMOL/L (ref 7–15)
ANION GAP SERPL CALCULATED.3IONS-SCNC: 7 MMOL/L (ref 3–14)
ANION GAP SERPL CALCULATED.3IONS-SCNC: 7 MMOL/L (ref 7–15)
ANION GAP SERPL CALCULATED.3IONS-SCNC: 7 MMOL/L (ref 7–15)
ANTIBODY SCREEN: NEGATIVE
APPEARANCE UR: CLEAR
APPEARANCE UR: CLEAR
AST SERPL W P-5'-P-CCNC: 36 U/L (ref 0–45)
AST SERPL W P-5'-P-CCNC: 47 U/L (ref 10–50)
AST SERPL W P-5'-P-CCNC: 52 U/L (ref 10–50)
AST SERPL W P-5'-P-CCNC: 55 U/L (ref 10–50)
AST SERPL W P-5'-P-CCNC: 56 U/L (ref 10–50)
AST SERPL W P-5'-P-CCNC: 58 U/L (ref 10–50)
AST SERPL W P-5'-P-CCNC: 59 U/L (ref 10–50)
AST SERPL W P-5'-P-CCNC: 60 U/L (ref 10–50)
AST SERPL W P-5'-P-CCNC: 61 U/L (ref 10–50)
AST SERPL W P-5'-P-CCNC: 62 U/L (ref 10–50)
AST SERPL W P-5'-P-CCNC: 71 U/L (ref 10–50)
AST SERPL W P-5'-P-CCNC: 98 U/L (ref 10–50)
ATRIAL RATE - MUSE: 52 BPM
ATRIAL RATE - MUSE: NORMAL BPM
BACTERIA #/AREA URNS HPF: ABNORMAL /HPF
BACTERIA BLD CULT: NO GROWTH
BACTERIA BLD CULT: NO GROWTH
BASE EXCESS BLDA CALC-SCNC: -4.9 MMOL/L (ref -9–1.8)
BASE EXCESS BLDV CALC-SCNC: -1.5 MMOL/L (ref -7.7–1.9)
BASE EXCESS BLDV CALC-SCNC: 10 MMOL/L (ref -7.7–1.9)
BASE EXCESS BLDV CALC-SCNC: 2.5 MMOL/L (ref -7.7–1.9)
BASOPHILS # BLD AUTO: 0 10E3/UL (ref 0–0.2)
BASOPHILS # BLD AUTO: 0 10E3/UL (ref 0–0.2)
BASOPHILS # BLD AUTO: 0.1 10E3/UL (ref 0–0.2)
BASOPHILS NFR BLD AUTO: 0 %
BASOPHILS NFR BLD AUTO: 1 %
BILIRUB DIRECT SERPL-MCNC: 2.3 MG/DL (ref 0–0.2)
BILIRUB DIRECT SERPL-MCNC: 2.79 MG/DL (ref 0–0.3)
BILIRUB DIRECT SERPL-MCNC: 3.9 MG/DL (ref 0–0.3)
BILIRUB DIRECT SERPL-MCNC: 4.89 MG/DL (ref 0–0.3)
BILIRUB SERPL-MCNC: 4.3 MG/DL
BILIRUB SERPL-MCNC: 4.4 MG/DL
BILIRUB SERPL-MCNC: 4.4 MG/DL (ref 0.2–1.3)
BILIRUB SERPL-MCNC: 4.5 MG/DL
BILIRUB SERPL-MCNC: 5.5 MG/DL
BILIRUB SERPL-MCNC: 5.5 MG/DL
BILIRUB SERPL-MCNC: 6.8 MG/DL
BILIRUB SERPL-MCNC: 6.9 MG/DL
BILIRUB SERPL-MCNC: 7.1 MG/DL
BILIRUB SERPL-MCNC: 7.5 MG/DL
BILIRUB SERPL-MCNC: 8.3 MG/DL
BILIRUB SERPL-MCNC: 8.3 MG/DL
BILIRUB UR QL STRIP: ABNORMAL
BILIRUB UR QL STRIP: NEGATIVE
BLD PROD TYP BPU: NORMAL
BLD PROD TYP BPU: NORMAL
BLOOD COMPONENT TYPE: NORMAL
BLOOD COMPONENT TYPE: NORMAL
BUN SERPL-MCNC: 19.7 MG/DL (ref 8–23)
BUN SERPL-MCNC: 19.7 MG/DL (ref 8–23)
BUN SERPL-MCNC: 20.3 MG/DL (ref 8–23)
BUN SERPL-MCNC: 20.4 MG/DL (ref 8–23)
BUN SERPL-MCNC: 20.8 MG/DL (ref 8–23)
BUN SERPL-MCNC: 21.9 MG/DL (ref 8–23)
BUN SERPL-MCNC: 22.2 MG/DL (ref 8–23)
BUN SERPL-MCNC: 23 MG/DL (ref 7–30)
BUN SERPL-MCNC: 24 MG/DL (ref 8–23)
BUN SERPL-MCNC: 24 MG/DL (ref 8–23)
BUN SERPL-MCNC: 24.5 MG/DL (ref 8–23)
BUN SERPL-MCNC: 26.3 MG/DL (ref 8–23)
BUN SERPL-MCNC: 27.1 MG/DL (ref 8–23)
BUN SERPL-MCNC: 28.2 MG/DL (ref 8–23)
BUN SERPL-MCNC: 28.6 MG/DL (ref 8–23)
BUN SERPL-MCNC: 29.4 MG/DL (ref 8–23)
BUN SERPL-MCNC: 30 MG/DL (ref 8–23)
BUN SERPL-MCNC: 32.1 MG/DL (ref 8–23)
BUN SERPL-MCNC: 32.1 MG/DL (ref 8–23)
BURR CELLS BLD QL SMEAR: SLIGHT
BURR CELLS BLD QL SMEAR: SLIGHT
CALCIUM SERPL-MCNC: 10.1 MG/DL (ref 8.8–10.2)
CALCIUM SERPL-MCNC: 8.6 MG/DL (ref 8.8–10.2)
CALCIUM SERPL-MCNC: 8.7 MG/DL (ref 8.8–10.2)
CALCIUM SERPL-MCNC: 8.8 MG/DL (ref 8.8–10.2)
CALCIUM SERPL-MCNC: 8.9 MG/DL (ref 8.8–10.2)
CALCIUM SERPL-MCNC: 8.9 MG/DL (ref 8.8–10.2)
CALCIUM SERPL-MCNC: 9 MG/DL (ref 8.8–10.2)
CALCIUM SERPL-MCNC: 9 MG/DL (ref 8.8–10.2)
CALCIUM SERPL-MCNC: 9.1 MG/DL (ref 8.5–10.1)
CALCIUM SERPL-MCNC: 9.1 MG/DL (ref 8.8–10.2)
CALCIUM SERPL-MCNC: 9.1 MG/DL (ref 8.8–10.2)
CALCIUM SERPL-MCNC: 9.3 MG/DL (ref 8.8–10.2)
CALCIUM SERPL-MCNC: 9.4 MG/DL (ref 8.8–10.2)
CALCIUM SERPL-MCNC: 9.4 MG/DL (ref 8.8–10.2)
CALCIUM SERPL-MCNC: 9.5 MG/DL (ref 8.8–10.2)
CALCIUM SERPL-MCNC: 9.5 MG/DL (ref 8.8–10.2)
CALCIUM SERPL-MCNC: 9.6 MG/DL (ref 8.8–10.2)
CHLORIDE BLD-SCNC: 104 MMOL/L (ref 94–109)
CHLORIDE SERPL-SCNC: 101 MMOL/L (ref 98–107)
CHLORIDE SERPL-SCNC: 102 MMOL/L (ref 98–107)
CHLORIDE SERPL-SCNC: 103 MMOL/L (ref 98–107)
CHLORIDE SERPL-SCNC: 103 MMOL/L (ref 98–107)
CHLORIDE SERPL-SCNC: 104 MMOL/L (ref 98–107)
CHLORIDE SERPL-SCNC: 106 MMOL/L (ref 98–107)
CHLORIDE SERPL-SCNC: 109 MMOL/L (ref 98–107)
CHLORIDE SERPL-SCNC: 113 MMOL/L (ref 98–107)
CHLORIDE SERPL-SCNC: 97 MMOL/L (ref 98–107)
CHLORIDE SERPL-SCNC: 98 MMOL/L (ref 98–107)
CHLORIDE SERPL-SCNC: 99 MMOL/L (ref 98–107)
CHOLEST SERPL-MCNC: 130 MG/DL
CO2 SERPL-SCNC: 22 MMOL/L (ref 20–32)
CODING SYSTEM: NORMAL
CODING SYSTEM: NORMAL
COLOR UR AUTO: ABNORMAL
COLOR UR AUTO: YELLOW
CREAT SERPL-MCNC: 0.7 MG/DL (ref 0.67–1.17)
CREAT SERPL-MCNC: 0.71 MG/DL (ref 0.67–1.17)
CREAT SERPL-MCNC: 0.71 MG/DL (ref 0.67–1.17)
CREAT SERPL-MCNC: 0.72 MG/DL (ref 0.67–1.17)
CREAT SERPL-MCNC: 0.76 MG/DL (ref 0.67–1.17)
CREAT SERPL-MCNC: 0.76 MG/DL (ref 0.67–1.17)
CREAT SERPL-MCNC: 0.77 MG/DL (ref 0.67–1.17)
CREAT SERPL-MCNC: 0.78 MG/DL (ref 0.67–1.17)
CREAT SERPL-MCNC: 0.79 MG/DL (ref 0.67–1.17)
CREAT SERPL-MCNC: 0.8 MG/DL (ref 0.66–1.25)
CREAT SERPL-MCNC: 0.8 MG/DL (ref 0.67–1.17)
CREAT SERPL-MCNC: 0.8 MG/DL (ref 0.67–1.17)
CREAT SERPL-MCNC: 0.81 MG/DL (ref 0.67–1.17)
CREAT SERPL-MCNC: 0.82 MG/DL (ref 0.67–1.17)
CREAT SERPL-MCNC: 0.83 MG/DL (ref 0.67–1.17)
CREAT SERPL-MCNC: 0.86 MG/DL (ref 0.67–1.17)
CREAT SERPL-MCNC: 0.93 MG/DL (ref 0.67–1.17)
CREAT SERPL-MCNC: 0.94 MG/DL (ref 0.67–1.17)
CREAT SERPL-MCNC: 0.95 MG/DL (ref 0.67–1.17)
CREAT SERPL-MCNC: 1.07 MG/DL (ref 0.67–1.17)
CREAT SERPL-MCNC: 1.12 MG/DL (ref 0.67–1.17)
CROSSMATCH: NORMAL
CROSSMATCH: NORMAL
CRP SERPL-MCNC: 59.67 MG/L
DEPRECATED CALCIDIOL+CALCIFEROL SERPL-MC: 63 UG/L (ref 20–75)
DEPRECATED CALCIDIOL+CALCIFEROL SERPL-MC: 76 UG/L (ref 20–75)
DEPRECATED HCO3 PLAS-SCNC: 17 MMOL/L (ref 22–29)
DEPRECATED HCO3 PLAS-SCNC: 20 MMOL/L (ref 22–29)
DEPRECATED HCO3 PLAS-SCNC: 22 MMOL/L (ref 22–29)
DEPRECATED HCO3 PLAS-SCNC: 23 MMOL/L (ref 22–29)
DEPRECATED HCO3 PLAS-SCNC: 24 MMOL/L (ref 22–29)
DEPRECATED HCO3 PLAS-SCNC: 26 MMOL/L (ref 22–29)
DEPRECATED HCO3 PLAS-SCNC: 27 MMOL/L (ref 22–29)
DEPRECATED HCO3 PLAS-SCNC: 27 MMOL/L (ref 22–29)
DEPRECATED HCO3 PLAS-SCNC: 28 MMOL/L (ref 22–29)
DEPRECATED HCO3 PLAS-SCNC: 28 MMOL/L (ref 22–29)
DEPRECATED HCO3 PLAS-SCNC: 30 MMOL/L (ref 22–29)
DEPRECATED HCO3 PLAS-SCNC: 30 MMOL/L (ref 22–29)
DEPRECATED HCO3 PLAS-SCNC: 31 MMOL/L (ref 22–29)
DEPRECATED HCO3 PLAS-SCNC: 31 MMOL/L (ref 22–29)
DIASTOLIC BLOOD PRESSURE - MUSE: NORMAL MMHG
DIASTOLIC BLOOD PRESSURE - MUSE: NORMAL MMHG
EOSINOPHIL # BLD AUTO: 0 10E3/UL (ref 0–0.7)
EOSINOPHIL # BLD AUTO: 0 10E3/UL (ref 0–0.7)
EOSINOPHIL # BLD AUTO: 0.1 10E3/UL (ref 0–0.7)
EOSINOPHIL # BLD AUTO: 0.2 10E3/UL (ref 0–0.7)
EOSINOPHIL NFR BLD AUTO: 0 %
EOSINOPHIL NFR BLD AUTO: 0 %
EOSINOPHIL NFR BLD AUTO: 1 %
EOSINOPHIL NFR BLD AUTO: 1 %
EOSINOPHIL NFR BLD AUTO: 2 %
EOSINOPHIL NFR BLD AUTO: 5 %
ERYTHROCYTE [DISTWIDTH] IN BLOOD BY AUTOMATED COUNT: 15.8 % (ref 10–15)
ERYTHROCYTE [DISTWIDTH] IN BLOOD BY AUTOMATED COUNT: 16.2 % (ref 10–15)
ERYTHROCYTE [DISTWIDTH] IN BLOOD BY AUTOMATED COUNT: 16.6 % (ref 10–15)
ERYTHROCYTE [DISTWIDTH] IN BLOOD BY AUTOMATED COUNT: 16.7 % (ref 10–15)
ERYTHROCYTE [DISTWIDTH] IN BLOOD BY AUTOMATED COUNT: 17.6 % (ref 10–15)
ERYTHROCYTE [DISTWIDTH] IN BLOOD BY AUTOMATED COUNT: 17.8 % (ref 10–15)
ERYTHROCYTE [DISTWIDTH] IN BLOOD BY AUTOMATED COUNT: 18.5 % (ref 10–15)
ERYTHROCYTE [DISTWIDTH] IN BLOOD BY AUTOMATED COUNT: 19.5 % (ref 10–15)
ERYTHROCYTE [DISTWIDTH] IN BLOOD BY AUTOMATED COUNT: 19.7 % (ref 10–15)
ERYTHROCYTE [DISTWIDTH] IN BLOOD BY AUTOMATED COUNT: 19.7 % (ref 10–15)
ERYTHROCYTE [DISTWIDTH] IN BLOOD BY AUTOMATED COUNT: 19.9 % (ref 10–15)
ERYTHROCYTE [DISTWIDTH] IN BLOOD BY AUTOMATED COUNT: 20 % (ref 10–15)
ERYTHROCYTE [DISTWIDTH] IN BLOOD BY AUTOMATED COUNT: 20.1 % (ref 10–15)
ERYTHROCYTE [DISTWIDTH] IN BLOOD BY AUTOMATED COUNT: 21 % (ref 10–15)
ERYTHROCYTE [DISTWIDTH] IN BLOOD BY AUTOMATED COUNT: 21.9 % (ref 10–15)
ERYTHROCYTE [DISTWIDTH] IN BLOOD BY AUTOMATED COUNT: 22.3 % (ref 10–15)
ERYTHROCYTE [DISTWIDTH] IN BLOOD BY AUTOMATED COUNT: 22.9 % (ref 10–15)
ERYTHROCYTE [DISTWIDTH] IN BLOOD BY AUTOMATED COUNT: 23 % (ref 10–15)
ERYTHROCYTE [DISTWIDTH] IN BLOOD BY AUTOMATED COUNT: 23.2 % (ref 10–15)
ETHANOL SERPL-MCNC: <0.01 G/DL
GFR SERPL CREATININE-BSD FRML MDRD: 66 ML/MIN/1.73M2
GFR SERPL CREATININE-BSD FRML MDRD: 70 ML/MIN/1.73M2
GFR SERPL CREATININE-BSD FRML MDRD: 81 ML/MIN/1.73M2
GFR SERPL CREATININE-BSD FRML MDRD: 82 ML/MIN/1.73M2
GFR SERPL CREATININE-BSD FRML MDRD: 83 ML/MIN/1.73M2
GFR SERPL CREATININE-BSD FRML MDRD: 88 ML/MIN/1.73M2
GFR SERPL CREATININE-BSD FRML MDRD: 88 ML/MIN/1.73M2
GFR SERPL CREATININE-BSD FRML MDRD: 89 ML/MIN/1.73M2
GFR SERPL CREATININE-BSD FRML MDRD: 90 ML/MIN/1.73M2
GFR SERPL CREATININE-BSD FRML MDRD: 90 ML/MIN/1.73M2
GFR SERPL CREATININE-BSD FRML MDRD: >90 ML/MIN/1.73M2
GGT SERPL-CCNC: 101 U/L (ref 8–61)
GLUCOSE BLD-MCNC: 119 MG/DL (ref 70–99)
GLUCOSE BLDC GLUCOMTR-MCNC: 102 MG/DL (ref 70–99)
GLUCOSE BLDC GLUCOMTR-MCNC: 115 MG/DL (ref 70–99)
GLUCOSE BLDC GLUCOMTR-MCNC: 124 MG/DL (ref 70–99)
GLUCOSE BLDC GLUCOMTR-MCNC: 35 MG/DL (ref 70–99)
GLUCOSE BLDC GLUCOMTR-MCNC: 39 MG/DL (ref 70–99)
GLUCOSE BLDC GLUCOMTR-MCNC: 96 MG/DL (ref 70–99)
GLUCOSE SERPL-MCNC: 101 MG/DL (ref 70–99)
GLUCOSE SERPL-MCNC: 106 MG/DL (ref 70–99)
GLUCOSE SERPL-MCNC: 106 MG/DL (ref 70–99)
GLUCOSE SERPL-MCNC: 107 MG/DL (ref 70–99)
GLUCOSE SERPL-MCNC: 118 MG/DL (ref 70–99)
GLUCOSE SERPL-MCNC: 120 MG/DL (ref 70–99)
GLUCOSE SERPL-MCNC: 125 MG/DL (ref 70–99)
GLUCOSE SERPL-MCNC: 83 MG/DL (ref 70–99)
GLUCOSE SERPL-MCNC: 87 MG/DL (ref 70–99)
GLUCOSE SERPL-MCNC: 87 MG/DL (ref 70–99)
GLUCOSE SERPL-MCNC: 89 MG/DL (ref 70–99)
GLUCOSE SERPL-MCNC: 92 MG/DL (ref 70–99)
GLUCOSE SERPL-MCNC: 95 MG/DL (ref 70–99)
GLUCOSE SERPL-MCNC: 96 MG/DL (ref 70–99)
GLUCOSE SERPL-MCNC: 96 MG/DL (ref 70–99)
GLUCOSE SERPL-MCNC: 97 MG/DL (ref 70–99)
GLUCOSE SERPL-MCNC: 98 MG/DL (ref 70–99)
GLUCOSE SERPL-MCNC: 99 MG/DL (ref 70–99)
GLUCOSE SERPL-MCNC: 99 MG/DL (ref 70–99)
GLUCOSE UR STRIP-MCNC: NEGATIVE MG/DL
GLUCOSE UR STRIP-MCNC: NEGATIVE MG/DL
HCO3 BLD-SCNC: 20 MMOL/L (ref 21–28)
HCO3 BLDV-SCNC: 24 MMOL/L (ref 21–28)
HCO3 BLDV-SCNC: 26 MMOL/L (ref 21–28)
HCO3 BLDV-SCNC: 28 MMOL/L (ref 21–28)
HCO3 BLDV-SCNC: 35 MMOL/L (ref 21–28)
HCT VFR BLD AUTO: 19.2 % (ref 40–53)
HCT VFR BLD AUTO: 22 % (ref 40–53)
HCT VFR BLD AUTO: 22.1 % (ref 40–53)
HCT VFR BLD AUTO: 24 % (ref 40–53)
HCT VFR BLD AUTO: 24.1 % (ref 40–53)
HCT VFR BLD AUTO: 24.2 % (ref 40–53)
HCT VFR BLD AUTO: 26.4 % (ref 40–53)
HCT VFR BLD AUTO: 26.6 % (ref 40–53)
HCT VFR BLD AUTO: 28.5 % (ref 40–53)
HCT VFR BLD AUTO: 29.2 % (ref 40–53)
HCT VFR BLD AUTO: 30.1 % (ref 40–53)
HCT VFR BLD AUTO: 30.3 % (ref 40–53)
HCT VFR BLD AUTO: 30.7 % (ref 40–53)
HCT VFR BLD AUTO: 32.5 % (ref 40–53)
HCT VFR BLD AUTO: 37.6 % (ref 40–53)
HCT VFR BLD AUTO: 39.7 % (ref 40–53)
HCT VFR BLD AUTO: 41.5 % (ref 40–53)
HCT VFR BLD AUTO: 46.7 % (ref 40–53)
HCT VFR BLD AUTO: 48.1 % (ref 40–53)
HDLC SERPL-MCNC: 31 MG/DL
HGB BLD-MCNC: 10 G/DL (ref 13.3–17.7)
HGB BLD-MCNC: 10.1 G/DL (ref 13.3–17.7)
HGB BLD-MCNC: 10.6 G/DL (ref 13.3–17.7)
HGB BLD-MCNC: 13.3 G/DL (ref 13.3–17.7)
HGB BLD-MCNC: 14 G/DL (ref 13.3–17.7)
HGB BLD-MCNC: 14.5 G/DL (ref 13.3–17.7)
HGB BLD-MCNC: 16.3 G/DL (ref 13.3–17.7)
HGB BLD-MCNC: 16.9 G/DL (ref 13.3–17.7)
HGB BLD-MCNC: 6.1 G/DL (ref 13.3–17.7)
HGB BLD-MCNC: 6.6 G/DL (ref 13.3–17.7)
HGB BLD-MCNC: 7 G/DL (ref 13.3–17.7)
HGB BLD-MCNC: 7.4 G/DL (ref 13.3–17.7)
HGB BLD-MCNC: 7.5 G/DL (ref 13.3–17.7)
HGB BLD-MCNC: 7.6 G/DL (ref 13.3–17.7)
HGB BLD-MCNC: 8 G/DL (ref 13.3–17.7)
HGB BLD-MCNC: 8.1 G/DL (ref 13.3–17.7)
HGB BLD-MCNC: 8.1 G/DL (ref 13.3–17.7)
HGB BLD-MCNC: 8.2 G/DL (ref 13.3–17.7)
HGB BLD-MCNC: 8.9 G/DL (ref 13.3–17.7)
HGB BLD-MCNC: 9 G/DL (ref 13.3–17.7)
HGB BLD-MCNC: 9.2 G/DL (ref 13.3–17.7)
HGB BLD-MCNC: 9.3 G/DL (ref 13.3–17.7)
HGB BLD-MCNC: 9.4 G/DL (ref 13.3–17.7)
HGB BLD-MCNC: 9.9 G/DL (ref 13.3–17.7)
HGB UR QL STRIP: ABNORMAL
HGB UR QL STRIP: NEGATIVE
HOLD SPECIMEN: NORMAL
HYALINE CASTS #/AREA URNS LPF: ABNORMAL /LPF
IMM GRANULOCYTES # BLD: 0 10E3/UL
IMM GRANULOCYTES # BLD: 0.1 10E3/UL
IMM GRANULOCYTES NFR BLD: 0 %
IMM GRANULOCYTES NFR BLD: 1 %
INR PPP: 1.82 (ref 0.85–1.15)
INTERPRETATION ECG - MUSE: NORMAL
INTERPRETATION ECG - MUSE: NORMAL
ISSUE DATE AND TIME: NORMAL
ISSUE DATE AND TIME: NORMAL
KETONES UR STRIP-MCNC: ABNORMAL MG/DL
KETONES UR STRIP-MCNC: NEGATIVE MG/DL
LACTATE BLD-SCNC: 4.8 MMOL/L
LACTATE SERPL-SCNC: 1.9 MMOL/L (ref 0.7–2)
LACTATE SERPL-SCNC: 2 MMOL/L (ref 0.7–2)
LACTATE SERPL-SCNC: 2.4 MMOL/L (ref 0.7–2)
LACTATE SERPL-SCNC: 2.6 MMOL/L (ref 0.7–2)
LACTATE SERPL-SCNC: 2.7 MMOL/L (ref 0.7–2)
LACTATE SERPL-SCNC: 3.2 MMOL/L (ref 0.7–2)
LACTATE SERPL-SCNC: 3.6 MMOL/L (ref 0.7–2)
LACTATE SERPL-SCNC: 4.1 MMOL/L (ref 0.7–2)
LACTATE SERPL-SCNC: 6.2 MMOL/L (ref 0.7–2)
LDLC SERPL CALC-MCNC: 82 MG/DL
LEUKOCYTE ESTERASE UR QL STRIP: NEGATIVE
LEUKOCYTE ESTERASE UR QL STRIP: NEGATIVE
LIPASE SERPL-CCNC: 63 U/L (ref 13–60)
LVEF ECHO: NORMAL
LYMPHOCYTES # BLD AUTO: 0.4 10E3/UL (ref 0.8–5.3)
LYMPHOCYTES # BLD AUTO: 0.4 10E3/UL (ref 0.8–5.3)
LYMPHOCYTES # BLD AUTO: 0.6 10E3/UL (ref 0.8–5.3)
LYMPHOCYTES # BLD AUTO: 0.6 10E3/UL (ref 0.8–5.3)
LYMPHOCYTES # BLD AUTO: 0.7 10E3/UL (ref 0.8–5.3)
LYMPHOCYTES # BLD AUTO: 1.1 10E3/UL (ref 0.8–5.3)
LYMPHOCYTES NFR BLD AUTO: 11 %
LYMPHOCYTES NFR BLD AUTO: 12 %
LYMPHOCYTES NFR BLD AUTO: 21 %
LYMPHOCYTES NFR BLD AUTO: 4 %
LYMPHOCYTES NFR BLD AUTO: 6 %
LYMPHOCYTES NFR BLD AUTO: 7 %
MAGNESIUM SERPL-MCNC: 1.9 MG/DL (ref 1.7–2.3)
MAGNESIUM SERPL-MCNC: 2.1 MG/DL (ref 1.7–2.3)
MAGNESIUM SERPL-MCNC: 2.3 MG/DL (ref 1.7–2.3)
MCH RBC QN AUTO: 33.6 PG (ref 26.5–33)
MCH RBC QN AUTO: 34 PG (ref 26.5–33)
MCH RBC QN AUTO: 34.1 PG (ref 26.5–33)
MCH RBC QN AUTO: 34.2 PG (ref 26.5–33)
MCH RBC QN AUTO: 34.4 PG (ref 26.5–33)
MCH RBC QN AUTO: 34.4 PG (ref 26.5–33)
MCH RBC QN AUTO: 34.5 PG (ref 26.5–33)
MCH RBC QN AUTO: 34.6 PG (ref 26.5–33)
MCH RBC QN AUTO: 34.8 PG (ref 26.5–33)
MCH RBC QN AUTO: 34.9 PG (ref 26.5–33)
MCH RBC QN AUTO: 35.7 PG (ref 26.5–33)
MCH RBC QN AUTO: 35.8 PG (ref 26.5–33)
MCH RBC QN AUTO: 35.9 PG (ref 26.5–33)
MCH RBC QN AUTO: 36.1 PG (ref 26.5–33)
MCH RBC QN AUTO: 36.2 PG (ref 26.5–33)
MCH RBC QN AUTO: 36.3 PG (ref 26.5–33)
MCH RBC QN AUTO: 36.5 PG (ref 26.5–33)
MCHC RBC AUTO-ENTMCNC: 32.2 G/DL (ref 31.5–36.5)
MCHC RBC AUTO-ENTMCNC: 32.3 G/DL (ref 31.5–36.5)
MCHC RBC AUTO-ENTMCNC: 32.6 G/DL (ref 31.5–36.5)
MCHC RBC AUTO-ENTMCNC: 32.7 G/DL (ref 31.5–36.5)
MCHC RBC AUTO-ENTMCNC: 32.9 G/DL (ref 31.5–36.5)
MCHC RBC AUTO-ENTMCNC: 33.2 G/DL (ref 31.5–36.5)
MCHC RBC AUTO-ENTMCNC: 33.3 G/DL (ref 31.5–36.5)
MCHC RBC AUTO-ENTMCNC: 33.5 G/DL (ref 31.5–36.5)
MCHC RBC AUTO-ENTMCNC: 33.5 G/DL (ref 31.5–36.5)
MCHC RBC AUTO-ENTMCNC: 33.6 G/DL (ref 31.5–36.5)
MCHC RBC AUTO-ENTMCNC: 34.1 G/DL (ref 31.5–36.5)
MCHC RBC AUTO-ENTMCNC: 34.1 G/DL (ref 31.5–36.5)
MCHC RBC AUTO-ENTMCNC: 34.4 G/DL (ref 31.5–36.5)
MCHC RBC AUTO-ENTMCNC: 34.4 G/DL (ref 31.5–36.5)
MCHC RBC AUTO-ENTMCNC: 34.9 G/DL (ref 31.5–36.5)
MCHC RBC AUTO-ENTMCNC: 34.9 G/DL (ref 31.5–36.5)
MCHC RBC AUTO-ENTMCNC: 35.1 G/DL (ref 31.5–36.5)
MCHC RBC AUTO-ENTMCNC: 35.3 G/DL (ref 31.5–36.5)
MCHC RBC AUTO-ENTMCNC: 35.4 G/DL (ref 31.5–36.5)
MCV RBC AUTO: 100 FL (ref 78–100)
MCV RBC AUTO: 100 FL (ref 78–100)
MCV RBC AUTO: 101 FL (ref 78–100)
MCV RBC AUTO: 102 FL (ref 78–100)
MCV RBC AUTO: 102 FL (ref 78–100)
MCV RBC AUTO: 103 FL (ref 78–100)
MCV RBC AUTO: 104 FL (ref 78–100)
MCV RBC AUTO: 105 FL (ref 78–100)
MCV RBC AUTO: 105 FL (ref 78–100)
MCV RBC AUTO: 107 FL (ref 78–100)
MCV RBC AUTO: 108 FL (ref 78–100)
MCV RBC AUTO: 109 FL (ref 78–100)
MCV RBC AUTO: 111 FL (ref 78–100)
MCV RBC AUTO: 96 FL (ref 78–100)
MCV RBC AUTO: 97 FL (ref 78–100)
MCV RBC AUTO: 99 FL (ref 78–100)
MCV RBC AUTO: 99 FL (ref 78–100)
MONOCYTES # BLD AUTO: 0.6 10E3/UL (ref 0–1.3)
MONOCYTES # BLD AUTO: 0.7 10E3/UL (ref 0–1.3)
MONOCYTES # BLD AUTO: 0.8 10E3/UL (ref 0–1.3)
MONOCYTES # BLD AUTO: 0.8 10E3/UL (ref 0–1.3)
MONOCYTES # BLD AUTO: 0.9 10E3/UL (ref 0–1.3)
MONOCYTES # BLD AUTO: 1.1 10E3/UL (ref 0–1.3)
MONOCYTES NFR BLD AUTO: 10 %
MONOCYTES NFR BLD AUTO: 11 %
MONOCYTES NFR BLD AUTO: 12 %
MONOCYTES NFR BLD AUTO: 12 %
MONOCYTES NFR BLD AUTO: 13 %
MONOCYTES NFR BLD AUTO: 16 %
MUCOUS THREADS #/AREA URNS LPF: PRESENT /LPF
NEUTROPHILS # BLD AUTO: 3.1 10E3/UL (ref 1.6–8.3)
NEUTROPHILS # BLD AUTO: 3.6 10E3/UL (ref 1.6–8.3)
NEUTROPHILS # BLD AUTO: 4.2 10E3/UL (ref 1.6–8.3)
NEUTROPHILS # BLD AUTO: 5.2 10E3/UL (ref 1.6–8.3)
NEUTROPHILS # BLD AUTO: 7.3 10E3/UL (ref 1.6–8.3)
NEUTROPHILS # BLD AUTO: 7.9 10E3/UL (ref 1.6–8.3)
NEUTROPHILS NFR BLD AUTO: 61 %
NEUTROPHILS NFR BLD AUTO: 71 %
NEUTROPHILS NFR BLD AUTO: 74 %
NEUTROPHILS NFR BLD AUTO: 79 %
NEUTROPHILS NFR BLD AUTO: 82 %
NEUTROPHILS NFR BLD AUTO: 83 %
NITRATE UR QL: NEGATIVE
NITRATE UR QL: NEGATIVE
NONHDLC SERPL-MCNC: 99 MG/DL
NRBC # BLD AUTO: 0 10E3/UL
NRBC # BLD AUTO: 0.3 10E3/UL
NRBC # BLD AUTO: 0.5 10E3/UL
NRBC # BLD AUTO: 0.5 10E3/UL
NRBC BLD AUTO-RTO: 0 /100
NRBC BLD AUTO-RTO: 4 /100
NRBC BLD AUTO-RTO: 5 /100
NRBC BLD AUTO-RTO: 5 /100
NT-PROBNP SERPL-MCNC: 9617 PG/ML (ref 0–1800)
NT-PROBNP SERPL-MCNC: ABNORMAL PG/ML (ref 0–1800)
O2/TOTAL GAS SETTING VFR VENT: 2 %
O2/TOTAL GAS SETTING VFR VENT: 24 %
O2/TOTAL GAS SETTING VFR VENT: 32 %
O2/TOTAL GAS SETTING VFR VENT: 60 %
P AXIS - MUSE: NORMAL DEGREES
P AXIS - MUSE: NORMAL DEGREES
PCO2 BLD: 35 MM HG (ref 35–45)
PCO2 BLDV: 40 MM HG (ref 40–50)
PCO2 BLDV: 46 MM HG (ref 40–50)
PCO2 BLDV: 47 MM HG (ref 40–50)
PCO2 BLDV: 50 MM HG (ref 40–50)
PH BLD: 7.36 [PH] (ref 7.35–7.45)
PH BLDV: 7.34 [PH] (ref 7.32–7.43)
PH BLDV: 7.38 [PH] (ref 7.32–7.43)
PH BLDV: 7.4 [PH] (ref 7.32–7.43)
PH BLDV: 7.46 [PH] (ref 7.32–7.43)
PH UR STRIP: 5.5 [PH] (ref 5–7)
PH UR STRIP: 7 [PH] (ref 5–7)
PHOSPHATE SERPL-MCNC: 3.4 MG/DL (ref 2.5–4.5)
PHOSPHATE SERPL-MCNC: 3.6 MG/DL (ref 2.5–4.5)
PHOSPHATE SERPL-MCNC: 3.7 MG/DL (ref 2.5–4.5)
PHOSPHATE SERPL-MCNC: 3.9 MG/DL (ref 2.5–4.5)
PLAT MORPH BLD: ABNORMAL
PLAT MORPH BLD: ABNORMAL
PLATELET # BLD AUTO: 109 10E3/UL (ref 150–450)
PLATELET # BLD AUTO: 112 10E3/UL (ref 150–450)
PLATELET # BLD AUTO: 113 10E3/UL (ref 150–450)
PLATELET # BLD AUTO: 137 10E3/UL (ref 150–450)
PLATELET # BLD AUTO: 138 10E3/UL (ref 150–450)
PLATELET # BLD AUTO: 154 10E3/UL (ref 150–450)
PLATELET # BLD AUTO: 158 10E3/UL (ref 150–450)
PLATELET # BLD AUTO: 173 10E3/UL (ref 150–450)
PLATELET # BLD AUTO: 178 10E3/UL (ref 150–450)
PLATELET # BLD AUTO: 178 10E3/UL (ref 150–450)
PLATELET # BLD AUTO: 184 10E3/UL (ref 150–450)
PLATELET # BLD AUTO: 187 10E3/UL (ref 150–450)
PLATELET # BLD AUTO: 187 10E3/UL (ref 150–450)
PLATELET # BLD AUTO: 195 10E3/UL (ref 150–450)
PLATELET # BLD AUTO: 216 10E3/UL (ref 150–450)
PLATELET # BLD AUTO: 224 10E3/UL (ref 150–450)
PLATELET # BLD AUTO: 225 10E3/UL (ref 150–450)
PLATELET # BLD AUTO: 239 10E3/UL (ref 150–450)
PLATELET # BLD AUTO: 251 10E3/UL (ref 150–450)
PLATELET # BLD AUTO: 285 10E3/UL (ref 150–450)
PO2 BLD: 224 MM HG (ref 80–105)
PO2 BLDV: 14 MM HG (ref 25–47)
PO2 BLDV: 17 MM HG (ref 25–47)
PO2 BLDV: 25 MM HG (ref 25–47)
PO2 BLDV: 26 MM HG (ref 25–47)
POTASSIUM BLD-SCNC: 4 MMOL/L (ref 3.4–5.3)
POTASSIUM SERPL-SCNC: 3 MMOL/L (ref 3.4–5.3)
POTASSIUM SERPL-SCNC: 3.1 MMOL/L (ref 3.4–5.3)
POTASSIUM SERPL-SCNC: 3.3 MMOL/L (ref 3.4–5.3)
POTASSIUM SERPL-SCNC: 3.4 MMOL/L (ref 3.4–5.3)
POTASSIUM SERPL-SCNC: 3.4 MMOL/L (ref 3.4–5.3)
POTASSIUM SERPL-SCNC: 3.5 MMOL/L (ref 3.4–5.3)
POTASSIUM SERPL-SCNC: 3.6 MMOL/L (ref 3.4–5.3)
POTASSIUM SERPL-SCNC: 3.6 MMOL/L (ref 3.4–5.3)
POTASSIUM SERPL-SCNC: 3.7 MMOL/L (ref 3.4–5.3)
POTASSIUM SERPL-SCNC: 3.8 MMOL/L (ref 3.4–5.3)
POTASSIUM SERPL-SCNC: 3.8 MMOL/L (ref 3.4–5.3)
POTASSIUM SERPL-SCNC: 3.9 MMOL/L (ref 3.4–5.3)
POTASSIUM SERPL-SCNC: 4.1 MMOL/L (ref 3.4–5.3)
POTASSIUM SERPL-SCNC: 4.2 MMOL/L (ref 3.4–5.3)
POTASSIUM SERPL-SCNC: 4.3 MMOL/L (ref 3.4–5.3)
POTASSIUM SERPL-SCNC: 4.4 MMOL/L (ref 3.4–5.3)
POTASSIUM SERPL-SCNC: 4.6 MMOL/L (ref 3.4–5.3)
POTASSIUM SERPL-SCNC: 4.7 MMOL/L (ref 3.4–5.3)
POTASSIUM SERPL-SCNC: 4.9 MMOL/L (ref 3.4–5.3)
PR INTERVAL - MUSE: NORMAL MS
PR INTERVAL - MUSE: NORMAL MS
PROCALCITONIN SERPL IA-MCNC: 0.04 NG/ML
PROCALCITONIN SERPL IA-MCNC: 0.2 NG/ML
PROCALCITONIN SERPL IA-MCNC: 0.31 NG/ML
PROT SERPL-MCNC: 5.5 G/DL (ref 6.4–8.3)
PROT SERPL-MCNC: 5.6 G/DL (ref 6.4–8.3)
PROT SERPL-MCNC: 5.9 G/DL (ref 6.4–8.3)
PROT SERPL-MCNC: 6 G/DL (ref 6.4–8.3)
PROT SERPL-MCNC: 6.1 G/DL (ref 6.4–8.3)
PROT SERPL-MCNC: 6.2 G/DL (ref 6.4–8.3)
PROT SERPL-MCNC: 6.3 G/DL (ref 6.4–8.3)
PROT SERPL-MCNC: 6.3 G/DL (ref 6.4–8.3)
PROT SERPL-MCNC: 6.5 G/DL (ref 6.4–8.3)
PROT SERPL-MCNC: 7.2 G/DL (ref 6.4–8.3)
PROT SERPL-MCNC: 7.2 G/DL (ref 6.8–8.8)
PROT SERPL-MCNC: 8 G/DL (ref 6.4–8.3)
QRS DURATION - MUSE: 78 MS
QRS DURATION - MUSE: 84 MS
QT - MUSE: 324 MS
QT - MUSE: 364 MS
QTC - MUSE: 420 MS
QTC - MUSE: 450 MS
R AXIS - MUSE: -61 DEGREES
R AXIS - MUSE: 157 DEGREES
RBC # BLD AUTO: 1.92 10E6/UL (ref 4.4–5.9)
RBC # BLD AUTO: 2.1 10E6/UL (ref 4.4–5.9)
RBC # BLD AUTO: 2.23 10E6/UL (ref 4.4–5.9)
RBC # BLD AUTO: 2.3 10E6/UL (ref 4.4–5.9)
RBC # BLD AUTO: 2.38 10E6/UL (ref 4.4–5.9)
RBC # BLD AUTO: 2.41 10E6/UL (ref 4.4–5.9)
RBC # BLD AUTO: 2.6 10E6/UL (ref 4.4–5.9)
RBC # BLD AUTO: 2.62 10E6/UL (ref 4.4–5.9)
RBC # BLD AUTO: 2.66 10E6/UL (ref 4.4–5.9)
RBC # BLD AUTO: 2.69 10E6/UL (ref 4.4–5.9)
RBC # BLD AUTO: 2.77 10E6/UL (ref 4.4–5.9)
RBC # BLD AUTO: 2.79 10E6/UL (ref 4.4–5.9)
RBC # BLD AUTO: 2.81 10E6/UL (ref 4.4–5.9)
RBC # BLD AUTO: 2.94 10E6/UL (ref 4.4–5.9)
RBC # BLD AUTO: 3.66 10E6/UL (ref 4.4–5.9)
RBC # BLD AUTO: 3.97 10E6/UL (ref 4.4–5.9)
RBC # BLD AUTO: 4.1 10E6/UL (ref 4.4–5.9)
RBC # BLD AUTO: 4.72 10E6/UL (ref 4.4–5.9)
RBC # BLD AUTO: 5.03 10E6/UL (ref 4.4–5.9)
RBC #/AREA URNS AUTO: ABNORMAL /HPF
RBC MORPH BLD: ABNORMAL
RBC MORPH BLD: ABNORMAL
SAO2 % BLDV: 42 % (ref 94–100)
SODIUM SERPL-SCNC: 133 MMOL/L (ref 133–144)
SODIUM SERPL-SCNC: 133 MMOL/L (ref 136–145)
SODIUM SERPL-SCNC: 135 MMOL/L (ref 136–145)
SODIUM SERPL-SCNC: 135 MMOL/L (ref 136–145)
SODIUM SERPL-SCNC: 136 MMOL/L (ref 136–145)
SODIUM SERPL-SCNC: 137 MMOL/L (ref 136–145)
SODIUM SERPL-SCNC: 137 MMOL/L (ref 136–145)
SODIUM SERPL-SCNC: 138 MMOL/L (ref 136–145)
SODIUM SERPL-SCNC: 138 MMOL/L (ref 136–145)
SODIUM SERPL-SCNC: 139 MMOL/L (ref 136–145)
SODIUM SERPL-SCNC: 140 MMOL/L (ref 136–145)
SODIUM SERPL-SCNC: 141 MMOL/L (ref 136–145)
SODIUM SERPL-SCNC: 142 MMOL/L (ref 136–145)
SODIUM SERPL-SCNC: 143 MMOL/L (ref 136–145)
SODIUM SERPL-SCNC: 146 MMOL/L (ref 136–145)
SODIUM SERPL-SCNC: 147 MMOL/L (ref 136–145)
SODIUM SERPL-SCNC: 148 MMOL/L (ref 136–145)
SODIUM SERPL-SCNC: 152 MMOL/L (ref 136–145)
SP GR UR STRIP: 1.01 (ref 1–1.03)
SP GR UR STRIP: >=1.03 (ref 1–1.03)
SPECIMEN EXPIRATION DATE: NORMAL
SYSTOLIC BLOOD PRESSURE - MUSE: NORMAL MMHG
SYSTOLIC BLOOD PRESSURE - MUSE: NORMAL MMHG
T AXIS - MUSE: 15 DEGREES
T AXIS - MUSE: 171 DEGREES
T3 SERPL-MCNC: 48 NG/DL (ref 85–202)
T3 SERPL-MCNC: 66 NG/DL (ref 85–202)
T4 FREE SERPL-MCNC: 1.03 NG/DL (ref 0.9–1.7)
T4 FREE SERPL-MCNC: 1.16 NG/DL (ref 0.9–1.7)
TRIGL SERPL-MCNC: 87 MG/DL
TROPONIN T SERPL HS-MCNC: 42 NG/L
TSH SERPL DL<=0.005 MIU/L-ACNC: 7.86 UIU/ML (ref 0.3–4.2)
TSH SERPL DL<=0.005 MIU/L-ACNC: 9.68 UIU/ML (ref 0.3–4.2)
UNIT ABO/RH: NORMAL
UNIT ABO/RH: NORMAL
UNIT NUMBER: NORMAL
UNIT NUMBER: NORMAL
UNIT STATUS: NORMAL
UNIT STATUS: NORMAL
UNIT TYPE ISBT: 6200
UNIT TYPE ISBT: 6200
UROBILINOGEN UR STRIP-ACNC: 1 E.U./DL
UROBILINOGEN UR STRIP-MCNC: 4 MG/DL
VENTRICULAR RATE- MUSE: 101 BPM
VENTRICULAR RATE- MUSE: 92 BPM
VIT B12 SERPL-MCNC: 1163 PG/ML (ref 232–1245)
WBC # BLD AUTO: 5 10E3/UL (ref 4–11)
WBC # BLD AUTO: 5.1 10E3/UL (ref 4–11)
WBC # BLD AUTO: 5.1 10E3/UL (ref 4–11)
WBC # BLD AUTO: 5.2 10E3/UL (ref 4–11)
WBC # BLD AUTO: 5.3 10E3/UL (ref 4–11)
WBC # BLD AUTO: 5.7 10E3/UL (ref 4–11)
WBC # BLD AUTO: 6 10E3/UL (ref 4–11)
WBC # BLD AUTO: 6.4 10E3/UL (ref 4–11)
WBC # BLD AUTO: 6.5 10E3/UL (ref 4–11)
WBC # BLD AUTO: 6.6 10E3/UL (ref 4–11)
WBC # BLD AUTO: 6.7 10E3/UL (ref 4–11)
WBC # BLD AUTO: 7.4 10E3/UL (ref 4–11)
WBC # BLD AUTO: 7.4 10E3/UL (ref 4–11)
WBC # BLD AUTO: 7.5 10E3/UL (ref 4–11)
WBC # BLD AUTO: 8.8 10E3/UL (ref 4–11)
WBC # BLD AUTO: 8.9 10E3/UL (ref 4–11)
WBC # BLD AUTO: 9.3 10E3/UL (ref 4–11)
WBC # BLD AUTO: 9.5 10E3/UL (ref 4–11)
WBC # BLD AUTO: 9.8 10E3/UL (ref 4–11)
WBC #/AREA URNS AUTO: ABNORMAL /HPF

## 2023-01-01 PROCEDURE — 250N000011 HC RX IP 250 OP 636: Performed by: EMERGENCY MEDICINE

## 2023-01-01 PROCEDURE — 86923 COMPATIBILITY TEST ELECTRIC: CPT | Performed by: STUDENT IN AN ORGANIZED HEALTH CARE EDUCATION/TRAINING PROGRAM

## 2023-01-01 PROCEDURE — 85018 HEMOGLOBIN: CPT | Performed by: INTERNAL MEDICINE

## 2023-01-01 PROCEDURE — 250N000013 HC RX MED GY IP 250 OP 250 PS 637: Performed by: STUDENT IN AN ORGANIZED HEALTH CARE EDUCATION/TRAINING PROGRAM

## 2023-01-01 PROCEDURE — 93010 ELECTROCARDIOGRAM REPORT: CPT | Performed by: INTERNAL MEDICINE

## 2023-01-01 PROCEDURE — 710N000009 HC RECOVERY PHASE 1, LEVEL 1, PER MIN: Performed by: STUDENT IN AN ORGANIZED HEALTH CARE EDUCATION/TRAINING PROGRAM

## 2023-01-01 PROCEDURE — P9016 RBC LEUKOCYTES REDUCED: HCPCS | Performed by: STUDENT IN AN ORGANIZED HEALTH CARE EDUCATION/TRAINING PROGRAM

## 2023-01-01 PROCEDURE — 71045 X-RAY EXAM CHEST 1 VIEW: CPT

## 2023-01-01 PROCEDURE — 92526 ORAL FUNCTION THERAPY: CPT | Mod: GN | Performed by: REHABILITATION PRACTITIONER

## 2023-01-01 PROCEDURE — 250N000013 HC RX MED GY IP 250 OP 250 PS 637: Performed by: HOSPITALIST

## 2023-01-01 PROCEDURE — 81003 URINALYSIS AUTO W/O SCOPE: CPT | Performed by: HOSPITALIST

## 2023-01-01 PROCEDURE — 82803 BLOOD GASES ANY COMBINATION: CPT | Performed by: HOSPITALIST

## 2023-01-01 PROCEDURE — 82977 ASSAY OF GGT: CPT | Performed by: INTERNAL MEDICINE

## 2023-01-01 PROCEDURE — 250N000009 HC RX 250

## 2023-01-01 PROCEDURE — 99233 SBSQ HOSP IP/OBS HIGH 50: CPT | Mod: FS | Performed by: NURSE PRACTITIONER

## 2023-01-01 PROCEDURE — 76705 ECHO EXAM OF ABDOMEN: CPT

## 2023-01-01 PROCEDURE — 84295 ASSAY OF SERUM SODIUM: CPT | Performed by: HOSPITALIST

## 2023-01-01 PROCEDURE — 84145 PROCALCITONIN (PCT): CPT | Performed by: EMERGENCY MEDICINE

## 2023-01-01 PROCEDURE — 83605 ASSAY OF LACTIC ACID: CPT

## 2023-01-01 PROCEDURE — 73552 X-RAY EXAM OF FEMUR 2/>: CPT | Mod: RT

## 2023-01-01 PROCEDURE — 85027 COMPLETE CBC AUTOMATED: CPT | Performed by: NURSE PRACTITIONER

## 2023-01-01 PROCEDURE — 84132 ASSAY OF SERUM POTASSIUM: CPT | Performed by: INTERNAL MEDICINE

## 2023-01-01 PROCEDURE — 250N000011 HC RX IP 250 OP 636: Performed by: HOSPITALIST

## 2023-01-01 PROCEDURE — 85027 COMPLETE CBC AUTOMATED: CPT | Performed by: STUDENT IN AN ORGANIZED HEALTH CARE EDUCATION/TRAINING PROGRAM

## 2023-01-01 PROCEDURE — 120N000001 HC R&B MED SURG/OB

## 2023-01-01 PROCEDURE — 85027 COMPLETE CBC AUTOMATED: CPT | Performed by: HOSPITALIST

## 2023-01-01 PROCEDURE — 99282 EMERGENCY DEPT VISIT SF MDM: CPT

## 2023-01-01 PROCEDURE — 84484 ASSAY OF TROPONIN QUANT: CPT | Performed by: NURSE PRACTITIONER

## 2023-01-01 PROCEDURE — 36415 COLL VENOUS BLD VENIPUNCTURE: CPT | Performed by: HOSPITALIST

## 2023-01-01 PROCEDURE — 250N000011 HC RX IP 250 OP 636: Performed by: NURSE ANESTHETIST, CERTIFIED REGISTERED

## 2023-01-01 PROCEDURE — 250N000013 HC RX MED GY IP 250 OP 250 PS 637: Performed by: INTERNAL MEDICINE

## 2023-01-01 PROCEDURE — 85014 HEMATOCRIT: CPT | Performed by: STUDENT IN AN ORGANIZED HEALTH CARE EDUCATION/TRAINING PROGRAM

## 2023-01-01 PROCEDURE — 80053 COMPREHEN METABOLIC PANEL: CPT | Performed by: INTERNAL MEDICINE

## 2023-01-01 PROCEDURE — 83605 ASSAY OF LACTIC ACID: CPT | Performed by: HOSPITALIST

## 2023-01-01 PROCEDURE — 95816 EEG AWAKE AND DROWSY: CPT

## 2023-01-01 PROCEDURE — 36415 COLL VENOUS BLD VENIPUNCTURE: CPT | Performed by: STUDENT IN AN ORGANIZED HEALTH CARE EDUCATION/TRAINING PROGRAM

## 2023-01-01 PROCEDURE — 85018 HEMOGLOBIN: CPT | Performed by: PHYSICIAN ASSISTANT

## 2023-01-01 PROCEDURE — 99232 SBSQ HOSP IP/OBS MODERATE 35: CPT | Performed by: HOSPITALIST

## 2023-01-01 PROCEDURE — 99215 OFFICE O/P EST HI 40 MIN: CPT | Mod: VID | Performed by: INTERNAL MEDICINE

## 2023-01-01 PROCEDURE — 99214 OFFICE O/P EST MOD 30 MIN: CPT | Performed by: INTERNAL MEDICINE

## 2023-01-01 PROCEDURE — 999N000157 HC STATISTIC RCP TIME EA 10 MIN

## 2023-01-01 PROCEDURE — 85025 COMPLETE CBC W/AUTO DIFF WBC: CPT | Performed by: INTERNAL MEDICINE

## 2023-01-01 PROCEDURE — 83735 ASSAY OF MAGNESIUM: CPT | Performed by: HOSPITALIST

## 2023-01-01 PROCEDURE — 97530 THERAPEUTIC ACTIVITIES: CPT | Mod: GP | Performed by: PHYSICAL THERAPIST

## 2023-01-01 PROCEDURE — 85025 COMPLETE CBC W/AUTO DIFF WBC: CPT | Performed by: HOSPITALIST

## 2023-01-01 PROCEDURE — 86923 COMPATIBILITY TEST ELECTRIC: CPT | Performed by: INTERNAL MEDICINE

## 2023-01-01 PROCEDURE — 93325 DOPPLER ECHO COLOR FLOW MAPG: CPT | Mod: 26 | Performed by: INTERNAL MEDICINE

## 2023-01-01 PROCEDURE — 99222 1ST HOSP IP/OBS MODERATE 55: CPT | Mod: FS | Performed by: PHYSICIAN ASSISTANT

## 2023-01-01 PROCEDURE — 36415 COLL VENOUS BLD VENIPUNCTURE: CPT | Performed by: INTERNAL MEDICINE

## 2023-01-01 PROCEDURE — 80053 COMPREHEN METABOLIC PANEL: CPT | Performed by: STUDENT IN AN ORGANIZED HEALTH CARE EDUCATION/TRAINING PROGRAM

## 2023-01-01 PROCEDURE — 82077 ASSAY SPEC XCP UR&BREATH IA: CPT | Performed by: EMERGENCY MEDICINE

## 2023-01-01 PROCEDURE — 83605 ASSAY OF LACTIC ACID: CPT | Performed by: STUDENT IN AN ORGANIZED HEALTH CARE EDUCATION/TRAINING PROGRAM

## 2023-01-01 PROCEDURE — 92526 ORAL FUNCTION THERAPY: CPT | Mod: GN

## 2023-01-01 PROCEDURE — 80048 BASIC METABOLIC PNL TOTAL CA: CPT | Performed by: EMERGENCY MEDICINE

## 2023-01-01 PROCEDURE — 110N000005 HC R&B HOSPICE, ACCENT

## 2023-01-01 PROCEDURE — 250N000011 HC RX IP 250 OP 636: Performed by: INTERNAL MEDICINE

## 2023-01-01 PROCEDURE — 93308 TTE F-UP OR LMTD: CPT | Mod: 26 | Performed by: INTERNAL MEDICINE

## 2023-01-01 PROCEDURE — 84295 ASSAY OF SERUM SODIUM: CPT | Performed by: INTERNAL MEDICINE

## 2023-01-01 PROCEDURE — 85014 HEMATOCRIT: CPT | Performed by: HOSPITALIST

## 2023-01-01 PROCEDURE — 250N000011 HC RX IP 250 OP 636: Performed by: STUDENT IN AN ORGANIZED HEALTH CARE EDUCATION/TRAINING PROGRAM

## 2023-01-01 PROCEDURE — 0QS636Z REPOSITION RIGHT UPPER FEMUR WITH INTRAMEDULLARY INTERNAL FIXATION DEVICE, PERCUTANEOUS APPROACH: ICD-10-PCS | Performed by: STUDENT IN AN ORGANIZED HEALTH CARE EDUCATION/TRAINING PROGRAM

## 2023-01-01 PROCEDURE — 82306 VITAMIN D 25 HYDROXY: CPT | Performed by: INTERNAL MEDICINE

## 2023-01-01 PROCEDURE — 999N000178 HC STATISTIC SUCTION SPUTUM

## 2023-01-01 PROCEDURE — 99215 OFFICE O/P EST HI 40 MIN: CPT | Performed by: INTERNAL MEDICINE

## 2023-01-01 PROCEDURE — 99417 PROLNG OP E/M EACH 15 MIN: CPT | Performed by: INTERNAL MEDICINE

## 2023-01-01 PROCEDURE — 272N000001 HC OR GENERAL SUPPLY STERILE: Performed by: STUDENT IN AN ORGANIZED HEALTH CARE EDUCATION/TRAINING PROGRAM

## 2023-01-01 PROCEDURE — 85049 AUTOMATED PLATELET COUNT: CPT | Performed by: STUDENT IN AN ORGANIZED HEALTH CARE EDUCATION/TRAINING PROGRAM

## 2023-01-01 PROCEDURE — 83880 ASSAY OF NATRIURETIC PEPTIDE: CPT | Performed by: INTERNAL MEDICINE

## 2023-01-01 PROCEDURE — 999N000127 HC STATISTIC PERIPHERAL IV START W US GUIDANCE

## 2023-01-01 PROCEDURE — 82248 BILIRUBIN DIRECT: CPT | Performed by: NURSE PRACTITIONER

## 2023-01-01 PROCEDURE — 70450 CT HEAD/BRAIN W/O DYE: CPT

## 2023-01-01 PROCEDURE — 99233 SBSQ HOSP IP/OBS HIGH 50: CPT | Performed by: STUDENT IN AN ORGANIZED HEALTH CARE EDUCATION/TRAINING PROGRAM

## 2023-01-01 PROCEDURE — 258N000003 HC RX IP 258 OP 636: Performed by: ANESTHESIOLOGY

## 2023-01-01 PROCEDURE — 250N000009 HC RX 250: Performed by: HOSPITALIST

## 2023-01-01 PROCEDURE — 82140 ASSAY OF AMMONIA: CPT | Performed by: HOSPITALIST

## 2023-01-01 PROCEDURE — 86901 BLOOD TYPING SEROLOGIC RH(D): CPT | Performed by: STUDENT IN AN ORGANIZED HEALTH CARE EDUCATION/TRAINING PROGRAM

## 2023-01-01 PROCEDURE — 99232 SBSQ HOSP IP/OBS MODERATE 35: CPT | Performed by: INTERNAL MEDICINE

## 2023-01-01 PROCEDURE — 80053 COMPREHEN METABOLIC PANEL: CPT | Performed by: HOSPITALIST

## 2023-01-01 PROCEDURE — 99605 MTMS BY PHARM NP 15 MIN: CPT

## 2023-01-01 PROCEDURE — 82947 ASSAY GLUCOSE BLOOD QUANT: CPT | Performed by: NURSE PRACTITIONER

## 2023-01-01 PROCEDURE — 83690 ASSAY OF LIPASE: CPT | Performed by: INTERNAL MEDICINE

## 2023-01-01 PROCEDURE — 36415 COLL VENOUS BLD VENIPUNCTURE: CPT | Performed by: EMERGENCY MEDICINE

## 2023-01-01 PROCEDURE — 258N000003 HC RX IP 258 OP 636: Performed by: HOSPITALIST

## 2023-01-01 PROCEDURE — 99233 SBSQ HOSP IP/OBS HIGH 50: CPT | Performed by: HOSPITALIST

## 2023-01-01 PROCEDURE — 73502 X-RAY EXAM HIP UNI 2-3 VIEWS: CPT

## 2023-01-01 PROCEDURE — 80048 BASIC METABOLIC PNL TOTAL CA: CPT | Performed by: STUDENT IN AN ORGANIZED HEALTH CARE EDUCATION/TRAINING PROGRAM

## 2023-01-01 PROCEDURE — 93005 ELECTROCARDIOGRAM TRACING: CPT

## 2023-01-01 PROCEDURE — 85018 HEMOGLOBIN: CPT | Performed by: STUDENT IN AN ORGANIZED HEALTH CARE EDUCATION/TRAINING PROGRAM

## 2023-01-01 PROCEDURE — 250N000009 HC RX 250: Performed by: NURSE ANESTHETIST, CERTIFIED REGISTERED

## 2023-01-01 PROCEDURE — 97530 THERAPEUTIC ACTIVITIES: CPT | Mod: GP

## 2023-01-01 PROCEDURE — 84439 ASSAY OF FREE THYROXINE: CPT | Performed by: INTERNAL MEDICINE

## 2023-01-01 PROCEDURE — 36415 COLL VENOUS BLD VENIPUNCTURE: CPT | Performed by: NURSE PRACTITIONER

## 2023-01-01 PROCEDURE — 999N000141 HC STATISTIC PRE-PROCEDURE NURSING ASSESSMENT: Performed by: STUDENT IN AN ORGANIZED HEALTH CARE EDUCATION/TRAINING PROGRAM

## 2023-01-01 PROCEDURE — 250N000009 HC RX 250: Performed by: NURSE PRACTITIONER

## 2023-01-01 PROCEDURE — 999N000179 XR SURGERY CARM FLUORO LESS THAN 5 MIN W STILLS

## 2023-01-01 PROCEDURE — 31720 CLEARANCE OF AIRWAYS: CPT

## 2023-01-01 PROCEDURE — 84443 ASSAY THYROID STIM HORMONE: CPT | Performed by: INTERNAL MEDICINE

## 2023-01-01 PROCEDURE — 99214 OFFICE O/P EST MOD 30 MIN: CPT | Performed by: NURSE PRACTITIONER

## 2023-01-01 PROCEDURE — 84145 PROCALCITONIN (PCT): CPT | Performed by: HOSPITALIST

## 2023-01-01 PROCEDURE — 85025 COMPLETE CBC W/AUTO DIFF WBC: CPT | Performed by: EMERGENCY MEDICINE

## 2023-01-01 PROCEDURE — 250N000009 HC RX 250: Performed by: ANESTHESIOLOGY

## 2023-01-01 PROCEDURE — 71046 X-RAY EXAM CHEST 2 VIEWS: CPT | Mod: TC | Performed by: INTERNAL MEDICINE

## 2023-01-01 PROCEDURE — 94640 AIRWAY INHALATION TREATMENT: CPT

## 2023-01-01 PROCEDURE — 83605 ASSAY OF LACTIC ACID: CPT | Performed by: NURSE PRACTITIONER

## 2023-01-01 PROCEDURE — 258N000003 HC RX IP 258 OP 636: Performed by: INTERNAL MEDICINE

## 2023-01-01 PROCEDURE — 258N000003 HC RX IP 258 OP 636: Performed by: STUDENT IN AN ORGANIZED HEALTH CARE EDUCATION/TRAINING PROGRAM

## 2023-01-01 PROCEDURE — 250N000025 HC SEVOFLURANE, PER MIN: Performed by: STUDENT IN AN ORGANIZED HEALTH CARE EDUCATION/TRAINING PROGRAM

## 2023-01-01 PROCEDURE — 84132 ASSAY OF SERUM POTASSIUM: CPT | Performed by: HOSPITALIST

## 2023-01-01 PROCEDURE — 99207 PR APP CREDIT; MD BILLING SHARED VISIT: CPT | Performed by: NURSE PRACTITIONER

## 2023-01-01 PROCEDURE — 80053 COMPREHEN METABOLIC PANEL: CPT | Performed by: EMERGENCY MEDICINE

## 2023-01-01 PROCEDURE — 99233 SBSQ HOSP IP/OBS HIGH 50: CPT | Performed by: INTERNAL MEDICINE

## 2023-01-01 PROCEDURE — 82306 VITAMIN D 25 HYDROXY: CPT | Performed by: STUDENT IN AN ORGANIZED HEALTH CARE EDUCATION/TRAINING PROGRAM

## 2023-01-01 PROCEDURE — 85610 PROTHROMBIN TIME: CPT | Performed by: NURSE PRACTITIONER

## 2023-01-01 PROCEDURE — 82310 ASSAY OF CALCIUM: CPT | Performed by: STUDENT IN AN ORGANIZED HEALTH CARE EDUCATION/TRAINING PROGRAM

## 2023-01-01 PROCEDURE — 92610 EVALUATE SWALLOWING FUNCTION: CPT | Mod: GN | Performed by: REHABILITATION PRACTITIONER

## 2023-01-01 PROCEDURE — 250N000009 HC RX 250: Performed by: STUDENT IN AN ORGANIZED HEALTH CARE EDUCATION/TRAINING PROGRAM

## 2023-01-01 PROCEDURE — 99417 PROLNG OP E/M EACH 15 MIN: CPT | Performed by: STUDENT IN AN ORGANIZED HEALTH CARE EDUCATION/TRAINING PROGRAM

## 2023-01-01 PROCEDURE — 82248 BILIRUBIN DIRECT: CPT | Performed by: HOSPITALIST

## 2023-01-01 PROCEDURE — 258N000003 HC RX IP 258 OP 636

## 2023-01-01 PROCEDURE — 82565 ASSAY OF CREATININE: CPT | Performed by: STUDENT IN AN ORGANIZED HEALTH CARE EDUCATION/TRAINING PROGRAM

## 2023-01-01 PROCEDURE — 84100 ASSAY OF PHOSPHORUS: CPT | Performed by: HOSPITALIST

## 2023-01-01 PROCEDURE — 99215 OFFICE O/P EST HI 40 MIN: CPT | Performed by: PHYSICIAN ASSISTANT

## 2023-01-01 PROCEDURE — 80048 BASIC METABOLIC PNL TOTAL CA: CPT

## 2023-01-01 PROCEDURE — C1769 GUIDE WIRE: HCPCS | Performed by: STUDENT IN AN ORGANIZED HEALTH CARE EDUCATION/TRAINING PROGRAM

## 2023-01-01 PROCEDURE — 83880 ASSAY OF NATRIURETIC PEPTIDE: CPT | Performed by: EMERGENCY MEDICINE

## 2023-01-01 PROCEDURE — 99285 EMERGENCY DEPT VISIT HI MDM: CPT | Mod: 25

## 2023-01-01 PROCEDURE — 82248 BILIRUBIN DIRECT: CPT | Performed by: EMERGENCY MEDICINE

## 2023-01-01 PROCEDURE — P9016 RBC LEUKOCYTES REDUCED: HCPCS | Performed by: INTERNAL MEDICINE

## 2023-01-01 PROCEDURE — 80061 LIPID PANEL: CPT | Performed by: INTERNAL MEDICINE

## 2023-01-01 PROCEDURE — 86140 C-REACTIVE PROTEIN: CPT | Performed by: HOSPITALIST

## 2023-01-01 PROCEDURE — 82374 ASSAY BLOOD CARBON DIOXIDE: CPT | Performed by: HOSPITALIST

## 2023-01-01 PROCEDURE — 73030 X-RAY EXAM OF SHOULDER: CPT | Mod: RT

## 2023-01-01 PROCEDURE — 370N000017 HC ANESTHESIA TECHNICAL FEE, PER MIN: Performed by: STUDENT IN AN ORGANIZED HEALTH CARE EDUCATION/TRAINING PROGRAM

## 2023-01-01 PROCEDURE — G0180 MD CERTIFICATION HHA PATIENT: HCPCS | Performed by: INTERNAL MEDICINE

## 2023-01-01 PROCEDURE — 99207 PR NO BILLABLE SERVICE THIS VISIT: CPT | Performed by: NURSE PRACTITIONER

## 2023-01-01 PROCEDURE — 96127 BRIEF EMOTIONAL/BEHAV ASSMT: CPT | Mod: VID | Performed by: INTERNAL MEDICINE

## 2023-01-01 PROCEDURE — 99291 CRITICAL CARE FIRST HOUR: CPT

## 2023-01-01 PROCEDURE — 84480 ASSAY TRIIODOTHYRONINE (T3): CPT | Performed by: INTERNAL MEDICINE

## 2023-01-01 PROCEDURE — 36415 COLL VENOUS BLD VENIPUNCTURE: CPT

## 2023-01-01 PROCEDURE — 93321 DOPPLER ECHO F-UP/LMTD STD: CPT

## 2023-01-01 PROCEDURE — 97161 PT EVAL LOW COMPLEX 20 MIN: CPT | Mod: GP

## 2023-01-01 PROCEDURE — 999N000208 ECHOCARDIOGRAM LIMITED

## 2023-01-01 PROCEDURE — 250N000009 HC RX 250: Performed by: EMERGENCY MEDICINE

## 2023-01-01 PROCEDURE — 82803 BLOOD GASES ANY COMBINATION: CPT | Performed by: NURSE PRACTITIONER

## 2023-01-01 PROCEDURE — 87040 BLOOD CULTURE FOR BACTERIA: CPT | Performed by: STUDENT IN AN ORGANIZED HEALTH CARE EDUCATION/TRAINING PROGRAM

## 2023-01-01 PROCEDURE — 93000 ELECTROCARDIOGRAM COMPLETE: CPT | Performed by: INTERNAL MEDICINE

## 2023-01-01 PROCEDURE — 999N000063 XR FEMUR PORT RIGHT 2 VIEWS: Mod: RT

## 2023-01-01 PROCEDURE — 82607 VITAMIN B-12: CPT | Performed by: INTERNAL MEDICINE

## 2023-01-01 PROCEDURE — 71250 CT THORAX DX C-: CPT

## 2023-01-01 PROCEDURE — 36600 WITHDRAWAL OF ARTERIAL BLOOD: CPT

## 2023-01-01 PROCEDURE — 999N000065 XR ABDOMEN PORT 1 VIEW

## 2023-01-01 PROCEDURE — 85027 COMPLETE CBC AUTOMATED: CPT | Performed by: EMERGENCY MEDICINE

## 2023-01-01 PROCEDURE — 71046 X-RAY EXAM CHEST 2 VIEWS: CPT

## 2023-01-01 PROCEDURE — 86850 RBC ANTIBODY SCREEN: CPT | Performed by: STUDENT IN AN ORGANIZED HEALTH CARE EDUCATION/TRAINING PROGRAM

## 2023-01-01 PROCEDURE — 258N000003 HC RX IP 258 OP 636: Performed by: NURSE ANESTHETIST, CERTIFIED REGISTERED

## 2023-01-01 PROCEDURE — 99223 1ST HOSP IP/OBS HIGH 75: CPT | Performed by: FAMILY MEDICINE

## 2023-01-01 PROCEDURE — 99223 1ST HOSP IP/OBS HIGH 75: CPT | Mod: AI | Performed by: HOSPITALIST

## 2023-01-01 PROCEDURE — 36415 COLL VENOUS BLD VENIPUNCTURE: CPT | Performed by: PHYSICIAN ASSISTANT

## 2023-01-01 PROCEDURE — 83880 ASSAY OF NATRIURETIC PEPTIDE: CPT | Performed by: NURSE PRACTITIONER

## 2023-01-01 PROCEDURE — 72125 CT NECK SPINE W/O DYE: CPT

## 2023-01-01 PROCEDURE — 87040 BLOOD CULTURE FOR BACTERIA: CPT | Performed by: NURSE PRACTITIONER

## 2023-01-01 PROCEDURE — 99214 OFFICE O/P EST MOD 30 MIN: CPT | Performed by: PHYSICIAN ASSISTANT

## 2023-01-01 PROCEDURE — 82248 BILIRUBIN DIRECT: CPT | Performed by: INTERNAL MEDICINE

## 2023-01-01 PROCEDURE — 85018 HEMOGLOBIN: CPT | Performed by: HOSPITALIST

## 2023-01-01 PROCEDURE — 93321 DOPPLER ECHO F-UP/LMTD STD: CPT | Mod: 26 | Performed by: INTERNAL MEDICINE

## 2023-01-01 PROCEDURE — 360N000084 HC SURGERY LEVEL 4 W/ FLUORO, PER MIN: Performed by: STUDENT IN AN ORGANIZED HEALTH CARE EDUCATION/TRAINING PROGRAM

## 2023-01-01 PROCEDURE — 255N000002 HC RX 255 OP 636: Performed by: STUDENT IN AN ORGANIZED HEALTH CARE EDUCATION/TRAINING PROGRAM

## 2023-01-01 PROCEDURE — C1713 ANCHOR/SCREW BN/BN,TIS/BN: HCPCS | Performed by: STUDENT IN AN ORGANIZED HEALTH CARE EDUCATION/TRAINING PROGRAM

## 2023-01-01 PROCEDURE — 99205 OFFICE O/P NEW HI 60 MIN: CPT | Mod: VID | Performed by: STUDENT IN AN ORGANIZED HEALTH CARE EDUCATION/TRAINING PROGRAM

## 2023-01-01 PROCEDURE — 93971 EXTREMITY STUDY: CPT | Mod: LT

## 2023-01-01 PROCEDURE — 99215 OFFICE O/P EST HI 40 MIN: CPT | Performed by: NURSE PRACTITIONER

## 2023-01-01 PROCEDURE — 81001 URINALYSIS AUTO W/SCOPE: CPT | Performed by: PHYSICIAN ASSISTANT

## 2023-01-01 PROCEDURE — 99607 MTMS BY PHARM ADDL 15 MIN: CPT

## 2023-01-01 DEVICE — IMP SCR SYN TFNA FENESTRATED LAG 105MM 04.038.205S: Type: IMPLANTABLE DEVICE | Site: HIP | Status: FUNCTIONAL

## 2023-01-01 DEVICE — SCREW BN 40MM 5MM LCK X25 STRL IM NL 04.045.040S: Type: IMPLANTABLE DEVICE | Site: HIP | Status: FUNCTIONAL

## 2023-01-01 DEVICE — IMP NAIL SYN CAN FEM PROX TFNA 11X170MM 130D 04.037.142S: Type: IMPLANTABLE DEVICE | Site: HIP | Status: FUNCTIONAL

## 2023-01-01 RX ORDER — LIDOCAINE 40 MG/G
CREAM TOPICAL
Status: DISCONTINUED | OUTPATIENT
Start: 2023-01-01 | End: 2023-01-01

## 2023-01-01 RX ORDER — OXYCODONE HCL 5 MG/5 ML
5 SOLUTION, ORAL ORAL EVERY 4 HOURS PRN
Refills: 0
Start: 2023-01-01

## 2023-01-01 RX ORDER — LORAZEPAM 2 MG/ML
.5-1 INJECTION INTRAMUSCULAR
Status: DISCONTINUED | OUTPATIENT
Start: 2023-01-01 | End: 2023-01-01 | Stop reason: HOSPADM

## 2023-01-01 RX ORDER — QUETIAPINE FUMARATE 25 MG/1
TABLET, FILM COATED ORAL
Qty: 30 TABLET | Refills: 0 | Status: ON HOLD | OUTPATIENT
Start: 2023-01-01 | End: 2023-01-01

## 2023-01-01 RX ORDER — SODIUM CHLORIDE, SODIUM LACTATE, POTASSIUM CHLORIDE, CALCIUM CHLORIDE 600; 310; 30; 20 MG/100ML; MG/100ML; MG/100ML; MG/100ML
INJECTION, SOLUTION INTRAVENOUS CONTINUOUS
Status: DISCONTINUED | OUTPATIENT
Start: 2023-01-01 | End: 2023-01-01

## 2023-01-01 RX ORDER — MAGNESIUM HYDROXIDE 1200 MG/15ML
LIQUID ORAL PRN
Status: DISCONTINUED | OUTPATIENT
Start: 2023-01-01 | End: 2023-01-01 | Stop reason: HOSPADM

## 2023-01-01 RX ORDER — MIRTAZAPINE 15 MG/1
15 TABLET, FILM COATED ORAL EVERY EVENING
Status: DISCONTINUED | OUTPATIENT
Start: 2023-01-01 | End: 2023-01-01 | Stop reason: HOSPADM

## 2023-01-01 RX ORDER — ACYCLOVIR 200 MG/1
10 CAPSULE ORAL ONCE
Status: DISCONTINUED | OUTPATIENT
Start: 2023-01-01 | End: 2023-01-01

## 2023-01-01 RX ORDER — TORSEMIDE 10 MG/1
10 TABLET ORAL DAILY
Qty: 30 TABLET | Refills: 0 | Status: ON HOLD | OUTPATIENT
Start: 2023-01-01 | End: 2023-01-01

## 2023-01-01 RX ORDER — LEVOTHYROXINE SODIUM 25 UG/1
25 TABLET ORAL DAILY
Qty: 45 TABLET | Refills: 0 | Status: ON HOLD | OUTPATIENT
Start: 2023-01-01 | End: 2023-01-01

## 2023-01-01 RX ORDER — ENOXAPARIN SODIUM 100 MG/ML
40 INJECTION SUBCUTANEOUS EVERY 24 HOURS
Status: DISCONTINUED | OUTPATIENT
Start: 2023-01-01 | End: 2023-01-01

## 2023-01-01 RX ORDER — ONDANSETRON 4 MG/1
4 TABLET, ORALLY DISINTEGRATING ORAL EVERY 30 MIN PRN
Status: DISCONTINUED | OUTPATIENT
Start: 2023-01-01 | End: 2023-01-01 | Stop reason: HOSPADM

## 2023-01-01 RX ORDER — ONDANSETRON 2 MG/ML
4 INJECTION INTRAMUSCULAR; INTRAVENOUS EVERY 6 HOURS PRN
Status: DISCONTINUED | OUTPATIENT
Start: 2023-01-01 | End: 2023-01-01 | Stop reason: HOSPADM

## 2023-01-01 RX ORDER — ONDANSETRON 4 MG/1
4 TABLET, ORALLY DISINTEGRATING ORAL EVERY 6 HOURS PRN
Qty: 5 TABLET | Refills: 0 | DISCHARGE
Start: 2023-01-01

## 2023-01-01 RX ORDER — CEPHALEXIN 500 MG/1
500 CAPSULE ORAL 3 TIMES DAILY
Qty: 30 CAPSULE | Refills: 0 | Status: SHIPPED | OUTPATIENT
Start: 2023-01-01 | End: 2023-01-01

## 2023-01-01 RX ORDER — METOPROLOL SUCCINATE 25 MG/1
25 TABLET, EXTENDED RELEASE ORAL DAILY
Status: DISCONTINUED | OUTPATIENT
Start: 2023-01-01 | End: 2023-01-01

## 2023-01-01 RX ORDER — MIRTAZAPINE 15 MG/1
15 TABLET, FILM COATED ORAL AT BEDTIME
Status: DISCONTINUED | OUTPATIENT
Start: 2023-01-01 | End: 2023-01-01

## 2023-01-01 RX ORDER — AMOXICILLIN 250 MG
1 CAPSULE ORAL 2 TIMES DAILY
Qty: 21 TABLET | Refills: 0 | DISCHARGE
Start: 2023-01-01 | End: 2023-01-01

## 2023-01-01 RX ORDER — OXYCODONE HCL 5 MG/5 ML
5 SOLUTION, ORAL ORAL EVERY 4 HOURS PRN
Status: DISCONTINUED | OUTPATIENT
Start: 2023-01-01 | End: 2023-01-01 | Stop reason: HOSPADM

## 2023-01-01 RX ORDER — MIRTAZAPINE 7.5 MG/1
7.5 TABLET, FILM COATED ORAL EVERY EVENING
Status: DISCONTINUED | OUTPATIENT
Start: 2023-01-01 | End: 2023-01-01

## 2023-01-01 RX ORDER — DEXTROSE MONOHYDRATE 100 MG/ML
INJECTION, SOLUTION INTRAVENOUS CONTINUOUS PRN
Status: DISCONTINUED | OUTPATIENT
Start: 2023-01-01 | End: 2023-01-01 | Stop reason: HOSPADM

## 2023-01-01 RX ORDER — ACETAMINOPHEN 325 MG/1
650 TABLET ORAL EVERY 6 HOURS PRN
Status: DISCONTINUED | OUTPATIENT
Start: 2023-01-01 | End: 2023-01-01 | Stop reason: HOSPADM

## 2023-01-01 RX ORDER — MIRTAZAPINE 15 MG/1
15 TABLET, FILM COATED ORAL EVERY EVENING
Status: DISCONTINUED | OUTPATIENT
Start: 2023-01-01 | End: 2023-01-01

## 2023-01-01 RX ORDER — HYDROMORPHONE HCL IN WATER/PF 6 MG/30 ML
0.4 PATIENT CONTROLLED ANALGESIA SYRINGE INTRAVENOUS
Status: DISCONTINUED | OUTPATIENT
Start: 2023-01-01 | End: 2023-01-01 | Stop reason: HOSPADM

## 2023-01-01 RX ORDER — SCOLOPAMINE TRANSDERMAL SYSTEM 1 MG/1
1 PATCH, EXTENDED RELEASE TRANSDERMAL
Status: DISCONTINUED | OUTPATIENT
Start: 2023-01-01 | End: 2023-01-01 | Stop reason: HOSPADM

## 2023-01-01 RX ORDER — POLYETHYLENE GLYCOL 3350 17 G/17G
1 POWDER, FOR SOLUTION ORAL DAILY PRN
COMMUNITY

## 2023-01-01 RX ORDER — CEFAZOLIN SODIUM 1 G/3ML
1 INJECTION, POWDER, FOR SOLUTION INTRAMUSCULAR; INTRAVENOUS EVERY 8 HOURS
Status: COMPLETED | OUTPATIENT
Start: 2023-01-01 | End: 2023-01-01

## 2023-01-01 RX ORDER — DEXTROSE MONOHYDRATE 25 G/50ML
INJECTION, SOLUTION INTRAVENOUS
Status: DISPENSED
Start: 2023-01-01 | End: 2023-01-01

## 2023-01-01 RX ORDER — POTASSIUM CHLORIDE 1.5 G/1.58G
20 POWDER, FOR SOLUTION ORAL ONCE
Status: DISCONTINUED | OUTPATIENT
Start: 2023-01-01 | End: 2023-01-01

## 2023-01-01 RX ORDER — ONDANSETRON 4 MG/1
4 TABLET, ORALLY DISINTEGRATING ORAL EVERY 6 HOURS PRN
Status: DISCONTINUED | OUTPATIENT
Start: 2023-01-01 | End: 2023-01-01 | Stop reason: HOSPADM

## 2023-01-01 RX ORDER — NALOXONE HYDROCHLORIDE 0.4 MG/ML
0.4 INJECTION, SOLUTION INTRAMUSCULAR; INTRAVENOUS; SUBCUTANEOUS
Status: DISCONTINUED | OUTPATIENT
Start: 2023-01-01 | End: 2023-01-01 | Stop reason: HOSPADM

## 2023-01-01 RX ORDER — DEXTROSE MONOHYDRATE 50 MG/ML
INJECTION, SOLUTION INTRAVENOUS CONTINUOUS
Status: DISCONTINUED | OUTPATIENT
Start: 2023-01-01 | End: 2023-01-01

## 2023-01-01 RX ORDER — LEVOTHYROXINE SODIUM 25 UG/1
25 TABLET ORAL DAILY
Status: DISCONTINUED | OUTPATIENT
Start: 2023-01-01 | End: 2023-01-01

## 2023-01-01 RX ORDER — GADOBUTROL 604.72 MG/ML
6 INJECTION INTRAVENOUS ONCE
Status: DISCONTINUED | OUTPATIENT
Start: 2023-01-01 | End: 2023-01-01

## 2023-01-01 RX ORDER — IPRATROPIUM BROMIDE AND ALBUTEROL SULFATE 2.5; .5 MG/3ML; MG/3ML
3 SOLUTION RESPIRATORY (INHALATION)
Status: COMPLETED | OUTPATIENT
Start: 2023-01-01 | End: 2023-01-01

## 2023-01-01 RX ORDER — HALOPERIDOL 5 MG/ML
2 INJECTION INTRAMUSCULAR EVERY 6 HOURS PRN
Start: 2023-01-01

## 2023-01-01 RX ORDER — HALOPERIDOL 5 MG/ML
2 INJECTION INTRAMUSCULAR EVERY 6 HOURS PRN
Status: DISCONTINUED | OUTPATIENT
Start: 2023-01-01 | End: 2023-01-01 | Stop reason: HOSPADM

## 2023-01-01 RX ORDER — IPRATROPIUM BROMIDE AND ALBUTEROL SULFATE 2.5; .5 MG/3ML; MG/3ML
3 SOLUTION RESPIRATORY (INHALATION) EVERY 4 HOURS PRN
Qty: 90 ML
Start: 2023-01-01

## 2023-01-01 RX ORDER — HYDROMORPHONE HCL IN WATER/PF 6 MG/30 ML
0.2 PATIENT CONTROLLED ANALGESIA SYRINGE INTRAVENOUS
Status: DISCONTINUED | OUTPATIENT
Start: 2023-01-01 | End: 2023-01-01

## 2023-01-01 RX ORDER — MIRTAZAPINE 7.5 MG/1
7.5 TABLET, FILM COATED ORAL AT BEDTIME
Qty: 30 TABLET | Refills: 1 | Status: ON HOLD | OUTPATIENT
Start: 2023-01-01 | End: 2023-01-01

## 2023-01-01 RX ORDER — ACETAMINOPHEN 325 MG/1
650 TABLET ORAL EVERY 6 HOURS PRN
Qty: 100 TABLET | Refills: 0 | DISCHARGE
Start: 2023-01-01

## 2023-01-01 RX ORDER — AMOXICILLIN 250 MG
1 CAPSULE ORAL 2 TIMES DAILY
Status: DISCONTINUED | OUTPATIENT
Start: 2023-01-01 | End: 2023-01-01 | Stop reason: HOSPADM

## 2023-01-01 RX ORDER — LANOLIN ALCOHOL/MO/W.PET/CERES
3 CREAM (GRAM) TOPICAL EVERY EVENING
Status: DISCONTINUED | OUTPATIENT
Start: 2023-01-01 | End: 2023-01-01

## 2023-01-01 RX ORDER — OXYCODONE HCL 5 MG/5 ML
10 SOLUTION, ORAL ORAL EVERY 4 HOURS PRN
Status: DISCONTINUED | OUTPATIENT
Start: 2023-01-01 | End: 2023-01-01 | Stop reason: HOSPADM

## 2023-01-01 RX ORDER — ATROPINE SULFATE 10 MG/ML
2-4 SOLUTION/ DROPS OPHTHALMIC
Start: 2023-01-01

## 2023-01-01 RX ORDER — SODIUM CHLORIDE 9 MG/ML
INJECTION, SOLUTION INTRAVENOUS CONTINUOUS
Status: DISCONTINUED | OUTPATIENT
Start: 2023-01-01 | End: 2023-01-01 | Stop reason: HOSPADM

## 2023-01-01 RX ORDER — OXYCODONE HCL 5 MG/5 ML
10 SOLUTION, ORAL ORAL EVERY 4 HOURS PRN
Refills: 0
Start: 2023-01-01

## 2023-01-01 RX ORDER — NITROGLYCERIN 0.4 MG/1
TABLET SUBLINGUAL
Qty: 25 TABLET | Refills: 3 | Status: ON HOLD | OUTPATIENT
Start: 2023-01-01 | End: 2023-01-01

## 2023-01-01 RX ORDER — QUETIAPINE FUMARATE 25 MG/1
18.75 TABLET, FILM COATED ORAL AT BEDTIME
Status: ON HOLD | COMMUNITY
End: 2023-01-01

## 2023-01-01 RX ORDER — HYDROMORPHONE HCL IN WATER/PF 6 MG/30 ML
0.2 PATIENT CONTROLLED ANALGESIA SYRINGE INTRAVENOUS
Status: DISCONTINUED | OUTPATIENT
Start: 2023-01-01 | End: 2023-01-01 | Stop reason: HOSPADM

## 2023-01-01 RX ORDER — LABETALOL HYDROCHLORIDE 5 MG/ML
5 INJECTION, SOLUTION INTRAVENOUS
Status: DISCONTINUED | OUTPATIENT
Start: 2023-01-01 | End: 2023-01-01 | Stop reason: HOSPADM

## 2023-01-01 RX ORDER — QUETIAPINE FUMARATE 25 MG/1
25 TABLET, FILM COATED ORAL AT BEDTIME
Status: DISCONTINUED | OUTPATIENT
Start: 2023-01-01 | End: 2023-01-01

## 2023-01-01 RX ORDER — TORSEMIDE 10 MG/1
10 TABLET ORAL DAILY
Status: DISCONTINUED | OUTPATIENT
Start: 2023-01-01 | End: 2023-01-01 | Stop reason: HOSPADM

## 2023-01-01 RX ORDER — ONDANSETRON 2 MG/ML
INJECTION INTRAMUSCULAR; INTRAVENOUS PRN
Status: DISCONTINUED | OUTPATIENT
Start: 2023-01-01 | End: 2023-01-01

## 2023-01-01 RX ORDER — ACETAMINOPHEN 325 MG/1
650 TABLET ORAL EVERY 4 HOURS PRN
Status: DISCONTINUED | OUTPATIENT
Start: 2023-01-01 | End: 2023-01-01 | Stop reason: HOSPADM

## 2023-01-01 RX ORDER — POLYETHYLENE GLYCOL 3350 17 G/17G
17 POWDER, FOR SOLUTION ORAL DAILY PRN
Status: DISCONTINUED | OUTPATIENT
Start: 2023-01-01 | End: 2023-01-01 | Stop reason: HOSPADM

## 2023-01-01 RX ORDER — SODIUM CHLORIDE 9 MG/ML
INJECTION, SOLUTION INTRAVENOUS CONTINUOUS
Status: ACTIVE | OUTPATIENT
Start: 2023-01-01 | End: 2023-01-01

## 2023-01-01 RX ORDER — GLYCOPYRROLATE 0.2 MG/ML
0.2 INJECTION, SOLUTION INTRAMUSCULAR; INTRAVENOUS EVERY 4 HOURS
Status: DISCONTINUED | OUTPATIENT
Start: 2023-01-01 | End: 2023-01-01 | Stop reason: HOSPADM

## 2023-01-01 RX ORDER — CEFAZOLIN SODIUM/WATER 2 G/20 ML
2 SYRINGE (ML) INTRAVENOUS
Status: COMPLETED | OUTPATIENT
Start: 2023-01-01 | End: 2023-01-01

## 2023-01-01 RX ORDER — NALOXONE HYDROCHLORIDE 0.4 MG/ML
0.1 INJECTION, SOLUTION INTRAMUSCULAR; INTRAVENOUS; SUBCUTANEOUS
Status: DISCONTINUED | OUTPATIENT
Start: 2023-01-01 | End: 2023-01-01 | Stop reason: HOSPADM

## 2023-01-01 RX ORDER — NALOXONE HYDROCHLORIDE 0.4 MG/ML
0.2 INJECTION, SOLUTION INTRAMUSCULAR; INTRAVENOUS; SUBCUTANEOUS
Status: DISCONTINUED | OUTPATIENT
Start: 2023-01-01 | End: 2023-01-01 | Stop reason: HOSPADM

## 2023-01-01 RX ORDER — TORSEMIDE 10 MG/1
10 TABLET ORAL 2 TIMES DAILY
Status: DISCONTINUED | OUTPATIENT
Start: 2023-01-01 | End: 2023-01-01

## 2023-01-01 RX ORDER — ENOXAPARIN SODIUM 100 MG/ML
30 INJECTION SUBCUTANEOUS EVERY 24 HOURS
Status: DISCONTINUED | OUTPATIENT
Start: 2023-01-01 | End: 2023-01-01 | Stop reason: HOSPADM

## 2023-01-01 RX ORDER — TORSEMIDE 10 MG/1
10 TABLET ORAL EVERY OTHER DAY
Qty: 45 TABLET | Refills: 1 | Status: SHIPPED | OUTPATIENT
Start: 2023-01-01 | End: 2023-01-01

## 2023-01-01 RX ORDER — POLYETHYLENE GLYCOL 3350 17 G/17G
17 POWDER, FOR SOLUTION ORAL DAILY PRN
Status: DISCONTINUED | OUTPATIENT
Start: 2023-01-01 | End: 2023-01-01

## 2023-01-01 RX ORDER — BISACODYL 10 MG
10 SUPPOSITORY, RECTAL RECTAL DAILY PRN
Start: 2023-01-01

## 2023-01-01 RX ORDER — THIAMINE HYDROCHLORIDE 100 MG/ML
100 INJECTION, SOLUTION INTRAMUSCULAR; INTRAVENOUS ONCE
Status: COMPLETED | OUTPATIENT
Start: 2023-01-01 | End: 2023-01-01

## 2023-01-01 RX ORDER — TRANEXAMIC ACID 10 MG/ML
1 INJECTION, SOLUTION INTRAVENOUS ONCE
Status: COMPLETED | OUTPATIENT
Start: 2023-01-01 | End: 2023-01-01

## 2023-01-01 RX ORDER — ENOXAPARIN SODIUM 100 MG/ML
40 INJECTION SUBCUTANEOUS EVERY 24 HOURS
Qty: 12 ML | Refills: 0 | DISCHARGE
Start: 2023-01-01 | End: 2023-01-01

## 2023-01-01 RX ORDER — LIDOCAINE 40 MG/G
CREAM TOPICAL
Status: DISCONTINUED | OUTPATIENT
Start: 2023-01-01 | End: 2023-01-01 | Stop reason: HOSPADM

## 2023-01-01 RX ORDER — NICOTINE 14MG/24HR
250 PATCH, TRANSDERMAL 24 HOURS TRANSDERMAL DAILY
Status: ON HOLD | COMMUNITY
End: 2023-01-01

## 2023-01-01 RX ORDER — POLYETHYLENE GLYCOL 3350 17 G/17G
17 POWDER, FOR SOLUTION ORAL DAILY
Status: DISCONTINUED | OUTPATIENT
Start: 2023-01-01 | End: 2023-01-01

## 2023-01-01 RX ORDER — MULTIPLE VITAMINS W/ MINERALS TAB 9MG-400MCG
1 TAB ORAL DAILY
Status: DISCONTINUED | OUTPATIENT
Start: 2023-01-01 | End: 2023-01-01

## 2023-01-01 RX ORDER — HYDROMORPHONE HCL IN WATER/PF 6 MG/30 ML
0.2 PATIENT CONTROLLED ANALGESIA SYRINGE INTRAVENOUS
Refills: 0
Start: 2023-01-01

## 2023-01-01 RX ORDER — POTASSIUM CHLORIDE 1.5 G/1.58G
40 POWDER, FOR SOLUTION ORAL ONCE
Status: COMPLETED | OUTPATIENT
Start: 2023-01-01 | End: 2023-01-01

## 2023-01-01 RX ORDER — ATROPINE SULFATE 10 MG/ML
2-4 SOLUTION/ DROPS OPHTHALMIC
Status: DISCONTINUED | OUTPATIENT
Start: 2023-01-01 | End: 2023-01-01 | Stop reason: HOSPADM

## 2023-01-01 RX ORDER — KETAMINE HYDROCHLORIDE 10 MG/ML
INJECTION INTRAMUSCULAR; INTRAVENOUS PRN
Status: DISCONTINUED | OUTPATIENT
Start: 2023-01-01 | End: 2023-01-01

## 2023-01-01 RX ORDER — METOPROLOL TARTRATE 1 MG/ML
5 INJECTION, SOLUTION INTRAVENOUS ONCE
Status: COMPLETED | OUTPATIENT
Start: 2023-01-01 | End: 2023-01-01

## 2023-01-01 RX ORDER — DIGOXIN 0.25 MG/ML
125 INJECTION INTRAMUSCULAR; INTRAVENOUS EVERY 6 HOURS
Status: COMPLETED | OUTPATIENT
Start: 2023-01-01 | End: 2023-01-01

## 2023-01-01 RX ORDER — AMOXICILLIN 250 MG
1 CAPSULE ORAL 2 TIMES DAILY
Status: DISCONTINUED | OUTPATIENT
Start: 2023-01-01 | End: 2023-01-01

## 2023-01-01 RX ORDER — METOPROLOL TARTRATE 1 MG/ML
2.5 INJECTION, SOLUTION INTRAVENOUS EVERY 4 HOURS PRN
Status: DISCONTINUED | OUTPATIENT
Start: 2023-01-01 | End: 2023-01-01 | Stop reason: HOSPADM

## 2023-01-01 RX ORDER — LIDOCAINE HYDROCHLORIDE 20 MG/ML
JELLY TOPICAL ONCE
Status: COMPLETED | OUTPATIENT
Start: 2023-01-01 | End: 2023-01-01

## 2023-01-01 RX ORDER — LORAZEPAM 2 MG/ML
0.5 INJECTION INTRAMUSCULAR
Status: COMPLETED | OUTPATIENT
Start: 2023-01-01 | End: 2023-01-01

## 2023-01-01 RX ORDER — QUETIAPINE FUMARATE 25 MG/1
TABLET, FILM COATED ORAL
Qty: 30 TABLET | Refills: 0 | Status: SHIPPED | OUTPATIENT
Start: 2023-01-01 | End: 2023-01-01

## 2023-01-01 RX ORDER — SCOLOPAMINE TRANSDERMAL SYSTEM 1 MG/1
1 PATCH, EXTENDED RELEASE TRANSDERMAL
Start: 2023-01-01

## 2023-01-01 RX ORDER — ONDANSETRON 2 MG/ML
4 INJECTION INTRAMUSCULAR; INTRAVENOUS EVERY 30 MIN PRN
Status: DISCONTINUED | OUTPATIENT
Start: 2023-01-01 | End: 2023-01-01 | Stop reason: HOSPADM

## 2023-01-01 RX ORDER — ONDANSETRON 4 MG/1
4 TABLET, ORALLY DISINTEGRATING ORAL EVERY 6 HOURS PRN
Status: DISCONTINUED | OUTPATIENT
Start: 2023-01-01 | End: 2023-01-01

## 2023-01-01 RX ORDER — DEXAMETHASONE SODIUM PHOSPHATE 4 MG/ML
INJECTION, SOLUTION INTRA-ARTICULAR; INTRALESIONAL; INTRAMUSCULAR; INTRAVENOUS; SOFT TISSUE PRN
Status: DISCONTINUED | OUTPATIENT
Start: 2023-01-01 | End: 2023-01-01

## 2023-01-01 RX ORDER — SODIUM CHLORIDE 9 MG/ML
INJECTION, SOLUTION INTRAVENOUS CONTINUOUS
Status: CANCELLED | OUTPATIENT
Start: 2023-01-01

## 2023-01-01 RX ORDER — BISACODYL 10 MG
10 SUPPOSITORY, RECTAL RECTAL DAILY PRN
Status: DISCONTINUED | OUTPATIENT
Start: 2023-01-01 | End: 2023-01-01

## 2023-01-01 RX ORDER — IPRATROPIUM BROMIDE AND ALBUTEROL SULFATE 2.5; .5 MG/3ML; MG/3ML
3 SOLUTION RESPIRATORY (INHALATION) EVERY 4 HOURS PRN
Status: DISCONTINUED | OUTPATIENT
Start: 2023-01-01 | End: 2023-01-01

## 2023-01-01 RX ORDER — ETOMIDATE 2 MG/ML
INJECTION INTRAVENOUS PRN
Status: DISCONTINUED | OUTPATIENT
Start: 2023-01-01 | End: 2023-01-01

## 2023-01-01 RX ORDER — LEVOTHYROXINE SODIUM 25 UG/1
TABLET ORAL
Qty: 90 TABLET | OUTPATIENT
Start: 2023-01-01

## 2023-01-01 RX ORDER — POTASSIUM CHLORIDE 1500 MG/1
20 TABLET, EXTENDED RELEASE ORAL 2 TIMES DAILY
Qty: 30 TABLET | Refills: 3 | Status: ON HOLD | OUTPATIENT
Start: 2023-01-01 | End: 2023-01-01

## 2023-01-01 RX ORDER — AMOXICILLIN 250 MG
2 CAPSULE ORAL 2 TIMES DAILY
Status: DISCONTINUED | OUTPATIENT
Start: 2023-01-01 | End: 2023-01-01

## 2023-01-01 RX ORDER — FUROSEMIDE 10 MG/ML
20 INJECTION INTRAMUSCULAR; INTRAVENOUS EVERY 12 HOURS
Status: DISCONTINUED | OUTPATIENT
Start: 2023-01-01 | End: 2023-01-01

## 2023-01-01 RX ORDER — PYRIDOXINE HCL (VITAMIN B6) 50 MG
1 TABLET ORAL DAILY
Status: ON HOLD | COMMUNITY
End: 2023-01-01

## 2023-01-01 RX ORDER — PROCHLORPERAZINE MALEATE 5 MG
5 TABLET ORAL EVERY 6 HOURS PRN
Status: DISCONTINUED | OUTPATIENT
Start: 2023-01-01 | End: 2023-01-01 | Stop reason: HOSPADM

## 2023-01-01 RX ORDER — TORSEMIDE 5 MG/1
5 TABLET ORAL DAILY
Qty: 45 TABLET | Refills: 1
Start: 2023-01-01 | End: 2023-01-01

## 2023-01-01 RX ORDER — ACETAMINOPHEN 325 MG/1
975 TABLET ORAL
Status: COMPLETED | OUTPATIENT
Start: 2023-01-01 | End: 2023-01-01

## 2023-01-01 RX ORDER — QUETIAPINE FUMARATE 25 MG/1
25 TABLET, FILM COATED ORAL EVERY EVENING
Status: DISCONTINUED | OUTPATIENT
Start: 2023-01-01 | End: 2023-01-01

## 2023-01-01 RX ORDER — GLYCOPYRROLATE 1 MG/5ML
SOLUTION ORAL
Status: CANCELLED | OUTPATIENT
Start: 2023-01-01

## 2023-01-01 RX ORDER — ACETAMINOPHEN 650 MG/1
650 SUPPOSITORY RECTAL EVERY 6 HOURS PRN
Status: DISCONTINUED | OUTPATIENT
Start: 2023-01-01 | End: 2023-01-01 | Stop reason: HOSPADM

## 2023-01-01 RX ORDER — OXYCODONE HYDROCHLORIDE 5 MG/1
5 TABLET ORAL EVERY 4 HOURS PRN
Status: DISCONTINUED | OUTPATIENT
Start: 2023-01-01 | End: 2023-01-01

## 2023-01-01 RX ORDER — OXYCODONE HYDROCHLORIDE 5 MG/1
5 TABLET ORAL EVERY 4 HOURS PRN
Qty: 20 TABLET | Refills: 0 | Status: SHIPPED | OUTPATIENT
Start: 2023-01-01 | End: 2023-01-01

## 2023-01-01 RX ORDER — POTASSIUM CHLORIDE 7.45 MG/ML
10 INJECTION INTRAVENOUS
Status: COMPLETED | OUTPATIENT
Start: 2023-01-01 | End: 2023-01-01

## 2023-01-01 RX ORDER — BISACODYL 10 MG
10 SUPPOSITORY, RECTAL RECTAL DAILY PRN
Status: DISCONTINUED | OUTPATIENT
Start: 2023-01-01 | End: 2023-01-01 | Stop reason: HOSPADM

## 2023-01-01 RX ORDER — ONDANSETRON 2 MG/ML
4 INJECTION INTRAMUSCULAR; INTRAVENOUS EVERY 6 HOURS PRN
Status: DISCONTINUED | OUTPATIENT
Start: 2023-01-01 | End: 2023-01-01

## 2023-01-01 RX ORDER — MIRTAZAPINE 15 MG/1
15 TABLET, FILM COATED ORAL EVERY EVENING
Start: 2023-01-01

## 2023-01-01 RX ORDER — SODIUM CHLORIDE, SODIUM LACTATE, POTASSIUM CHLORIDE, CALCIUM CHLORIDE 600; 310; 30; 20 MG/100ML; MG/100ML; MG/100ML; MG/100ML
INJECTION, SOLUTION INTRAVENOUS CONTINUOUS
Status: DISCONTINUED | OUTPATIENT
Start: 2023-01-01 | End: 2023-01-01 | Stop reason: HOSPADM

## 2023-01-01 RX ORDER — HYDROMORPHONE HYDROCHLORIDE 1 MG/ML
.5-1 INJECTION, SOLUTION INTRAMUSCULAR; INTRAVENOUS; SUBCUTANEOUS
Status: DISCONTINUED | OUTPATIENT
Start: 2023-01-01 | End: 2023-01-01 | Stop reason: HOSPADM

## 2023-01-01 RX ORDER — OXYCODONE HYDROCHLORIDE 5 MG/1
10 TABLET ORAL EVERY 4 HOURS PRN
Status: DISCONTINUED | OUTPATIENT
Start: 2023-01-01 | End: 2023-01-01

## 2023-01-01 RX ORDER — NYSTATIN 100000 [USP'U]/G
POWDER TOPICAL 2 TIMES DAILY PRN
Qty: 60 G | Refills: 1 | Status: ON HOLD | OUTPATIENT
Start: 2023-01-01 | End: 2023-01-01

## 2023-01-01 RX ORDER — HYDROCORTISONE 25 MG/G
CREAM TOPICAL 2 TIMES DAILY PRN
Status: DISCONTINUED | OUTPATIENT
Start: 2023-01-01 | End: 2023-01-01 | Stop reason: HOSPADM

## 2023-01-01 RX ORDER — POTASSIUM CHLORIDE 1.5 G/1.58G
20 POWDER, FOR SOLUTION ORAL ONCE
Status: COMPLETED | OUTPATIENT
Start: 2023-01-01 | End: 2023-01-01

## 2023-01-01 RX ORDER — DEXMEDETOMIDINE HYDROCHLORIDE 4 UG/ML
INJECTION, SOLUTION INTRAVENOUS PRN
Status: DISCONTINUED | OUTPATIENT
Start: 2023-01-01 | End: 2023-01-01

## 2023-01-01 RX ORDER — HYDROCORTISONE 25 MG/G
CREAM TOPICAL 2 TIMES DAILY PRN
Qty: 30 G | Refills: 0 | Status: SHIPPED | OUTPATIENT
Start: 2023-01-01

## 2023-01-01 RX ORDER — LEVOTHYROXINE SODIUM 25 UG/1
25 TABLET ORAL DAILY
Status: DISCONTINUED | OUTPATIENT
Start: 2023-01-01 | End: 2023-01-01 | Stop reason: HOSPADM

## 2023-01-01 RX ORDER — TORSEMIDE 10 MG/1
10 TABLET ORAL DAILY
Qty: 30 TABLET | Refills: 3 | Status: SHIPPED | OUTPATIENT
Start: 2023-01-01 | End: 2023-01-01

## 2023-01-01 RX ORDER — MIRTAZAPINE 7.5 MG/1
7.5 TABLET, FILM COATED ORAL AT BEDTIME
Status: DISCONTINUED | OUTPATIENT
Start: 2023-01-01 | End: 2023-01-01

## 2023-01-01 RX ORDER — LIDOCAINE HYDROCHLORIDE 20 MG/ML
10 JELLY TOPICAL ONCE
Status: DISCONTINUED | OUTPATIENT
Start: 2023-01-01 | End: 2023-01-01 | Stop reason: HOSPADM

## 2023-01-01 RX ORDER — FENTANYL CITRATE 50 UG/ML
INJECTION, SOLUTION INTRAMUSCULAR; INTRAVENOUS PRN
Status: DISCONTINUED | OUTPATIENT
Start: 2023-01-01 | End: 2023-01-01

## 2023-01-01 RX ORDER — GUAIFENESIN 600 MG/1
15 TABLET, EXTENDED RELEASE ORAL DAILY
Status: DISCONTINUED | OUTPATIENT
Start: 2023-01-01 | End: 2023-01-01 | Stop reason: HOSPADM

## 2023-01-01 RX ORDER — LEVALBUTEROL INHALATION SOLUTION 1.25 MG/3ML
1.25 SOLUTION RESPIRATORY (INHALATION) EVERY 4 HOURS PRN
Status: DISCONTINUED | OUTPATIENT
Start: 2023-01-01 | End: 2023-01-01 | Stop reason: HOSPADM

## 2023-01-01 RX ORDER — HYDROMORPHONE HCL IN WATER/PF 6 MG/30 ML
0.4 PATIENT CONTROLLED ANALGESIA SYRINGE INTRAVENOUS
Status: DISCONTINUED | OUTPATIENT
Start: 2023-01-01 | End: 2023-01-01

## 2023-01-01 RX ORDER — LORAZEPAM 2 MG/ML
0.5 INJECTION INTRAMUSCULAR ONCE
Status: DISCONTINUED | OUTPATIENT
Start: 2023-01-01 | End: 2023-01-01 | Stop reason: HOSPADM

## 2023-01-01 RX ORDER — SODIUM CHLORIDE 9 MG/ML
INJECTION, SOLUTION INTRAVENOUS CONTINUOUS
Status: DISCONTINUED | OUTPATIENT
Start: 2023-01-01 | End: 2023-01-01

## 2023-01-01 RX ORDER — ACETAMINOPHEN 325 MG/1
650 TABLET ORAL EVERY 6 HOURS PRN
Status: DISCONTINUED | OUTPATIENT
Start: 2023-01-01 | End: 2023-01-01

## 2023-01-01 RX ORDER — POLYETHYLENE GLYCOL 3350 17 G/17G
17 POWDER, FOR SOLUTION ORAL DAILY
Status: DISCONTINUED | OUTPATIENT
Start: 2023-01-01 | End: 2023-01-01 | Stop reason: HOSPADM

## 2023-01-01 RX ORDER — FENTANYL CITRATE 0.05 MG/ML
25 INJECTION, SOLUTION INTRAMUSCULAR; INTRAVENOUS EVERY 5 MIN PRN
Status: DISCONTINUED | OUTPATIENT
Start: 2023-01-01 | End: 2023-01-01 | Stop reason: HOSPADM

## 2023-01-01 RX ORDER — IPRATROPIUM BROMIDE AND ALBUTEROL SULFATE 2.5; .5 MG/3ML; MG/3ML
3 SOLUTION RESPIRATORY (INHALATION) EVERY 4 HOURS PRN
Status: DISCONTINUED | OUTPATIENT
Start: 2023-01-01 | End: 2023-01-01 | Stop reason: HOSPADM

## 2023-01-01 RX ORDER — MIRTAZAPINE 7.5 MG/1
7.5 TABLET, FILM COATED ORAL AT BEDTIME
Qty: 30 TABLET | Refills: 1 | Status: SHIPPED | OUTPATIENT
Start: 2023-01-01 | End: 2023-01-01

## 2023-01-01 RX ORDER — CEFAZOLIN SODIUM/WATER 2 G/20 ML
2 SYRINGE (ML) INTRAVENOUS SEE ADMIN INSTRUCTIONS
Status: DISCONTINUED | OUTPATIENT
Start: 2023-01-01 | End: 2023-01-01 | Stop reason: HOSPADM

## 2023-01-01 RX ORDER — HYDRALAZINE HYDROCHLORIDE 20 MG/ML
2.5-5 INJECTION INTRAMUSCULAR; INTRAVENOUS
Status: DISCONTINUED | OUTPATIENT
Start: 2023-01-01 | End: 2023-01-01 | Stop reason: HOSPADM

## 2023-01-01 RX ORDER — ACETAMINOPHEN 325 MG/1
650 TABLET ORAL EVERY 4 HOURS PRN
Status: DISCONTINUED | OUTPATIENT
Start: 2023-01-01 | End: 2023-01-01

## 2023-01-01 RX ORDER — FUROSEMIDE 10 MG/ML
60 INJECTION INTRAMUSCULAR; INTRAVENOUS ONCE
Status: COMPLETED | OUTPATIENT
Start: 2023-01-01 | End: 2023-01-01

## 2023-01-01 RX ORDER — FENTANYL CITRATE 0.05 MG/ML
50 INJECTION, SOLUTION INTRAMUSCULAR; INTRAVENOUS EVERY 5 MIN PRN
Status: DISCONTINUED | OUTPATIENT
Start: 2023-01-01 | End: 2023-01-01 | Stop reason: HOSPADM

## 2023-01-01 RX ADMIN — LEVOTHYROXINE SODIUM 25 MCG: 25 TABLET ORAL at 08:38

## 2023-01-01 RX ADMIN — HYDROMORPHONE HYDROCHLORIDE 0.2 MG: 0.2 INJECTION, SOLUTION INTRAMUSCULAR; INTRAVENOUS; SUBCUTANEOUS at 06:26

## 2023-01-01 RX ADMIN — POTASSIUM CHLORIDE 40 MEQ: 1.5 POWDER, FOR SOLUTION ORAL at 13:21

## 2023-01-01 RX ADMIN — ENOXAPARIN SODIUM 40 MG: 40 INJECTION SUBCUTANEOUS at 13:30

## 2023-01-01 RX ADMIN — HALOPERIDOL LACTATE 2 MG: 5 INJECTION, SOLUTION INTRAMUSCULAR at 00:54

## 2023-01-01 RX ADMIN — OXYCODONE HYDROCHLORIDE 5 MG: 5 SOLUTION ORAL at 08:57

## 2023-01-01 RX ADMIN — SODIUM CHLORIDE: 9 INJECTION, SOLUTION INTRAVENOUS at 08:55

## 2023-01-01 RX ADMIN — POTASSIUM CHLORIDE 10 MEQ: 7.46 INJECTION, SOLUTION INTRAVENOUS at 22:06

## 2023-01-01 RX ADMIN — DIGOXIN 125 MCG: 0.25 INJECTION INTRAMUSCULAR; INTRAVENOUS at 18:45

## 2023-01-01 RX ADMIN — HALOPERIDOL LACTATE 2 MG: 5 INJECTION, SOLUTION INTRAMUSCULAR at 08:38

## 2023-01-01 RX ADMIN — TRANEXAMIC ACID 1 G: 10 INJECTION, SOLUTION INTRAVENOUS at 14:13

## 2023-01-01 RX ADMIN — MULTIPLE VITAMINS W/ MINERALS TAB 1 TABLET: TAB at 08:30

## 2023-01-01 RX ADMIN — HYDROMORPHONE HYDROCHLORIDE 0.2 MG: 0.2 INJECTION, SOLUTION INTRAMUSCULAR; INTRAVENOUS; SUBCUTANEOUS at 03:15

## 2023-01-01 RX ADMIN — SUGAMMADEX 150 MG: 100 INJECTION, SOLUTION INTRAVENOUS at 15:36

## 2023-01-01 RX ADMIN — POLYETHYLENE GLYCOL 3350 17 G: 17 POWDER, FOR SOLUTION ORAL at 10:07

## 2023-01-01 RX ADMIN — HYDROMORPHONE HYDROCHLORIDE 0.2 MG: 0.2 INJECTION, SOLUTION INTRAMUSCULAR; INTRAVENOUS; SUBCUTANEOUS at 13:19

## 2023-01-01 RX ADMIN — HYDROMORPHONE HYDROCHLORIDE 0.2 MG: 0.2 INJECTION, SOLUTION INTRAMUSCULAR; INTRAVENOUS; SUBCUTANEOUS at 00:50

## 2023-01-01 RX ADMIN — SENNOSIDES AND DOCUSATE SODIUM 1 TABLET: 8.6; 5 TABLET ORAL at 08:30

## 2023-01-01 RX ADMIN — HYDROMORPHONE HYDROCHLORIDE 0.2 MG: 0.2 INJECTION, SOLUTION INTRAMUSCULAR; INTRAVENOUS; SUBCUTANEOUS at 18:25

## 2023-01-01 RX ADMIN — HYDROMORPHONE HYDROCHLORIDE 0.2 MG: 0.2 INJECTION, SOLUTION INTRAMUSCULAR; INTRAVENOUS; SUBCUTANEOUS at 02:35

## 2023-01-01 RX ADMIN — SCOPALAMINE 1 PATCH: 1 PATCH, EXTENDED RELEASE TRANSDERMAL at 18:08

## 2023-01-01 RX ADMIN — HYDROMORPHONE HYDROCHLORIDE 0.5 MG: 1 INJECTION, SOLUTION INTRAMUSCULAR; INTRAVENOUS; SUBCUTANEOUS at 10:43

## 2023-01-01 RX ADMIN — ACETAMINOPHEN 650 MG: 325 TABLET, FILM COATED ORAL at 04:17

## 2023-01-01 RX ADMIN — SENNOSIDES AND DOCUSATE SODIUM 1 TABLET: 8.6; 5 TABLET ORAL at 10:07

## 2023-01-01 RX ADMIN — Medication 1 MG: at 21:43

## 2023-01-01 RX ADMIN — ACETAMINOPHEN 975 MG: 325 TABLET, FILM COATED ORAL at 18:20

## 2023-01-01 RX ADMIN — FUROSEMIDE 20 MG: 10 INJECTION, SOLUTION INTRAMUSCULAR; INTRAVENOUS at 18:49

## 2023-01-01 RX ADMIN — LEVOTHYROXINE SODIUM 25 MCG: 25 TABLET ORAL at 08:37

## 2023-01-01 RX ADMIN — POTASSIUM CHLORIDE 10 MEQ: 7.46 INJECTION, SOLUTION INTRAVENOUS at 18:43

## 2023-01-01 RX ADMIN — DEXAMETHASONE SODIUM PHOSPHATE 4 MG: 4 INJECTION, SOLUTION INTRA-ARTICULAR; INTRALESIONAL; INTRAMUSCULAR; INTRAVENOUS; SOFT TISSUE at 14:13

## 2023-01-01 RX ADMIN — HYDROMORPHONE HYDROCHLORIDE 0.2 MG: 0.2 INJECTION, SOLUTION INTRAMUSCULAR; INTRAVENOUS; SUBCUTANEOUS at 21:44

## 2023-01-01 RX ADMIN — SODIUM CHLORIDE: 9 INJECTION, SOLUTION INTRAVENOUS at 17:07

## 2023-01-01 RX ADMIN — METOPROLOL SUCCINATE 25 MG: 25 TABLET, EXTENDED RELEASE ORAL at 08:37

## 2023-01-01 RX ADMIN — LEVOTHYROXINE SODIUM 25 MCG: 25 TABLET ORAL at 07:58

## 2023-01-01 RX ADMIN — SODIUM CHLORIDE: 9 INJECTION, SOLUTION INTRAVENOUS at 15:44

## 2023-01-01 RX ADMIN — IPRATROPIUM BROMIDE AND ALBUTEROL SULFATE 3 ML: 2.5; .5 SOLUTION RESPIRATORY (INHALATION) at 20:44

## 2023-01-01 RX ADMIN — MULTIPLE VITAMINS W/ MINERALS TAB 1 TABLET: TAB at 08:29

## 2023-01-01 RX ADMIN — HALOPERIDOL LACTATE 2 MG: 5 INJECTION, SOLUTION INTRAMUSCULAR at 05:36

## 2023-01-01 RX ADMIN — ENOXAPARIN SODIUM 30 MG: 30 INJECTION SUBCUTANEOUS at 14:15

## 2023-01-01 RX ADMIN — TORSEMIDE 10 MG: 10 TABLET ORAL at 08:14

## 2023-01-01 RX ADMIN — LEVOTHYROXINE SODIUM 25 MCG: 25 TABLET ORAL at 08:14

## 2023-01-01 RX ADMIN — HYDROMORPHONE HYDROCHLORIDE 0.2 MG: 0.2 INJECTION, SOLUTION INTRAMUSCULAR; INTRAVENOUS; SUBCUTANEOUS at 07:56

## 2023-01-01 RX ADMIN — HYDROMORPHONE HYDROCHLORIDE 0.2 MG: 0.2 INJECTION, SOLUTION INTRAMUSCULAR; INTRAVENOUS; SUBCUTANEOUS at 07:28

## 2023-01-01 RX ADMIN — MELATONIN TAB 3 MG 3 MG: 3 TAB at 20:57

## 2023-01-01 RX ADMIN — ENOXAPARIN SODIUM 30 MG: 30 INJECTION SUBCUTANEOUS at 15:11

## 2023-01-01 RX ADMIN — ENOXAPARIN SODIUM 40 MG: 40 INJECTION SUBCUTANEOUS at 14:10

## 2023-01-01 RX ADMIN — QUETIAPINE 18.75 MG: 25 TABLET, FILM COATED ORAL at 23:54

## 2023-01-01 RX ADMIN — TORSEMIDE 10 MG: 10 TABLET ORAL at 21:07

## 2023-01-01 RX ADMIN — QUETIAPINE 18.75 MG: 25 TABLET, FILM COATED ORAL at 21:05

## 2023-01-01 RX ADMIN — DEXTROSE MONOHYDRATE: 50 INJECTION, SOLUTION INTRAVENOUS at 19:32

## 2023-01-01 RX ADMIN — MIRTAZAPINE 15 MG: 15 TABLET, FILM COATED ORAL at 17:19

## 2023-01-01 RX ADMIN — FENTANYL CITRATE 25 MCG: 50 INJECTION, SOLUTION INTRAMUSCULAR; INTRAVENOUS at 14:04

## 2023-01-01 RX ADMIN — ACETAMINOPHEN 975 MG: 325 TABLET, FILM COATED ORAL at 14:09

## 2023-01-01 RX ADMIN — MIRTAZAPINE 15 MG: 15 TABLET, FILM COATED ORAL at 17:58

## 2023-01-01 RX ADMIN — METOPROLOL SUCCINATE 25 MG: 25 TABLET, EXTENDED RELEASE ORAL at 08:24

## 2023-01-01 RX ADMIN — ENOXAPARIN SODIUM 30 MG: 30 INJECTION SUBCUTANEOUS at 14:10

## 2023-01-01 RX ADMIN — MULTIPLE VITAMINS W/ MINERALS TAB 1 TABLET: TAB at 15:48

## 2023-01-01 RX ADMIN — ENOXAPARIN SODIUM 30 MG: 30 INJECTION SUBCUTANEOUS at 13:41

## 2023-01-01 RX ADMIN — SENNOSIDES AND DOCUSATE SODIUM 1 TABLET: 8.6; 5 TABLET ORAL at 21:44

## 2023-01-01 RX ADMIN — ATROPINE SULFATE 4 DROP: 10 SOLUTION/ DROPS OPHTHALMIC at 00:33

## 2023-01-01 RX ADMIN — LIDOCAINE HYDROCHLORIDE: 20 JELLY TOPICAL at 10:48

## 2023-01-01 RX ADMIN — SENNOSIDES AND DOCUSATE SODIUM 1 TABLET: 50; 8.6 TABLET ORAL at 20:07

## 2023-01-01 RX ADMIN — CEFAZOLIN 1 G: 1 INJECTION, POWDER, FOR SOLUTION INTRAMUSCULAR; INTRAVENOUS at 21:51

## 2023-01-01 RX ADMIN — QUETIAPINE 18.75 MG: 25 TABLET, FILM COATED ORAL at 21:33

## 2023-01-01 RX ADMIN — POTASSIUM CHLORIDE 10 MEQ: 7.46 INJECTION, SOLUTION INTRAVENOUS at 19:53

## 2023-01-01 RX ADMIN — LEVOTHYROXINE SODIUM 25 MCG: 25 TABLET ORAL at 08:30

## 2023-01-01 RX ADMIN — SODIUM CHLORIDE, POTASSIUM CHLORIDE, SODIUM LACTATE AND CALCIUM CHLORIDE: 600; 310; 30; 20 INJECTION, SOLUTION INTRAVENOUS at 12:47

## 2023-01-01 RX ADMIN — POTASSIUM CHLORIDE 20 MEQ: 1.5 POWDER, FOR SOLUTION ORAL at 15:02

## 2023-01-01 RX ADMIN — ENOXAPARIN SODIUM 30 MG: 30 INJECTION SUBCUTANEOUS at 15:41

## 2023-01-01 RX ADMIN — IPRATROPIUM BROMIDE AND ALBUTEROL SULFATE 3 ML: 2.5; .5 SOLUTION RESPIRATORY (INHALATION) at 20:39

## 2023-01-01 RX ADMIN — OXYCODONE HYDROCHLORIDE 5 MG: 5 TABLET ORAL at 03:45

## 2023-01-01 RX ADMIN — MELATONIN 5 MG TABLET 5 MG: at 20:45

## 2023-01-01 RX ADMIN — Medication 1 MG: at 00:02

## 2023-01-01 RX ADMIN — ACETAMINOPHEN 650 MG: 325 TABLET, FILM COATED ORAL at 15:44

## 2023-01-01 RX ADMIN — MULTIPLE VITAMINS W/ MINERALS TAB 1 TABLET: TAB at 17:41

## 2023-01-01 RX ADMIN — HALOPERIDOL LACTATE 2 MG: 5 INJECTION, SOLUTION INTRAMUSCULAR at 12:25

## 2023-01-01 RX ADMIN — METOPROLOL TARTRATE 2.5 MG: 5 INJECTION INTRAVENOUS at 08:04

## 2023-01-01 RX ADMIN — OXYCODONE HYDROCHLORIDE 5 MG: 5 TABLET ORAL at 15:17

## 2023-01-01 RX ADMIN — MULTIVITAMIN 15 ML: LIQUID ORAL at 08:57

## 2023-01-01 RX ADMIN — LORAZEPAM 0.5 MG: 2 INJECTION INTRAMUSCULAR; INTRAVENOUS at 10:42

## 2023-01-01 RX ADMIN — CEFAZOLIN 1 G: 1 INJECTION, POWDER, FOR SOLUTION INTRAMUSCULAR; INTRAVENOUS at 05:52

## 2023-01-01 RX ADMIN — SODIUM CHLORIDE 500 ML: 9 INJECTION, SOLUTION INTRAVENOUS at 21:56

## 2023-01-01 RX ADMIN — SENNOSIDES AND DOCUSATE SODIUM 1 TABLET: 8.6; 5 TABLET ORAL at 21:07

## 2023-01-01 RX ADMIN — ENOXAPARIN SODIUM 30 MG: 30 INJECTION SUBCUTANEOUS at 14:48

## 2023-01-01 RX ADMIN — MIRTAZAPINE 15 MG: 15 TABLET, FILM COATED ORAL at 21:24

## 2023-01-01 RX ADMIN — QUETIAPINE 18.75 MG: 25 TABLET, FILM COATED ORAL at 21:17

## 2023-01-01 RX ADMIN — Medication 2 G: at 14:08

## 2023-01-01 RX ADMIN — HYDROMORPHONE HYDROCHLORIDE 0.2 MG: 0.2 INJECTION, SOLUTION INTRAMUSCULAR; INTRAVENOUS; SUBCUTANEOUS at 16:40

## 2023-01-01 RX ADMIN — TORSEMIDE 10 MG: 10 TABLET ORAL at 14:09

## 2023-01-01 RX ADMIN — ACETAMINOPHEN 650 MG: 325 TABLET, FILM COATED ORAL at 09:54

## 2023-01-01 RX ADMIN — ETOMIDATE 8 MG: 2 INJECTION INTRAVENOUS at 14:08

## 2023-01-01 RX ADMIN — METOPROLOL SUCCINATE 25 MG: 25 TABLET, EXTENDED RELEASE ORAL at 08:29

## 2023-01-01 RX ADMIN — OXYCODONE HYDROCHLORIDE 5 MG: 5 TABLET ORAL at 00:02

## 2023-01-01 RX ADMIN — HYDROMORPHONE HYDROCHLORIDE 0.2 MG: 0.2 INJECTION, SOLUTION INTRAMUSCULAR; INTRAVENOUS; SUBCUTANEOUS at 09:53

## 2023-01-01 RX ADMIN — MIRTAZAPINE 7.5 MG: 7.5 TABLET, FILM COATED ORAL at 23:54

## 2023-01-01 RX ADMIN — POLYETHYLENE GLYCOL 3350 17 G: 17 POWDER, FOR SOLUTION ORAL at 08:39

## 2023-01-01 RX ADMIN — ACETAMINOPHEN 650 MG: 325 TABLET, FILM COATED ORAL at 17:19

## 2023-01-01 RX ADMIN — MIRTAZAPINE 7.5 MG: 7.5 TABLET, FILM COATED ORAL at 21:43

## 2023-01-01 RX ADMIN — METOPROLOL TARTRATE 2.5 MG: 5 INJECTION INTRAVENOUS at 07:53

## 2023-01-01 RX ADMIN — POTASSIUM CHLORIDE 10 MEQ: 7.46 INJECTION, SOLUTION INTRAVENOUS at 23:43

## 2023-01-01 RX ADMIN — ENOXAPARIN SODIUM 30 MG: 30 INJECTION SUBCUTANEOUS at 13:09

## 2023-01-01 RX ADMIN — ATROPINE SULFATE 4 DROP: 10 SOLUTION/ DROPS OPHTHALMIC at 03:06

## 2023-01-01 RX ADMIN — SODIUM CHLORIDE, POTASSIUM CHLORIDE, SODIUM LACTATE AND CALCIUM CHLORIDE: 600; 310; 30; 20 INJECTION, SOLUTION INTRAVENOUS at 18:21

## 2023-01-01 RX ADMIN — HYDROMORPHONE HYDROCHLORIDE 0.2 MG: 0.2 INJECTION, SOLUTION INTRAMUSCULAR; INTRAVENOUS; SUBCUTANEOUS at 05:22

## 2023-01-01 RX ADMIN — MIRTAZAPINE 7.5 MG: 7.5 TABLET, FILM COATED ORAL at 21:05

## 2023-01-01 RX ADMIN — ATROPINE SULFATE 3 DROP: 10 SOLUTION/ DROPS OPHTHALMIC at 19:20

## 2023-01-01 RX ADMIN — SENNOSIDES AND DOCUSATE SODIUM 1 TABLET: 8.6; 5 TABLET ORAL at 08:14

## 2023-01-01 RX ADMIN — Medication 30 ML: at 14:26

## 2023-01-01 RX ADMIN — MIRTAZAPINE 15 MG: 15 TABLET, FILM COATED ORAL at 18:44

## 2023-01-01 RX ADMIN — TORSEMIDE 10 MG: 10 TABLET ORAL at 08:05

## 2023-01-01 RX ADMIN — TORSEMIDE 10 MG: 10 TABLET ORAL at 20:16

## 2023-01-01 RX ADMIN — SODIUM CHLORIDE: 9 INJECTION, SOLUTION INTRAVENOUS at 05:09

## 2023-01-01 RX ADMIN — ACETAMINOPHEN 650 MG: 325 TABLET, FILM COATED ORAL at 01:02

## 2023-01-01 RX ADMIN — HYDROMORPHONE HYDROCHLORIDE 0.2 MG: 0.2 INJECTION, SOLUTION INTRAMUSCULAR; INTRAVENOUS; SUBCUTANEOUS at 02:55

## 2023-01-01 RX ADMIN — METOPROLOL SUCCINATE 25 MG: 25 TABLET, EXTENDED RELEASE ORAL at 11:47

## 2023-01-01 RX ADMIN — METOPROLOL SUCCINATE 25 MG: 25 TABLET, EXTENDED RELEASE ORAL at 14:09

## 2023-01-01 RX ADMIN — ACETAMINOPHEN 975 MG: 325 TABLET, FILM COATED ORAL at 20:58

## 2023-01-01 RX ADMIN — ACETAMINOPHEN 975 MG: 325 TABLET, FILM COATED ORAL at 06:46

## 2023-01-01 RX ADMIN — MIRTAZAPINE 15 MG: 15 TABLET, FILM COATED ORAL at 17:09

## 2023-01-01 RX ADMIN — QUETIAPINE FUMARATE 25 MG: 25 TABLET ORAL at 21:24

## 2023-01-01 RX ADMIN — DEXMEDETOMIDINE HYDROCHLORIDE 12 MCG: 200 INJECTION INTRAVENOUS at 14:02

## 2023-01-01 RX ADMIN — ACETAMINOPHEN 975 MG: 325 TABLET, FILM COATED ORAL at 21:32

## 2023-01-01 RX ADMIN — HYDROMORPHONE HYDROCHLORIDE 0.2 MG: 0.2 INJECTION, SOLUTION INTRAMUSCULAR; INTRAVENOUS; SUBCUTANEOUS at 13:28

## 2023-01-01 RX ADMIN — MIRTAZAPINE 7.5 MG: 7.5 TABLET, FILM COATED ORAL at 18:53

## 2023-01-01 RX ADMIN — ROCURONIUM BROMIDE 30 MG: 50 INJECTION, SOLUTION INTRAVENOUS at 14:08

## 2023-01-01 RX ADMIN — ATROPINE SULFATE 3 DROP: 10 SOLUTION/ DROPS OPHTHALMIC at 16:49

## 2023-01-01 RX ADMIN — SENNOSIDES AND DOCUSATE SODIUM 1 TABLET: 8.6; 5 TABLET ORAL at 21:24

## 2023-01-01 RX ADMIN — QUETIAPINE FUMARATE 25 MG: 25 TABLET ORAL at 21:07

## 2023-01-01 RX ADMIN — METOPROLOL TARTRATE 2.5 MG: 5 INJECTION INTRAVENOUS at 12:25

## 2023-01-01 RX ADMIN — SENNOSIDES AND DOCUSATE SODIUM 1 TABLET: 50; 8.6 TABLET ORAL at 08:24

## 2023-01-01 RX ADMIN — SENNOSIDES AND DOCUSATE SODIUM 1 TABLET: 8.6; 5 TABLET ORAL at 11:48

## 2023-01-01 RX ADMIN — HALOPERIDOL LACTATE 2 MG: 5 INJECTION, SOLUTION INTRAMUSCULAR at 14:43

## 2023-01-01 RX ADMIN — IPRATROPIUM BROMIDE AND ALBUTEROL SULFATE 3 ML: 2.5; .5 SOLUTION RESPIRATORY (INHALATION) at 20:41

## 2023-01-01 RX ADMIN — METOPROLOL TARTRATE 2.5 MG: 5 INJECTION INTRAVENOUS at 02:23

## 2023-01-01 RX ADMIN — ATROPINE SULFATE 4 DROP: 10 SOLUTION/ DROPS OPHTHALMIC at 00:53

## 2023-01-01 RX ADMIN — OXYCODONE HYDROCHLORIDE 5 MG: 5 SOLUTION ORAL at 17:19

## 2023-01-01 RX ADMIN — POLYETHYLENE GLYCOL 3350 17 G: 17 POWDER, FOR SOLUTION ORAL at 08:05

## 2023-01-01 RX ADMIN — QUETIAPINE 25 MG: 25 TABLET, FILM COATED ORAL at 01:02

## 2023-01-01 RX ADMIN — OXYCODONE HYDROCHLORIDE 5 MG: 5 TABLET ORAL at 21:44

## 2023-01-01 RX ADMIN — POLYETHYLENE GLYCOL 3350 17 G: 17 POWDER, FOR SOLUTION ORAL at 11:47

## 2023-01-01 RX ADMIN — SENNOSIDES AND DOCUSATE SODIUM 1 TABLET: 50; 8.6 TABLET ORAL at 21:46

## 2023-01-01 RX ADMIN — ENOXAPARIN SODIUM 30 MG: 30 INJECTION SUBCUTANEOUS at 17:58

## 2023-01-01 RX ADMIN — SENNOSIDES AND DOCUSATE SODIUM 1 TABLET: 8.6; 5 TABLET ORAL at 21:33

## 2023-01-01 RX ADMIN — TORSEMIDE 10 MG: 10 TABLET ORAL at 08:37

## 2023-01-01 RX ADMIN — HYDROMORPHONE HYDROCHLORIDE 0.2 MG: 0.2 INJECTION, SOLUTION INTRAMUSCULAR; INTRAVENOUS; SUBCUTANEOUS at 17:07

## 2023-01-01 RX ADMIN — ACETAMINOPHEN 975 MG: 325 TABLET, FILM COATED ORAL at 11:46

## 2023-01-01 RX ADMIN — MIRTAZAPINE 15 MG: 15 TABLET, FILM COATED ORAL at 18:35

## 2023-01-01 RX ADMIN — MULTIPLE VITAMINS W/ MINERALS TAB 1 TABLET: TAB at 09:11

## 2023-01-01 RX ADMIN — DEXTROSE MONOHYDRATE: 50 INJECTION, SOLUTION INTRAVENOUS at 13:42

## 2023-01-01 RX ADMIN — LEVOTHYROXINE SODIUM 25 MCG: 25 TABLET ORAL at 10:07

## 2023-01-01 RX ADMIN — SCOPALAMINE 1 PATCH: 1 PATCH, EXTENDED RELEASE TRANSDERMAL at 20:31

## 2023-01-01 RX ADMIN — MIRTAZAPINE 7.5 MG: 7.5 TABLET, FILM COATED ORAL at 21:33

## 2023-01-01 RX ADMIN — MELATONIN 5 MG TABLET 5 MG: at 22:16

## 2023-01-01 RX ADMIN — DIGOXIN 125 MCG: 0.25 INJECTION INTRAMUSCULAR; INTRAVENOUS at 06:48

## 2023-01-01 RX ADMIN — MULTIPLE VITAMINS W/ MINERALS TAB 1 TABLET: TAB at 08:14

## 2023-01-01 RX ADMIN — METOPROLOL TARTRATE 12.5 MG: 100 TABLET ORAL at 21:46

## 2023-01-01 RX ADMIN — DEXTROSE MONOHYDRATE: 50 INJECTION, SOLUTION INTRAVENOUS at 04:29

## 2023-01-01 RX ADMIN — OXYCODONE HYDROCHLORIDE 5 MG: 5 SOLUTION ORAL at 17:09

## 2023-01-01 RX ADMIN — LEVOTHYROXINE SODIUM 25 MCG: 25 TABLET ORAL at 09:11

## 2023-01-01 RX ADMIN — ATROPINE SULFATE 4 DROP: 10 SOLUTION/ DROPS OPHTHALMIC at 22:24

## 2023-01-01 RX ADMIN — ACETAMINOPHEN 650 MG: 325 TABLET ORAL at 02:37

## 2023-01-01 RX ADMIN — MELATONIN 5 MG TABLET 5 MG: at 21:44

## 2023-01-01 RX ADMIN — OXYCODONE HYDROCHLORIDE 5 MG: 5 SOLUTION ORAL at 13:19

## 2023-01-01 RX ADMIN — ACETAMINOPHEN 650 MG: 325 TABLET, FILM COATED ORAL at 08:30

## 2023-01-01 RX ADMIN — LORAZEPAM 0.5 MG: 2 INJECTION INTRAMUSCULAR; INTRAVENOUS at 00:44

## 2023-01-01 RX ADMIN — SENNOSIDES AND DOCUSATE SODIUM 1 TABLET: 8.6; 5 TABLET ORAL at 13:31

## 2023-01-01 RX ADMIN — ACETAMINOPHEN 975 MG: 325 TABLET, FILM COATED ORAL at 13:31

## 2023-01-01 RX ADMIN — ATROPINE SULFATE 4 DROP: 10 SOLUTION/ DROPS OPHTHALMIC at 22:45

## 2023-01-01 RX ADMIN — METOPROLOL TARTRATE 2.5 MG: 5 INJECTION INTRAVENOUS at 11:33

## 2023-01-01 RX ADMIN — HYDROMORPHONE HYDROCHLORIDE 0.2 MG: 0.2 INJECTION, SOLUTION INTRAMUSCULAR; INTRAVENOUS; SUBCUTANEOUS at 00:31

## 2023-01-01 RX ADMIN — PHENYLEPHRINE HYDROCHLORIDE 0.5 MCG/KG/MIN: 10 INJECTION INTRAVENOUS at 14:08

## 2023-01-01 RX ADMIN — DEXTROSE MONOHYDRATE: 50 INJECTION, SOLUTION INTRAVENOUS at 13:00

## 2023-01-01 RX ADMIN — DIGOXIN 125 MCG: 0.25 INJECTION INTRAMUSCULAR; INTRAVENOUS at 23:51

## 2023-01-01 RX ADMIN — DEXTROSE MONOHYDRATE: 50 INJECTION, SOLUTION INTRAVENOUS at 05:12

## 2023-01-01 RX ADMIN — METOPROLOL TARTRATE 12.5 MG: 100 TABLET ORAL at 08:57

## 2023-01-01 RX ADMIN — HYDROMORPHONE HYDROCHLORIDE 0.2 MG: 0.2 INJECTION, SOLUTION INTRAMUSCULAR; INTRAVENOUS; SUBCUTANEOUS at 10:35

## 2023-01-01 RX ADMIN — ATROPINE SULFATE 2 DROP: 10 SOLUTION/ DROPS OPHTHALMIC at 20:33

## 2023-01-01 RX ADMIN — OXYCODONE HYDROCHLORIDE 5 MG: 5 SOLUTION ORAL at 23:47

## 2023-01-01 RX ADMIN — MULTIPLE VITAMINS W/ MINERALS TAB 1 TABLET: TAB at 08:05

## 2023-01-01 RX ADMIN — SENNOSIDES AND DOCUSATE SODIUM 1 TABLET: 8.6; 5 TABLET ORAL at 20:24

## 2023-01-01 RX ADMIN — SODIUM CHLORIDE: 9 INJECTION, SOLUTION INTRAVENOUS at 03:45

## 2023-01-01 RX ADMIN — METOPROLOL SUCCINATE 25 MG: 25 TABLET, EXTENDED RELEASE ORAL at 08:06

## 2023-01-01 RX ADMIN — ACETAMINOPHEN 650 MG: 325 TABLET, FILM COATED ORAL at 23:47

## 2023-01-01 RX ADMIN — MIRTAZAPINE 7.5 MG: 7.5 TABLET, FILM COATED ORAL at 21:07

## 2023-01-01 RX ADMIN — ACETAMINOPHEN 975 MG: 325 TABLET, FILM COATED ORAL at 03:23

## 2023-01-01 RX ADMIN — HUMAN ALBUMIN MICROSPHERES AND PERFLUTREN 6 ML: 10; .22 INJECTION, SOLUTION INTRAVENOUS at 15:52

## 2023-01-01 RX ADMIN — ENOXAPARIN SODIUM 30 MG: 30 INJECTION SUBCUTANEOUS at 13:18

## 2023-01-01 RX ADMIN — SENNOSIDES AND DOCUSATE SODIUM 1 TABLET: 8.6; 5 TABLET ORAL at 08:05

## 2023-01-01 RX ADMIN — QUETIAPINE 18.75 MG: 25 TABLET, FILM COATED ORAL at 21:43

## 2023-01-01 RX ADMIN — ENOXAPARIN SODIUM 30 MG: 30 INJECTION SUBCUTANEOUS at 14:57

## 2023-01-01 RX ADMIN — HYDROMORPHONE HYDROCHLORIDE 0.4 MG: 0.2 INJECTION, SOLUTION INTRAMUSCULAR; INTRAVENOUS; SUBCUTANEOUS at 09:38

## 2023-01-01 RX ADMIN — TRANEXAMIC ACID 1 G: 10 INJECTION, SOLUTION INTRAVENOUS at 15:12

## 2023-01-01 RX ADMIN — SODIUM CHLORIDE: 9 INJECTION, SOLUTION INTRAVENOUS at 19:32

## 2023-01-01 RX ADMIN — SENNOSIDES AND DOCUSATE SODIUM 1 TABLET: 8.6; 5 TABLET ORAL at 20:59

## 2023-01-01 RX ADMIN — HYDROMORPHONE HYDROCHLORIDE 0.2 MG: 0.2 INJECTION, SOLUTION INTRAMUSCULAR; INTRAVENOUS; SUBCUTANEOUS at 22:45

## 2023-01-01 RX ADMIN — HYDROMORPHONE HYDROCHLORIDE 0.2 MG: 0.2 INJECTION, SOLUTION INTRAMUSCULAR; INTRAVENOUS; SUBCUTANEOUS at 18:17

## 2023-01-01 RX ADMIN — HYDROMORPHONE HYDROCHLORIDE 0.2 MG: 0.2 INJECTION, SOLUTION INTRAMUSCULAR; INTRAVENOUS; SUBCUTANEOUS at 05:55

## 2023-01-01 RX ADMIN — DEXTROSE MONOHYDRATE: 50 INJECTION, SOLUTION INTRAVENOUS at 21:13

## 2023-01-01 RX ADMIN — LEVOTHYROXINE SODIUM 25 MCG: 25 TABLET ORAL at 08:05

## 2023-01-01 RX ADMIN — HALOPERIDOL LACTATE 2 MG: 5 INJECTION, SOLUTION INTRAMUSCULAR at 20:14

## 2023-01-01 RX ADMIN — MIRTAZAPINE 7.5 MG: 7.5 TABLET, FILM COATED ORAL at 21:17

## 2023-01-01 RX ADMIN — FUROSEMIDE 60 MG: 10 INJECTION, SOLUTION INTRAMUSCULAR; INTRAVENOUS at 09:45

## 2023-01-01 RX ADMIN — SENNOSIDES AND DOCUSATE SODIUM 1 TABLET: 8.6; 5 TABLET ORAL at 20:57

## 2023-01-01 RX ADMIN — Medication 10 MG: at 14:05

## 2023-01-01 RX ADMIN — METOPROLOL TARTRATE 5 MG: 5 INJECTION INTRAVENOUS at 21:28

## 2023-01-01 RX ADMIN — MULTIVITAMIN 15 ML: LIQUID ORAL at 08:58

## 2023-01-01 RX ADMIN — ATROPINE SULFATE 2 DROP: 10 SOLUTION/ DROPS OPHTHALMIC at 13:29

## 2023-01-01 RX ADMIN — LEVOTHYROXINE SODIUM 25 MCG: 25 TABLET ORAL at 08:24

## 2023-01-01 RX ADMIN — FENTANYL CITRATE 25 MCG: 50 INJECTION, SOLUTION INTRAMUSCULAR; INTRAVENOUS at 14:02

## 2023-01-01 RX ADMIN — POLYETHYLENE GLYCOL 3350 17 G: 17 POWDER, FOR SOLUTION ORAL at 13:30

## 2023-01-01 RX ADMIN — SODIUM CHLORIDE: 9 INJECTION, SOLUTION INTRAVENOUS at 14:30

## 2023-01-01 RX ADMIN — ATROPINE SULFATE 4 DROP: 10 SOLUTION/ DROPS OPHTHALMIC at 08:46

## 2023-01-01 RX ADMIN — LEVOTHYROXINE SODIUM 25 MCG: 25 TABLET ORAL at 13:30

## 2023-01-01 RX ADMIN — LEVOTHYROXINE SODIUM 25 MCG: 25 TABLET ORAL at 11:47

## 2023-01-01 RX ADMIN — ONDANSETRON 4 MG: 2 INJECTION INTRAMUSCULAR; INTRAVENOUS at 14:54

## 2023-01-01 RX ADMIN — OXYCODONE HYDROCHLORIDE 5 MG: 5 TABLET ORAL at 04:17

## 2023-01-01 RX ADMIN — METOPROLOL TARTRATE 12.5 MG: 100 TABLET ORAL at 08:58

## 2023-01-01 RX ADMIN — MULTIPLE VITAMINS W/ MINERALS TAB 1 TABLET: TAB at 08:37

## 2023-01-01 RX ADMIN — METOPROLOL TARTRATE 12.5 MG: 100 TABLET ORAL at 20:07

## 2023-01-01 RX ADMIN — HYDROMORPHONE HYDROCHLORIDE 0.2 MG: 0.2 INJECTION, SOLUTION INTRAMUSCULAR; INTRAVENOUS; SUBCUTANEOUS at 20:31

## 2023-01-01 RX ADMIN — ATROPINE SULFATE 4 DROP: 10 SOLUTION/ DROPS OPHTHALMIC at 05:55

## 2023-01-01 RX ADMIN — MIRTAZAPINE 7.5 MG: 7.5 TABLET, FILM COATED ORAL at 18:09

## 2023-01-01 RX ADMIN — POTASSIUM CHLORIDE 40 MEQ: 1.5 POWDER, FOR SOLUTION ORAL at 09:50

## 2023-01-01 RX ADMIN — SODIUM CHLORIDE: 9 INJECTION, SOLUTION INTRAVENOUS at 14:15

## 2023-01-01 RX ADMIN — HYDROMORPHONE HYDROCHLORIDE 0.2 MG: 0.2 INJECTION, SOLUTION INTRAMUSCULAR; INTRAVENOUS; SUBCUTANEOUS at 09:47

## 2023-01-01 RX ADMIN — THIAMINE HYDROCHLORIDE 100 MG: 100 INJECTION, SOLUTION INTRAMUSCULAR; INTRAVENOUS at 23:18

## 2023-01-01 RX ADMIN — DEXMEDETOMIDINE HYDROCHLORIDE 8 MCG: 200 INJECTION INTRAVENOUS at 14:03

## 2023-01-01 RX ADMIN — METOPROLOL SUCCINATE 25 MG: 25 TABLET, EXTENDED RELEASE ORAL at 09:11

## 2023-01-01 RX ADMIN — TORSEMIDE 10 MG: 10 TABLET ORAL at 21:24

## 2023-01-01 RX ADMIN — MELATONIN 5 MG TABLET 5 MG: at 20:24

## 2023-01-01 RX ADMIN — GLYCOPYRROLATE 0.2 MG: 0.2 INJECTION, SOLUTION INTRAMUSCULAR; INTRAVENOUS at 10:40

## 2023-01-01 RX ADMIN — TORSEMIDE 10 MG: 10 TABLET ORAL at 08:29

## 2023-01-01 RX ADMIN — SENNOSIDES AND DOCUSATE SODIUM 1 TABLET: 8.6; 5 TABLET ORAL at 08:37

## 2023-01-01 ASSESSMENT — PAIN SCALES - GENERAL
PAINLEVEL: NO PAIN (0)

## 2023-01-01 ASSESSMENT — ACTIVITIES OF DAILY LIVING (ADL)
ADLS_ACUITY_SCORE: 49
ADLS_ACUITY_SCORE: 49
ADLS_ACUITY_SCORE: 47
ADLS_ACUITY_SCORE: 49
ADLS_ACUITY_SCORE: 46
ADLS_ACUITY_SCORE: 37
ADLS_ACUITY_SCORE: 51
ADLS_ACUITY_SCORE: 51
ADLS_ACUITY_SCORE: 49
ADLS_ACUITY_SCORE: 46
ADLS_ACUITY_SCORE: 49
CONCENTRATING,_REMEMBERING_OR_MAKING_DECISIONS_DIFFICULTY: YES
ADLS_ACUITY_SCORE: 37
ADLS_ACUITY_SCORE: 51
ADLS_ACUITY_SCORE: 43
ADLS_ACUITY_SCORE: 51
ADLS_ACUITY_SCORE: 55
ADLS_ACUITY_SCORE: 53
ADLS_ACUITY_SCORE: 49
WEAR_GLASSES_OR_BLIND: YES
ADLS_ACUITY_SCORE: 46
ADLS_ACUITY_SCORE: 49
ADLS_ACUITY_SCORE: 43
ADLS_ACUITY_SCORE: 55
ADLS_ACUITY_SCORE: 49
ADLS_ACUITY_SCORE: 37
ADLS_ACUITY_SCORE: 45
ADLS_ACUITY_SCORE: 51
ADLS_ACUITY_SCORE: 39
ADLS_ACUITY_SCORE: 51
ADLS_ACUITY_SCORE: 37
ADLS_ACUITY_SCORE: 51
ADLS_ACUITY_SCORE: 51
ADLS_ACUITY_SCORE: 47
ADLS_ACUITY_SCORE: 51
ADLS_ACUITY_SCORE: 51
ADLS_ACUITY_SCORE: 47
ADLS_ACUITY_SCORE: 49
ADLS_ACUITY_SCORE: 39
ADLS_ACUITY_SCORE: 45
ADLS_ACUITY_SCORE: 51
ADLS_ACUITY_SCORE: 35
ADLS_ACUITY_SCORE: 49
ADLS_ACUITY_SCORE: 49
ADLS_ACUITY_SCORE: 51
ADLS_ACUITY_SCORE: 45
ADLS_ACUITY_SCORE: 47
ADLS_ACUITY_SCORE: 51
ADLS_ACUITY_SCORE: 49
ADLS_ACUITY_SCORE: 49
ADLS_ACUITY_SCORE: 46
ADLS_ACUITY_SCORE: 46
ADLS_ACUITY_SCORE: 51
ADLS_ACUITY_SCORE: 47
ADLS_ACUITY_SCORE: 39
ADLS_ACUITY_SCORE: 37
ADLS_ACUITY_SCORE: 51
ADLS_ACUITY_SCORE: 51
ADLS_ACUITY_SCORE: 46
ADLS_ACUITY_SCORE: 49
ADLS_ACUITY_SCORE: 37
ADLS_ACUITY_SCORE: 55
ADLS_ACUITY_SCORE: 51
ADLS_ACUITY_SCORE: 49
ADLS_ACUITY_SCORE: 47
ADLS_ACUITY_SCORE: 51
ADLS_ACUITY_SCORE: 45
ADLS_ACUITY_SCORE: 45
ADLS_ACUITY_SCORE: 51
ADLS_ACUITY_SCORE: 39
ADLS_ACUITY_SCORE: 51
ADLS_ACUITY_SCORE: 49
ADLS_ACUITY_SCORE: 35
ADLS_ACUITY_SCORE: 51
ADLS_ACUITY_SCORE: 51
ADLS_ACUITY_SCORE: 35
ADLS_ACUITY_SCORE: 51
ADLS_ACUITY_SCORE: 46
ADLS_ACUITY_SCORE: 47
ADLS_ACUITY_SCORE: 55
ADLS_ACUITY_SCORE: 51
ADLS_ACUITY_SCORE: 37
ADLS_ACUITY_SCORE: 47
ADLS_ACUITY_SCORE: 51
ADLS_ACUITY_SCORE: 49
ADLS_ACUITY_SCORE: 55
ADLS_ACUITY_SCORE: 49
ADLS_ACUITY_SCORE: 51
ADLS_ACUITY_SCORE: 46
ADLS_ACUITY_SCORE: 49
ADLS_ACUITY_SCORE: 46
ADLS_ACUITY_SCORE: 51
ADLS_ACUITY_SCORE: 51
ADLS_ACUITY_SCORE: 49
ADLS_ACUITY_SCORE: 47
ADLS_ACUITY_SCORE: 47
ADLS_ACUITY_SCORE: 49
ADLS_ACUITY_SCORE: 45
ADLS_ACUITY_SCORE: 51
ADLS_ACUITY_SCORE: 49
ADLS_ACUITY_SCORE: 51
ADLS_ACUITY_SCORE: 35
VISION_MANAGEMENT: GLASSES
ADLS_ACUITY_SCORE: 51
ADLS_ACUITY_SCORE: 37
ADLS_ACUITY_SCORE: 51
ADLS_ACUITY_SCORE: 53
ADLS_ACUITY_SCORE: 51
ADLS_ACUITY_SCORE: 46
ADLS_ACUITY_SCORE: 37
ADLS_ACUITY_SCORE: 45
ADLS_ACUITY_SCORE: 47
ADLS_ACUITY_SCORE: 47
ADLS_ACUITY_SCORE: 51
ADLS_ACUITY_SCORE: 45
ADLS_ACUITY_SCORE: 49
ADLS_ACUITY_SCORE: 47
ADLS_ACUITY_SCORE: 33
ADLS_ACUITY_SCORE: 51
ADLS_ACUITY_SCORE: 46
ADLS_ACUITY_SCORE: 55
ADLS_ACUITY_SCORE: 53
ADLS_ACUITY_SCORE: 39
ADLS_ACUITY_SCORE: 49
ADLS_ACUITY_SCORE: 49
ADLS_ACUITY_SCORE: 51
ADLS_ACUITY_SCORE: 51
ADLS_ACUITY_SCORE: 53
ADLS_ACUITY_SCORE: 49
ADLS_ACUITY_SCORE: 49
ADLS_ACUITY_SCORE: 51
ADLS_ACUITY_SCORE: 49
ADLS_ACUITY_SCORE: 45
ADLS_ACUITY_SCORE: 49
ADLS_ACUITY_SCORE: 47
ADLS_ACUITY_SCORE: 45
ADLS_ACUITY_SCORE: 39
ADLS_ACUITY_SCORE: 51
ADLS_ACUITY_SCORE: 53
ADLS_ACUITY_SCORE: 46
ADLS_ACUITY_SCORE: 49
ADLS_ACUITY_SCORE: 39
ADLS_ACUITY_SCORE: 49
ADLS_ACUITY_SCORE: 46
ADLS_ACUITY_SCORE: 51
ADLS_ACUITY_SCORE: 46
ADLS_ACUITY_SCORE: 51
ADLS_ACUITY_SCORE: 47
ADLS_ACUITY_SCORE: 49
ADLS_ACUITY_SCORE: 51
ADLS_ACUITY_SCORE: 47
ADLS_ACUITY_SCORE: 51
ADLS_ACUITY_SCORE: 49
ADLS_ACUITY_SCORE: 46
ADLS_ACUITY_SCORE: 43
ADLS_ACUITY_SCORE: 39
ADLS_ACUITY_SCORE: 47
ADLS_ACUITY_SCORE: 49
ADLS_ACUITY_SCORE: 46
ADLS_ACUITY_SCORE: 47
ADLS_ACUITY_SCORE: 49
ADLS_ACUITY_SCORE: 47
ADLS_ACUITY_SCORE: 45
ADLS_ACUITY_SCORE: 46
ADLS_ACUITY_SCORE: 49
ADLS_ACUITY_SCORE: 46
ADLS_ACUITY_SCORE: 47
ADLS_ACUITY_SCORE: 46
ADLS_ACUITY_SCORE: 47
ADLS_ACUITY_SCORE: 51
ADLS_ACUITY_SCORE: 51
ADLS_ACUITY_SCORE: 35
ADLS_ACUITY_SCORE: 37
ADLS_ACUITY_SCORE: 49
ADLS_ACUITY_SCORE: 37
ADLS_ACUITY_SCORE: 45
ADLS_ACUITY_SCORE: 35
ADLS_ACUITY_SCORE: 49
ADLS_ACUITY_SCORE: 49
ADLS_ACUITY_SCORE: 47
ADLS_ACUITY_SCORE: 39
ADLS_ACUITY_SCORE: 51
ADLS_ACUITY_SCORE: 39
ADLS_ACUITY_SCORE: 51
ADLS_ACUITY_SCORE: 51
ADLS_ACUITY_SCORE: 47
ADLS_ACUITY_SCORE: 49
ADLS_ACUITY_SCORE: 46
ADLS_ACUITY_SCORE: 49

## 2023-01-01 ASSESSMENT — ENCOUNTER SYMPTOMS
DYSRHYTHMIAS: 1
ABDOMINAL PAIN: 0
SEIZURES: 0
ARTHRALGIAS: 1
VOMITING: 0

## 2023-01-01 ASSESSMENT — COPD QUESTIONNAIRES
CAT_SEVERITY: MODERATE
COPD: 1

## 2023-01-01 ASSESSMENT — PATIENT HEALTH QUESTIONNAIRE - PHQ9
SUM OF ALL RESPONSES TO PHQ QUESTIONS 1-9: 6
SUM OF ALL RESPONSES TO PHQ QUESTIONS 1-9: 6

## 2023-01-01 ASSESSMENT — LIFESTYLE VARIABLES: TOBACCO_USE: 0

## 2023-01-01 NOTE — ED PROVIDER NOTES
"    History   Chief Complaint:  Leg Pain     The history is provided by the patient and the spouse.      Rm Villarreal is a 82 year old male, on Eliquis, who presents with worsening lower left leg pain, erythema, and swelling over the past 2 days following some mild erythema to his bilateral legs for \"awhile.\" No vomiting or abdominal pain. Of note, the patient's wife at bedside additionally reports concern for steady cognitive decline over the last 6 months since he was diagnosed with a mild cognitive impairment in 2019. He has an upcoming appointment with a memory care facility next month, on 2/16, but his wife is interested in having this cognitive decline addressed today. Notably, the patient has history of heavy alcohol use but has \"slowed down\" in use over the past 1-2 months.    Independent Historian: no, supplemented by wife      ROS:  Review of Systems   Cardiovascular: Positive for leg swelling (bilateral, L>R).   Gastrointestinal: Negative for abdominal pain and vomiting.   All other systems reviewed and are negative.     Allergies:  Atorvastatin     Medications:    Albuterol  Eliquis  Loperamide  Melatonin  Metoprolol succinate  Niacin  Nitroglycerin  Cholecalciferol    Past Medical History:    Adjustment disorder  Carotid stenosis, bilateral  Chronic atrial fibrillation  COPD  CAD  Blood transfusion  Hypertension  Ischemic cardiomyopathy  Pulmonary nodule    Past Surgical History:    Angiogram x3  Colonoscopy x2  Hemorrhoidectomy  Visceral angiogram  Left CEA    Family History:    Heart disease    Social History:  Presents with wife.   reports that he quit smoking about 19 years ago. He has never used smokeless tobacco. He reports current alcohol use. He reports that he does not use drugs.  PCP: Greg Royal     Physical Exam     Patient Vitals for the past 24 hrs:   BP Temp Temp src Pulse Resp SpO2 Height Weight   01/01/23 1908 (!) 100/94 -- -- 92 18 95 % -- --   01/01/23 1830 (!) 133/99 -- " "-- 117 -- -- -- --   01/01/23 1815 (!) 125/97 -- -- 105 -- -- -- --   01/01/23 1612 (!) 130/91 97.2  F (36.2  C) Temporal 67 16 95 % 1.803 m (5' 11\") 68.5 kg (151 lb)      Physical Exam  Vitals: reviewed by me  General: Pt seen on [facility], pleasant, cooperative, and alert to conversation, chronically ill-appearing however, not alert oriented to place or time or purpose, seems to be quite demented.  Eyes: Tracking well, clear conjunctiva BL  ENT: MMM, midline trachea.   Lungs: No tachypnea, no accessory muscle use. No respiratory distress.  Lungs are clear to auscultation bilaterally  CV: Rate as above  Abd: Soft, non tender, no guarding, no rebound. Non distended.  Specifically no right upper quadrant tenderness to palpation on multiple exams, negative Larkin's.  MSK: no joint effusion.  No evidence of trauma.  Bilateral lower extremities do have 2+ pitting edema and chronic skin changes with what appears to be venous stasis hyperpigmentation.  Left leg is warm and tender and more erythematous than the right, distal pulses are felt bilaterally, bilateral lower extremities are warm and well-perfused as well.  Minimal tenderness noted to left lower extremity on palpation.  Skin: No rash  Neuro: Clear speech and no facial droop.  Psych: Not RIS, no e/o AH/VH      Emergency Department Course   ECG:  ECG results from 01/01/23   EKG 12 lead     Value    Systolic Blood Pressure     Diastolic Blood Pressure     Ventricular Rate 101    Atrial Rate     CT Interval     QRS Duration 78        QTc 420    P Axis     R AXIS 157    T Axis 15    Interpretation ECG      Suspect arm lead reversal, interpretation assumes no reversal  Atrial fibrillation with rapid ventricular response with premature ventricular or aberrantly conducted complexes  Low voltage QRS  Anterolateral infarct (cited on or before 24-JAN-2001)  Abnormal ECG  When compared with ECG of 29-JUN-2022 11:34,  Left anterior fascicular block is no longer " Present  Questionable change in initial forces of Lateral leads  T wave inversion no longer evident in Inferior leads  Confirmed by GENERATED REPORT, COMPUTER ([id number]),  PATY MARTINS ([id number]) on 1/1/2023 6:06:48 PM       Imaging:  US Lower Extremity Venous Duplex Left   Final Result   IMPRESSION:   1.  No deep venous thrombosis in the left lower extremity.   2.  Popliteal fossa cyst.         Report per radiology    Laboratory:  Labs Ordered and Resulted from Time of ED Arrival to Time of ED Departure   CBC WITH PLATELETS - Abnormal       Result Value    WBC Count 7.4      RBC Count 4.72      Hemoglobin 16.3      Hematocrit 46.7      MCV 99      MCH 34.5 (*)     MCHC 34.9      RDW 16.2 (*)     Platelet Count 154     BASIC METABOLIC PANEL - Abnormal    Sodium 133      Potassium 4.0      Chloride 104      Carbon Dioxide (CO2) 22      Anion Gap 7      Urea Nitrogen 23      Creatinine 0.80      Calcium 9.1      Glucose 119 (*)     GFR Estimate 89     RBC AND PLATELET MORPHOLOGY - Abnormal    Platelet Assessment        Value: Automated Count Confirmed. Platelet morphology is normal.    Porter Cells Slight (*)     RBC Morphology Confirmed RBC Indices     HEPATIC FUNCTION PANEL - Abnormal    Bilirubin Total 4.4 (*)     Bilirubin Direct 2.3 (*)     Protein Total 7.2      Albumin 3.1 (*)     Alkaline Phosphatase 146      AST 36      ALT 28        Emergency Department Course & Assessments:     Interventions:  Medications - No data to display      Consultations/Discussion of Management or Tests:  ED Course as of 01/01/23 1909   Sun Jan 01, 2023   1823 I obtained history and examined the patient as noted above.   1903 I rechecked the patient and explained findings. I believe that they are safe for discharge at this time.     Social Determinants of Health affecting care:  History of consistent alcohol use. Still current use.    Disposition:  The patient was discharged to home.     Impression & Plan      Medical Decision  Making:  This is a pleasant 82-year-old male with multiple comorbidities, and what appears to be progressive dementia since 2019 who is coming to the ER with the acute symptom of left lower extremity redness.  I do appreciate that he has edema here, wife has not been using compression stockings that she seems to mistaken longer socks for actual compression stockings we went over how compression stockings may help.  I do think that he has an element of overlying cellulitis but does have signs of venous stasis change in bilateral lower extremities as well, ultrasound shows no DVT.  He also has elevated liver function test which I suspect are likely due to excess alcohol use, and his recent stoppage of alcohol is of course reassuring.  I have asked him to follow with your regular doctor in the week ahead, and wife states that she prefers this to being admitted here to the hospital.  I did offer observation admission for possible placement to memory care and for careful monitoring of his other comorbidities, but wife politely declined this and states she feels comfortable going home at this time though she is interested in memory care in the future.    He does have COPD at baseline, but did quite well on his ambulation trial, and family is asking if they can take the walker home for safety which I do think is a good idea given the addition of his leg pain to his other comorbidities.  We will plan for discharge as above, red flags for any come back to the ER were discussed in detail.    Diagnosis:    ICD-10-CM    1. Leg swelling  M79.89       2. Cellulitis, unspecified cellulitis site  L03.90       3. Elevated liver function tests  R79.89       4. Cognitive impairment  R41.89          Discharge Medications:  New Prescriptions    CEPHALEXIN (KEFLEX) 500 MG CAPSULE    Take 1 capsule (500 mg) by mouth 3 times daily for 10 days      Scribe Disclosure:  I, Jannet Harris, am serving as a scribe at 5:24 PM on 1/1/2023 to  document services personally performed by Mode Curiel MD based on my observations and the provider's statements to me.     1/1/2023   Mode Curiel MD Fitzgerald, Michael Maxwell Kreofsky, MD  01/01/23 9143

## 2023-01-01 NOTE — ED TRIAGE NOTES
Bilateral leg swelling with left leg painful. Anticoagulated with Eliquis for a fib.      Triage Assessment     Row Name 01/01/23 1183       Triage Assessment (Adult)    Airway WDL WDL       Respiratory WDL    Respiratory WDL WDL       Skin Circulation/Temperature WDL    Skin Circulation/Temperature WDL WDL       Cardiac WDL    Cardiac WDL WDL       Peripheral/Neurovascular WDL    Peripheral Neurovascular WDL WDL       Cognitive/Neuro/Behavioral WDL    Cognitive/Neuro/Behavioral WDL WDL

## 2023-01-02 NOTE — TELEPHONE ENCOUNTER
Wife called to make a f/u appt for Zoran-patient, with Dr. Royal. He was in the ED 01/0/23 and needs f/u with PCP within 1-3 days per ED doctor. Zoran was in the ED 01/01/23 for left leg swelling, redness, pain and cellulitis.    Discharged same day.     ED doctor suggested a liver function test and wound check with PCP>     No soon appointments are available. Please call patient to discuss.       Patient would also  like to discuss cognitive impairment, for possible prescription.       Please call- [phone]- [phone] to leave a message.

## 2023-01-02 NOTE — DISCHARGE INSTRUCTIONS
As we discussed, it does look like you have a small infection on your left leg, but you do have swelling in both sides, so please do be certain to elevate your feet, and use the compression stockings that we discussed which can be purchased at any drugstore.  Please be certain that you follow-up with your regular doctor in the next 3 to 5 days as well, this is extremely important and you need to have your liver tests redrawn to make sure that they are not getting worse.  I suspect that your alcohol history likely has influenced your testing today, that we do need to have this looked at again soon.  Come back to the ER immediately with any other concerns you have, any new symptoms, or if you feel unsteady on the walker that we have given you.
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
## 2023-01-04 NOTE — PROGRESS NOTES
Assessment & Plan     Hospital discharge follow-up  Cellulitis of left lower extremity    Doing well since hospital discharge.  Continue antibiotics.  Leg elevation.  Compression wrappings which I provided today.  Discussed wound care.  Placed a Telfa pad with a gauze wrap and surrounding Ace wrap.  Repeat at home. Close monitoring.    MCI (mild cognitive impairment)  Close follow-up with new or worsening cognitive impairment.  Happy seeing geriatrics as well as his PCP upcoming. Reviewed labs.     Dysuria  Negative for UTI.  Push fluids.  - UA reflex to Microscopic and Culture  - UA reflex to Microscopic and Culture  - Urine Microscopic      30 minutes spent on the date of the encounter doing chart review, review of test results, interpretation of tests, patient visit, documentation and discussion with family      MED REC REQUIRED  Post Medication Reconciliation Status: discharge medications reconciled and changed, per note/orders      No follow-ups on file.    The likelihood of other entities in the differential is insufficient to justify any further testing for them at this time. This was explained to the patient. The patient was advised that persistent or worsening symptoms would require further evaluation. Patient advised to call the office and if unable to reach to go to the emergency room if they develop any new or worsening symptoms. Expressed understanding and agreement with above stated plan.       Mode Quintero PA-C  [facility] MILLI Meehan is a 82 year old accompanied by his spouse, presenting for the following health issues:  ER F/U, Urinary Problem, and Rash    Presents for ER follow-up.  Was seen on 1/1/2023 for evaluation of worsening left lower leg pain/erythema and swelling over the past 2 days.  Negative DVT work-up.  Started on Keflex.  Already has noted improvement in the swelling as well as the associated redness on the left lower extremity.  Small break in  "the skin has been producing a small amount of serous fluid wife noted yesterday.  Already healing.  No surrounding abscess.  No pain.  No fever, chills, sweats.    Ongoing concerns for mild cognitive impairment which was diagnosed in 2019.  Does have an upcoming appointment with geriatrics.    Wife notes darker urine.  Curious if he has a UTI which can explain his increasing cognitive impairment over the past 1 to 2 months.    ED/UC Followup:    Facility:  [facility] Emergency Dept  Date of visit: 01/01/2023  Reason for visit:     Leg swelling   Cellulitis, unspecified cellulitis site   Elevated liver function tests   Cognitive impairment       Review of Systems   Constitutional, HEENT, cardiovascular, pulmonary, GI, , musculoskeletal, neuro, skin, endocrine and psych systems are negative, except as otherwise noted.      Objective    /85 (BP Location: Right arm, Patient Position: Sitting, Cuff Size: Adult Regular)   Pulse 112   Temp 97.7  F (36.5  C) (Temporal)   Ht 1.803 m (5' 11\")   Wt 68.5 kg (151 lb)   HC 16 cm (6.3\")   BMI 21.06 kg/m    Body mass index is 21.06 kg/m .     Allergies   Allergen Reactions     Atorvastatin Diarrhea     Intolerance to most statin medications      Current Outpatient Medications   Medication Sig Dispense Refill     albuterol (PROAIR HFA/PROVENTIL HFA/VENTOLIN HFA) 108 (90 Base) MCG/ACT inhaler INHALE 1 PUFF BY MOUTH EVERY 4 HOURS AS NEEDED FOR COUGH OR CONGESTION 18 g 0     apixaban ANTICOAGULANT (ELIQUIS) 5 MG tablet Take 1 tablet (5 mg) by mouth 2 times daily 60 tablet 11     cephALEXin (KEFLEX) 500 MG capsule Take 1 capsule (500 mg) by mouth 3 times daily for 10 days 30 capsule 0     loperamide (IMODIUM) 2 MG capsule Take 1-2 mg by mouth as needed for diarrhea       melatonin 5 MG tablet Take 5 mg by mouth nightly as needed for sleep       metoprolol succinate ER (TOPROL XL) 25 MG 24 hr tablet Take 1 tablet (25 mg) by mouth daily 90 tablet 1     " NIACIN CR PO Take 1,000 mg by mouth 2 times daily (before meals)        nitroglycerin (NITROSTAT) 0.4 MG SL tablet For chest pain place 1 tablet under the tongue every 5 minutes for 3 doses. If symptoms persist 5 minutes after 1st dose call 911. 25 tablet 3     Vitamin D3 (CHOLECALCIFEROL) 125 MCG (5000 UT) tablet Take 1 tablet by mouth daily       Past Medical History:   Diagnosis Date     Adjustment disorder      Carotid stenosis, bilateral     left CEA 2007     Chronic atrial fibrillation (H)     warfarin     COPD (chronic obstructive pulmonary disease) (H) 02/03/2021     Coronary artery disease     cardiac cath 2014: chronic occlusion of circumflex and apical LAD: medical management; cath 2005: stent to LAD, historical: stent to RCA     History of blood transfusion      Hypertension      Ischemic cardiomyopathy 2021    ef 25%     Pulmonary nodule      Past Surgical History:   Procedure Laterality Date     angiogram  02/19/2014    cardiac cath 2014: chronic occlusion of circumflex and apical LAD: medical management     ANGIOGRAM  2005    stent to LAD     ANGIOGRAM      stent to RCA     COLONOSCOPY      2010     COLONOSCOPY N/A 8/30/2018    Procedure: COMBINED COLONOSCOPY, SINGLE OR MULTIPLE BIOPSY/POLYPECTOMY BY BIOPSY;  colonoscopy;  Surgeon: Tyler Dee MD;  Location:  GI     GI SURGERY      hemorrhoidectomy     IR VISCERAL ANGIOGRAM  7/25/2019     VASCULAR SURGERY  2007    left CEA         Physical Exam   GENERAL: healthy, alert and no distress  EYES: Eyes grossly normal to inspection, PERRL and conjunctivae and sclerae normal  RESP: lungs clear to auscultation - no rales, rhonchi or wheezes  CV: regular rate and rhythm, normal S1 S2, no S3 or S4, no murmur, click or rub, no peripheral edema and peripheral pulses strong  ABDOMEN: soft, nontender, no hepatosplenomegaly, no masses and bowel sounds normal  MS: no gross musculoskeletal defects noted, no edema  SKIN: Dryness of bilateral lower extremities.   Venous stasis.  1+ pitting edema bilaterally with associated chronic skin changes. No erythema. Small break in the skin left anterior lower extremity with serous fluid drainage.  No surrounding abscess or consolidation palpable.  Well-healing.  No suspicious lesions or rashes  NEURO: Normal strength and tone, mentation intact and speech normal  PSYCH: mentation appears normal, affect normal/bright
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
## 2023-01-06 NOTE — PATIENT INSTRUCTIONS
Today's Recommendations    Start torsemide 10 mg daily  Labs in 1 weeks  Follow up with Inge in 1 month  Follow up with Dr. Banuelos in July 2023    Please send a [employer] message or call [phone] to the RN team with questions or concerns.     Scheduling number [phone]    SAMANTHA Daley, CNP

## 2023-01-08 NOTE — ED PROVIDER NOTES
History     Chief Complaint:  Wound Check       HPI   Rm Villarreal is a 82 year old male who presents with weeping from a superficial scratch on the inside of his left thigh.  Patient's wife notes that he was recently started on a diuretic for his bilateral leg swelling, and notes that there was some staining and wet spots on their sheets this morning when she woke up.  She is quite certain he is not urinating, does not smell like urine, and she checked him as he does wear a diaper to bed, and the diaper was empty.    She notes that there is a small scratch on the thigh, and that it continues to drain serous fluid, albeit very slowly.  No other trauma, no other concerns, patient does have a history of liver disease, and was seen here about a week ago for leg swelling and cellulitis of the same leg, notes that the cellulitis has improved significantly.      Independent Historian: Patient's wife    Review of External Notes: Yes      Allergies:  Atorvastatin     Medications:    albuterol (PROAIR HFA/PROVENTIL HFA/VENTOLIN HFA) 108 (90 Base) MCG/ACT inhaler  apixaban ANTICOAGULANT (ELIQUIS) 5 MG tablet  cephALEXin (KEFLEX) 500 MG capsule  loperamide (IMODIUM) 2 MG capsule  melatonin 5 MG tablet  metoprolol succinate ER (TOPROL XL) 25 MG 24 hr tablet  NIACIN CR PO  nitroglycerin (NITROSTAT) 0.4 MG SL tablet  torsemide (DEMADEX) 10 MG tablet  Vitamin D3 (CHOLECALCIFEROL) 125 MCG (5000 UT) tablet        Past Medical History:    Past Medical History:   Diagnosis Date     Adjustment disorder      Carotid stenosis, bilateral      Chronic atrial fibrillation (H)      COPD (chronic obstructive pulmonary disease) (H) 02/03/2021     Coronary artery disease      History of blood transfusion      Hypertension      Ischemic cardiomyopathy 2021     Pulmonary nodule        Past Surgical History:    Past Surgical History:   Procedure Laterality Date     angiogram  02/19/2014    cardiac cath 2014: chronic occlusion of circumflex  "and apical LAD: medical management     ANGIOGRAM  2005    stent to LAD     ANGIOGRAM      stent to RCA     COLONOSCOPY      2010     COLONOSCOPY N/A 8/30/2018    Procedure: COMBINED COLONOSCOPY, SINGLE OR MULTIPLE BIOPSY/POLYPECTOMY BY BIOPSY;  colonoscopy;  Surgeon: Tyler Dee MD;  Location:  GI     GI SURGERY      hemorrhoidectomy     IR VISCERAL ANGIOGRAM  7/25/2019     VASCULAR SURGERY  2007    left CEA        Family History:    family history includes Heart Disease in his father.    Social History:   reports that he quit smoking about 19 years ago. His smoking use included cigarettes. He has never used smokeless tobacco. He reports current alcohol use. He reports that he does not use drugs.  PCP: Greg Royal     Physical Exam     Patient Vitals for the past 24 hrs:   BP Temp Temp src Pulse Resp SpO2 Height Weight   01/08/23 0959 (!) 142/94 97.7  F (36.5  C) Oral (!) 45 20 97 % 1.803 m (5' 11\") 68.5 kg (151 lb)        Physical Exam  Vitals: reviewed by me  General: Pt seen on [facility], pleasant, cooperative, and alert to conversation  Eyes: Tracking well, clear conjunctiva BL  ENT: MMM, midline trachea.   Lungs: No tachypnea, no accessory muscle use. No respiratory distress.   MSK: no joint effusion.  No evidence of trauma apart from a very superficial scratch that is roughly about 4 cm, very well approximated, does not qualify as laceration, over the medial left thigh.  Very scant serosanguineous drainage, ameliorated completely with bandage that was in place.  I took it off, cleaned it, and put on a Tegaderm with 4 x 4 gauze underneath as well.  No surrounding erythema, nontender, no induration, no purulence, no fluctuance, no other skin breaks.  Distal extremity is warm and well-perfused.  Skin: No rash  Neuro: Clear speech and no facial droop.  Psych: Not RIS, no e/o AH/VH      Emergency Department Course     Procedures   I placed a 4 x 4 under Tegaderm after cleaning patient's " wound    Emergency Department Course & Assessments:      Interventions:  Medications - No data to display       Social Determinants of Health affecting care:  Poor health literacy    Disposition:  The patient was discharged to home.     Impression & Plan        Medical Decision Making:  This is a very pleasant 82-year-old male with multiple comorbidities who presents the emergency room with what appears to be weeping from a small scratch on his left thigh.  There is no evidence of an infection, I think the weeping is likely exacerbated by his baseline leg swelling and edema in that area.  He does have liver disease as well which may be contributing.  The patient is otherwise doing well, at his baseline, and I do think that they are stable for discharge at this time.  He recently completed a round of antibiotics for cellulitis in this area I do not see any evidence of ongoing infection.  We will plan for discharge as above, red flags for when to come back to the ER were discussed in detail and we talked about the importance of a wound check in the next 2 to 3 days.  Bandage supplies were also given.    Diagnosis:    ICD-10-CM    1. Wound, open with complication  T14.8XXA            Discharge Medications:  Discharge Medication List as of 1/8/2023 10:26 AM           1/8/2023   Mode Curiel*        Mode Curiel MD  01/08/23 1713

## 2023-01-08 NOTE — ED TRIAGE NOTES
Left thigh drainage - pt and wife noticed drainage left thigh area - pt placed on diuretic yesterday      Triage Assessment     Row Name 01/08/23 0984       Triage Assessment (Adult)    Airway WDL WDL       Respiratory WDL    Respiratory WDL WDL       Cardiac WDL    Cardiac WDL WDL       Cognitive/Neuro/Behavioral WDL    Cognitive/Neuro/Behavioral WDL WDL

## 2023-01-08 NOTE — DISCHARGE INSTRUCTIONS
As we discussed, your wound is likely a scratch based on the way it looks, and is weeping, which does happen sometimes if people suffer from edema and have a small wound even a small superficial wound like yours.  Please come back to the ER immediately with any other concerns or becomes red or tender in the surrounding area or if it starts to drain anything that is purulent.  Please keep the bandage on that we put in place for the next 48 hours, and check with your regular doctor in the next 2 to 3 days to make sure its not getting infected.  Come back with any fevers or skin changes, and feel free to change the bandage on your own every day after keeping the bandage that we put in place for the next 2 days on.

## 2023-01-09 NOTE — TELEPHONE ENCOUNTER
I would recommend getting a BMP today or later this week with that amount of diuresis. Please hold torsemide for now. Could you please get a new baseline weight?     SAMANTHA Daley, CNP

## 2023-01-09 NOTE — TELEPHONE ENCOUNTER
Spoke to pt wife Queenie and made her aware that pt will not take any torsemide until we get the results of his BMP on Wednesday.  Pt will have a BMP done on Wednesday at his PCP office lab. Will then decide if Torsemide will be restarted and if lab is needed on Monday on 1/16. Pt wife states understanding. Tylor

## 2023-01-09 NOTE — TELEPHONE ENCOUNTER
Pt and wife called and pt was started on Torsemide on 1/7 and are stating the pt has lost 12-14 pounds and wondering if this is ok/normal.  Pt was started for his SOB with exertion. Pt feels that his breathing is better and pt wife says that pt wheezes are much better. States that legs are less swollen also. Pt was in ER yesterday because of a weeping wound on his thigh. Pt wife also states that his appetite is better also. Pt is scheduled for BMP on 1/16. Will make Inge aware to see if any changes are needed at this time. Tylor

## 2023-01-13 NOTE — TELEPHONE ENCOUNTER
BMP was stable with diuresis. Please restart torsemide 10 mg every other day and get BMP prior to my visit next month.     Inge Hollis APRN, CNP

## 2023-01-13 NOTE — TELEPHONE ENCOUNTER
"01/13/23 Spoke w patients wife and explained recommendations per Inge Hollis NP . They will resume Torsemide 10 mg every other day and then go to PCP to have BMP drawn prior to OV w Inge on 2/6.  Wife voiced concern over continued weight loss> # 10. Wife stated appetite  is very poor and activity is minimal. She states he \" sleeps on the couch most of the day\" . Pt has PCP appt on 2/2 but encouraged wife to reach out to see if pt can be seen sooner. Wife also stated she is going to look into getting Home Health Care help.  Wife voiced understanding and agreement w plan.  Isidro VILLALTA 1105 am  "

## 2023-01-13 NOTE — TELEPHONE ENCOUNTER
Reason for Call:  Other appointment    Detailed comments: Pt has an appt scheduled for 02/03/23, but is hoping to get in sooner. Pt was put on a diuretic from his cardiologist and in the last 10 days he has lost approximately 18lbs. His cardiologist would like him seen to make sure there is nothing serious going on. Please advise.    Phone Number Patient can be reached at: Cell number on file:    Telephone Information:   Mobile [phone] Wife's       Best Time: asap    Can we leave a detailed message on this number? YES    Call taken on 1/13/2023 at 11:07 AM by Zahida Cano

## 2023-01-16 NOTE — LETTER
January 16, 2023  Rm Villarreal  [address] [place] [address]    Dear Mr. Villarreal, [facility]     Thank you for talking with me on Jan 16, 2023 about your health and medications. As a follow-up to our conversation, I have included two documents:      1. Your Recommended To-Do List has steps you should take to get the best results from your medications.  2. Your Medication List will help you keep track of your medications and how to take them.    If you want to talk about these documents, please call Madi Crowder RPH at phone: [phone], Monday-Friday 8-4:30pm.    I look forward to working with you and your doctors to make sure your medications work well for you.    Sincerely,  Madi Crowder RPH  [facility] Pharmacist, [facility]

## 2023-01-16 NOTE — LETTER
_  Medication List        Prepared on: Jan 16, 2023     Bring your Medication List when you go to the doctor, hospital, or   emergency room. And, share it with your family or caregivers.     Note any changes to how you take your medications.  Cross out medications when you no longer use them.    Medication How I take it Why I use it Prescriber   apixaban ANTICOAGULANT (ELIQUIS) 5 MG tablet Take 1 tablet (5 mg) by mouth 2 times daily Persistent Atrial Fibrillation (H) SAMANTHA López CNP   loperamide (IMODIUM) 2 MG capsule Take 1-2 mg by mouth as needed for diarrhea Diarrhea Patient Reported   melatonin 5 MG tablet Take 5 mg by mouth nightly as needed for sleep Sleep  Patient Reported   metoprolol succinate ER (TOPROL XL) 25 MG 24 hr tablet Take 1 tablet (25 mg) by mouth daily Hypertension SAMANTHA López CNP   nitroglycerin (NITROSTAT) 0.4 MG SL tablet For chest pain place 1 tablet under the tongue every 5 minutes for 3 doses. If symptoms persist 5 minutes after 1st dose call 911. Chest pain Héctor Solis MD   torsemide (DEMADEX) 10 MG tablet Take 1 tablet (10 mg) by mouth every other day Heart & swelling reduction SAMANTHA López CNP   Vitamin D3 (CHOLECALCIFEROL) 125 MCG (5000 UT) tablet Take 1 tablet by mouth daily General health  Patient Reported         Add new medications, over-the-counter drugs, herbals, vitamins, or  minerals in the blank rows below.    Medication How I take it Why I use it Prescriber                          Allergies:      atorvastatin        Side effects I have had:               Other Information:              My notes and questions:

## 2023-01-16 NOTE — PROGRESS NOTES
Medication Therapy Management (MTM) Encounter    ASSESSMENT:                            Medication Adherence/Access: No issues identified    Afib/Hypertension/HF/CAD: Patient has been in close follow up with cardiology recently due to unintentional weight loss after starting torsemide and discussion of ICD placement. Recommended to continue to hold torsemide until I am able to discuss with Inge Hollis APRN CNP, weight loss is likely not completely due to diuretic (see weight loss section below). Given the patient's age and recent reduction in weight, he would be indicated for a dose reduction of Eliquis, since patient I right at dosing cut off will continue to monitor at this time. Additionally patient would benefit to have nitroglycerin on hand, recommended if he carry the nitroglycerin with him that he should get the medication refilled at least every 3-6 months to ensure efficacy.     Unintentional weight loss: Weight loss is likely due to multiple factors (diuretic start, appetite reduction, etc.) quetiapine was discontinued in early December which could be a possible additional contributing factor. Plan in place for patient to follow up with Dr. Royal on Feb 3rd to discuss further. Recommended patient try to put a little bit of water in Ensure drinks to help with taste/consuption. Could consider adding mirtazapine in the future if needed (see sleep/mood/memory section below).     Hyperlipidemia: Patient due for lipids which can be obtained at next lab appointment, Recommend that patient continue to remain off of niacin as this is likely not helping much with lipids. Would be indicated for a statin however he has declined in the past, will continue to monitor. Given patient's age and other comorbidities would agree that risk of starting statin therapy may outweigh benefit.     COPD: Patient requesting refill for albuterol inhaler, due to albuterol inhaler being prescribed for an acute condition in the  past, recommend that patient follow up with Dr. Royal at his upcoming appointment. Hard to determine if the patient is reporting shortness of breath that is related to decline in heart or lung function.     Sleep/Mood/Memory: Overall stable, as mentioned above could consider mirtazapine in the future to help with sleep, mood, and weight however at this time the patient feels he does not need a medication. Plan in place for patient to have appointment with the memory clinic scheduled on 2/16 to help with recent decline in memory.     GI: Stable.     Supplements: Stable.     PLAN:                            1. Start drinking an Ensure drink 1 to 2 times a day- you can add some water to help with the thickness.   2. Talk to Dr. Royal about a refill for your albuterol- he can examine your lungs and help to determine if this would be helpful.   3. I will message Inge Hollis about a plan for torsemide and a refill for nitroglycerin  4. Get lipid levels check when you get labs again- ideally if you can come without eating or in a fasting state that would be ideal.     Follow-up: I will call you once I hear back from Inge Hollis    SUBJECTIVE/OBJECTIVE:                          Rm Villarreal is a 82 year old male coming in for a follow-up visit.  Today's visit is a follow-up MT visit from 11/21/22. He is accompanied by his wife Queenie.     Reason for visit: Blood pressure re-check.    Allergies/ADRs: Reviewed in chart  Past Medical History: Reviewed in chart  Tobacco: He reports that he quit smoking about 19 years ago. His smoking use included cigarettes. He has never used smokeless tobacco.    Medication Adherence/Access: Wife Queenie helps with medications.     Afib/Hypertension/HF/CAD: Patient is currently taking Eliquis 5mg twice daily for anticoagulation. Patient reports no current concerns of bruising or bleeding. Patient does not have a history of GI bleed.   Patient is also taking metoprolol succinate XL 25mg  daily and nitroglycerin as needed (requests a refill today- did not have time to assess use). Patient reports no current medication side effects.   Patient was recently started torsemide by cardiology and since starting on torsemide has seen significant weight loss (about 20 pounds). He has not taken torsemide for about a week- never re-started as recommended by cardiology as his wife is concerned for his weight loss.   Patient does self-monitor blood pressure. Home BP monitoring in range of 110-115's systolic over 80's diastolic.  BP Readings from Last 6 Encounters:   01/16/23 122/84   01/08/23 (!) 142/94   01/06/23 (!) 135/96   01/04/23 129/85   01/01/23 (!) 100/94   11/21/22 (!) 137/92     Unintentional weight loss: Zoran has lost about 20 pounds since starting torsemide (about 2 weeks ago) and continues to lose weight despite holding torsemide for about a week. Queenie mentions that Zoran's eating has declined and that Zoran is just not interested eating-  As nothing sounds good to eat. She will often make him a peanut butter toast and he is not interested in eating it. He has tried Ensure drinks but finds that they are thick and hard to consume.     Hyperlipidemia: Current therapy includes no medications- they have run out of their supply of Niacin and have stopped using the medication.   Recent Labs   Lab Test 08/05/21  1110 06/10/19  0751   CHOL 204* 223*   HDL 82 96   * 109*   TRIG 91 89     COPD: Current therapy includes albuterol inhaler- hasn't used for a couple of weeks, finds that it is helpful when he has some wheezing but mentions that he needs a refill.     Sleep/Mood: Current therapy includes melatonin 5mg every night. Generally sleeping has been a bit better having less dreams where he will get up in the middle of the night and talk in his sleep. Still does some sleeping during the day as reported by Queenie. Generally does feel like the melatonin is helping but some nights he does wake up at about  3-4 AM but usually can get back to bed. Quetiapine was discontinued at the beginning of December. At this time the patient reports that he feels he does not need a medication for sleep and/or mood.     GI: Current therapy includes imodium as needed for diarrhea- hasn't had much concern of diarrhea lately but instead constipation, wife Queenie mentions she thinks it may be due to his immobility. He will eat a couple of dried prunes and this seems to help to keep bowels regular.     Supplements: Current therapy includes vitamin D 5000 units daily. No side effects.   Vitamin D Deficiency Screening Results:  Lab Results   Component Value Date    VITDT 46 08/05/2021    VITDT 28 12/17/2019    VITDT 24 06/10/2019    VITDT 25 05/23/2018    VITDT 37 04/06/2017     Today's Vitals: /84   Pulse 106   Wt 132 lb 1.6 oz (59.9 kg)   BMI 18.42 kg/m    ----------------  I spent 40 minutes with this patient today. I offer these suggestions for consideration by Inge Hollis, SAMANTHA CNP and approved lipid lab order through [facility] with Dr. Royal a copy of the visit note was provided to the patient's provider(s).    A summary of these recommendations was given to the patient.    Roseann Crowder PharmD  Medication Therapy Management Resident  [phone]    The patient was seen independently by Dr. Crowder  I have read the note and agree with the assessment and plan.  Inge Gilmore PharmD, [facility]  Medication Therapy Management Provider  Pager: [phone]       Medication Therapy Recommendations  Chronic systolic congestive heart failure (H)    Current Medication: torsemide (DEMADEX) 10 MG tablet   Rationale: Patient prefers not to take - Adherence - Adherence   Recommendation: Clarfying if patient should be taking with cardiology   Status: Accepted per Provider         Ischemic cardiomyopathy    Current Medication: nitroglycerin (NITROSTAT) 0.4 MG SL tablet   Rationale: Incorrect storage - Dosage too low - Effectiveness    Recommendation: Re-order medication   Status: Accepted per Provider

## 2023-01-16 NOTE — LETTER
"Recommended To-Do List      Prepared on: Jan 16, 2023       You can get the best results from your medications by completing the items on this \"To-Do List.\"      Bring your To-Do List when you go to your doctor. And, share it with your family or caregivers.    My To-Do List:  What we talked about: What I should do:   The importance of taking your medication as intended     I will message Inge Hollis about getting a refill for nitroglycerin. You should refill this medication at least every 3-6 months to ensure that the medication will work when you need it.           What we talked about: What I should do:   Increasing your food intake    Start drinking an Ensure drink 1 to 2 times a day- you can add some water to help with the thickness.           What we talked about: What I should do:   Your breathing and refill for albuterol  Inhaler     Talk to Dr. Royal about a refill for your albuterol- he can examine your lungs and help to determine if this would be helpful.           What we talked about: What I should do:    Continue to hold torsemide   Wait for my phone call on updates if you should take this or not.                 "

## 2023-01-17 NOTE — TELEPHONE ENCOUNTER
Spoke with pt's wife and scheduled an appointment with  tomorrow 01/18/2023.    Sendy Herrera CMA

## 2023-01-17 NOTE — PROGRESS NOTES
Per Inge Hollis APRN CNP okay to hold diuretic until follow up appointment scheduled on 2/6. Additionally will cancel upcoming scheduled lab appointment for re-peat BMP as patient has not been taking torsemide and will refill NTG. Patient and wife were updated.     Follow up: MTM to follow up with patient after upcoming appointment with PCP and Cardiology    Inge Hollis APRN CNP Skurat, McKenzie, RPH  Yes and yes and Yes. :)      ----- Message -----   From: Madi Crowder RPH   Sent: 1/17/2023  12:00 PM CST   To: SAMANTHA López CNP   Subject: RE: Torsemide                                     Would you like him to hold off on the re-peat BMP then? Also are you okay with the NTG refill?     Thanks,   Roseann Crowder, PharmD   Medication Therapy Management Resident   [phone]     ----- Message -----   From: Inge Hollis APRN CNP   Sent: 1/17/2023   9:29 AM CST   To: Madi Crowder RPH   Subject: RE: Torsemide                                     Hi Roseann-     He can hold off diuretic for now.     Thanks     Inge   ----- Message -----   From: Madi Crowder RPH   Sent: 1/16/2023  11:49 AM CST   To: SAMANTHA López CNP   Subject: Torsemide                                         Hi Inge,     I met with Zoran and his wife today, I wanted to let you know that his wife let me know that they never restarted the torsemide after you suggested re-starting at every other day. I wanted to hear your recommendation on this. His wife is concerned as he continues to lose weight (down about 20 lbs) additionally he has lost his appetite and is not eating, I feel that this is not all a diuretic effect he is seeing PCP on 2/3, would you recommend he re-start torsemide now at every other day or continue to hold until appointment with you on 2/6?     He also is scheduled for re-peat BMP before your visit as of now and wondering if he can have a re-fill on nitroglycerin to have on hand as needed.

## 2023-01-18 NOTE — PROGRESS NOTES
Assessment & Plan   Problem List Items Addressed This Visit        Endocrine    Hyperlipidemia LDL goal <100       Circulatory    Benign essential hypertension - Primary    Ischemic cardiomyopathy    Relevant Orders    Comprehensive metabolic panel (BMP + Alb, Alk Phos, ALT, AST, Total. Bili, TP) (Completed)    CBC with platelets and differential (Completed)    BNP-N terminal pro (Completed)    PAD (peripheral artery disease) (H)   Other Visit Diagnoses     Malnutrition, unspecified type (H)        Relevant Orders    Comprehensive metabolic panel (BMP + Alb, Alk Phos, ALT, AST, Total. Bili, TP) (Completed)    CBC with platelets and differential (Completed)    Vitamin B12 (Completed)    TSH (Completed)    Vitamin D Deficiency (Completed)    Comprehensive Weight Management    Weight loss        Relevant Orders    Comprehensive metabolic panel (BMP + Alb, Alk Phos, ALT, AST, Total. Bili, TP) (Completed)    CBC with platelets and differential (Completed)    Vitamin B12 (Completed)    TSH (Completed)    Vitamin D Deficiency (Completed)    Comprehensive Weight Management    Reactive depression        Relevant Medications    mirtazapine (REMERON) 7.5 MG tablet    Chronic systolic (congestive) heart failure (H)        Relevant Orders    BNP-N terminal pro (Completed)    Liver disease, unspecified        Relevant Orders    Vitamin D Deficiency (Completed)    Hyperbilirubinemia        Relevant Orders    US Abdomen Limited    Diarrhea, unspecified type        Relevant Orders    TSH (Completed)    Hyperbilirubinemia,         Relevant Orders    GGT (Completed)    Lipase (Completed)    Hypothyroidism, unspecified type        Relevant Orders    T4, free (Completed)    T3, total (Completed)    Other specified diseases of liver        Relevant Orders    GGT (Completed)         Discussed multiple health conditions frailty weight loss congestive heart failure malnutrition and depression.  Check some basic labs.  Start on  high-calorie protein supplements shakes.  Continue follow-up with heart failure clinic/core program.  Gentle diuresis as he had significant drop in weight of 18 pounds,  if any recurrence of leg swelling started demadex every other day, monitor for any change in weight.  May need further evaluation for his weight loss including CT imaging if continues to lose weight.  He has some icterus on exam and minimal jaundice , concerning for liver congestive hepatopathy versus other ongoing pathology.  Repeat liver panel bilirubin.  He is on chronic anticoagulation check INR level as well.  Can take as needed Imodium for the diarrhea.  Increase fiber in diet.  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 Soto next months.  Also treat possible underlying depression or other comorbidities check thyroid function test B12 vitamin D first.  Multivitamin daily.  If continues to have diarrhea will test for C. difficile  Follow-up in 2 weeks and as needed         See Patient Instructions    Return in about 2 weeks (around 2/1/2023), or if symptoms worsen or fail to improve, for As needed and if symptoms worsen.    Greg Royal MD  [facility]  Total time spent was 74 minutes review of records , exam and addressing multiple health conditions  Subjective   Zoran is a 82 year old, presenting for the following health issues:  Follow Up (Patient seen by his cardiologist who put him on a diuretic.  Patient has lost 18 pounds since in the last 18 lbs since being started on the diuretic.  )      HPI     Presenting to discuss multiple health concerns for hospital follow-up, history of chronic heart failure ejection  fraction in the 20s.  History of weight loss 18 pounds over the last 2 to 3 weeks.  has some decreased appetite and decreased portions of meals no nausea or vomiting and has diarrhea slight ongoing,, cachectic looking disheveled slightly on  as needed Imodium.  He was on Demadex and that was stopped by significant other Recently.  He diuresed significantly  Denies any cough or shortness of breath there is concern for some memory changes as well as appointment with  memory clinic next month.  Follows with heart failure clinic as well.  He is intolerant to ARB or ACE inhibitor due to hypotension/dizziness.  Remains on chronic anticoagulation and beta-blockers well-tolerated, no blood in the stools melena or bleeding problems.  No recent falls.  There was a blister on the left medial thigh that resolved as well he was treated for left lower extremity cellulitis that also resolved.    Review of Systems   Constitutional, HEENT, cardiovascular, pulmonary, gi and gu systems are negative, except as otherwise noted.      Objective    /88 (BP Location: Left arm, Patient Position: Sitting, Cuff Size: Adult Regular)   Pulse 87   Temp 97.8  F (36.6  C) (Temporal)   Resp 20   Wt 59.6 kg (131 lb 6.4 oz)   SpO2 95%   BMI 18.33 kg/m    Body mass index is 18.33 kg/m .  Physical Exam   GENERAL:  alert and no distress.slightly disheveled cachectic looking positive mild icterus  EYES: Eyes grossly normal to inspection,   HENT:. mouth without ulcers or lesions  NECK: no adenopathy, no asymmetry, masses, or scars and thyroid normal to palpation,   Positive neck vein distention  RESP: lungs clear to auscultation - no rales, rhonchi or wheezes  CV: Irregular regular rate and rhythm,no murmur, click or rub, no peripheral edema, weak peripheral pulses  , Has ABDOMEN: soft, nontender, no hepatosplenomegaly, no masses and bowel sounds normal  MS: no gross musculoskeletal defects noted, no edema  Skin:  no bruises or ecchymosis.  Slightly purplish discoloration of the hands and some thick deep fissures on the tip of the fingers and very dry skin related  NEURO: Generalized weakness with slight decrease in strength and tone, mentation delayed,  speech normal  PSYCH: mentation appears slow , affect  flat    Lab on 01/11/2023   Component Date Value Ref Range Status     Sodium 01/11/2023 139  136 - 145 mmol/L Final     Potassium 01/11/2023 3.4  3.4 - 5.3 mmol/L Final     Chloride 01/11/2023 99  98 - 107 mmol/L Final     Carbon Dioxide (CO2) 01/11/2023 26  22 - 29 mmol/L Final     Anion Gap 01/11/2023 14  7 - 15 mmol/L Final     Urea Nitrogen 01/11/2023 24.0 (H)  8.0 - 23.0 mg/dL Final     Creatinine 01/11/2023 0.95  0.67 - 1.17 mg/dL Final     Calcium 01/11/2023 10.1  8.8 - 10.2 mg/dL Final     Glucose 01/11/2023 125 (H)  70 - 99 mg/dL Final     GFR Estimate 01/11/2023 81  >60 mL/min/1.73m2 Final    Effective December 21, 2021 eGFRcr in adults is calculated using the 2021 CKD-EPI creatinine equation which includes age and gender (Refugio vaughan al., NEJM, DOI: 10.1056/BZVDtw8156917)

## 2023-01-19 NOTE — PROGRESS NOTES
Called patient regarding MD message below. Sent message in a mychart as well. He will use Mychart if he has further questions.   Appointments in Next Year    Feb 03, 2023  9:00 AM  (Arrive by 8:40 AM)  Provider Visit with Greg Royal MD  [facility] ([facility] ) [phone]   Feb 06, 2023  3:00 PM  (Arrive by 2:55 PM)  Return EP with SAMANTHA López CNP  [facility] ([facility] ) [phone]   Feb 16, 2023  8:30 AM  (Arrive by 8:10 AM)  Provider Visit with Juany Soto DO  [facility] ([facility] ) [phone]

## 2023-01-19 NOTE — RESULT ENCOUNTER NOTE
Zoran, reviewed your lab results:    Lipase which is a pancreatic test is normal.    GGT slightly elevated , I do recommend that Patient gets an ultrasound of his liver although probably the hyperbilirubinemia and abnormal liver test (elevated alk phosp) is due to congestive hepatopathy from heart failure , will advise to get ultrasound we will place an order.    Thyroid test called TSH is slightly elevated 7.86 with low T3, I would suggest patient start on low-dose of levothyroxine 25 mcg once daily, we will send a prescription to the pharmacy on record  Will place an order for ultrasound of the liver, if continues to lose weight we will need to do CT of the abdomen pelvis.    Will repeat his liver function test and thyroid function test in 4 to 6 weeks.    Test for heart failure called N-terminal proBNP is elevated 11,797 during this abnormal elevated result, I do recommend that he stays on Demadex 10 mg half tablet daily, given this abnormal/elevated BNP result, we will monitor his weight trend accordingly.  Call us if more than 3 pounds decrease in 1 day or 5 pounds in 1 week.    Please advise to follow-up with heart failure clinic/CORE clinic.    Vitamin B12 is normal which is reassuring.    Electrolytes are normal, kidney function test is normal which is very reassuring; low albumin and elevated bilirubin..  Advise increase protein shakes intake as discussed in the clinic in order to increase his calorie intake    Any further questions please let me know, schedule follow-up in the next 2 weeks.  Dr Royal
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
## 2023-01-20 NOTE — TELEPHONE ENCOUNTER
Mirtazapine was just started yesterday is hard to tell if this is a side effect from 1 day as symptoms are going on for 3 weeks so I do not see any relation.  But mirtazapine can exacerbate such symptoms.  If this is the case with him waking up with some slurred speech or being sleepy or dizzy then hold on mirtazapine, in general is well-tolerated.  If any further questions please schedule a telephone follow-up visit.  I would encourage Zoran also to see on medical therapy management pharmacy in the clinic which I would place a referral if they agree.

## 2023-01-20 NOTE — TELEPHONE ENCOUNTER
Spoke to wife and patient.  He had the symptoms prior to him taking the medication Mirtazapine.     Wife states the patient is complaining of his tongue. His tongue looks normal but sometimes she feels it as impacted his speech and he is hard to understand.     No weakness or balance issues, no weakness .   No vision , head ache or other neurological concerns.     He has lost a lot of weight.     Encouraged  the  patient to go to [facility].     No nurse protocol available.     Dina Matos RN  [facility]

## 2023-01-20 NOTE — TELEPHONE ENCOUNTER
Patient Contact    Attempt # 1    Was call answered?  No.  Left message on voicemail with information to call clinic back.    On callback, please relay PCP's message below and determine if MTM referral is acceptable to patient,    Kathleen Gaspar RN  [facility]

## 2023-01-22 NOTE — TELEPHONE ENCOUNTER
Reason for Call:  Other Update    Detailed comments: Wife, Perez called to report weiht changes. 1/21/.6 lb, 1/22/.6 lb      Phone Number Patient can be reached at: Other phone number:  [phone]-diya Todd    Best Time: anytime    Can we leave a detailed message on this number? YES    Call taken on 1/22/2023 at 8:50 AM by Rex Olea

## 2023-01-23 NOTE — TELEPHONE ENCOUNTER
Patient Contact    Attempt # 1    Was call answered?  Yes. Writer received verbal approval from patient to speak to wife Queenie (C2C). Writer relayed PCP's message above.    Patient's wife gave responses to questions:    1. Patient's wife states that he is not regularly drinking high-calorie protein supplements, as he does not like the taste and refuses to drink them. Patient's wife states that she will try alternative flavor/drink options.    2. Patient's wife states that they have an US abdomen limited on Wednesday 01/25/23, but they would be open to CT chest and CT abdomen. Patient's wife requesting PCP place CT chest and abdomen orders.    3. Patient's wife states that they attempted to schedule a visit for 's Comprehensive Weight Management program through the Bariatric referral placed 01/18/23 but was told that the program is exclusively for promoting weight loss. Patient's wife wondering if a referral for nutrition/dietician could be placed instead.    4. Patient's wife is wondering if the CORE clinic is the same as the outpatient heart clinic they see at [facility]. Writer unable to locate a referral for this clinic. Patient's wife wondering if patient needs a referral for CORE/heart failure clinic, different from the heart specialist (Inge Calvillo CNP) they see at [facility]?    5. Patient's wife stated that the patient has been struggling with anxiety during the day and is wondering if there is an anti-anxiety medication that could be given during the day, as mertazapine is strictly for bedtime?    Kathleen Gaspar, RN  [facility]

## 2023-01-23 NOTE — TELEPHONE ENCOUNTER
Patient Contact    Attempt # 1    Was call answered?  Yes. Spoke to wife (Queenie, no C2C) who reported the most recent weights:  01/21/23: 127.6 lb  01/22/23: 123.6 lb  01/23/23: 124.6lb**  **Not given 1/2 water pill yesterday (01/22/23)    Patient's wife states that they have not yet scheduled for weight management through [facility] - [place] via referral placed 01/18/23.    PCP, patient's wife is wondering what the plan is for progressive weight loss.    Callback: [phone] ok to leave a detailed vm    Kathleen Gaspar, RN  [facility]

## 2023-01-23 NOTE — TELEPHONE ENCOUNTER
Patients wife states that they went to [facility] on Friday. No note noted.     Attempted to make virtual appt but was told she has already spoken to another nurse today regarding questions for PCP.    Lesly Jordan RN on 1/23/2023 at 3:32 PM

## 2023-01-24 NOTE — TELEPHONE ENCOUNTER
Called nutrition services number listed below, they see patients to help them lose weight, not focused on nutrition for stopping weight loss, advised we call the nutrition line - [phone]     said she sees several referrals come through with dx of unintentional weight loss, advises try that instead of just weight loss    Sabine SCHUMACHER Triage RN  [facility]

## 2023-01-24 NOTE — TELEPHONE ENCOUNTER
Referrals placed:  Ct abdomen/pelvis done  Ct chest done    Ref placed for swallow and speech    nutrition referral need signing for waiver, not covered by [facility] insurance, sent note to referral coordinator

## 2023-01-24 NOTE — TELEPHONE ENCOUNTER
Patient Contact    Attempt # 1    Was call answered?  Yes. Writer relayed PCP's message below, patient expressed verbal understanding. Writer provided patient with speech therapy number for scheduling. Patient's wife stated that they have already scheduled CT scans for 01/26/23. Patient's wife states that they will wait to schedule with nutrition services until they hear back about the referral waiver.    Patient's wife wondering about daytime anxiety medication. Patient's wife stated that the patient has been struggling with anxiety during the day and is wondering if there is an anti-anxiety medication that could be given during the day, as mertazapine is strictly for bedtime?    Kathleen Gaspar, RN  [facility]

## 2023-01-24 NOTE — CONFIDENTIAL NOTE
Referral can not be completed through [facility], for nutrition, with diagnosis of unintentional weight loss, as pt will need to sign waiver.  ?insurance related?

## 2023-01-27 NOTE — TELEPHONE ENCOUNTER
01/27/23 Msg recd from Inge Hollis NP    I agree to take the 5 mg of torsemide daily.  If he is asymptomatic we will do anything about the pleural effusions at this time.  Please have labs as previously scheduled.      Spoke w wife Queenie and explained recommendations. She voiced understanding and agreement w plan.    Gita 215 pm

## 2023-01-27 NOTE — TELEPHONE ENCOUNTER
I agree to take the 5 mg of torsemide daily.  If he is asymptomatic we will do anything about the pleural effusions at this time.  Please have labs as previously scheduled.

## 2023-01-27 NOTE — CONFIDENTIAL NOTE
VALERIO PCP:     Spoke with patient's spouse, advised her a Nutrition ABN Medicare waiver form would need to be signed before referral can be placed to nutrition     Offered to mail a copy or place copy at , but spouse said she will discuss with patient and decide if they want to proceed     If pt calls back and wants to proceed with signing ABN form, I have an electronic copy of this form I previously received from one of our nutritionists     Den Obrien RN  [facility]

## 2023-01-27 NOTE — TELEPHONE ENCOUNTER
"01/27/23 Wife Queenie called expressing concern over MyChart message she recd from Dr Royal ( PCP) after CT was done 01/27/23 requesting follow up with Cardiology ASAP     \"there is mention of small bilateral pleural effusions in the space between the thorax and the lungs and some of congestion in the lungs as well and enlarged heart suggestive of congestive heart failure ,please schedule follow-up with the heart failure clinic/cardiology ASAP. \"    Wife stated pt has been taking 1/2 tab of 10 mg Torsemide daily at the most recent advise of PCP  . She stated he does not appear to be SOB at all with rest or activity . He denies cough and is able to lay flat at night while asleep. He does not have swelling anywhere .    Pt has f/u with Inge Hollis on 2/6 and has BMP set up for 2/3 at PCP. Wife is wondering if this lab is still needed.    Routing to Inge Hollis NP for update and recommendations.    Wife voiced understanding and agreement with plan.   Gita 12 pm   "

## 2023-02-01 NOTE — TELEPHONE ENCOUNTER
Zoran's wife Queenie called and mentioned that she thinks Zoran is having some hemorrhoid related pain which may have been caused from previous constipation and is looking for recommendations for the pain until able to see Dr. Royal on Friday.     Recommended patient could try using the cream or wipe version of preparation H up to 4 times daily to help with symptoms until able to see . Also mentioned patient could continue to use Acetaminophen 500-1000mg every 4-6 hours up to a maximum of 4,000mg/24 hours.     Follow up: will check back in with patient after upcoming appointments.     Skyler DonovanD  Medication Therapy Management Resident  [phone]    I have read the note as written by Dr. Crowder and agree with the plan.  Inge Gilmore PharmD, [facility]  Medication Therapy Management Provider  Pager: [phone]

## 2023-02-03 NOTE — PROGRESS NOTES
Assessment & Plan   Problem List Items Addressed This Visit        Circulatory    Ischemic cardiomyopathy    Chronic systolic congestive heart failure (H)    Relevant Orders    EKG 12-lead complete w/read - Clinics (Completed)    Comprehensive metabolic panel (BMP + Alb, Alk Phos, ALT, AST, Total. Bili, TP) (Completed)    CBC with platelets and differential (Completed)    BNP-N terminal pro (Completed)   Other Visit Diagnoses     Rectal pain    -  Primary    Relevant Medications    hydrocortisone, Perianal, (HYDROCORTISONE) 2.5 % cream    Hypothyroidism, unspecified type        Relevant Medications    levothyroxine (SYNTHROID/LEVOTHROID) 25 MCG tablet    Other Relevant Orders    TSH with free T4 reflex (Completed)    T4 free (Completed)    TSH with free T4 reflex    T3, total         Advised to keep a diary of his bowel movements, to delineate better symptoms, whether further constipation or diarrhea , his daughter (Mukul) states sometimes he gets diarrhea after passing a bowel movement.  No melena or hematochezia from history.  Advised to do sitz bath and try Anusol cream for a week and see if that helps with his rectal?  Pain  As for his left upper extremity (LUE) swelling ecchymosis and purplish /cyanotic appearance; he has good Doppler pulses of brachial and ulnar and radial pulses, he has intact neurovascular exam of LUE,  there is no pain with movement, advised to keep left arm elevated as possible, support on  Couple pillows under the arm , we will continue closely monitor, he can try to remove the ring on  finger ring (on the left fourth finger) by using some Vaseline and try to slide out, he does have some decreased cap refill distally on the fingers of both hands but that is chronic ,was able to detect a pulse ox of 100% on the right thumb area, heart rate was irregular ranging anywhere between 100-130, blood pressure is normal.  Will continue to monitor for now for next week and reassess the  purplish discoloration ecchymosis of left upper extremity ,there is no evidence or signs of compartmental syndrome as soft tissue is very supple and soft on palpation, patient not complaining of any pain and adequate pulses by the Doppler and on exam of the left upper extremity.  He does have some chronic cyanosis in his digits due to underlying cardiomyopathy and CHF.  Patient is not in any distress during the visit hemodynamics are stable, we will check an EKG and repeat some basic labs, he has slight occult hypothyroidism, would consider treatment if TSH is still abnormal ,could be euthyroid sick syndrome as well when these were checked.  As for the Seroquel advised to take cut dose in quarters take quarter tablet once daily as needed, he takes mirtazapine at bedtime is helping him sleep, so he sleeping better during the night and less during the day.  Keep Tylenol to dosing 1 to 2 tablets every 8 hours AND not q 4 to 6 hours due to underlying liver congestive hepatopathy.  Reviewed CT scan results with family  Patient seeing cardiology next week and memory clinic with Dr Soto.  Follow-up in 1 week could be video visit       See Patient Instructions    Return in about 2 months (around 4/3/2023), or if symptoms worsen or fail to improve.    Greg Royal MD  [facility]  Total time was 74 minutes review of records and addressing multiple health concerns and exam visit.  .  Martin Meehan is a 82 year old accompanied by his spouse and daughter, daughter Nila wa son phone, presenting for the following health issues:  Follow Up (Patient here for follow up ) and Musculoskeletal Problem (Patient had a fall about two weeks ago.  His left arm has significant bruising from a fall 2 weeks ago.  He was seen at [facility] but no fracture was noted.  )      HPI   Patient presenting for evaluation and follow-up ,he sustained a fall with secondary ecchymosis and purplish discoloration of his left arm ,he  was seen in urgent care had an x-ray there was no fracture as per wife, no reports available, they gave him compression arm sleeve.  He denies any pain in his left arm.  He has been having some diarrhea when he eats he was constipated at some point and he is complaining of some rectal pain ,also he points at the left lateral abdominal area.  He has some cognitive issues and is seeing memory clinic next week.  No cough or shortness of breath ,he is not very active during the day.  He is taking Tylenol 2 tablets his wife's been giving every 4-6 hours.  No fever or chills.  Labs showed evidence of elevated ProBNP and elevation of liver enzymes, ultrasound showed congestive hepatopathy, CT showed mild bilateral pleural effusion; cardiology not concerned, no evidence of malignancy in addition to cardiomegaly.  He is on Demadex 5 mg daily.  He is on metoprolol 25 mg once daily; his heart rate has been ranging between 110s to 130 irregular irregular.    Review of Systems   Constitutional, HEENT, cardiovascular, pulmonary, gi and gu systems are negative, except as otherwise noted.      Objective    /87   Pulse (!) 142   Temp 97  F (36.1  C) (Temporal)   Resp 20   Wt 59.9 kg (132 lb)   BMI 18.41 kg/m    Body mass index is 18.41 kg/m .  Physical Exam   General appearance not in acute distress; alert and oriented  Lungs clear to auscultation bilateral, no use of accessory muscles,  Heart irregularly irregular no murmur  Abdomen soft nontender nondistended  Musculoskeletal:Left upper extremity:  there are some slight pitting edema and generalized swelling from the elbow down to the hand slightly pitting and some purplish discoloration, he has bilateral ?cyanosis both hands and digits (chronic) and cap refill slightly prolonged but he has good pulses in his left upper extremity radial , ulnar and brachial pulse by Doppler,  Intact neurovascular exam of his left upper extremity at side of the ecchymosis/purplish  discoloration/ cyanosis.  Neurologic grossly intact.   rectal exam could not appreciate any fissures or hemorrhoids, the rectal/anus area was filled with loose stools.  No melena.  Office Visit on 01/18/2023   Component Date Value Ref Range Status     Sodium 01/18/2023 138  136 - 145 mmol/L Final     Potassium 01/18/2023 4.2  3.4 - 5.3 mmol/L Final     Chloride 01/18/2023 99  98 - 107 mmol/L Final     Carbon Dioxide (CO2) 01/18/2023 22  22 - 29 mmol/L Final     Anion Gap 01/18/2023 17 (H)  7 - 15 mmol/L Final     Urea Nitrogen 01/18/2023 22.2  8.0 - 23.0 mg/dL Final     Creatinine 01/18/2023 0.94  0.67 - 1.17 mg/dL Final     Calcium 01/18/2023 9.3  8.8 - 10.2 mg/dL Final     Glucose 01/18/2023 96  70 - 99 mg/dL Final     Alkaline Phosphatase 01/18/2023 175 (H)  40 - 129 U/L Final     AST 01/18/2023 47  10 - 50 U/L Final     ALT 01/18/2023 26  10 - 50 U/L Final     Protein Total 01/18/2023 7.2  6.4 - 8.3 g/dL Final     Albumin 01/18/2023 3.1 (L)  3.5 - 5.2 g/dL Final     Bilirubin Total 01/18/2023 4.4 (H)  <=1.2 mg/dL Final     GFR Estimate 01/18/2023 82  >60 mL/min/1.73m2 Final    Effective December 21, 2021 eGFRcr in adults is calculated using the 2021 CKD-EPI creatinine equation which includes age and gender (Refugio et al., NEJM, DOI: 10.1056/LWPDyl7682570)     N Terminal Pro BNP Outpatient 01/18/2023 11,797 (H)  0 - 1,800 pg/mL Final    Reference range shown and results flagged as abnormal are for the outpatient, non acute settings. Establishing a baseline value for each individual patient is useful for follow-up.    Suggested inpatient cut points for confirming diagnosis of CHF in an acute setting are:  >450 pg/mL (age 18 to less than 50)  >900 pg/mL (age 50 to less than 75)  >1800 pg/mL (75 yrs and older)    An inpatient or emergency department NT-proPBNP <300 pg/mL effectively rules out acute CHF, with 99% negative predictive value.         TSH 01/18/2023 7.86 (H)  0.30 - 4.20 uIU/mL Final     Vitamin D,  Total (25-Hydroxy) 01/18/2023 76 (H)  20 - 75 ug/L Final     Cholesterol 01/18/2023 130  <200 mg/dL Final     Triglycerides 01/18/2023 87  <150 mg/dL Final     Direct Measure HDL 01/18/2023 31 (L)  >=40 mg/dL Final     LDL Cholesterol Calculated 01/18/2023 82  <=100 mg/dL Final     Non HDL Cholesterol 01/18/2023 99  <130 mg/dL Final     Vitamin B12 01/18/2023 1,163  232 - 1,245 pg/mL Final     WBC Count 01/18/2023 5.1  4.0 - 11.0 10e3/uL Final     RBC Count 01/18/2023 5.03  4.40 - 5.90 10e6/uL Final     Hemoglobin 01/18/2023 16.9  13.3 - 17.7 g/dL Final     Hematocrit 01/18/2023 48.1  40.0 - 53.0 % Final     MCV 01/18/2023 96  78 - 100 fL Final     MCH 01/18/2023 33.6 (H)  26.5 - 33.0 pg Final     MCHC 01/18/2023 35.1  31.5 - 36.5 g/dL Final     RDW 01/18/2023 15.8 (H)  10.0 - 15.0 % Final     Platelet Count 01/18/2023 195  150 - 450 10e3/uL Final     % Neutrophils 01/18/2023 71  % Final     % Lymphocytes 01/18/2023 11  % Final     % Monocytes 01/18/2023 13  % Final     % Eosinophils 01/18/2023 5  % Final     % Basophils 01/18/2023 1  % Final     % Immature Granulocytes 01/18/2023 0  % Final     Absolute Neutrophils 01/18/2023 3.6  1.6 - 8.3 10e3/uL Final     Absolute Lymphocytes 01/18/2023 0.6 (L)  0.8 - 5.3 10e3/uL Final     Absolute Monocytes 01/18/2023 0.6  0.0 - 1.3 10e3/uL Final     Absolute Eosinophils 01/18/2023 0.2  0.0 - 0.7 10e3/uL Final     Absolute Basophils 01/18/2023 0.1  0.0 - 0.2 10e3/uL Final     Absolute Immature Granulocytes 01/18/2023 0.0  <=0.4 10e3/uL Final     GGT 01/18/2023 101 (H)  8 - 61 U/L Final     Lipase 01/18/2023 63 (H)  13 - 60 U/L Final     Free T4 01/18/2023 1.03  0.90 - 1.70 ng/dL Final     T3 Total 01/18/2023 48 (L)  85 - 202 ng/dL Final
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
## 2023-02-06 NOTE — PATIENT INSTRUCTIONS
Today's Recommendations    Palliative care referral  Continue all medications without changes.  Please follow up with cardiology in 3 months.    Please send a [employer] message or call [phone] to the RN team with questions or concerns.     Scheduling number [phone]    SAMANTHA Daley, CNP

## 2023-02-09 PROBLEM — F03.94 DEMENTIA WITH ANXIETY, UNSPECIFIED DEMENTIA SEVERITY, UNSPECIFIED DEMENTIA TYPE (H): Status: ACTIVE | Noted: 2023-01-01

## 2023-02-09 NOTE — Clinical Note
VALERIO to PCP Dr. Royal for visit tomorrow and Dr. Mattson for upcoming consultation next week. Sorry lengthy note. Seems so frail, wife checking oximeter readings at home (I think ours in clinic was having technical difficulties). Discussed diagnosis of dementia. Its my first meeting with them but I think goals of care discussions are very important and wonder if hospice too would be something to explore if they're open to it? Wife seems robust for age but burning out in caregiver role.

## 2023-02-09 NOTE — PATIENT INSTRUCTIONS
Check with your home oximeter readings a few times over the next 24 hours and let Dr. Royal know tomorrow (goal > 88%)    Try Seroquel in mid-afternoon rather than morning to try to get ahead of evening delusions/hallucinations    Melatonin can also be given a few hours prior to desired bedtime as its slow to kick-in    Continue Remeron for now too (mood/sleep/appetite)    If you want a Home Care referral (physical therapy , occupational therapy , nursing) let me or Dr. Royal know    I'm concerned though based on constellation of symptoms that this has progressed to dementia and we'll try to continue to be supportive and provide care/medications to help    Follow up with Dr. Royal as planned tomorrow and me in the next few months as well
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
## 2023-02-10 NOTE — PROGRESS NOTES
"Zoran is a 82 year old who is being evaluated via a billable video visit.      How would you like to obtain your AVS? MyChart  If the video visit is dropped, the invitation should be resent by: Text to cell phone: [phone] (doximity)  Will anyone else be joining your video visit? Wife kayla          Assessment & Plan   Problem List Items Addressed This Visit        Nervous and Auditory    Dementia with anxiety, unspecified dementia severity, unspecified dementia type    Relevant Medications    mirtazapine (REMERON) 7.5 MG tablet       Circulatory    Ischemic cardiomyopathy - Primary    Chronic systolic congestive heart failure (H)       Behavioral    Adjustment disorder with anxious mood   Other Visit Diagnoses     Left arm swelling        improving as per wife, no video camera available to assess.,    Medically complex patient        referral to Home Health care.    Relevant Orders    Home Care Referral    Reactive depression        Relevant Medications    mirtazapine (REMERON) 7.5 MG tablet         Medications are working; no agitation or irritation,   Wakes at night 4 to 5 times    Complaining of pain, it hurts, in \"sphincter\",     Continue with sitz bath, and using Anusol cream,     ROOM TO INCREASE MIRTAZAPINE, TO 15 MG  On melatonin, 5 mg at night    Home health care, referral suggested again,     Can schedule, apt  in 4 weeks, virtual visit    Can increase tylenol, to 4 - 6 tablets a day FOR PAIN.    Mix Miralax with water and not with protein liquid, CAN GIVE ALSO METAMUCIL DAILY IN ADDITION TO MIRALAX,     Home health referral placed, meets criteria, OT.RN    Discussed multiple health concerns today.  Seems his pulse ox remained in the 95%.  Remains on Demadex 5 mg once daily.  Referral placed to palliative care by cardiology.  Referral placed today to home health care due to his confinement and his home needs with ADL,  assessment for OT at home for safety and medication management.  He is dealing with " constipation, he is starting MiraLAX, wife is dissolving in protein shakes; advised to dissolve only in water.  Can try twice a day MIRALAX, as well if not working will add Senokot or Colace as needed.  Also can try Metamucil either fiber pills or liquid/powder.  He is doing sitz bath's and using Anusol cream ,continue as such for now he is complaining of some?  Rectal pain when passing a bowel.  He remains on mirtazapine but he is waking up couple times during the night as per wife there is room to increase mirtazapine to 15 keeping in mind that we do not want to increase the risk of falls as well.  Quetiapine to take in mid afternoon as per Dr. Soto we may switch to risperidone if there is too much sedation during the day.  Left arm Echymosis/discoloration is improved as per wife and there is no concerns at this time.  All questions answered follow-up in 4 weeks virtual video visit as needed         CONSULTATION/REFERRAL to Home Health  Work on weight loss  Regular exercise  See Patient Instructions    Return if symptoms worsen or fail to improve, for Physical Exam.    Greg Royal MD  [facility]  Total time spent was 40 minutes in review of records, virtual visit and addressing multiple health conditions.    Subjective   Zoran is a 82 year old, presenting for the following health issues:  Follow Up and Refill Request      History of Present Illness       Reason for visit:  F/u    He eats 2-3 servings of fruits and vegetables daily.He consumes 1 sweetened beverage(s) daily.He exercises with enough effort to increase his heart rate 9 or less minutes per day.  He exercises with enough effort to increase his heart rate 3 or less days per week.   He is taking medications regularly.    Today's PHQ-9         PHQ-9 Total Score: 6    PHQ-9 Q9 Thoughts of better off dead/self-harm past 2 weeks :   Not at all       HISTORY GIVEN BY WIFE LORE,  Arm better, swelling gone down and purple color is  "getting better, keeping arm lifted, no pain in arm    He is moving arm without discomfort, naturally    Started withg new prescription, and started Miralax, daily with thyroid,     Breathing is same    Seen in memory clinic, no change in meds, move Seroquel, to mid afternoon  keep Remeron same dose at night,   Seen by cardiology, referral placed to palliative    No change in cardiac meds, ACEI/ARB could not tolerate    Still having bouts of pain, ON RIGHT SIDE, when asked , reports pain in \"sphincter\"    Pox was 95%    On tylenol, given 2 tabs once a day,       Review of Systems   Constitutional, HEENT, cardiovascular, pulmonary, gi and gu systems are negative, except as otherwise noted.      Objective           Vitals:  No vitals were obtained today due to virtual visit.    Physical Exam   GENERAL: Healthy, alert and no distress  This was a video visit but patient could not open the camera.  Patient was conversant he does have dementia, He did not sound to be in distress.    Office Visit on 02/03/2023   Component Date Value Ref Range Status     TSH 02/03/2023 9.68 (H)  0.30 - 4.20 uIU/mL Final     N Terminal Pro BNP Outpatient 02/03/2023 9,617 (H)  0 - 1,800 pg/mL Final    Reference range shown and results flagged as abnormal are for the outpatient, non acute settings. Establishing a baseline value for each individual patient is useful for follow-up.    Suggested inpatient cut points for confirming diagnosis of CHF in an acute setting are:  >450 pg/mL (age 18 to less than 50)  >900 pg/mL (age 50 to less than 75)  >1800 pg/mL (75 yrs and older)    An inpatient or emergency department NT-proPBNP <300 pg/mL effectively rules out acute CHF, with 99% negative predictive value.         Sodium 02/03/2023 136  136 - 145 mmol/L Final     Potassium 02/03/2023 3.8  3.4 - 5.3 mmol/L Final     Chloride 02/03/2023 99  98 - 107 mmol/L Final     Carbon Dioxide (CO2) 02/03/2023 23  22 - 29 mmol/L Final     Anion Gap 02/03/2023 14  " 7 - 15 mmol/L Final     Urea Nitrogen 02/03/2023 20.8  8.0 - 23.0 mg/dL Final     Creatinine 02/03/2023 0.83  0.67 - 1.17 mg/dL Final     Calcium 02/03/2023 9.6  8.8 - 10.2 mg/dL Final     Glucose 02/03/2023 87  70 - 99 mg/dL Final     Alkaline Phosphatase 02/03/2023 216 (H)  40 - 129 U/L Final     AST 02/03/2023 56 (H)  10 - 50 U/L Final     ALT 02/03/2023 34  10 - 50 U/L Final     Protein Total 02/03/2023 8.0  6.4 - 8.3 g/dL Final     Albumin 02/03/2023 3.8  3.5 - 5.2 g/dL Final     Bilirubin Total 02/03/2023 4.5 (H)  <=1.2 mg/dL Final     GFR Estimate 02/03/2023 88  >60 mL/min/1.73m2 Final    eGFR calculated using 2021 CKD-EPI equation.     WBC Count 02/03/2023 5.7  4.0 - 11.0 10e3/uL Final     RBC Count 02/03/2023 4.10 (L)  4.40 - 5.90 10e6/uL Final     Hemoglobin 02/03/2023 14.0  13.3 - 17.7 g/dL Final     Hematocrit 02/03/2023 39.7 (L)  40.0 - 53.0 % Final     MCV 02/03/2023 97  78 - 100 fL Final     MCH 02/03/2023 34.1 (H)  26.5 - 33.0 pg Final     MCHC 02/03/2023 35.3  31.5 - 36.5 g/dL Final     RDW 02/03/2023 17.6 (H)  10.0 - 15.0 % Final     Platelet Count 02/03/2023 225  150 - 450 10e3/uL Final     % Neutrophils 02/03/2023 74  % Final     % Lymphocytes 02/03/2023 12  % Final     % Monocytes 02/03/2023 12  % Final     % Eosinophils 02/03/2023 2  % Final     % Basophils 02/03/2023 1  % Final     % Immature Granulocytes 02/03/2023 0  % Final     Absolute Neutrophils 02/03/2023 4.2  1.6 - 8.3 10e3/uL Final     Absolute Lymphocytes 02/03/2023 0.7 (L)  0.8 - 5.3 10e3/uL Final     Absolute Monocytes 02/03/2023 0.7  0.0 - 1.3 10e3/uL Final     Absolute Eosinophils 02/03/2023 0.1  0.0 - 0.7 10e3/uL Final     Absolute Basophils 02/03/2023 0.1  0.0 - 0.2 10e3/uL Final     Absolute Immature Granulocytes 02/03/2023 0.0  <=0.4 10e3/uL Final     Free T4 02/03/2023 1.16  0.90 - 1.70 ng/dL Final     T3 Total 02/03/2023 66 (L)  85 - 202 ng/dL Final             Video-Visit Details    Type of service:  Video Visit   Video  Start Time: 9:30 AM  Video End Time:9:55 AM  PT COULD NOT CONNECT VIDEO  Originating Location (pt. Location): Home    Distant Location (provider location):  On-site  [facility]

## 2023-02-14 NOTE — TELEPHONE ENCOUNTER
Received a call from [facility], [facility], requesting verbal approval for PT, OT, and SW evaluation. [facility] also requesting verbal approval for skilled RN visits.     Frequency: 1 time a week for 3 weeks, every other week for 3 weeks, and 2 PRN visits  Reason: new medications, pain, swelling in legs and arms due to fall    Verbal approval given for the requested home care orders.    [facility] is requesting a script be sent to the pharmacy for Nystatin Powder. Patient has some excoriation in his groin area from moisture. [facility] is requesting one bottle with one additional refills.     Medication pended for review to the requested pharmacy.     Yaritza Quintero, RN
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
## 2023-02-17 NOTE — NURSING NOTE
Patient declined individual allergy and medication review by support staff because medications and allergies were reviewed on 02/10 and pts wife states no changes.    Gina Polanco, VF
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
## 2023-02-17 NOTE — NURSING NOTE
Is the patient currently in the state of [place]? YES    Visit mode:VIDEO    If the visit is dropped, the patient can be reconnected by: VIDEO VISIT: Send to e-mail at: [email or web address]    Will anyone else be joining the visit? NO      How would you like to obtain your AVS? MyChart    Are changes needed to the allergy or medication list? NO    Gina Polanco, TERE

## 2023-02-17 NOTE — PATIENT INSTRUCTIONS
"Recommendations:  -We spent a lot of time today talking about how to make medical decisions, specifically looking at each choice through the lens of your quality of life and asking such questions as \"how will this intervention contribute to my life in a meaningful way?  What benefit will it give me?  How will it help me live better?\".  -We discussed the topic of CPR as well as the topic of the surgery to have the internal defibrillator placed.  We talked about how people make the decision of whether or not they would want to undergo CPR.  The topic of CPR is incredibly important, because if you were to decide that you did not want CPR attempted on you in the event that your heart stopped, it likely would not make sense to go through having a defibrillator placed.  -I recommend ongoing conversations with your family regarding the topics we discussed today.  If we can be of any help in the future with further conversations, please let me know.   -There is room to increase the mirtazapine (Remeron) to still have a benefit for appetite and sleep.  If sleep issues persist, it may be reasonable to try increasing the dose up to 15 mg.  -Of note, our Palliative care team is clinic based, so we do not have an extension of our team that goes into patient;s homes. We often work with patients who are also receiving homecare services, and I even make the referrals to homecare myself sometimes. I do recommend homecare for Zoran, as I think it will be beneficial, and there are no conflicts between us and homecare from a care or billing perspective.     Follow up: As needed based on our conversation today, please do not hesitate to reach out with any questions or concerns.      Reasons to Call    If you are having worsening/uncontrolled symptoms we want you to call!    You or your other physicians make any changes to medications we have prescribed.  -Please call for refills 4-5 days before you will run out of medication.    Important " Phone Numbers, including: Refills, scheduling, and general questions     [facility] - Katie Dimas. Palliative Care RN - [phone]    [facility] - Prabha Marcus. Palliative Care RN - [phone]  *After hours or on weekends. Will connect you with on call MD. [phone]
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
## 2023-02-21 NOTE — TELEPHONE ENCOUNTER
"  Keerthi, PT with [facility] looking for orders:    Order:  \"OK to continue PT every other week for six weeks.\"    Writer gave verbal Ok per protocol.    Lesly Jordan RN on 2/21/2023 at 1:55 PM      "

## 2023-02-28 NOTE — TELEPHONE ENCOUNTER
The Home Care/Assisted Living/Nursing Facility is calling regarding an established patient.  Has the patient seen Home Care in the past or is currently residing in Assisted Living or Nursing Facility? Yes.     Nicci OT calling from [facility] requesting the following orders that are within the Home Care, Assisted Living or Nursing Home Eval and Treatment standing order and can be signed as standing order signature required by RN.    Preferred Call Back Number: [phone]    PT/OT/Speech Therapy     OT: Once every three weeks for seven weeks    Any additional Orders:  Are there any orders requested, not stated above, that are outside of the standing order and must be routed to a licensed practitioner for approval?    No    Writer has verified [employer] will send fax to have orders signed.    Marisol DE LA FUENTE RN  [facility] Triage Team

## 2023-03-03 NOTE — TELEPHONE ENCOUNTER
Pt was called. Pt denies chest pain of breathing problems during call. Future OV was scheduled . Pt was advised to seek care at ER if worsening symptoms. He verbalized agreement with plan.    Reason for Disposition    MODERATE swelling of both ankles (e.g., swelling extends up to the knees) AND new-onset or worsening    Additional Information    Negative: Sounds like a life-threatening emergency to the triager    Negative: Chest pain    Negative: Small area of swelling and followed an insect bite to the area    Negative: Followed a knee injury    Negative: Ankle or foot injury    Negative: Pregnant with leg swelling or edema    Negative: Difficulty breathing at rest    Negative: Entire foot is cool or blue in comparison to other side    Negative: SEVERE swelling (e.g., swelling extends above knee, entire leg is swollen, weeping fluid)    Negative: Thigh or calf pain and only 1 side and present > 1 hour    Negative: Thigh, calf, or ankle swelling in only one leg    Negative: Thigh, calf, or ankle swelling in both legs, but one side is definitely more swollen (Exception: longstanding difference between legs)    Negative: Cast on leg or ankle and has increasing pain    Negative: Can't walk or can barely stand (new-onset)    Negative: Fever and red area (or area very tender to touch)    Negative: Patient sounds very sick or weak to the triager    Negative: Swelling of face, arm or hands  (Exception: Slight puffiness of fingers during hot weather.)    Negative: Pregnant 20 or more weeks and sudden weight gain (i.e., > 2 lbs, 1 kg in one week)    Protocols used: LEG SWELLING AND EDEMA-A-OH

## 2023-03-03 NOTE — TELEPHONE ENCOUNTER
FYI - Status Update    Who is Calling: nurse, Marian from [facility]    Update: RN did a phone visit today and he is having SOB which was concerning. Legs are +2. Pt is elevating legs/propping them up to help swelling. Pt reports having sob at night when he is laying down. RN advised to prop his legs up with pillows when sleeping and pt agreed to plan.     RN is not sure when next visit for pt with PCP is but believes a sooner visit would be beneficial.     Does caller want a call/response back:  If needed    Hilda Patel,  [facility]

## 2023-03-06 NOTE — PROGRESS NOTES
Assessment & Plan     (R60.0) Bilateral lower extremity edema  (primary encounter diagnosis)  Comment: Newer bilateral FLAKO with concerns about SOB from the home RN but pt and wife say he is at his baseline.  History is quite vague as the patient has dementia and wife is able to provide some history.  CXR completed d/t the rales on exam which shows a small pleural effusion. There is a mild infiltrate versus atelectasis that is associated, but considering his history we will start with an increase in torsemide dose before adding an antibiotic.  He has been on 5 mg of torsemide and will increase to 10 mg.  He is previously on this dose and did well.  Encouraged prompt cardiology follow-up for reevaluation.  He is also scheduled with his PCP in a few weeks.  They have the home RN which he will be helpful they can always follow-up in the ED if any new severe symptoms develop  Plan: XR Chest 2 Views, Adult Cardiology Eval          Referral            (I50.22) Chronic systolic congestive heart failure (H)  Comment:   Plan: Adult Cardiology Eval  Referral            (I25.5) Ischemic cardiomyopathy  Comment: see above  Plan: Adult Cardiology Eval  Referral,         torsemide (DEMADEX) 10 MG tablet            (J43.9) Pulmonary emphysema, unspecified emphysema type (H)  Comment:   Plan: XR Chest 2 Views          58 minutes spent on the date of the encounter doing chart review, history and exam, documentation and further activities as noted above       SAMANTHA Mcdonald CNP  [facility] [place]ILL[place] Meehan is a 82 year old accompanied by his spouse, presenting for the following health issues:  No chief complaint on file.      History of Present Illness       COPD:  He presents for follow up of COPD.  Overall, COPD symptoms are slightly worse since last visit. He has more than usual fatigue or shortness of breath with exertion and more than usual shortness of breath at  "rest.  He rarely coughs and does not have change in sputum. No recent fever. He can walk the length of 3-5 rooms without stopping to rest. He can walk 1 flights of stairs without resting.The patient has had no ED, urgent care, or hospital admissions because of COPD since the last visit.     Heart Failure:  He presents for follow up of heart failure. He is experiencing shortness of breath at night or when lying flat, which is slightly worse. He is experiencing lower extremity edema which is worse than usual. He has orthopenea and is not coughing at night. Patient is checking weight daily. He has recently had a weight increase. He has side effects from medications including dizziness. He has had no other medical visits for heart failure since the last visit.    He eats 2-3 servings of fruits and vegetables daily.He consumes 1 sweetened beverage(s) daily.He exercises with enough effort to increase his heart rate 9 or less minutes per day.  He exercises with enough effort to increase his heart rate 3 or less days per week.   He is taking medications regularly.       They have an RN that comes in. This last time he answered yes to some questions resulting in this appt:   Pt States SOB is stable but RN said it was worse  Pt and wife think he is about at baseline with his health    No significant coughing   A lot of times he sleeps with 2 pillows and his wife will notice a raspy sound. Didn't have as much when on his side. This has been going for for several months    Has COPD. Only has an albuterol inhaler   O2 a few days ago was 96   Hands have been purple for a long time so it is always hard to get a pulse ox.   R hand seemed more swollen yesterday but is normal today       Review of Systems   Detailed as above         Objective    /88 (BP Location: Left arm, Patient Position: Sitting, Cuff Size: Adult Regular)   Pulse 72   Temp 97.5  F (36.4  C)   Resp 18   Ht 1.798 m (5' 10.8\")   Wt 63 kg (139 lb)   BMI " 19.50 kg/m    There is no height or weight on file to calculate BMI.  Physical Exam  Constitutional:       Comments: Chronically ill appearing   Cardiovascular:      Rate and Rhythm: Rhythm irregular.   Pulmonary:      Effort: Pulmonary effort is normal.      Breath sounds: Rales (right base) present.   Musculoskeletal:      Right lower leg: Edema present.      Left lower leg: Edema present.      Comments: +2-3 pitting FLAKO   Skin:     Coloration: Skin is jaundiced.      Comments: Dark purple fingers   Neurological:      Mental Status: He is alert.   Psychiatric:         Cognition and Memory: Memory is impaired.

## 2023-03-07 NOTE — TELEPHONE ENCOUNTER
Home care called     Visited with patient today - +2 edema in leg     Requesting order for JAVIER stockings - pended 15/20mmHg, knee high     Requesting DME order mailed to patient's home address on file     Call back home care RN     Sabine SCHUMACHER, Triage RN  [facility]

## 2023-03-10 NOTE — PROGRESS NOTES
HPI and Plan:   At the pleasure of seeing this 82-year-old gentleman who is accompanied by his wife, on an urgent basis as he has been short of breath for the past month.    His excellent medical records reviewed.  He was just seen by my colleague Inge Calvillo about a month ago    This is a gentleman who is coronary artery disease history goes back more than 20 years.  At last angiography almost 10 years ago he is known to have a chronic circumflex occlusion and apical LAD occlusion.  Last nuclear study just under 2 years ago showed ejection fraction of 24% with infarctions in the LAD distribution and also in the inferior wall.  Echocardiogram has demonstrated severe secondary pulmonary hypertension 1-2+ tricuspid regurgitation.    He has peripheral vascular disease, status post carotid endarterectomy.  He is in permanent atrial fibrillation and is rate controlled.  He has COPD from his history of smoking.    More recently he has been diagnosed with dementia and he has had multiple falls.  In the past few years he has lost at least 20 pounds.    His wife tells me that over the past few weeks he has progressively developed worsening of his shortness of breath especially worse at night with wheezing.  He denies fevers cough and other systemic disturbances.  He has no chest pains    He drinks 1-2 beers a night together with his protein drinks and 2 to 3 glasses of water a day he admits to liking salt and he does add salt to his diet.    At his last clinic visit he was around 134 pounds, now is 139 pounds.  He certainly has evidence of florid congestive heart failure on physical exam.  He has 1-2+ bilateral ankle edema though there is evidence of venous stasis.  His JVP is at the level of his earlobes.  His chest does sound relatively clear    His heart rate does appear controlled.  I am happy to see that he has a normal GFR and his potassium is 4 he does have deranged liver function test and I feel quite certain that  this is due to passive hepatic congestion    Perhaps his salt and fluid intake may have something to do with his CHF progression but overall it is much more likely due to end-of-life fast approaching.  I see that he has just been to see palliative care which I think is certainly most appropriate and I do not think he would benefit from insertion of a defibrillator.    I will do what I can for him in clinic.  His blood pressure is just over 100 systolic which certainly does not need much more room for diuresis.  Unless I will give it a try with home diuresis first.  He is on Demadex 10 mg and I have asked him to double it to 20 taking an extra dose after lunch to avoid worsening nocturia I have also prescribed 20 mg of K-Dur to take.  They do have a blood pressure monitoring at home and advised his wife to measure his blood pressure before giving his medications in the morning and to hold metoprolol should his systolic fall below 100.  His fluid intake should be no more than 1.5 to 2 L/day in total and asked him to cut his beer to 1/day.      The above may improve his situation a little and I have arranged for him to see one of my colleagues in about a week's time with a basic metabolic panel at clinic visit.  If the situation does not improve much perhaps consideration may be given to brief hospitalization for intravenous diuretic medics or even shot of intravenous diuretics as an outpatient.    I believe the family is aware of how guarded his prognosis is..              Orders Placed This Encounter   Procedures     Basic metabolic panel     Follow-Up with Cardiology FITZ       Orders Placed This Encounter   Medications     Saccharomyces boulardii (PROBIOTIC) 250 MG CAPS     Cyanocobalamin (B-12) 100 MCG TABS     potassium chloride ER (KLOR-CON M) 20 MEQ CR tablet     Sig: Take 1 tablet (20 mEq) by mouth 2 times daily     Dispense:  30 tablet     Refill:  3       Encounter Diagnoses   Name Primary?     Bilateral  lower extremity edema      Chronic systolic congestive heart failure (H)      Ischemic cardiomyopathy        CURRENT MEDICATIONS:  Current Outpatient Medications   Medication Sig Dispense Refill     apixaban ANTICOAGULANT (ELIQUIS) 5 MG tablet Take 1 tablet (5 mg) by mouth 2 times daily 60 tablet 11     Cyanocobalamin (B-12) 100 MCG TABS        hydrocortisone, Perianal, (HYDROCORTISONE) 2.5 % cream Place rectally 2 times daily as needed for hemorrhoids 30 g 0     levothyroxine (SYNTHROID/LEVOTHROID) 25 MCG tablet Take 1 tablet (25 mcg) by mouth daily 45 tablet 0     loperamide (IMODIUM) 2 MG capsule Take 1-2 mg by mouth as needed for diarrhea       melatonin 5 MG tablet Take 5 mg by mouth nightly as needed for sleep       metoprolol succinate ER (TOPROL XL) 25 MG 24 hr tablet Take 1 tablet (25 mg) by mouth daily 90 tablet 1     mirtazapine (REMERON) 7.5 MG tablet Take 1 tablet (7.5 mg) by mouth At Bedtime 30 tablet 1     nitroGLYcerin (NITROSTAT) 0.4 MG sublingual tablet For chest pain place 1 tablet under the tongue every 5 minutes for 3 doses. If symptoms persist 5 minutes after 1st dose call 911. 25 tablet 3     nystatin (MYCOSTATIN) 043534 UNIT/GM external powder Apply topically 2 times daily as needed for other (Excoriation) 60 g 1     potassium chloride ER (KLOR-CON M) 20 MEQ CR tablet Take 1 tablet (20 mEq) by mouth 2 times daily 30 tablet 3     QUEtiapine (SEROQUEL) 25 MG tablet TAKE 1/2 TABLET BY MOUTH EVERY DAY AS NEEDED FOR ANXIETY 30 tablet 0     Saccharomyces boulardii (PROBIOTIC) 250 MG CAPS        torsemide (DEMADEX) 10 MG tablet Take 1 tablet (10 mg) by mouth daily 30 tablet 0     Vitamin D3 (CHOLECALCIFEROL) 125 MCG (5000 UT) tablet Take 1 tablet by mouth daily         ALLERGIES     Allergies   Allergen Reactions     Atorvastatin Diarrhea     Intolerance to most statin medications        PAST MEDICAL HISTORY:  Past Medical History:   Diagnosis Date     Adjustment disorder      Carotid stenosis,  bilateral     left CEA      Chronic atrial fibrillation (H)     warfarin     COPD (chronic obstructive pulmonary disease) (H) 2021     Coronary artery disease     cardiac cath 2014: chronic occlusion of circumflex and apical LAD: medical management; cath : stent to LAD, historical: stent to RCA     History of blood transfusion      Hypertension      Ischemic cardiomyopathy     ef 25%     Pulmonary nodule        PAST SURGICAL HISTORY:  Past Surgical History:   Procedure Laterality Date     angiogram  2014    cardiac cath 2014: chronic occlusion of circumflex and apical LAD: medical management     ANGIOGRAM      stent to LAD     ANGIOGRAM      stent to RCA     COLONOSCOPY           COLONOSCOPY N/A 2018    Procedure: COMBINED COLONOSCOPY, SINGLE OR MULTIPLE BIOPSY/POLYPECTOMY BY BIOPSY;  colonoscopy;  Surgeon: Tyler Dee MD;  Location:  GI     GI SURGERY      hemorrhoidectomy     IR VISCERAL ANGIOGRAM  2019     VASCULAR SURGERY      left CEA       FAMILY HISTORY:  Family History   Problem Relation Age of Onset     Heart Disease Father        SOCIAL HISTORY:  Social History     Socioeconomic History     Marital status:      Spouse name: None     Number of children: None     Years of education: None     Highest education level: None   Tobacco Use     Smoking status: Former     Types: Cigarettes     Quit date: 2003     Years since quittin.6     Smokeless tobacco: Never   Vaping Use     Vaping Use: Never used   Substance and Sexual Activity     Alcohol use: Yes     Comment: 1-2 beer daily     Drug use: No     Sexual activity: Not Currently     Partners: Female       Review of Systems:  Skin:  Negative     Eyes:  Positive for glasses;visual blurring;double vision  ENT:  Negative    Respiratory:  Positive for shortness of breath;cough;wheezing  Cardiovascular:    Positive for;dizziness;fatigue;edema  Gastroenterology: Negative    Genitourinary:  Negative   "  Musculoskeletal:  Positive for joint pain  Neurologic:  Negative    Psychiatric:  Negative    Heme/Lymph/Imm:  Negative    Endocrine:  Positive for thyroid disorder    Physical Exam:  Vitals: /75 (BP Location: Right arm, Patient Position: Sitting)   Pulse 86   Ht 1.798 m (5' 10.8\")   Wt 63.3 kg (139 lb 8 oz)   SpO2 97%   BMI 19.57 kg/m      Constitutional:    cachectic;frail;chronically ill      Skin:    venous stasis changes        Head:  normocephalic        Eyes:  conjunctivae and lids unremarkable        Lymph:No Cervical lymphadenopathy present     ENT:           Neck:    JVP >12      Respiratory:  normal symmetry;clear to auscultation         Cardiac: apical impulse not displaced       grade 3;systolic murmur;LLSB          pulses below the femoral arteries are diminished                                      GI:  abdomen soft        Extremities and Muscular Skeletal:        RLE edema;2+ LLE edema;1+      Neurological:           Psych:  Alert and Oriented x 3        Recent Lab Results:  LIPID RESULTS:  Lab Results   Component Value Date    CHOL 130 01/18/2023    CHOL 223 (H) 06/10/2019    HDL 31 (L) 01/18/2023    HDL 96 06/10/2019    LDL 82 01/18/2023     (H) 06/10/2019    TRIG 87 01/18/2023    TRIG 89 06/10/2019    CHOLHDLRATIO 2.1 04/27/2005       LIVER ENZYME RESULTS:  Lab Results   Component Value Date    AST 56 (H) 02/03/2023    AST 61 (H) 04/17/2021    ALT 34 02/03/2023    ALT 46 04/17/2021       CBC RESULTS:  Lab Results   Component Value Date    WBC 5.7 02/03/2023    WBC 10.5 04/17/2021    RBC 4.10 (L) 02/03/2023    RBC 4.42 04/17/2021    HGB 14.0 02/03/2023    HGB 14.4 04/17/2021    HCT 39.7 (L) 02/03/2023    HCT 41.7 04/17/2021    MCV 97 02/03/2023    MCV 94 04/17/2021    MCH 34.1 (H) 02/03/2023    MCH 32.6 04/17/2021    MCHC 35.3 02/03/2023    MCHC 34.5 04/17/2021    RDW 17.6 (H) 02/03/2023    RDW 14.5 04/17/2021     02/03/2023     04/17/2021       BMP RESULTS:  Lab " Results   Component Value Date     02/03/2023     04/17/2021    POTASSIUM 3.8 02/03/2023    POTASSIUM 4.0 01/01/2023    POTASSIUM 3.7 04/17/2021    CHLORIDE 99 02/03/2023    CHLORIDE 104 01/01/2023    CHLORIDE 106 04/17/2021    CO2 23 02/03/2023    CO2 22 01/01/2023    CO2 27 04/17/2021    ANIONGAP 14 02/03/2023    ANIONGAP 7 01/01/2023    ANIONGAP 3 04/17/2021    GLC 87 02/03/2023     (H) 01/01/2023    GLC 89 04/17/2021    BUN 20.8 02/03/2023    BUN 23 01/01/2023    BUN 17 04/17/2021    CR 0.83 02/03/2023    CR 0.94 04/17/2021    GFRESTIMATED 88 02/03/2023    GFRESTIMATED 76 04/17/2021    GFRESTBLACK 89 04/17/2021    RANDY 9.6 02/03/2023    RANDY 8.9 04/17/2021        A1C RESULTS:  No results found for: A1C    INR RESULTS:  Lab Results   Component Value Date    INR 1.1 04/04/2022    INR 2.2 (H) 03/07/2022    INR 2.08 (H) 01/17/2022    INR 2.00 (H) 06/28/2021    INR 1.80 (H) 06/14/2021           CC  Phuc Benton, APRN CNP  [address]  [place] [address]

## 2023-03-10 NOTE — LETTER
3/10/2023    Greg Royal MD  [address]  [facility] [address]    RE: Rm HERNANDEZ Phil       Dear Colleague,     I had the pleasure of seeing Rm Villarreal in the [facility].  HPI and Plan:   At the pleasure of seeing this 82-year-old gentleman who is accompanied by his wife, on an urgent basis as he has been short of breath for the past month.    His excellent medical records reviewed.  He was just seen by my colleague Inge Calvillo about a month ago    This is a gentleman who is coronary artery disease history goes back more than 20 years.  At last angiography almost 10 years ago he is known to have a chronic circumflex occlusion and apical LAD occlusion.  Last nuclear study just under 2 years ago showed ejection fraction of 24% with infarctions in the LAD distribution and also in the inferior wall.  Echocardiogram has demonstrated severe secondary pulmonary hypertension 1-2+ tricuspid regurgitation.    He has peripheral vascular disease, status post carotid endarterectomy.  He is in permanent atrial fibrillation and is rate controlled.  He has COPD from his history of smoking.    More recently he has been diagnosed with dementia and he has had multiple falls.  In the past few years he has lost at least 20 pounds.    His wife tells me that over the past few weeks he has progressively developed worsening of his shortness of breath especially worse at night with wheezing.  He denies fevers cough and other systemic disturbances.  He has no chest pains    He drinks 1-2 beers a night together with his protein drinks and 2 to 3 glasses of water a day he admits to liking salt and he does add salt to his diet.    At his last clinic visit he was around 134 pounds, now is 139 pounds.  He certainly has evidence of florid congestive heart failure on physical exam.  He has 1-2+ bilateral ankle edema though there is evidence of venous stasis.  His JVP is at the level of his earlobes.  His  chest does sound relatively clear    His heart rate does appear controlled.  I am happy to see that he has a normal GFR and his potassium is 4 he does have deranged liver function test and I feel quite certain that this is due to passive hepatic congestion    Perhaps his salt and fluid intake may have something to do with his CHF progression but overall it is much more likely due to end-of-life fast approaching.  I see that he has just been to see palliative care which I think is certainly most appropriate and I do not think he would benefit from insertion of a defibrillator.    I will do what I can for him in clinic.  His blood pressure is just over 100 systolic which certainly does not need much more room for diuresis.  Unless I will give it a try with home diuresis first.  He is on Demadex 10 mg and I have asked him to double it to 20 taking an extra dose after lunch to avoid worsening nocturia I have also prescribed 20 mg of K-Dur to take.  They do have a blood pressure monitoring at home and advised his wife to measure his blood pressure before giving his medications in the morning and to hold metoprolol should his systolic fall below 100.  His fluid intake should be no more than 1.5 to 2 L/day in total and asked him to cut his beer to 1/day.      The above may improve his situation a little and I have arranged for him to see one of my colleagues in about a week's time with a basic metabolic panel at clinic visit.  If the situation does not improve much perhaps consideration may be given to brief hospitalization for intravenous diuretic medics or even shot of intravenous diuretics as an outpatient.    I believe the family is aware of how guarded his prognosis is..              Orders Placed This Encounter   Procedures     Basic metabolic panel     Follow-Up with Cardiology FITZ       Orders Placed This Encounter   Medications     Saccharomyces boulardii (PROBIOTIC) 250 MG CAPS     Cyanocobalamin (B-12) 100 MCG  TABS     potassium chloride ER (KLOR-CON M) 20 MEQ CR tablet     Sig: Take 1 tablet (20 mEq) by mouth 2 times daily     Dispense:  30 tablet     Refill:  3       Encounter Diagnoses   Name Primary?     Bilateral lower extremity edema      Chronic systolic congestive heart failure (H)      Ischemic cardiomyopathy        CURRENT MEDICATIONS:  Current Outpatient Medications   Medication Sig Dispense Refill     apixaban ANTICOAGULANT (ELIQUIS) 5 MG tablet Take 1 tablet (5 mg) by mouth 2 times daily 60 tablet 11     Cyanocobalamin (B-12) 100 MCG TABS        hydrocortisone, Perianal, (HYDROCORTISONE) 2.5 % cream Place rectally 2 times daily as needed for hemorrhoids 30 g 0     levothyroxine (SYNTHROID/LEVOTHROID) 25 MCG tablet Take 1 tablet (25 mcg) by mouth daily 45 tablet 0     loperamide (IMODIUM) 2 MG capsule Take 1-2 mg by mouth as needed for diarrhea       melatonin 5 MG tablet Take 5 mg by mouth nightly as needed for sleep       metoprolol succinate ER (TOPROL XL) 25 MG 24 hr tablet Take 1 tablet (25 mg) by mouth daily 90 tablet 1     mirtazapine (REMERON) 7.5 MG tablet Take 1 tablet (7.5 mg) by mouth At Bedtime 30 tablet 1     nitroGLYcerin (NITROSTAT) 0.4 MG sublingual tablet For chest pain place 1 tablet under the tongue every 5 minutes for 3 doses. If symptoms persist 5 minutes after 1st dose call 911. 25 tablet 3     nystatin (MYCOSTATIN) 556763 UNIT/GM external powder Apply topically 2 times daily as needed for other (Excoriation) 60 g 1     potassium chloride ER (KLOR-CON M) 20 MEQ CR tablet Take 1 tablet (20 mEq) by mouth 2 times daily 30 tablet 3     QUEtiapine (SEROQUEL) 25 MG tablet TAKE 1/2 TABLET BY MOUTH EVERY DAY AS NEEDED FOR ANXIETY 30 tablet 0     Saccharomyces boulardii (PROBIOTIC) 250 MG CAPS        torsemide (DEMADEX) 10 MG tablet Take 1 tablet (10 mg) by mouth daily 30 tablet 0     Vitamin D3 (CHOLECALCIFEROL) 125 MCG (5000 UT) tablet Take 1 tablet by mouth daily         ALLERGIES      Allergies   Allergen Reactions     Atorvastatin Diarrhea     Intolerance to most statin medications        PAST MEDICAL HISTORY:  Past Medical History:   Diagnosis Date     Adjustment disorder      Carotid stenosis, bilateral     left CEA      Chronic atrial fibrillation (H)     warfarin     COPD (chronic obstructive pulmonary disease) (H) 2021     Coronary artery disease     cardiac cath : chronic occlusion of circumflex and apical LAD: medical management; cath : stent to LAD, historical: stent to RCA     History of blood transfusion      Hypertension      Ischemic cardiomyopathy     ef 25%     Pulmonary nodule        PAST SURGICAL HISTORY:  Past Surgical History:   Procedure Laterality Date     angiogram  2014    cardiac cath 2014: chronic occlusion of circumflex and apical LAD: medical management     ANGIOGRAM      stent to LAD     ANGIOGRAM      stent to RCA     COLONOSCOPY           COLONOSCOPY N/A 2018    Procedure: COMBINED COLONOSCOPY, SINGLE OR MULTIPLE BIOPSY/POLYPECTOMY BY BIOPSY;  colonoscopy;  Surgeon: Tyler Dee MD;  Location:  GI     GI SURGERY      hemorrhoidectomy     IR VISCERAL ANGIOGRAM  2019     VASCULAR SURGERY      left CEA       FAMILY HISTORY:  Family History   Problem Relation Age of Onset     Heart Disease Father        SOCIAL HISTORY:  Social History     Socioeconomic History     Marital status:      Spouse name: None     Number of children: None     Years of education: None     Highest education level: None   Tobacco Use     Smoking status: Former     Types: Cigarettes     Quit date: 2003     Years since quittin.6     Smokeless tobacco: Never   Vaping Use     Vaping Use: Never used   Substance and Sexual Activity     Alcohol use: Yes     Comment: 1-2 beer daily     Drug use: No     Sexual activity: Not Currently     Partners: Female       Review of Systems:  Skin:  Negative     Eyes:  Positive for glasses;visual  "blurring;double vision  ENT:  Negative    Respiratory:  Positive for shortness of breath;cough;wheezing  Cardiovascular:    Positive for;dizziness;fatigue;edema  Gastroenterology: Negative    Genitourinary:  Negative    Musculoskeletal:  Positive for joint pain  Neurologic:  Negative    Psychiatric:  Negative    Heme/Lymph/Imm:  Negative    Endocrine:  Positive for thyroid disorder    Physical Exam:  Vitals: /75 (BP Location: Right arm, Patient Position: Sitting)   Pulse 86   Ht 1.798 m (5' 10.8\")   Wt 63.3 kg (139 lb 8 oz)   SpO2 97%   BMI 19.57 kg/m      Constitutional:    cachectic;frail;chronically ill      Skin:    venous stasis changes        Head:  normocephalic        Eyes:  conjunctivae and lids unremarkable        Lymph:No Cervical lymphadenopathy present     ENT:           Neck:    JVP >12      Respiratory:  normal symmetry;clear to auscultation         Cardiac: apical impulse not displaced       grade 3;systolic murmur;LLSB          pulses below the femoral arteries are diminished                                      GI:  abdomen soft        Extremities and Muscular Skeletal:        RLE edema;2+ LLE edema;1+      Neurological:           Psych:  Alert and Oriented x 3        Recent Lab Results:  LIPID RESULTS:  Lab Results   Component Value Date    CHOL 130 01/18/2023    CHOL 223 (H) 06/10/2019    HDL 31 (L) 01/18/2023    HDL 96 06/10/2019    LDL 82 01/18/2023     (H) 06/10/2019    TRIG 87 01/18/2023    TRIG 89 06/10/2019    CHOLHDLRATIO 2.1 04/27/2005       LIVER ENZYME RESULTS:  Lab Results   Component Value Date    AST 56 (H) 02/03/2023    AST 61 (H) 04/17/2021    ALT 34 02/03/2023    ALT 46 04/17/2021       CBC RESULTS:  Lab Results   Component Value Date    WBC 5.7 02/03/2023    WBC 10.5 04/17/2021    RBC 4.10 (L) 02/03/2023    RBC 4.42 04/17/2021    HGB 14.0 02/03/2023    HGB 14.4 04/17/2021    HCT 39.7 (L) 02/03/2023    HCT 41.7 04/17/2021    MCV 97 02/03/2023    MCV 94 04/17/2021 "    MCH 34.1 (H) 02/03/2023    MCH 32.6 04/17/2021    MCHC 35.3 02/03/2023    MCHC 34.5 04/17/2021    RDW 17.6 (H) 02/03/2023    RDW 14.5 04/17/2021     02/03/2023     04/17/2021       BMP RESULTS:  Lab Results   Component Value Date     02/03/2023     04/17/2021    POTASSIUM 3.8 02/03/2023    POTASSIUM 4.0 01/01/2023    POTASSIUM 3.7 04/17/2021    CHLORIDE 99 02/03/2023    CHLORIDE 104 01/01/2023    CHLORIDE 106 04/17/2021    CO2 23 02/03/2023    CO2 22 01/01/2023    CO2 27 04/17/2021    ANIONGAP 14 02/03/2023    ANIONGAP 7 01/01/2023    ANIONGAP 3 04/17/2021    GLC 87 02/03/2023     (H) 01/01/2023    GLC 89 04/17/2021    BUN 20.8 02/03/2023    BUN 23 01/01/2023    BUN 17 04/17/2021    CR 0.83 02/03/2023    CR 0.94 04/17/2021    GFRESTIMATED 88 02/03/2023    GFRESTIMATED 76 04/17/2021    GFRESTBLACK 89 04/17/2021    RANDY 9.6 02/03/2023    RANDY 8.9 04/17/2021        A1C RESULTS:  No results found for: A1C    INR RESULTS:  Lab Results   Component Value Date    INR 1.1 04/04/2022    INR 2.2 (H) 03/07/2022    INR 2.08 (H) 01/17/2022    INR 2.00 (H) 06/28/2021    INR 1.80 (H) 06/14/2021           CC  Phuc Benton, APRN CNP  [address]  [place] [address]    Thank you for allowing me to participate in the care of your patient.      Sincerely,     DR YAZMIN BRADLEY MD     [facility]

## 2023-03-15 PROBLEM — S72.141A CLOSED INTERTROCHANTERIC FRACTURE OF HIP, RIGHT, INITIAL ENCOUNTER (H): Status: ACTIVE | Noted: 2023-01-01

## 2023-03-15 PROBLEM — S72.141A CLOSED DISPLACED INTERTROCHANTERIC FRACTURE OF RIGHT FEMUR, INITIAL ENCOUNTER (H): Status: ACTIVE | Noted: 2023-01-01

## 2023-03-15 PROBLEM — J90 PLEURAL EFFUSION: Status: ACTIVE | Noted: 2023-01-01

## 2023-03-15 PROBLEM — R79.89 ELEVATED LACTIC ACID LEVEL: Status: ACTIVE | Noted: 2023-01-01

## 2023-03-16 NOTE — CONSULTS
"[facility]    Orthopedic Consultation    Rm Villarreal MRN# [id number]   Age: 82 year old YOB: 1940     Date of Admission:  3/15/2023    Reason for consult: Right intertrochanteric femur fracture        Requesting physician: Dr. Edmond Saldana        Level of consult: Consult, follow and place orders           Assessment and Plan:   Assessment:   Right intertrochanteric femur fracture, osteoporotic fracture       Plan:   The patient's history and clinical/diagnostic findings were reviewed with the on-call orthopedic trauma surgeon. The patient is a candidate for a  will be scheduled for IM nail pending surgical optimization by hospital team. Dr. Brady James will discuss the risks, benefits, and outcomes of surgery while obtaining consent.     Surgeon:  NPO effective now.  NWB/bedrest until postop.  Continue pain regimen.  Anticoagulation: hold Eliquis   Vitamin D ordered  Type and screen ordered.    Discussed with hospitalist regarding patient's cardiac and lung fragility. He will contact anesthesia.     Please contact orthopedic trauma team if any questions or concerns arise.           Chief Complaint:   Intertrochanteric fracture         History of Present Illness:   This patient is a 82 year old male who presents with the following condition requiring a hospital admission: PMH significant for mild dementia, CAD, hypothyroidism, bilateral carotid stenosis, chronic atrial fibrillation on apixaban, COPD, hypertension, and ischemic cardiomyopathy who presented to the ED after missing a step and falling down the stairs at home and resultant R hip pain.    intertrochanteric proximal femur fracture secondary to mechanical fall  Pt and spouse report that he fell less than two steps after the staircase turns. Patient denies head trauma or loss of consciousness and reports tripping while going down the steps.  He does endorse having \"a few beers\" earlier this evening but " denied any prodrome. Spouse reports he had two beers but did not finish either of them. CT head and CT cervical spine negative for acute pathology.  Right shoulder x-ray negative for acute pathology.  Is otherwise neurologically intact in the ED upon admission.      Per family in room, Zoran has progressive dementia since his initial diagnosis in . He does walk independently, with no cane or assistive device. He does take Eliquis.             Past Medical History:     Past Medical History:   Diagnosis Date     Adjustment disorder      Carotid stenosis, bilateral     left CEA      Chronic atrial fibrillation (H)     warfarin     COPD (chronic obstructive pulmonary disease) (H) 2021     Coronary artery disease     cardiac cath : chronic occlusion of circumflex and apical LAD: medical management; cath : stent to LAD, historical: stent to RCA     History of blood transfusion      Hypertension      Ischemic cardiomyopathy     ef 25%     Pulmonary nodule              Past Surgical History:     Past Surgical History:   Procedure Laterality Date     angiogram  2014    cardiac cath 2014: chronic occlusion of circumflex and apical LAD: medical management     ANGIOGRAM      stent to LAD     ANGIOGRAM      stent to RCA     COLONOSCOPY           COLONOSCOPY N/A 2018    Procedure: COMBINED COLONOSCOPY, SINGLE OR MULTIPLE BIOPSY/POLYPECTOMY BY BIOPSY;  colonoscopy;  Surgeon: Tyler Dee MD;  Location:  GI     GI SURGERY      hemorrhoidectomy     IR VISCERAL ANGIOGRAM  2019     VASCULAR SURGERY      left CEA             Social History:     Social History     Tobacco Use     Smoking status: Former     Types: Cigarettes     Quit date: 2003     Years since quittin.6     Smokeless tobacco: Never   Substance Use Topics     Alcohol use: Yes     Comment: 1-2 beer daily             Family History:     Family History   Problem Relation Age of Onset     Heart Disease Father               Immunizations:     VACCINE/DOSE   Diptheria   DPT   DTAP   HBIG   Hepatitis A   Hepatitis B   HIB   Influenza   Measles   Meningococcal   MMR   Mumps   Pneumococcal   Polio   Rubella   Small Pox   TDAP   Varicella   Zoster             Allergies:     Allergies   Allergen Reactions     Atorvastatin Diarrhea     Intolerance to most statin medications              Medications:     Current Facility-Administered Medications   Medication     acetaminophen (TYLENOL) tablet 650 mg    Or     acetaminophen (TYLENOL) Suppository 650 mg     bisacodyl (DULCOLAX) suppository 10 mg     hydrocortisone (Perianal) (ANUSOL-HC) 2.5 % cream     ipratropium - albuterol 0.5 mg/2.5 mg/3 mL (DUONEB) neb solution 3 mL     levothyroxine (SYNTHROID/LEVOTHROID) tablet 25 mcg     lidocaine (LMX4) cream     lidocaine 1 % 0.1-1 mL     melatonin tablet 1 mg     metoprolol succinate ER (TOPROL XL) 24 hr tablet 25 mg     mirtazapine (REMERON) tablet TABS 7.5 mg     naloxone (NARCAN) injection 0.2 mg    Or     naloxone (NARCAN) injection 0.4 mg    Or     naloxone (NARCAN) injection 0.2 mg    Or     naloxone (NARCAN) injection 0.4 mg     ondansetron (ZOFRAN ODT) ODT tab 4 mg    Or     ondansetron (ZOFRAN) injection 4 mg     oxyCODONE (ROXICODONE) tablet 5 mg     polyethylene glycol (MIRALAX) Packet 17 g     QUEtiapine (SEROquel) half-tab 12.5-25 mg     QUEtiapine (SEROquel) quarter-tab 18.75 mg     senna-docusate (SENOKOT-S/PERICOLACE) 8.6-50 MG per tablet 1 tablet    Or     senna-docusate (SENOKOT-S/PERICOLACE) 8.6-50 MG per tablet 2 tablet     sodium chloride (PF) 0.9% PF flush 3 mL     sodium chloride (PF) 0.9% PF flush 3 mL             Review of Systems:   10 point ROS neg other than the symptoms noted above in the HPI.          Physical Exam:   All vitals have been reviewed  Patient Vitals for the past 24 hrs:   BP Temp Temp src Pulse Resp SpO2 Height Weight   03/16/23 0737 135/85 97.9  F (36.6  C) Oral 98 16 (!) 84 % -- --   03/16/23  0148 (!) 139/94 97.9  F (36.6  C) Oral 115 16 96 % -- --   03/15/23 2300 117/89 97.8  F (36.6  C) Oral 96 16 95 % -- --   03/15/23 2100 113/85 -- -- 96 -- 90 % -- --   03/15/23 1940 (!) 140/107 (!) 96.6  F (35.9  C) Temporal 88 16 (!) 87 % 1.829 m (6') 77.1 kg (170 lb)       Intake/Output Summary (Last 24 hours) at 3/16/2023 1137  Last data filed at 3/16/2023 0740  Gross per 24 hour   Intake --   Output 300 ml   Net -300 ml     Recent Labs   Lab 03/16/23  0730 03/15/23  1941   HGB 13.3 14.5       Constitutional: Pleasant, progressive dementia  HEENT: Head atraumatic normocephalic. Pupils equal round and reactive.  Respiratory: Unlabored breathing no audible wheeze  Cardiovascular: Regular rate and rhythm per pulses.  GI: Abdomen is non-distended.  Lymph/Hematologic: No lymphadenopathy in areas examined.  Genitourinary: No vazquez  Skin: No rashes, no cyanosis, no edema.  Musculoskeletal: Skin intact. No erythema, or ecchymosis noted. Sensation intact. Pitting edema RLE greater than LLE. Dorsalis Pedis pulse intact.   Neurologic: normal without focal findings, SHERINE, reflexes normal and symmetric            Data:   All laboratory data reviewed  Results for orders placed or performed during the hospital encounter of 03/15/23   Head CT w/o contrast     Status: None    Narrative    EXAM: CT HEAD W/O CONTRAST  LOCATION: [facility]  DATE/TIME: 3/15/2023 8:29 PM    INDICATION: Fall, trauma, pain.  COMPARISON: CT 06/29/2022.  TECHNIQUE: Routine CT Head without IV contrast. Multiplanar reformats. Dose reduction techniques were used.    FINDINGS:  INTRACRANIAL CONTENTS: No intracranial hemorrhage, extraaxial collection, or mass effect.  No CT evidence of acute infarct. Mild presumed chronic small vessel ischemic changes. Mild to moderate generalized volume loss. No hydrocephalus. The sella is   unremarkable for technique. The cerebellar tonsils are appropriately positioned.    VISUALIZED  ORBITS/SINUSES/MASTOIDS: No intraorbital abnormality. No paranasal sinus mucosal disease. No middle ear or mastoid effusion.    BONES/SOFT TISSUES: No scalp hematoma. No skull fracture.      Impression    IMPRESSION:  1.  No evidence of acute intracranial hemorrhage.  2.  No evidence of acute calvarial fracture.  3.  Age-related changes.   XR Shoulder Right G/E 3 Views     Status: None    Narrative    EXAM: XR SHOULDER RIGHT G/E 3 VIEWS  LOCATION: [facility]  DATE/TIME: 3/15/2023 8:42 PM    INDICATION: fall, pain  COMPARISON: None.      Impression    IMPRESSION: No acute fracture or malalignment. Mild glenohumeral and moderate acromioclavicular joint degenerative changes. Osteopenia. Aortic atherosclerosis.   Cervical spine CT w/o contrast     Status: None    Narrative    EXAM: CT CERVICAL SPINE W/O CONTRAST  LOCATION: [facility]  DATE/TIME: 3/15/2023 8:30 PM    INDICATION: Fall, trauma, pain.  COMPARISON: Radiograph 07/25/2019.  TECHNIQUE: Routine CT Cervical Spine without IV contrast. Multiplanar reformats. Dose reduction techniques were used.    FINDINGS:  ALIGNMENT: Mild retrolisthesis at C4-C5 C5-C6. Symmetric facet alignment. Unremarkable alignment of the craniocervical junction.    BONES: The occipital condyles appear intact. Vertebral body heights are unremarkable. There is no evidence of acute displaced fractures. No destructive bony lesions are apparent.    DISCS: Moderate multilevel spondylosis. This is most pronounced at C4-C7, where there is intervertebral disc height loss and disc-osteophyte complex formation. There is a prominent central protrusion at C3-C4.    SPINAL CANAL: No high-grade stenosis.    NEURAL FORAMINA: Moderate right C3-C4, moderate left C4-C5, moderate bilateral C5-C6, and moderate right C6-C7 neural foraminal stenosis.    SOFT TISSUES: No prevertebral soft tissue thickening. Unremarkable paraspinal soft tissues.    UPPER THORAX:  Partially imaged right pleural effusion.      Impression     IMPRESSION:  1.  No evidence of acute displaced fracture.  2.  No evidence of traumatic subluxation.  3.  Level degenerative change.  4.  Partially imaged right pleural effusion.   Chest XR,  PA & LAT     Status: None    Narrative    EXAM: XR CHEST 2 VIEWS  LOCATION: [facility]  DATE/TIME: 3/15/2023 8:44 PM    INDICATION: Shortness of breath. Likely COPD.  COMPARISON: 03/06/2023      Impression    IMPRESSION:     Increased moderate right pleural effusion with adjacent compressive atelectasis. Probable trace left pleural effusion with mild left basilar atelectasis. Otherwise, no focal airspace disease. No pneumothorax.    Stable cardiomegaly.    Multilevel degenerative changes of the spine. Left humeral intramedullary nail, incompletely imaged.   XR Pelvis w Hip Right 1 View     Status: None    Narrative    EXAM: XR PELVIS AND HIP RIGHT 1 VIEW  LOCATION: [facility]  DATE/TIME: 3/15/2023 8:45 PM    INDICATION: Fall, hip pain.  COMPARISON: None.      Impression    IMPRESSION: Mildly displaced and slightly varus angulated intertrochanteric right proximal femur fracture. No dislocation of the right femoral head from the acetabulum. Lag screw fixation across the left SI joint from left to right. Chronic healed   fracture deformities of the right superior and inferior pubic rami. Mild bilateral hip osteoarthritis. No acute displaced pelvic fracture. Embolization coils project along the right pelvis. Degenerative change lower lumbar spine and both SI joints.   Extensive arterial calcification.    NOTE: ABNORMAL REPORT    THE DICTATION ABOVE DESCRIBES AN ABNORMALITY FOR WHICH FOLLOW-UP IS NEEDED.    XR Femur Right 2 Views     Status: None    Narrative    EXAM: XR FEMUR RIGHT 2 VIEWS  LOCATION: [facility]  DATE/TIME: 3/15/2023 10:00 PM    INDICATION: hip fracture, need full femur  xray  COMPARISON: None.      Impression    IMPRESSION: There is an acute mildly displaced comminuted advanced vascular calcification. Embolic coils are seen in the lower right hemipelvis. The right femur is otherwise normal. Lower intertrochanteric fracture involving the right hip.   CBC with platelets and differential     Status: Abnormal   Result Value Ref Range    WBC Count 5.1 4.0 - 11.0 10e3/uL    RBC Count 3.97 (L) 4.40 - 5.90 10e6/uL    Hemoglobin 14.5 13.3 - 17.7 g/dL    Hematocrit 41.5 40.0 - 53.0 %     (H) 78 - 100 fL    MCH 36.5 (H) 26.5 - 33.0 pg    MCHC 34.9 31.5 - 36.5 g/dL    RDW 17.8 (H) 10.0 - 15.0 %    Platelet Count 178 150 - 450 10e3/uL    % Neutrophils 61 %    % Lymphocytes 21 %    % Monocytes 16 %    % Eosinophils 1 %    % Basophils 1 %    % Immature Granulocytes 0 %    NRBCs per 100 WBC 0 <1 /100    Absolute Neutrophils 3.1 1.6 - 8.3 10e3/uL    Absolute Lymphocytes 1.1 0.8 - 5.3 10e3/uL    Absolute Monocytes 0.8 0.0 - 1.3 10e3/uL    Absolute Eosinophils 0.1 0.0 - 0.7 10e3/uL    Absolute Basophils 0.1 0.0 - 0.2 10e3/uL    Absolute Immature Granulocytes 0.0 <=0.4 10e3/uL    Absolute NRBCs 0.0 10e3/uL   [place] Draw     Status: None    Narrative    The following orders were created for panel order [place] Draw.  Procedure                               Abnormality         Status                     ---------                               -----------         ------                     Extra Blue Top Tube[[id number]]                              Final result                 Please view results for these tests on the individual orders.   Extra Blue Top Tube     Status: None   Result Value Ref Range    Hold Specimen JIC    iStat Gases (lactate) venous, POCT     Status: Abnormal   Result Value Ref Range    Lactic Acid POCT 4.8 (HH) <=2.0 mmol/L    Bicarbonate Venous POCT 26 21 - 28 mmol/L    O2 Sat, Venous POCT 42 (L) 94 - 100 %    pCO2V Venous POCT 47 40 - 50 mm Hg    pH Venous POCT 7.34 7.32 -  7.43    pO2 Venous POCT 25 25 - 47 mm Hg   Basic metabolic panel     Status: Abnormal   Result Value Ref Range    Sodium 133 (L) 136 - 145 mmol/L    Potassium 3.9 3.4 - 5.3 mmol/L    Chloride 97 (L) 98 - 107 mmol/L    Carbon Dioxide (CO2) 26 22 - 29 mmol/L    Anion Gap 10 7 - 15 mmol/L    Urea Nitrogen 20.4 8.0 - 23.0 mg/dL    Creatinine 0.82 0.67 - 1.17 mg/dL    Calcium 8.7 (L) 8.8 - 10.2 mg/dL    Glucose 101 (H) 70 - 99 mg/dL    GFR Estimate 89 >60 mL/min/1.73m2   Alcohol level blood     Status: Normal   Result Value Ref Range    Alcohol ethyl <0.01 <=0.01 g/dL   BNP     Status: Abnormal   Result Value Ref Range    N terminal Pro BNP Inpatient 11,226 (H) 0 - 1,800 pg/mL   Procalcitonin     Status: Normal   Result Value Ref Range    Procalcitonin 0.04 <0.05 ng/mL   Lactic acid whole blood     Status: Normal   Result Value Ref Range    Lactic Acid 1.9 0.7 - 2.0 mmol/L   Basic metabolic panel     Status: Abnormal   Result Value Ref Range    Sodium 135 (L) 136 - 145 mmol/L    Potassium 4.3 3.4 - 5.3 mmol/L    Chloride 98 98 - 107 mmol/L    Carbon Dioxide (CO2) 26 22 - 29 mmol/L    Anion Gap 11 7 - 15 mmol/L    Urea Nitrogen 19.7 8.0 - 23.0 mg/dL    Creatinine 0.77 0.67 - 1.17 mg/dL    Calcium 8.8 8.8 - 10.2 mg/dL    Glucose 95 70 - 99 mg/dL    GFR Estimate >90 >60 mL/min/1.73m2   CBC with platelets     Status: Abnormal   Result Value Ref Range    WBC Count 6.7 4.0 - 11.0 10e3/uL    RBC Count 3.66 (L) 4.40 - 5.90 10e6/uL    Hemoglobin 13.3 13.3 - 17.7 g/dL    Hematocrit 37.6 (L) 40.0 - 53.0 %     (H) 78 - 100 fL    MCH 36.3 (H) 26.5 - 33.0 pg    MCHC 35.4 31.5 - 36.5 g/dL    RDW 16.7 (H) 10.0 - 15.0 %    Platelet Count 137 (L) 150 - 450 10e3/uL   RBC and Platelet Morphology     Status: Abnormal   Result Value Ref Range    Platelet Assessment  Automated Count Confirmed. Platelet morphology is normal.     Automated Count Confirmed. Platelet morphology is normal.    [place] Cells Slight (A) None Seen    RBC Morphology  Confirmed RBC Indices    CBC with platelets + differential     Status: Abnormal    Narrative    The following orders were created for panel order CBC with platelets + differential.  Procedure                               Abnormality         Status                     ---------                               -----------         ------                     CBC with platelets and d...[[id number]]  Abnormal            Final result                 Please view results for these tests on the individual orders.          Attestation:  I have reviewed today's vital signs, notes, medications, labs and imaging.   Amount of time performed on this consult: 50 minutes.    Chelita Diop PA-C  [facility]

## 2023-03-16 NOTE — PROGRESS NOTES
Patient arrived to preop calm cooperative and able to participate in his cares. Upon completing the nerve block he was unable to tolerate the procedure. Plan to complete the [place] and Nerve block in OR. Wife updated and agreeable. Patient sleeping when not disturbed.

## 2023-03-16 NOTE — ANESTHESIA POSTPROCEDURE EVALUATION
Patient: Rm Villarreal    Procedure: Procedure(s):  RIGHT OPEN REDUCTION INTERNAL FIXATION INTERTROCHANTERIC HIP FRACTURE       Anesthesia Type:  General    Note:  Disposition: Inpatient   Postop Pain Control: Uneventful            Sign Out: Well controlled pain   PONV: No   Neuro/Psych: Uneventful            Sign Out: Acceptable/Baseline neuro status   Airway/Respiratory: Uneventful            Sign Out: Acceptable/Baseline resp. status   CV/Hemodynamics: Uneventful            Sign Out: Acceptable CV status; No obvious hypovolemia; No obvious fluid overload   Other NRE: NONE   DID A NON-ROUTINE EVENT OCCUR? No           Last vitals:  Vitals Value Taken Time   /72 03/16/23 1615   Temp 36.8  C (98.2  F) 03/16/23 1545   Pulse 104 03/16/23 1627   Resp 16 03/16/23 1627   SpO2 100 % 03/16/23 1627   Vitals shown include unvalidated device data.    Electronically Signed By: Ayush Zarate MD  March 16, 2023  4:29 PM

## 2023-03-16 NOTE — PROGRESS NOTES
RECEIVING UNIT ED HANDOFF REVIEW    ED Nurse Handoff Report was reviewed by: Keyla Mckinnon RN on March 16, 2023 at 1:17 AM

## 2023-03-16 NOTE — PLAN OF CARE
Goal Outcome Evaluation:   Patient arrived from PACU @1730. Patient is alert and oriented to self today.  VSS on 5L oxygen  via oxy mask with sat 97%. On regular diet. IV LR infusing 75 ml/hr. Patient was agitated and restless, prn IV dilaudid and scheduled acetaminophen was given with effective results. Dressing dry and intact Safety round provided .Incontinent of Bowel, external cath in place.  Continue to monitor.

## 2023-03-16 NOTE — PHARMACY-ADMISSION MEDICATION HISTORY
Pharmacy Medication History  Admission medication history interview status for the 3/15/2023  admission is complete. See EPIC admission navigator for prior to admission medications     Location of Interview: Patient room  Medication history sources: Patient's family/friend (wife) and Surescripts    Significant changes made to the medication list:  Added:    - miralax   - quetiapine (wife breaks 25mg tablet into 3/4 (18.75 mg) to give nightly  Changed:   - torsemide 10 mg daily--> 10 mg twice daily   - potassium 20 mg twice daily--> once daily   - mirtazapine 15 mg at bedtime--> 7.5 mg at bedtime    In the past week, patient estimated taking medication this percent of the time: greater than 90%    Medication Affordability:  Not including over the counter (OTC) medications, was there a time in the past 12 months when you did not take your medications as prescribed because of cost?: No    Additional medication history information:   none    Medication reconciliation completed by provider prior to medication history? No    Time spent in this activity: 20 minutes    Prior to Admission medications    Medication Sig Last Dose Taking? Auth Provider Long Term End Date   apixaban ANTICOAGULANT (ELIQUIS) 5 MG tablet Take 1 tablet (5 mg) by mouth 2 times daily 3/15/2023 at am Yes Inge Hollis APRN CNP     Cyanocobalamin (B-12) 100 MCG TABS Take 1 tablet by mouth daily 3/15/2023 at am Yes Reported, Patient     hydrocortisone, Perianal, (HYDROCORTISONE) 2.5 % cream Place rectally 2 times daily as needed for hemorrhoids PRN Yes Greg Royal MD     levothyroxine (SYNTHROID/LEVOTHROID) 25 MCG tablet Take 1 tablet (25 mcg) by mouth daily 3/15/2023 at am Yes Greg Royal MD Yes    loperamide (IMODIUM) 2 MG capsule Take 1-2 mg by mouth as needed for diarrhea PRN Yes Reported, Patient     melatonin 5 MG tablet Take 5 mg by mouth At Bedtime 3/14/2023 at pm Yes Reported, Patient     metoprolol succinate ER (TOPROL XL) 25  MG 24 hr tablet Take 1 tablet (25 mg) by mouth daily 3/15/2023 at am Yes Inge Hollis, SAMANTHA CNP Yes    mirtazapine (REMERON) 7.5 MG tablet Take 1 tablet (7.5 mg) by mouth At Bedtime 3/14/2023 at pm Yes Greg Royal MD Yes    nitroGLYcerin (NITROSTAT) 0.4 MG sublingual tablet For chest pain place 1 tablet under the tongue every 5 minutes for 3 doses. If symptoms persist 5 minutes after 1st dose call 911. PRN Yes Inge Hollis, SAMANTHA CNP Yes    nystatin (MYCOSTATIN) 188580 UNIT/GM external powder Apply topically 2 times daily as needed for other (Excoriation) PRN Yes Greg Royal MD     polyethylene glycol (MIRALAX) 17 g packet Take 1 packet by mouth daily as needed for constipation PRN Yes Unknown, Entered By History     potassium chloride ER (KLOR-CON M) 20 MEQ CR tablet Take 1 tablet (20 mEq) by mouth 2 times daily  Patient taking differently: Take 20 mEq by mouth daily 3/15/2023 at am Yes Valentino Soni MD     QUEtiapine (SEROQUEL) 25 MG tablet Take 18.75 mg by mouth At Bedtime Wife breaks tablet and gives 3/4 of a tablet at bedtime 3/14/2023 at pm Yes Unknown, Entered By History Yes    QUEtiapine (SEROQUEL) 25 MG tablet TAKE 1/2 TABLET BY MOUTH EVERY DAY AS NEEDED FOR ANXIETY  Patient taking differently: TAKE 1/2- 1 TABLET BY MOUTH EVERY DAY AS NEEDED FOR ANXIETY PRN Yes Greg Royal MD Yes    Saccharomyces boulardii (PROBIOTIC) 250 MG CAPS Take 250 mg by mouth daily 3/15/2023 at am Yes Reported, Patient     torsemide (DEMADEX) 10 MG tablet Take 1 tablet (10 mg) by mouth daily  Patient taking differently: Take 10 mg by mouth 2 times daily 3/15/2023 at am Yes Phuc Benton, SAMANTHA CNP Yes    Vitamin D3 (CHOLECALCIFEROL) 125 MCG (5000 UT) tablet Take 1 tablet by mouth daily 3/15/2023 at am Yes Reported, Patient         The information provided in this note is only as accurate as the sources available at the time of update(s)

## 2023-03-16 NOTE — ANESTHESIA PROCEDURE NOTES
"Fascia iliaca Procedure Note    Pre-Procedure   Staff -        Anesthesiologist:  Bautista Urena MD       Performed By: anesthesiologist       Location: OR       Pre-Anesthestic Checklist: patient identified, IV checked, site marked, risks and benefits discussed, informed consent, monitors and equipment checked, pre-op evaluation, at physician/surgeon's request and post-op pain management  Timeout:       Correct Patient: Yes        Correct Procedure: Yes        Correct Site: Yes        Correct Position: Yes        Correct Laterality: Yes        Site Marked: Yes  Procedure Documentation  Procedure: Fascia iliaca       Laterality: right       Patient Position: supine       Patient Prep/Sterile Barriers: sterile gloves, mask       Skin prep: Chloraprep       Local skin infiltrated with mL of 1% lidocaine.        Needle Gauge: 22.        Needle Length (millimeters): 80        Ultrasound guided       1. Ultrasound was used to identify targeted nerve, plexus, vascular marker, or fascial plane and place a needle adjacent to it in real-time.       2. Ultrasound was used to visualize the spread of anesthetic in close proximity to the above referenced structure.       3. A permanent image is entered into the patient's record.       4. The visualized anatomic structures appeared normal.       5. There were no apparent abnormal pathologic findings.    Assessment/Narrative         The placement was negative for: blood aspirated, painful injection and site bleeding       Paresthesias: No.       Bolus given via needle..        Secured via.        Insertion/Infusion Method: Single Shot       Complications: none    Medication(s) Administered   Bupivacaine 0.5% w/ 1:400K Epi (Injection) - Injection   30 mL - 3/16/2023 2:26:00 PM    FOR [facility] ([facility] [place]) ONLY:   Pain Team Contact information: please page the Pain Team Via [employer]. Search \"Pain\". During daytime hours, please page the attending first. At night please page the "  first.

## 2023-03-16 NOTE — PLAN OF CARE
Goal Outcome Evaluation:                      Summary:  Acute  Right inter trochanteric proximal femur fracture secondary to mechanical fall.    Alert to self only. Confuse. Patient at times talking to self. VSS, on 3 L oxygen. LE. PRN Oxycodone and Tylenol for right hip pain, cold pack applied. Strict bedrest. NPO except for ice chips and meds. Right hip +3 edema, right leg +2 edema and left leg + 1 edema. Compression stockings on BLE. Patient restless at times, removing his clothes and oxygen tubing but not aggitated. Ortho surgery consult. Plan for surgery today in the afternoon, no time fixed yet.

## 2023-03-16 NOTE — ANESTHESIA PROCEDURE NOTES
Arterial Line Procedure Note    Pre-Procedure   Staff -        Anesthesiologist:  Bautista Urena MD       Performed By: Anesthesiologist       Location: pre-op       Pre-Anesthestic Checklist: patient identified, IV checked, site marked, risks and benefits discussed, informed consent, monitors and equipment checked and pre-op evaluation  Timeout:       Correct Patient: Yes        Correct Procedure: Yes        Correct Site: Yes        Correct Position: Yes   Line Placement:   This line was placed Post Induction  Procedure   Procedure: arterial line       Laterality: left       Insertion Site: radial.  Sterile Prep        All elements of maximal sterile barrier technique followed       Patient Prep/Sterile Barriers: hand hygiene, sterile gloves, hat, mask       Skin prep: Chloraprep  Insertion/Injection        Technique: Seldinger Technique        Catheter Type/Size: 20 gauge, 1.75 in/4.5 cm quick cath (integral wire)  Narrative        Tegaderm dressing used.       Arterial waveform: Yes    Comments:  Site sterilely prepped with Chloraprep.  Lidocaine 1% used for skin topicalization.  Left radial artery palpated.  Arrow catheter advanced into radial artery, with return of bright red blood.  Pulsatile waveform on monitor.  Patient tolerate procedure well.

## 2023-03-16 NOTE — PROGRESS NOTES
"[facility]    Medicine Progress Note - Hospitalist Service    Date of Admission:  3/15/2023    Assessment & Plan      Rm Villarreal is a 82 year old male with history of mild dementia (lives at home with spouse), hypothyroidism, CAD, bilateral carotid stenosis, chronic atrial fibrillation on apixaban, COPD, hypertension, and ischemic cardiomyopathy who presented to the ED after missing a step and falling down the stairs at home and resultant R hip pain. He had less than 2 beers (confirmed with spouse) evening of 3/15 but denies any prodrome.  Imaging shows a right intertrochanteric proximal femur fracture. OR on 3/16/23 for operative repair.    Acute right intertrochanteric proximal femur fracture secondary to mechanical fall  Pt and spouse report that he fell less than two steps after the staircase turns. Patient denies head trauma or loss of consciousness and reports tripping while going down the steps.  He does endorse having \"a few beers\" earlier this evening but denied any prodrome. Spouse reports he had two beers but did not finish either of them. CT head and CT cervical spine negative for acute pathology.  Right shoulder x-ray negative for acute pathology.  Is otherwise neurologically intact in the ED upon admission.    - Admit to inpatient  - Orthopedic surgery consultation  - N.p.o.   -Plan regular diet postoperative  - As needed analgesia and bowel regimen  - will hold off from [facility] tonight - history of heart failure as below, and LA has normalized  - PT OT to be ordered postoperatively  - VTE prophylaxis deferred to orthopedic surgery-patient was previously on apixaban    Acute proximal respiratory failure  Right moderate pleural effusion  Severe pulmonary hypertension  History of COPD at baseline  Patient does not use oxygen at home however he is requiring 2 to 3 L nasal cannula in the ED.  Chest imaging does indicate a moderate right pleural effusion with some compressive " atelectasis. Spouse and pt confirm no recent coughing, fevers/chills.  WBC is unremarkable and he is afebrile.  - Supplemental oxygen as needed to maintain 90% or greater  - As needed inhaler/nebulizer for wheezing  - Incentive spirometry  - Could consider thoracentesis pending clinical course and urgency of right hip intervention as above  - hx of cardiomyopathy as below  - Will plan for resumption of PTA diuretics postoperatively pending blood pressure trend    Lactic acidosis, resolved  Patient presented as above and was found to have a initial lactic of 4.8. Lactic acid follow up 1.9. Unclear cause, possible stress mediated in setting of severe pHTN and heart failure.   - Low suspicion of sepsis or infection currently    History of dementia  Patient lives at home with his spouse.  Take mirtazapine and seroquel at home. Follows with Memory doctor. Spouse reports that he often wanders at night and that usually she shadows him to ensure safety.   - PTA Seroquel and mirtazapine to continue   - bed alarm must be on and ideally patient can be close to RN station  - At risk for postoperative delirium    CAD - hx of stenting  Ischemic cardiomyopathy  Hypertension  Chronic atrial fibrillation on apixaban  Heart rate and BP stable in ED. PTA regimen appears to include metoprolol, apixaban, and torsemide. [facility] - lately on a weekly basis. Echo Oct 2022 showed EF 25-30%, severe hypokinesis noted, RV normal, LA moderately dilated. Severe pulm HTN also noted.  * Chart review shows recent visit with Cardiology 3/10 with Dr Soni where it was discussed that prognosis is guarded in general; torsemide increased to 10mg BID - with caution due to low/normal BP.  - hold apixaban  - Resume metoprolol  - Will consider cardiology consult pending clinical course/surgery plan     Hypothyroidism  - Continue levothyroxine    Recent frostbite of all fingertips  *Patient was snowblowing this winter without gloves and managed to  "frostbite the tips of all of his fingers.  *SPO2 monitoring is difficult on fingers  -Recommend to monitor SPO2 on ear or toes.       Diet: NPO for Medical/Clinical Reasons Except for: Meds, Ice Chips    DVT Prophylaxis: Pneumatic Compression Devices  Velazquez Catheter: Not present  Lines: None     Cardiac Monitoring: None  Code Status: Full Code      Clinically Significant Risk Factors Present on Admission               # Drug Induced Coagulation Defect: home medication list includes an anticoagulant medication     # Chronic systolic heart failure: echo within the past year with EF <40%   # Acute Respiratory Failure: Documented O2 saturation < 91%.  Continue supplemental oxygen as needed  # Dementia: noted on problem list            Disposition Plan      Expected Discharge Date: 03/17/2023                  Edilberto Wiggins MD  Hospitalist Service  [facility]  Securely message with [employer] (more info)  Text page via [employer] Paging/Directory   ______________________________________________________________________    Interval History   Patient seen around 11:30 AM.  He is in bed and appears to be in no acute distress.  He is maybe mildly tachypneic, but he does not have increased work of breathing.  He states that he has comfortable and knows that there is a plan to put \"washers and bolts\" in his hip.  He wonders if he can get up to go to the toilet to pee.  He does not endorse any significant pain.    He is quite hypoxic on room monitoring, but this is on his finger probe.  When this is moved to his toe or his ear it is better better measured and within normal limits.    I did have a discussion with patient and his wife about goals of care.  At this time they are understanding that he is full code and okay with intubation.  I did discuss with them specifically the risk of intubation and death associated with severe pulmonary hypertension and cardiovascular collapse.  They understand this.  I " also discussed that with his underlying lung disease and cardiac disease he may be a difficult extubation.  The wife is aware of these risks.    I will endeavor to reach out to the anesthesia team to discuss these concerns as well.    All in all, the patient will remain in pain if we cannot achieve fixation of his hip fracture.  Given the risks and benefits, I think it is reasonable to proceed.    Physical Exam   Vital Signs: Temp: 97.9  F (36.6  C) Temp src: Oral BP: 135/85 Pulse: 98   Resp: 16 SpO2: (!) 84 % O2 Device: Nasal cannula Oxygen Delivery: 3 LPM  Weight: 170 lbs 0 oz    Constitutional: Awake, alert, cooperative, no apparent distress  Respiratory: Clear to auscultation bilaterally, no crackles or wheezing  Cardiovascular: Regular rate and rhythm, normal S1 and S2, and no murmur noted.  No obvious JVP, but patient is lying flat.  GI: Normal bowel sounds, soft, non-distended, non-tender  Skin/Integumen: No rashes, no cyanosis, no edema  Other:       Medical Decision Making       55 MINUTES SPENT BY ME on the date of service doing chart review, history, exam, documentation & further activities per the note.      Data     I have personally reviewed the following data over the past 24 hrs:    6.7  \   13.3   / 137 (L)     135 (L) 98 19.7 /  95   4.3 26 0.77 \       Trop: N/A BNP: 11,226 (H)       Procal: 0.04 CRP: N/A Lactic Acid: 1.9         Imaging results reviewed over the past 24 hrs:   Recent Results (from the past 24 hour(s))   Head CT w/o contrast    Narrative    EXAM: CT HEAD W/O CONTRAST  LOCATION: [facility]  DATE/TIME: 3/15/2023 8:29 PM    INDICATION: Fall, trauma, pain.  COMPARISON: CT 06/29/2022.  TECHNIQUE: Routine CT Head without IV contrast. Multiplanar reformats. Dose reduction techniques were used.    FINDINGS:  INTRACRANIAL CONTENTS: No intracranial hemorrhage, extraaxial collection, or mass effect.  No CT evidence of acute infarct. Mild presumed chronic small  vessel ischemic changes. Mild to moderate generalized volume loss. No hydrocephalus. The sella is   unremarkable for technique. The cerebellar tonsils are appropriately positioned.    VISUALIZED ORBITS/SINUSES/MASTOIDS: No intraorbital abnormality. No paranasal sinus mucosal disease. No middle ear or mastoid effusion.    BONES/SOFT TISSUES: No scalp hematoma. No skull fracture.      Impression    IMPRESSION:  1.  No evidence of acute intracranial hemorrhage.  2.  No evidence of acute calvarial fracture.  3.  Age-related changes.   Cervical spine CT w/o contrast    Narrative    EXAM: CT CERVICAL SPINE W/O CONTRAST  LOCATION: [facility]  DATE/TIME: 3/15/2023 8:30 PM    INDICATION: Fall, trauma, pain.  COMPARISON: Radiograph 07/25/2019.  TECHNIQUE: Routine CT Cervical Spine without IV contrast. Multiplanar reformats. Dose reduction techniques were used.    FINDINGS:  ALIGNMENT: Mild retrolisthesis at C4-C5 C5-C6. Symmetric facet alignment. Unremarkable alignment of the craniocervical junction.    BONES: The occipital condyles appear intact. Vertebral body heights are unremarkable. There is no evidence of acute displaced fractures. No destructive bony lesions are apparent.    DISCS: Moderate multilevel spondylosis. This is most pronounced at C4-C7, where there is intervertebral disc height loss and disc-osteophyte complex formation. There is a prominent central protrusion at C3-C4.    SPINAL CANAL: No high-grade stenosis.    NEURAL FORAMINA: Moderate right C3-C4, moderate left C4-C5, moderate bilateral C5-C6, and moderate right C6-C7 neural foraminal stenosis.    SOFT TISSUES: No prevertebral soft tissue thickening. Unremarkable paraspinal soft tissues.    UPPER THORAX: Partially imaged right pleural effusion.      Impression     IMPRESSION:  1.  No evidence of acute displaced fracture.  2.  No evidence of traumatic subluxation.  3.  Level degenerative change.  4.  Partially imaged right  pleural effusion.   XR Shoulder Right G/E 3 Views    Narrative    EXAM: XR SHOULDER RIGHT G/E 3 VIEWS  LOCATION: [facility]  DATE/TIME: 3/15/2023 8:42 PM    INDICATION: fall, pain  COMPARISON: None.      Impression    IMPRESSION: No acute fracture or malalignment. Mild glenohumeral and moderate acromioclavicular joint degenerative changes. Osteopenia. Aortic atherosclerosis.   Chest XR,  PA & LAT    Narrative    EXAM: XR CHEST 2 VIEWS  LOCATION: [facility]  DATE/TIME: 3/15/2023 8:44 PM    INDICATION: Shortness of breath. Likely COPD.  COMPARISON: 03/06/2023      Impression    IMPRESSION:     Increased moderate right pleural effusion with adjacent compressive atelectasis. Probable trace left pleural effusion with mild left basilar atelectasis. Otherwise, no focal airspace disease. No pneumothorax.    Stable cardiomegaly.    Multilevel degenerative changes of the spine. Left humeral intramedullary nail, incompletely imaged.   XR Pelvis w Hip Right 1 View    Narrative    EXAM: XR PELVIS AND HIP RIGHT 1 VIEW  LOCATION: [facility]  DATE/TIME: 3/15/2023 8:45 PM    INDICATION: Fall, hip pain.  COMPARISON: None.      Impression    IMPRESSION: Mildly displaced and slightly varus angulated intertrochanteric right proximal femur fracture. No dislocation of the right femoral head from the acetabulum. Lag screw fixation across the left SI joint from left to right. Chronic healed   fracture deformities of the right superior and inferior pubic rami. Mild bilateral hip osteoarthritis. No acute displaced pelvic fracture. Embolization coils project along the right pelvis. Degenerative change lower lumbar spine and both SI joints.   Extensive arterial calcification.    NOTE: ABNORMAL REPORT    THE DICTATION ABOVE DESCRIBES AN ABNORMALITY FOR WHICH FOLLOW-UP IS NEEDED.    XR Femur Right 2 Views    Narrative    EXAM: XR FEMUR RIGHT 2 VIEWS  LOCATION: [facility]  DATE/TIME: 3/15/2023 10:00 PM    INDICATION: hip fracture, need full femur xray  COMPARISON: None.      Impression    IMPRESSION: There is an acute mildly displaced comminuted advanced vascular calcification. Embolic coils are seen in the lower right hemipelvis. The right femur is otherwise normal. Lower intertrochanteric fracture involving the right hip.

## 2023-03-16 NOTE — ANESTHESIA PREPROCEDURE EVALUATION
Anesthesia Pre-Procedure Evaluation    Patient: Rm Villarreal   MRN: [id number] : 1940        Procedure : Procedure(s):  INTERNAL FIXATION, FRACTURE, TROCHANTERIC, HIP, USING PINS OR RODS          Past Medical History:   Diagnosis Date     Adjustment disorder      Carotid stenosis, bilateral     left CEA      Chronic atrial fibrillation (H)     warfarin     COPD (chronic obstructive pulmonary disease) (H) 2021     Coronary artery disease     cardiac cath : chronic occlusion of circumflex and apical LAD: medical management; cath : stent to LAD, historical: stent to RCA     History of blood transfusion      Hypertension      Ischemic cardiomyopathy     ef 25%     Pulmonary nodule       Past Surgical History:   Procedure Laterality Date     angiogram  2014    cardiac cath 2014: chronic occlusion of circumflex and apical LAD: medical management     ANGIOGRAM      stent to LAD     ANGIOGRAM      stent to RCA     COLONOSCOPY           COLONOSCOPY N/A 2018    Procedure: COMBINED COLONOSCOPY, SINGLE OR MULTIPLE BIOPSY/POLYPECTOMY BY BIOPSY;  colonoscopy;  Surgeon: Tyler Dee MD;  Location:  GI     GI SURGERY      hemorrhoidectomy     IR VISCERAL ANGIOGRAM  2019     VASCULAR SURGERY  2007    left CEA      Allergies   Allergen Reactions     Atorvastatin Diarrhea     Intolerance to most statin medications       Social History     Tobacco Use     Smoking status: Former     Types: Cigarettes     Quit date: 2003     Years since quittin.6     Smokeless tobacco: Never   Substance Use Topics     Alcohol use: Yes     Comment: 1-2 beer daily      Wt Readings from Last 1 Encounters:   03/15/23 77.1 kg (170 lb)        Anesthesia Evaluation            ROS/MED HX  ENT/Pulmonary:     (+) moderate,  COPD,  (-) tobacco use, asthma and sleep apnea   Neurologic:     (+) dementia,  (-) no seizures and no CVA   Cardiovascular:     (+) Dyslipidemia hypertension-Peripheral  Vascular Disease-CAD --stent-CHF etiology: ICM Last EF: 25-30% dysrhythmias, a-fib, pulmonary hypertension, Previous cardiac testing   Echo: Date: 10/22 Results:  1. Left ventricular systolic function is severely reduced. The visual ejection  fraction is 25-30%.  2. Severe mid to distal anteroseptal, distal anterior, mid to distal inferior  and apical wall hypokinesis.  3. The right ventricle is normal in structure, function and size.  4. The left atrium is moderately dilated.  5. There is mild to moderate (1-2+) tricuspid regurgitation.  6. Severe pulmonary hypertensin; The right ventricular systolic pressure is  approximated at 66mmHg plus the right atrial pressure. IVC diameter >2.1 cm  collapsing <50% with sniff suggests a high RA pressure estimated at 15 mmHg or  greater.  7. In comparison with the prior report, findings are similar.  ______________________________________________________________________________  Left Ventricle  The left ventricle is normal in size. There is normal left ventricular wall  thickness. The visual ejection fraction is 25-30%. Left ventricular systolic  function is severely reduced. Diastolic Doppler findings (E/E' ratio and/or  other parameters) suggest left ventricular filling pressures are  indeterminate. Severe mid to distal anteroseptal, distal anterior, mid to  distal inferior and apical wall hypokinesis.     Right Ventricle  The right ventricle is normal in structure, function and size.     Atria  The left atrium is moderately dilated. The right atrium is moderately dilated.  There is no color Doppler evidence of an atrial shunt.     Mitral Valve  There is mild mitral annular calcification. There is mild (1+) mitral  regurgitation.     Tricuspid Valve  The tricuspid valve is normal in structure and function. There is mild to  moderate (1-2+) tricuspid regurgitation. The right ventricular systolic  pressure is approximated at 66mmHg plus the right atrial pressure.     Aortic  Valve  The aortic valve is trileaflet with aortic valve sclerosis. There is mild (1+)  aortic regurgitation.     Pulmonic Valve  The pulmonic valve is not well seen, but is grossly normal.     Vessels  IVC diameter >2.1 cm collapsing <50% with sniff suggests a high RA pressure  estimated at 15 mmHg or greater.  Stress Test: Date: Results:    ECG Reviewed: Date: Results:    Cath: Date: Results:      METS/Exercise Tolerance:     Hematologic:       Musculoskeletal:   (+) fracture, Fracture location: RLE,     GI/Hepatic:    (-) GERD and liver disease   Renal/Genitourinary:    (-) renal disease   Endo:     (+) thyroid problem, hypothyroidism,  (-) Type I DM and Type II DM   Psychiatric/Substance Use:       Infectious Disease:       Malignancy:       Other:               OUTSIDE LABS:  CBC:   Lab Results   Component Value Date    WBC 6.7 03/16/2023    WBC 5.1 03/15/2023    HGB 13.3 03/16/2023    HGB 14.5 03/15/2023    HCT 37.6 (L) 03/16/2023    HCT 41.5 03/15/2023     (L) 03/16/2023     03/15/2023     BMP:   Lab Results   Component Value Date     (L) 03/16/2023     (L) 03/15/2023    POTASSIUM 4.3 03/16/2023    POTASSIUM 3.9 03/15/2023    CHLORIDE 98 03/16/2023    CHLORIDE 97 (L) 03/15/2023    CO2 26 03/16/2023    CO2 26 03/15/2023    BUN 19.7 03/16/2023    BUN 20.4 03/15/2023    CR 0.77 03/16/2023    CR 0.82 03/15/2023    GLC 95 03/16/2023     (H) 03/15/2023     COAGS:   Lab Results   Component Value Date    PTT 39 (H) 04/27/2005    INR 1.1 04/04/2022     POC: No results found for: BGM, HCG, HCGS  HEPATIC:   Lab Results   Component Value Date    ALBUMIN 3.8 02/03/2023    PROTTOTAL 8.0 02/03/2023    ALT 34 02/03/2023    AST 56 (H) 02/03/2023     (H) 01/18/2023    ALKPHOS 216 (H) 02/03/2023    BILITOTAL 4.5 (H) 02/03/2023     OTHER:   Lab Results   Component Value Date    PH 7.34 03/15/2023    LACT 1.9 03/15/2023    RANDY 8.8 03/16/2023    LIPASE 63 (H) 01/18/2023    TSH 9.68 (H)  02/03/2023    T4 1.16 02/03/2023    T3 66 (L) 02/03/2023    CRP 28.7 (H) 07/02/2022    SED 11 12/17/2019       Anesthesia Plan    ASA Status:  4      Anesthesia Type: General.     - Airway: ETT   Induction: Intravenous, Propofol.   Maintenance: Balanced.   Techniques and Equipment:     - Lines/Monitors: 2nd IV, Arterial Line     Consents            Postoperative Care    Pain management: Multi-modal analgesia.   PONV prophylaxis: Ondansetron (or other 5HT-3), Background Propofol Infusion     Comments:    Other Comments: Fascia iliaca block            Bautista Urena MD

## 2023-03-16 NOTE — H&P
"[facility]    History and Physical - Hospitalist Service       Date of Admission:  3/15/2023    Assessment & Plan      Rm Villarreal is a 82 year old male with history of mild dementia (lives at home with spouse), hypothyroidism, CAD, bilateral carotid stenosis, chronic atrial fibrillation on apixaban, COPD, hypertension, and ischemic cardiomyopathy who presented to the ED after missing a step and falling down the stairs at home and resultant R hip pain. He had less than 2 beers (confirmed with spouse) this evening but denies any prodrome.  Imaging shows a right intertrochanteric proximal femur fracture and ED MD has alerted orthopedic surgery and likely operative fixation tomorrow.    Acute right intertrochanteric proximal femur fracture secondary to mechanical fall  Pt and spouse report that he fell less than two steps after the staircase turns. Patient denies head trauma or loss of consciousness and reports tripping while going down the steps.  He does endorse having \"a few beers\" earlier this evening but denied any prodrome. Spouse reports he had two beers but did not finish either of them. CT head and CT cervical spine negative for acute pathology.  Right shoulder x-ray negative for acute pathology.  Is otherwise neurologically intact in the ED upon admission.    - Admit to inpatient  - Orthopedic surgery consultation  - N.p.o. at midnight for probable OR tomorrow  - As needed analgesia and bowel regimen  [facility] - history of heart failure as below, and LA has normalized  - PT OT to be ordered postoperatively  - VTE prophylaxis deferred to orthopedic surgery-patient was previously on apixaban    Acute proximal respiratory failure  Right moderate pleural effusion  Severe pulmonary hypertension  History of COPD at baseline  Patient does not use oxygen at home however he is requiring 2 to 3 L nasal cannula in the ED.  Chest imaging does indicate a moderate " "right pleural effusion with some compressive atelectasis. Spouse and pt confirm no recent coughing, fevers/chills.  WBC is unremarkable and he is afebrile.  - Supplemental oxygen as needed to maintain 90% or greater  - As needed inhaler/nebulizer for wheezing  - Incentive spirometry  - consider diuresis pending BMP when resulted - especially if O2 needs increase overnight  - Could consider thoracentesis pending clinical course and urgency of right hip intervention as above  - hx of cardiomyopathy as below  - SEE ADDENDUM 00:35    Lactic acidosis - normalizing  Patient presented as above and was found to have a initial lactic of 4.8.  This will be repeated this evening.  Suspects it is in relation to his \"few beers\" he had this evening.  Alcohol level not resulted yet-ED attempting new blood draw.  - lactic acid follow up 1.9  - Low suspicion of sepsis or infection currently  - Blood cultures and further work-up if febrile or clinically deteriorates    History of dementia  Patient lives at home with his spouse.  Take mirtazapine and seroquel at home. Follows with Memory doctor. Spouse reports that he often wanders at night and that usually she shadows him to ensure safety.   - PTA Seroquel and mirtazapine to continue   - bed alarm must be on and ideally patient can be close to RN station - discussed with ED charge and bedside RN    CAD - hx of stenting  Ischemic cardiomyopathy  Hypertension  Chronic atrial fibrillation on apixaban  Heart rate and BP stable in ED. PTA regimen appears to include metoprolol, apixaban, and torsemide. [facility] - lately on a weekly basis. Echo Oct 2022 showed EF 25-30%, severe hypokinesis noted, RV normal, LA moderately dilated. Severe pulm HTN also noted.  - hold apixaban  - Resume metoprolol  - Awaiting BMP result and consider continuing PTA diuretic vs add IV diuresis if O2 need increases  ADDENDUM 00:35 - BMP returned with acceptable creat at 0.8 and bun 20. Na 133, K " 3.9, Cl 97; also, BNP 11k.  - Chart review shows recent visit with Cardiology 3/10 with Dr Soni where it was discussed that prognosis is guarded in general; torsemide increased to 10mg BID - with caution due to low/normal BP. BP currently variable but on soft side.  Considered giving a dose of IV diuretic however with patient's history of night time wandering with hx of dementia, increasing urinary output tonight seems overall contraindicated as his BP remains borderline and he is stable on 2L nc oxygen with 95% SpO2. Close follow up in morning ideally. I will reach out to AM hospitalist in AM.  - consider cardiology consult pending clinical course/surgery plan    Hypothyroidism  Continue levothyroxine to continue      Diet:   reg, npo at midnight  DVT Prophylaxis: Pneumatic Compression Devices - defer to ortho-surgery (PTA on doac)  Velazquez Catheter: Not present  Lines: None     Cardiac Monitoring: None  Code Status:   Full Code    Clinically Significant Risk Factors Present on Admission               # Drug Induced Coagulation Defect: home medication list includes an anticoagulant medication     # Chronic systolic heart failure: echo within the past year with EF <40%   # Dementia: noted on problem list            Disposition Plan      Expected Discharge Date: 03/17/2023                  Edmond Saldana MD  Hospitalist Service  [facility]  Securely message with [employer] (more info)  Text page via [employer] Paging/Directory     ______________________________________________________________________    Chief Complaint   R hip pain    History is obtained from the patient    History of Present Illness   Rm Villarreal is a 82 year old male with history of mild dementia (lives at home with spouse), hypothyroidism, CAD, bilateral carotid stenosis, chronic atrial fibrillation on apixaban, COPD, hypertension, and ischemic cardiomyopathy who presented to the ED after missing a step and falling down the  stairs at home and resultant R hip pain. Pt and spouse report that he fell less than two steps after the staircase turns.  He had less than 2 beers this evening (confirmed with spouse) and denies any prodrome. He denied head trauma or LOC.  No headache or vision changes. No tingling, numbness, or weakness in the extremities. He has been feeling at his baseline otherwise - no recent fevers, chills, cough, sob, chest pain, palpitations, abdominal pain, nausea, vomiting, or change in bowel or bladder habits.      Past Medical History    Past Medical History:   Diagnosis Date     Adjustment disorder      Carotid stenosis, bilateral     left CEA 2007     Chronic atrial fibrillation (H)     warfarin     COPD (chronic obstructive pulmonary disease) (H) 02/03/2021     Coronary artery disease     cardiac cath 2014: chronic occlusion of circumflex and apical LAD: medical management; cath 2005: stent to LAD, historical: stent to RCA     History of blood transfusion      Hypertension      Ischemic cardiomyopathy 2021    ef 25%     Pulmonary nodule        Past Surgical History   Past Surgical History:   Procedure Laterality Date     angiogram  02/19/2014    cardiac cath 2014: chronic occlusion of circumflex and apical LAD: medical management     ANGIOGRAM  2005    stent to LAD     ANGIOGRAM      stent to RCA     COLONOSCOPY      2010     COLONOSCOPY N/A 8/30/2018    Procedure: COMBINED COLONOSCOPY, SINGLE OR MULTIPLE BIOPSY/POLYPECTOMY BY BIOPSY;  colonoscopy;  Surgeon: Tyler Dee MD;  Location:  GI     GI SURGERY      hemorrhoidectomy     IR VISCERAL ANGIOGRAM  7/25/2019     VASCULAR SURGERY  2007    left CEA       Prior to Admission Medications   Prior to Admission Medications   Prescriptions Last Dose Informant Patient Reported? Taking?   Cyanocobalamin (B-12) 100 MCG TABS 3/15/2023 at am  Yes Yes   Sig: Take 1 tablet by mouth daily   QUEtiapine (SEROQUEL) 25 MG tablet PRN  No Yes   Sig: TAKE 1/2 TABLET BY MOUTH EVERY  DAY AS NEEDED FOR ANXIETY   Patient taking differently: TAKE 1/2- 1 TABLET BY MOUTH EVERY DAY AS NEEDED FOR ANXIETY   QUEtiapine (SEROQUEL) 25 MG tablet 3/14/2023 at pm  Yes Yes   Sig: Take 18.75 mg by mouth At Bedtime Wife breaks tablet and gives 3/4 of a tablet at bedtime   Saccharomyces boulardii (PROBIOTIC) 250 MG CAPS 3/15/2023 at am  Yes Yes   Sig: Take 250 mg by mouth daily   Vitamin D3 (CHOLECALCIFEROL) 125 MCG (5000 UT) tablet 3/15/2023 at am  Yes Yes   Sig: Take 1 tablet by mouth daily   apixaban ANTICOAGULANT (ELIQUIS) 5 MG tablet 3/15/2023 at am  No Yes   Sig: Take 1 tablet (5 mg) by mouth 2 times daily   hydrocortisone, Perianal, (HYDROCORTISONE) 2.5 % cream PRN  No Yes   Sig: Place rectally 2 times daily as needed for hemorrhoids   levothyroxine (SYNTHROID/LEVOTHROID) 25 MCG tablet 3/15/2023 at am  No Yes   Sig: Take 1 tablet (25 mcg) by mouth daily   loperamide (IMODIUM) 2 MG capsule PRN  Yes Yes   Sig: Take 1-2 mg by mouth as needed for diarrhea   melatonin 5 MG tablet 3/14/2023 at pm Spouse/Significant Other Yes Yes   Sig: Take 5 mg by mouth At Bedtime   metoprolol succinate ER (TOPROL XL) 25 MG 24 hr tablet 3/15/2023 at am  No Yes   Sig: Take 1 tablet (25 mg) by mouth daily   mirtazapine (REMERON) 7.5 MG tablet 3/14/2023 at pm  No Yes   Sig: Take 1 tablet (7.5 mg) by mouth At Bedtime   nitroGLYcerin (NITROSTAT) 0.4 MG sublingual tablet PRN  No Yes   Sig: For chest pain place 1 tablet under the tongue every 5 minutes for 3 doses. If symptoms persist 5 minutes after 1st dose call 911.   nystatin (MYCOSTATIN) 687588 UNIT/GM external powder PRN  No Yes   Sig: Apply topically 2 times daily as needed for other (Excoriation)   polyethylene glycol (MIRALAX) 17 g packet PRN  Yes Yes   Sig: Take 1 packet by mouth daily as needed for constipation   potassium chloride ER (KLOR-CON M) 20 MEQ CR tablet 3/15/2023 at am  No Yes   Sig: Take 1 tablet (20 mEq) by mouth 2 times daily   Patient taking differently: Take  20 mEq by mouth daily   torsemide (DEMADEX) 10 MG tablet 3/15/2023 at am  No Yes   Sig: Take 1 tablet (10 mg) by mouth daily   Patient taking differently: Take 10 mg by mouth 2 times daily      Facility-Administered Medications: None        Review of Systems    The 10 point Review of Systems is negative other than noted in the HPI or here.     Social History   I have reviewed this patient's social history and updated it with pertinent information if needed.  Social History     Tobacco Use     Smoking status: Former     Types: Cigarettes     Quit date: 2003     Years since quittin.6     Smokeless tobacco: Never   Vaping Use     Vaping Use: Never used   Substance Use Topics     Alcohol use: Yes     Comment: 1-2 beer daily     Drug use: No        Physical Exam   Vital Signs: Temp: (!) 96.6  F (35.9  C) Temp src: Temporal BP: 113/85 Pulse: 96   Resp: 16 SpO2: 90 % O2 Device: Nasal cannula Oxygen Delivery: 3 LPM  Weight: 170 lbs 0 oz    Gen: NAD, pleasant, spouse at bedside  HEENT: EOMI, MMM  Resp: no focal crackles,  no wheezes, no increased work of resp  CV: S1S2 heard, reg rhythm, reg rate  Abdo: soft, nontender, nondistended, bowel sounds present  Ext: calves nontender, well perfused, distal LE both with grossly intact sensorimotor  Neuro: aa, conversant, moves ext, dementia at baseline noted, CN grossly intact, no facial asymmetry      Medical Decision Making       78 MINUTES SPENT BY ME on the date of service doing chart review, history, exam, documentation & further activities per the note.      Data     I have personally reviewed the following data over the past 24 hrs:    5.1  \   14.5   / 178     N/A N/A N/A /  N/A   N/A N/A N/A \       Procal: N/A CRP: N/A Lactic Acid: 4.8 ()         Imaging results reviewed over the past 24 hrs:   Recent Results (from the past 24 hour(s))   Head CT w/o contrast    Narrative    EXAM: CT HEAD W/O CONTRAST  LOCATION: [facility]  DATE/TIME:  3/15/2023 8:29 PM    INDICATION: Fall, trauma, pain.  COMPARISON: CT 06/29/2022.  TECHNIQUE: Routine CT Head without IV contrast. Multiplanar reformats. Dose reduction techniques were used.    FINDINGS:  INTRACRANIAL CONTENTS: No intracranial hemorrhage, extraaxial collection, or mass effect.  No CT evidence of acute infarct. Mild presumed chronic small vessel ischemic changes. Mild to moderate generalized volume loss. No hydrocephalus. The sella is   unremarkable for technique. The cerebellar tonsils are appropriately positioned.    VISUALIZED ORBITS/SINUSES/MASTOIDS: No intraorbital abnormality. No paranasal sinus mucosal disease. No middle ear or mastoid effusion.    BONES/SOFT TISSUES: No scalp hematoma. No skull fracture.      Impression    IMPRESSION:  1.  No evidence of acute intracranial hemorrhage.  2.  No evidence of acute calvarial fracture.  3.  Age-related changes.   Cervical spine CT w/o contrast    Narrative    EXAM: CT CERVICAL SPINE W/O CONTRAST  LOCATION: [facility]  DATE/TIME: 3/15/2023 8:30 PM    INDICATION: Fall, trauma, pain.  COMPARISON: Radiograph 07/25/2019.  TECHNIQUE: Routine CT Cervical Spine without IV contrast. Multiplanar reformats. Dose reduction techniques were used.    FINDINGS:  ALIGNMENT: Mild retrolisthesis at C4-C5 C5-C6. Symmetric facet alignment. Unremarkable alignment of the craniocervical junction.    BONES: The occipital condyles appear intact. Vertebral body heights are unremarkable. There is no evidence of acute displaced fractures. No destructive bony lesions are apparent.    DISCS: Moderate multilevel spondylosis. This is most pronounced at C4-C7, where there is intervertebral disc height loss and disc-osteophyte complex formation. There is a prominent central protrusion at C3-C4.    SPINAL CANAL: No high-grade stenosis.    NEURAL FORAMINA: Moderate right C3-C4, moderate left C4-C5, moderate bilateral C5-C6, and moderate right C6-C7 neural  foraminal stenosis.    SOFT TISSUES: No prevertebral soft tissue thickening. Unremarkable paraspinal soft tissues.    UPPER THORAX: Partially imaged right pleural effusion.      Impression     IMPRESSION:  1.  No evidence of acute displaced fracture.  2.  No evidence of traumatic subluxation.  3.  Level degenerative change.  4.  Partially imaged right pleural effusion.   XR Shoulder Right G/E 3 Views    Narrative    EXAM: XR SHOULDER RIGHT G/E 3 VIEWS  LOCATION: [facility]  DATE/TIME: 3/15/2023 8:42 PM    INDICATION: fall, pain  COMPARISON: None.      Impression    IMPRESSION: No acute fracture or malalignment. Mild glenohumeral and moderate acromioclavicular joint degenerative changes. Osteopenia. Aortic atherosclerosis.   Chest XR,  PA & LAT    Narrative    EXAM: XR CHEST 2 VIEWS  LOCATION: [facility]  DATE/TIME: 3/15/2023 8:44 PM    INDICATION: Shortness of breath. Likely COPD.  COMPARISON: 03/06/2023      Impression    IMPRESSION:     Increased moderate right pleural effusion with adjacent compressive atelectasis. Probable trace left pleural effusion with mild left basilar atelectasis. Otherwise, no focal airspace disease. No pneumothorax.    Stable cardiomegaly.    Multilevel degenerative changes of the spine. Left humeral intramedullary nail, incompletely imaged.   XR Pelvis w Hip Right 1 View    Narrative    EXAM: XR PELVIS AND HIP RIGHT 1 VIEW  LOCATION: [facility]  DATE/TIME: 3/15/2023 8:45 PM    INDICATION: Fall, hip pain.  COMPARISON: None.      Impression    IMPRESSION: Mildly displaced and slightly varus angulated intertrochanteric right proximal femur fracture. No dislocation of the right femoral head from the acetabulum. Lag screw fixation across the left SI joint from left to right. Chronic healed   fracture deformities of the right superior and inferior pubic rami. Mild bilateral hip osteoarthritis. No acute displaced  pelvic fracture. Embolization coils project along the right pelvis. Degenerative change lower lumbar spine and both SI joints.   Extensive arterial calcification.    NOTE: ABNORMAL REPORT    THE DICTATION ABOVE DESCRIBES AN ABNORMALITY FOR WHICH FOLLOW-UP IS NEEDED.    XR Femur Right 2 Views    Narrative    EXAM: XR FEMUR RIGHT 2 VIEWS  LOCATION: [facility]  DATE/TIME: 3/15/2023 10:00 PM    INDICATION: hip fracture, need full femur xray  COMPARISON: None.      Impression    IMPRESSION: There is an acute mildly displaced comminuted advanced vascular calcification. Embolic coils are seen in the lower right hemipelvis. The right femur is otherwise normal. Lower intertrochanteric fracture involving the right hip.

## 2023-03-16 NOTE — OP NOTE
[facility]  Orthopedic Operative Note    Short Cephallomedullary Nailing    Rm Villarreal MRN# [id number]   YOB: 1940  Procedure Date:  3/16/2023  Age: 82 year old     PREOPERATIVE DIAGNOSIS:  Intertrochanteric femur fracture right.    POSTOPERATIVE DIAGNOSIS:  Intertrochanteric femur fracture right.     PROCEDURE PERFORMED:  Surgical treatment of right intertrochanteric femur fracture with cephalomedullary device    SURGEON:  ALEJANDRO MARQUEZ MD    FIRST ASSISTANT: Jamal Li OTC; A skilled first assistant was necessary for this procedure for assistance with patient positioning, prepping, draping, surgical visualization, wound closure, and application of the dressing.     ANESTHESIA:  General + FI block    EBL: 250cc    COMPLICATIONS: None     DISPOSITION: Post Anesthesia Care Unit     CONDITION: Stable     INDICATIONS:  Rm Villarreal  is a 82 year old male presentting with a right intertrochanteric hip fracture after a fall from 2 steps.  Patient has a history of progressive dementia.  He walks independently.  He takes Eliquis.  I met with his wife as well as his family and discussed possible treatment options including nonoperative and operative intervention along with the risks and benefits and expected recovery.  After thorough discussion, I recommended a cephalomedullary nail to stabilize his fracture and help return him to weightbearing as soon as possible.  After answering all questions, his wife was in agreement and elected to proceed with surgery.    Risks of surgery discussed included but not limited to bleeding, infection, damage to surrounding neurovascular structures, leg length inequality, nonunion, malunion, need for revision surgery, blood clots, pulmonary embolus, and the medical risks of anesthesia.  Benefits of surgery discussed included improved chance of union in acceptable alignment, improve function, and reduction in medical risks of  hip fractures.  Alternatives discussed included nonoperative management which I did not recommend.      The patient's wife acknowledges risks and wishes to proceed. The informed consent was signed and documented.  I met with the patient preoperatively and marked the operative extremity.      IMPLANTS:  Implant Name Type Inv. Item Serial No.  Lot No. LRB No. Used Action   IMP NAIL SYN CAN FEM PROX TFNA [id number] [id number] 04.037.142S - [id number] Metallic Hardware/[employer] IMP NAIL SYN CAN FEM PROX TFNA [id number] 130D 04.037.142S  [employer] [id number] Right 1 Implanted   IMP SCR SYN TFNA FENESTRATED LAG 105MM 04.038.205S - [id number] Metallic Hardware/[employer] IMP SCR SYN TFNA FENESTRATED LAG 105MM 04.038.205S  [employer] [id number] Right 1 Implanted   SCREW BN 40MM 5MM LCK X25 STRL IM NL 04.045.040S - [id number] Metallic Hardware/[employer] SCREW BN 40MM 5MM LCK X25 STRL IM NL 04.045.040S  [employer] [id number] Right 1 Implanted       PROCEDURE:   The patient was brought into the operating room and placed on operating table.  The patient underwent general anesthesia.  The patient was positioned supine on a Stacy table with the contralateral leg hyperextended.  All bony prominences were well-padded.  The operative extremity was cleansed with Hibiclens. The operative extremity was then prepped with ChloraPrep.  The surgical team scrubbed in.  A WHO timeout was conducted to verify correct patient, correct extremity, presence of the surgeon's initials on the operative extremity, and administration of IV antibiotics, in this case Ancef.      Fluoroscopy was brought in to confirm good start point.  A incision was made just proximal to the greater tuberosity.  The starting guidepin was advanced down to the start point on the greater trochanter.  We confirmed the start point on fluoroscopy and then advanced the guidewire into the femur.  The position of the guidewire was confirmed with fluoroscopy.  The greater  trochanter with a reamer. All instruments were removed.  A Synthes 130 degree x 170 cm x 11 mm nail was selected.  This was advanced to an appropriate depth, confirmed by fluoroscopy.       We then advanced the guide for the lag screw down to bone through a small lateral thigh incision.   The guidewire was then advanced into the central position within the femoral neck and head confirmed by fluoroscopy.  We then measured for and selected an appropriate length lag screw, 105 mm.     Next the reamer was utilized over the guide wire and then the lag screw was placed by hand.      Prior to placing the set screw, compression at the fracture site was achieved through the rick guide. This was done under fluoroscopy with approximately 5 mm of compression.     The setscrew was advanced and tightened.  The guide for the interlocking screw was advanced through the jig down to bone through a small lateral thigh incision.  We then drilled for and placed one statically interlocked distal interlocking screw.  Good bicortical purchase was achieved.      All instruments were then removed.  Final fluoroscopic imaging was obtained demonstrating good alignment of the fracture good positioning of all hardware.  We then thoroughly irrigated and closed in layers with 2-0 Monocryl, and 3-0 Monocryl on the skin.  Dermabond was used on the wounds.  The wounds were anesthetized with bupivacaine and sterile dressings were applied.     All instruments were accounted for at the end of the case. The patient awoke from anesthesia and was transferred to the PACU in stable condition.  PLAN:    Postoperatively:   1.  Ancef x 24 hours.   2.  Lovenox for DVT prophylaxis, may resume Eliquis per primary team  3.  PACU x-rays.   4.  Weightbearing as tolerated with a walker x 6 weeks.     5.  Physical therapy/occupational therapy.   6.  Keep dressing on for 2 weeks.  OK to shower.  No submerging wound.  7.  Osteoporosis workup and treatment with primary  care provider within 2 months.   8.  Discharge:  Case management social work consult for placement     FOLLOWUP:     1.  Plan 2-3-week wound check with x-rays (AP and Lateral Xrays).  This could also be done at patients rehab facility with x-rays sent to me at [facility].   2.  Eight-week followup with X-rays.    Please call as soon as possible to make an appointment to be seen in Dr. Brady James's clinic in 2-3 weeks.     Dr. James's care coordinator is Hortencia Rabago. Please contact her at [phone] to schedule an appointment.      Dr. James sees patient's at 2 clinic locations:    [facility]  o [address] [place] [address]    [facility]   o [address], [place] [address]      Please call the on-call phone number [phone] during evenings, nights and weekends for any urgent needs. Prescription refills must be done during business hours by calling [phone]        Brady James MD  [facility]

## 2023-03-16 NOTE — OR NURSING
Patient had a bowel movement sometime on his way to the OR. Skin cleaned and bed linens changed. His condom catheter leaked on his bed. Unspecified urine output.

## 2023-03-16 NOTE — PROGRESS NOTES
Patient is alert to self today. Requires repetition of commands but compliant. Placed on 3L of O2 via nasal cannula, and switched to Oxymask due to frequent mouth breathing. VSS, O2 reading variable due to extremity coolness. IV site saline locked.    At rest, patient appears comfortable but will wince with palpation of lower extremities. Edematous at right lower extremities and right hip. Trace edema on left lower extremities. Patient repositioned, tolerating oral meds and chews tablets. Skin has scattered bruises and chest/abdomen are dry.    Report given to pre-op and spouse notified. Patient currently in surgery.

## 2023-03-16 NOTE — ANESTHESIA PROCEDURE NOTES
Airway       Patient location during procedure: OR       Procedure Start/Stop Times: 3/16/2023 2:11 PM  Staff -        CRNA: Guy Bass APRN CRNA       Performed By: CRNA  Consent for Airway        Urgency: elective  Indications and Patient Condition       Indications for airway management: reymundo-procedural       Induction type:intravenous       Mask difficulty assessment: 1 - vent by mask    Final Airway Details       Final airway type: endotracheal airway       Successful airway: ETT - single  Endotracheal Airway Details        ETT size (mm): 8.0       Cuffed: yes       Successful intubation technique: video laryngoscopy       VL Blade Size: Bryan 4       Grade View of Cords: 1       Adjucts: stylet       Bite block used: None    Post intubation assessment        Placement verified by: capnometry, equal breath sounds and chest rise        Number of attempts at approach: 1       Secured with: silk tape       Ease of procedure: easy       Dentition: Intact and Unchanged    Medication(s) Administered   Medication Administration Time: 3/16/2023 2:11 PM

## 2023-03-16 NOTE — ANESTHESIA CARE TRANSFER NOTE
Patient: Rm Villarreal    Procedure: Procedure(s):  RIGHT OPEN REDUCTION INTERNAL FIXATION INTERTROCHANTERIC HIP FRACTURE       Diagnosis: Closed intertrochanteric fracture of hip, right, initial encounter (H) [S72.141A]  Diagnosis Additional Information: No value filed.    Anesthesia Type:   General     Note:    Oropharynx: oropharynx clear of all foreign objects  Level of Consciousness: drowsy  Oxygen Supplementation: face mask  Level of Supplemental Oxygen (L/min / FiO2): 6  Independent Airway: airway patency satisfactory and stable  Dentition: dentition unchanged  Vital Signs Stable: post-procedure vital signs reviewed and stable  Report to RN Given: handoff report given  Patient transferred to: PACU    Handoff Report: Identifed the Patient, Identified the Reponsible Provider, Reviewed the pertinent medical history, Discussed the surgical course, Reviewed Intra-OP anesthesia mangement and issues during anesthesia, Set expectations for post-procedure period and Allowed opportunity for questions and acknowledgement of understanding      Vitals:  Vitals Value Taken Time   /88 03/16/23 1545   Temp     Pulse 113 03/16/23 1549   Resp 13 03/16/23 1549   SpO2 93 % 03/16/23 1549   Vitals shown include unvalidated device data.    Electronically Signed By: SAMANTHA Colvin CRNA  March 16, 2023  3:51 PM

## 2023-03-16 NOTE — ED TRIAGE NOTES
Pt comes from home where he lives with his wife. Pt had a few beers tonight and some slight dementia. Pt was going down the stairs at home when he missed the last few steps and fell forward. Pt did not hit his head or LOC, pt is on eliquis. Poor circulation by observation. EMS gave 100mcg fentanyl for pain but pt was altered after the pain meds so they gave 1mg narcan which helped

## 2023-03-16 NOTE — ED PROVIDER NOTES
History     Chief Complaint:  Fall and Hip Pain       The history is provided by the patient.      Rm Villarreal is a 82 year old male, anticoagulated on Eliquis, with a history of slight dementia, reduced EF who presents with a mechanical fall. Earlier today, he was going down the stairs when he misstepped and fell forward. He did not hit his head or lose consciousness at the time. Since then, he has been having right shoulder and right hip pain. EMS was called, and he was brought to the ED. He received 100 mg of Fentanyl en route, but appeared altered afterwards, so EMS gave him 1 mg of Narcan. At bedside, he endorses drinking a few beers today.     Independent Historian: the EMS personnel     Review of External Notes: Cards note 3/10/23, history of ischemic cardiomyopathy severely reduced EF 24% on last echo which was remote, known severe pulmonary hypertension    ROS:  Review of Systems   Musculoskeletal: Positive for arthralgias (R shoulder and hip).   Neurological: Negative for syncope.   All other systems reviewed and are negative.    Allergies:  Atorvastatin     Medications:    Apixaban   Levothyroxine   loperamide  Metoprolol succinate  Mirtazapine   nitroglycerin   Quetiapine   Torsemide     Past Medical History:    Adjustment disorder   Carotid stenosis, bilateral   Chronic atrial fibrillation   COPD   CAD   Blood transfusion   HTN   Ischemic cardiomyopathy   Pulmonary nodule     Past Surgical History:    Angiogram x 3   Colon bx/polypectomy   Hemorrhoidectomy   L CEA     Family History:    Father - heart disease     Social History:  Hx of smoking (former smoker) and current alcohol use; denies smokeless tobacco use or drug use   PCP: Greg Royal   The patient presents to the ED alone via private vehicle     Physical Exam     Patient Vitals for the past 24 hrs:   BP Temp Temp src Pulse Resp SpO2 Height Weight   03/15/23 2100 113/85 -- -- 96 -- 90 % -- --   03/15/23 1940 (!) 140/107 (!)  96.6  F (35.9  C) Temporal 88 16 (!) 87 % 1.829 m (6') 77.1 kg (170 lb)        Physical Exam  Constitutional: Alert, attentive, GCS 15, thin frail appearance.   HENT:    Nose: Nose normal.    Mouth/Throat: Oropharynx is clear, mucous membranes are dry  Head: Atraumatic  Neck: No midline tenderness, full range of motion.  Eyes: EOM are normal, anicteric, conjugate gaze  CV: regular rate and rhythm; no murmurs  Chest: Mild pursed lip breathing, barrel chested but no overt prolonged expiratory phase or wheezing.  GI:  non tender. No distension. No guarding or rebound.    MSK: Right leg slightly shortened, externally rotated with proximal tenderness, diffuse right shoulder tenderness but able to range his shoulder, no other upper or lower extremity tenderness.  Neurological: Alert, attentive, moving all extremities equally.   Skin: Skin is warm and dry.       Emergency Department Course   Imaging:  XR Pelvis w Hip Right 1 View  Final Result  IMPRESSION: Mildly displaced and slightly varus angulated intertrochanteric right proximal femur fracture. No dislocation of the right femoral head from the acetabulum. Lag screw fixation across the left SI joint from left to right. Chronic healed   fracture deformities of the right superior and inferior pubic rami. Mild bilateral hip osteoarthritis. No acute displaced pelvic fracture. Embolization coils project along the right pelvis. Degenerative change lower lumbar spine and both SI joints.   Extensive arterial calcification.  NOTE: ABNORMAL REPORT  THE DICTATION ABOVE DESCRIBES AN ABNORMALITY FOR WHICH FOLLOW-UP IS NEEDED.     Chest XR,  PA & LAT  Final Result  IMPRESSION:   Increased moderate right pleural effusion with adjacent compressive atelectasis. Probable trace left pleural effusion with mild left basilar atelectasis. Otherwise, no focal airspace disease. No pneumothorax.  Stable cardiomegaly.  Multilevel degenerative changes of the spine. Left humeral intramedullary  nail, incompletely imaged.    XR Shoulder Right G/E 3 Views  Final Result  IMPRESSION: No acute fracture or malalignment. Mild glenohumeral and moderate acromioclavicular joint degenerative changes. Osteopenia. Aortic atherosclerosis.    Cervical spine CT w/o contrast  Final Result   IMPRESSION:  1.  No evidence of acute displaced fracture.  2.  No evidence of traumatic subluxation.  3.  Level degenerative change.  4.  Partially imaged right pleural effusion.    Head CT w/o contrast  Final Result  IMPRESSION:  1.  No evidence of acute intracranial hemorrhage.  2.  No evidence of acute calvarial fracture.  3.  Age-related changes.    Report per radiology    Laboratory:  Labs Ordered and Resulted from Time of ED Arrival to Time of ED Departure   CBC WITH PLATELETS AND DIFFERENTIAL - Abnormal       Result Value    WBC Count 5.1      RBC Count 3.97 (*)     Hemoglobin 14.5      Hematocrit 41.5       (*)     MCH 36.5 (*)     MCHC 34.9      RDW 17.8 (*)     Platelet Count 178      % Neutrophils 61      % Lymphocytes 21      % Monocytes 16      % Eosinophils 1      % Basophils 1      % Immature Granulocytes 0      NRBCs per 100 WBC 0      Absolute Neutrophils 3.1      Absolute Lymphocytes 1.1      Absolute Monocytes 0.8      Absolute Eosinophils 0.1      Absolute Basophils 0.1      Absolute Immature Granulocytes 0.0      Absolute NRBCs 0.0     ISTAT GASES LACTATE VENOUS POCT - Abnormal    Lactic Acid POCT 4.8 (*)     Bicarbonate Venous POCT 26      O2 Sat, Venous POCT 42 (*)     pCO2V Venous POCT 47      pH Venous POCT 7.34      pO2 Venous POCT 25     BASIC METABOLIC PANEL   ETHYL ALCOHOL LEVEL   NT PROBNP INPATIENT   PROCALCITONIN      Emergency Department Course & Assessments:    Interventions:  2039 Duoneb 3 ml INH   2041 Duoneb 3 ml INH   2044 Duoneb 3 ml INH     Independent Interpretation (X-rays, CTs, rhythm strip):  None      Consultations/Discussion of Management or Tests:  2106 I spoke with Dr. James from  Orthopedics.    I spoke with Dr. Saldana from Hospitalist Services, who accepts the patient for admission.    Social Determinants of Health affecting care:  None      Assessments:   I obtained history and examined the patient as noted above.   I rechecked the patient and explained findings. I discussed plan for admission to the hospital.    Disposition:  The patient was admitted to the hospital under the care of Dr. Saldana.     Impression & Plan    Medical Decision Makin-year-old male past medical history significant for severe ischemic cardiomyopathy, mild dementia, daily alcohol use, on anticoagulation presenting for what sounds like mechanical fall at home.  He does not think he lost consciousness, did not hit his head and is complaining only of right shoulder and right upper leg pain.  On exam he clearly has externally rotated shortened right leg concerning for hip fracture which was confirmed on x-ray showing intertrochanteric moderately displaced fracture he is neurovascular intact distally.  CT imaging of his head and neck was obtained due to concomitant EtOH use and dementia, these were negative.  Chest x-ray shows small moderate right-sided effusion, no evidence of rib fracture or pneumothorax.  X-ray of his right shoulder is negative for fracture.  We will plan for medicine admission for med clearance, preop optimization for surgical repair of his intertrochanteric hip fracture.  Case discussed with Dr. James, orthopedic surgery as well as Dr. Saldana, hospitalist.  He last took his Eliquis in the morning.    Diagnosis:    ICD-10-CM    1. Closed intertrochanteric fracture of hip, right, initial encounter (H)  S72.141A Case Request: INTERNAL FIXATION, FRACTURE, TROCHANTERIC, HIP, USING PINS OR RODS     Case Request: INTERNAL FIXATION, FRACTURE, TROCHANTERIC, HIP, USING PINS OR RODS   2. Long term current use of anticoagulant therapy  Z79.01       3. Pleural effusion  J90       4. Elevated  lactic acid level  R79.89          Michael Troncoso MD  [employer]  10:10 PM 03/15/23     Scribe Disclosure:  I, Mitchchano London, am serving as a scribe at 7:44 PM on 3/15/2023 to document services personally performed by Michael Troncoso MD based on my observations and the provider's statements to me.    3/15/2023   Michael Troncoso MD Dunbar, John Forrest, MD  03/15/23 [id number]

## 2023-03-16 NOTE — ED NOTES
[facility]  ED Nurse Handoff Report    ED Chief complaint: Fall and Hip Pain      ED Diagnosis:   Final diagnoses:   None       Code Status: Full Code    Allergies:   Allergies   Allergen Reactions    Atorvastatin Diarrhea     Intolerance to most statin medications        Patient Story: hip pain after fall  Focused Assessment:  pt had hip pain after missing a few steps and falling onto the floor. Right hip is broken    Treatments and/or interventions provided: see MAR  Patient's response to treatments and/or interventions: good    To be done/followed up on inpatient unit:  go to OR tomorrow afternoon    Does this patient have any cognitive concerns?: Baseline dementia    Activity level - Baseline/Home:  Independent  Activity Level - Current:   Total Care    Patient's Preferred language: English   Needed?: No    Isolation: None  Infection: Not Applicable  Patient tested for COVID 19 prior to admission: NO  Bariatric?: No    Vital Signs:   Vitals:    03/15/23 1940 03/15/23 2100   BP: (!) 140/107 113/85   Pulse: 88 96   Resp: 16    Temp: (!) 96.6  F (35.9  C)    TempSrc: Temporal    SpO2: (!) 87% 90%   Weight: 77.1 kg (170 lb)    Height: 1.829 m (6')        Cardiac Rhythm:     Was the PSS-3 completed:   Yes  What interventions are required if any?               Family Comments: family here  OBS brochure/video discussed/provided to patient/family: N/A              Name of person given brochure if not patient: NA              Relationship to patient: NA    For the majority of the shift this patient's behavior was Yellow.   Behavioral interventions performed were NA.    ED NURSE PHONE NUMBER: *[phone]

## 2023-03-17 NOTE — PROGRESS NOTES
"[facility]    Medicine Progress Note - Hospitalist Service    Date of Admission:  3/15/2023    Assessment & Plan      Rm Villarreal is a 82 year old male with history of mild dementia (lives at home with spouse), hypothyroidism, CAD, bilateral carotid stenosis, chronic atrial fibrillation on apixaban, COPD, hypertension, and ischemic cardiomyopathy who presented to the ED after missing a step and falling down the stairs at home and resultant R hip pain. He had less than 2 beers (confirmed with spouse) evening of 3/15 but denies any prodrome.  Imaging shows a right intertrochanteric proximal femur fracture. OR on 3/16/23 for operative repair.    Acute right intertrochanteric proximal femur fracture secondary to mechanical fall  S/p ORIF with cephalomedullary device 3/17/23  * Pt and spouse report that he fell less than two steps after the staircase turns. Patient denies head trauma or loss of consciousness and reports tripping while going down the steps.  He does endorse having \"a few beers\" earlier this evening but denied any prodrome. Spouse reports he had two beers but did not finish either of them. CT head and CT cervical spine negative for acute pathology.  Right shoulder x-ray negative for acute pathology.  Is otherwise neurologically intact in the ED upon admission.    *  ml   * pre-op hgb 13.3g/dL, post-op hgb 7.6g/dL    - Orthopedic surgery consultation  - regular diet   - As needed analgesia and bowel regimen  - PT OT   - VTE prophylaxis deferred to orthopedic surgery-patient was previously on apixaban    Acute proximal respiratory failure  Right moderate pleural effusion  Severe pulmonary hypertension  History of COPD at baseline  Patient does not use oxygen at home however he is requiring 2 to 3 L nasal cannula in the ED.  Chest imaging does indicate a moderate right pleural effusion with some compressive atelectasis. Spouse and pt confirm no recent coughing, " fevers/chills.  WBC is unremarkable and he is afebrile.  - Supplemental oxygen as needed to maintain 90% or greater  - As needed inhaler/nebulizer for wheezing  - Incentive spirometry  - Could consider thoracentesis pending clinical course and urgency of right hip intervention as above  - hx of cardiomyopathy as below  - Will plan for resumption of PTA diuretics postoperatively pending blood pressure trend    Lactic acidosis, recurrent  * Patient presented as above and was found to have a initial lactic of 4.8. Lactic acid follow up 1.9. Unclear cause, possible stress mediated in setting of severe pHTN and heart failure.   * post-operatively lactate elevated to 2.4 and 2.7. No obvious infection.  - Low suspicion of sepsis or infection currently  - continue to monitor, treat cardiac disease and anemia as discussed elsewhere.    History of dementia  Post-operative delirium  Patient lives at home with his spouse.  Take mirtazapine and seroquel at home. Follows with Memory doctor. Spouse reports that he often wanders at night and that usually she shadows him to ensure safety.   - PTA Seroquel and mirtazapine to continue   - bed alarm must be on and ideally patient can be close to RN station  - delirium precautions.     CAD - hx of stenting  Ischemic cardiomyopathy  Hypertension  Chronic atrial fibrillation on apixaban  HFrEF, with acute exacerbation.  Acute hypoxic respiratory failure  Heart rate and BP stable in ED. PTA regimen appears to include metoprolol, apixaban, and torsemide. [facility] - lately on a weekly basis. Echo Oct 2022 showed EF 25-30%, severe hypokinesis noted, RV normal, LA moderately dilated. Severe pulm HTN also noted.  * Chart review shows recent visit with Cardiology 3/10 with Dr Soni where it was discussed that prognosis is guarded in general; torsemide increased to 10mg BID - with caution due to low/normal BP.  * Post operatively, received IV fluids due to elevated lactate. O2 needs  rising. JVP elevated.   - hold apixaban, resume once cleared by surgery  - Resume metoprolol  - consult cardiology  - lasix 40mg IV one time, assess response prior to re-dosing.     Hypothyroidism  - Continue levothyroxine    Recent frostbite of all fingertips  *Patient was snowblowing this winter without gloves and managed to frostbite the tips of all of his fingers.  *SPO2 monitoring is difficult on fingers  -Recommend to monitor SPO2 on ear or toes.    Acute blood loss anemia due to intraoperative blood loss  *  ml   * pre-op hgb 13.3g/dL, post-op hgb 7.6g/dL  - monitor and transfuse as needed  - pending cardiology assessment, may transfuse to hgb 8 due to CAD       Diet: Advance Diet as Tolerated: Regular Diet Adult    DVT Prophylaxis: Pneumatic Compression Devices  Velazquez Catheter: Not present  Lines: None     Cardiac Monitoring: None  Code Status: Full Code      Clinically Significant Risk Factors                # Thrombocytopenia: Lowest platelets = 109 in last 2 days, will monitor for bleeding                  Disposition Plan     Expected Discharge Date: 03/17/2023      Destination: home            Edilberto Wiggins MD  Hospitalist Service  [facility]  Securely message with [employer] (more info)  Text page via [employer] Paging/Directory   ______________________________________________________________________    Interval History   Patient underwent cephalomedullary nail to the right intertrochanteric fracture yesterday.  Estimated blood loss is 250 cc.  Postoperatively he was mildly delirious.  Lactate was elevated and he was started on IV fluids by orthopedics.  Overnight, oxygen needs cami to 5 L currently.  (Do understand that oxygen measurement is somewhat complicated in this patient as he has significant peripheral artery disease and recently frostbite all of his fingertips).  This a.m. he is mildly confused, but pleasant.  He denies any pain.  He states that he has not working  hard to breathe.    I did call and update the wife by phone.  We did fill out and sign a blood consent.  2 nurses were available to serve as witnesses.    Physical Exam   Vital Signs: Temp: 97.6  F (36.4  C) Temp src: Oral BP: 97/70 Pulse: 98   Resp: 16 SpO2: 98 % O2 Device: Oxymask Oxygen Delivery: 5 LPM  Weight: 170 lbs 0 oz    Constitutional: Awake, alert, cooperative, no apparent distress  Respiratory: Clear to auscultation bilaterally, no crackles or wheezing  Cardiovascular: Regular rate and rhythm, normal S1 and S2, and no murmur noted.  JVP to mid neck, but patient is lying flat.  GI: Normal bowel sounds, soft, non-distended, non-tender  Skin/Integumen: No rashes, no cyanosis, no edema  Other:       Medical Decision Making       55 MINUTES SPENT BY ME on the date of service doing chart review, history, exam, documentation & further activities per the note.      Data     I have personally reviewed the following data over the past 24 hrs:    6.4  \   7.6 (L)   / 109 (L)     136 103 20.3 /  118 (H)   4.9 28 0.80 \       Procal: N/A CRP: N/A Lactic Acid: 2.7 (H)         Imaging results reviewed over the past 24 hrs:   Recent Results (from the past 24 hour(s))   XR Surgery LORRI L/T 5 Min Fluoro w Stills    Narrative    EXAM: XR SURGERY LORRI FLUORO LESS THAN 5 MIN W STILLS  LOCATION: [facility]  DATE/TIME: 3/16/2023 3:30 PM    INDICATION: Open Reduction Internal Fixation Right Intertrochanteric Hip Fracture  COMPARISON: None.    TECHNIQUE: Intraoperative fluoroscopy performed during the patient's procedure.    FLUOROSCOPIC TIME: 1  NUMBER OF IMAGES: 6    FINDINGS: Acute postoperative changes of an intramedullary nail with fixation of a intertrochanteric fracture of the right hip. Advanced vascular calcification.   XR Femur Port Right 2 Views    Narrative    EXAM: XR FEMUR PORT RIGHT 2 VIEWS  LOCATION: [facility]  DATE/TIME: 3/16/2023 4:44 PM    INDICATION: Status  post hip surgery.  COMPARISON: None.      Impression    IMPRESSION: Intramedullary nail with interlocking screw fixation across a mildly displaced intertrochanteric fracture of the right femur. Osteopenia. Mild degenerative changes in the right hip. Embolization coils in the right pelvis. Cannulated screw in   the sacrum.

## 2023-03-17 NOTE — PROGRESS NOTES
[facility]  Patient is currently receiving home care services from [facility] [place]. The patient is currently receiving RN/PT/OT services.  and home health team have been notified of patient admission. [facility] liaison will continue to follow patient during stay. If appropriate provide orders to resume home care at time of discharge.     Alert and oriented, no focal deficits, no motor or sensory deficits.

## 2023-03-17 NOTE — CONSULTS
[facility]    Cardiology Consultation     Date of Admission:  3/15/2023    Assessment & Plan   Rm Villarreal is a 82 year old male with PMH significant for longstanding coronary artery disease dating back to the 90s, ischemic cardiomyopathy with EF 25 to 30%, bilateral carotid stenoses status post left CEA, A-fib, hypertension, hyperlipidemia, COPD, elevated pulmonary pressures who presents with a fall after missing a step, falling down the stairs and found to have a hip fracture.  Status post ORIF 3/16/2023.  We are asked to see for assistance with management given his complex cardiac history.    1.  Mechanical fall with hip fracture now status post ORIF per Ortho    2.  Acute on chronic systolic heart failure, EF 25% at baseline with hypervolemia even prior to admission including chest x-ray on admission showing pleural effusions.  Now postop, after IV Lasix to 60 mg x 1.  JVP looks essentially normal although his lungs still have rales and rhonchi.  It is difficult to assess if these are secondary to COPD versus heart failure.  We will get a limited echocardiogram to look and IVC and plan on a chest x-ray in the morning.  If either of these look like he has ongoing volume would restart IV Lasix perhaps at 20 mg twice daily.  If he looks relatively euvolemic would restart home torsemide at 20 mg daily.  Request close I&O and daily weights.  Weight today likely completely inaccurate as home dry weight is about 135 pounds.    3.  Coronary artery disease, stable no chest pain, effective patient did well through surgery without complication is remarkable.    4.  COPD, does not require chronic oxygen at home    5.  Severely elevated pulmonary pressures at 66+ right atrial pressure.  Likely mixed left and right-sided pressures are normal no recent heart cath    6.  Hypertension well-controlled    7.  Hyperlipidemia    8.  Acute blood loss anemia, patient had normal hemoglobin in the 13's  prior to surgery dropped to 7.6 postop, expected.  Family is concerned about this and requesting additional check today.  Transfuse for hemoglobin less than 7.0.    9.  Acute respiratory failure, likely secondary to anemia, volume status, and underlying COPD.  Unclear if the patient has been hypoxic with exertion as an outpatient.  His family states he has never had a 6-minute walk or something like that.  Will defer to IM.    Thank you for this consultation.  This patient was discussed with Dr. Neff.  Final recommendations are pending his documentation.      Doris Parsons PA-C  2:23 PM 3/17/2023   [facility]  Pager: [phone]    I spent 45 minutes face-to-face and/or coordinating care. Over 50% of the time on the unit was spent counseling the patient and/or coordinating care as documented above.  Moderate complexity         Primary Care Physician   Greg Royal    Reason for Consult   Reason for consult: I was asked by Dr. Wiggins to evaluate this patient for shared cardiac management.    History of Present Illness   Rm Villarreal is a 82 year old male with PMH significant for longstanding coronary artery disease dating back to the 90s, ischemic cardiomyopathy with EF 25 to 30%, bilateral carotid stenoses status post left CEA, A-fib, hypertension, hyperlipidemia, COPD, elevated pulmonary pressures who presents with a fall after missing a step, falling down the stairs and found to have a hip fracture.  Status post ORIF 3/16/2023.      He was seen in clinic the week prior by Dr. Soni and felt to be hypervolemic.  His diuretic was increased from 10 mg daily to 20 mg daily.  Fortunately he made it through surgery.  We are asked to assist with management given his severe underlying cardiac disease.    On interview at this time he is somewhat confused but he has multiple family members in the room who can answer questions.  He denies any chest pain.  He does not particularly feel more short of breath than normal.   He is not on oxygen chronically at home.  His wife does not think he has been hypoxic at home.  She also notes though that it is difficult for him to do his inhalers as he has a hard time with coordination and he just starts taking them apart.  She notes that earlier this year he weighed 250 pounds was started on the torsemide 10 mg and lost 20 pounds this has been relatively stable.  She cannot really answer if the additional dose of torsemide helped when they saw Dr. Short.  The patient had not been complaining of shortness of breath at that time.  Patient notes that his mental status is not as good postoperatively.    Past Medical History   Past Medical History:   Diagnosis Date     Adjustment disorder      Carotid stenosis, bilateral     left CEA 2007     Chronic atrial fibrillation (H)     warfarin     COPD (chronic obstructive pulmonary disease) (H) 02/03/2021     Coronary artery disease     cardiac cath 2014: chronic occlusion of circumflex and apical LAD: medical management; cath 2005: stent to LAD, historical: stent to RCA     History of blood transfusion      Hypertension      Ischemic cardiomyopathy 2021    ef 25%     Pulmonary nodule        Past Surgical History   Past Surgical History:   Procedure Laterality Date     angiogram  02/19/2014    cardiac cath 2014: chronic occlusion of circumflex and apical LAD: medical management     ANGIOGRAM  2005    stent to LAD     ANGIOGRAM      stent to RCA     COLONOSCOPY      2010     COLONOSCOPY N/A 8/30/2018    Procedure: COMBINED COLONOSCOPY, SINGLE OR MULTIPLE BIOPSY/POLYPECTOMY BY BIOPSY;  colonoscopy;  Surgeon: Tyler Dee MD;  Location:  GI     GI SURGERY      hemorrhoidectomy     IR VISCERAL ANGIOGRAM  7/25/2019     OPEN REDUCTION INTERNAL FIXATION HIP NAILING Right 3/16/2023    Procedure: RIGHT OPEN REDUCTION INTERNAL FIXATION INTERTROCHANTERIC HIP FRACTURE;  Surgeon: Brady James MD;  Location:  OR     VASCULAR SURGERY  2007    left CEA        Prior to Admission Medications   Prior to Admission Medications   Prescriptions Last Dose Informant Patient Reported? Taking?   Cyanocobalamin (B-12) 100 MCG TABS 3/15/2023 at am  Yes Yes   Sig: Take 1 tablet by mouth daily   QUEtiapine (SEROQUEL) 25 MG tablet PRN  No Yes   Sig: TAKE 1/2 TABLET BY MOUTH EVERY DAY AS NEEDED FOR ANXIETY   Patient taking differently: TAKE 1/2- 1 TABLET BY MOUTH EVERY DAY AS NEEDED FOR ANXIETY   QUEtiapine (SEROQUEL) 25 MG tablet 3/14/2023 at pm  Yes Yes   Sig: Take 18.75 mg by mouth At Bedtime Wife breaks tablet and gives 3/4 of a tablet at bedtime   Saccharomyces boulardii (PROBIOTIC) 250 MG CAPS 3/15/2023 at am  Yes Yes   Sig: Take 250 mg by mouth daily   Vitamin D3 (CHOLECALCIFEROL) 125 MCG (5000 UT) tablet 3/15/2023 at am  Yes Yes   Sig: Take 1 tablet by mouth daily   apixaban ANTICOAGULANT (ELIQUIS) 5 MG tablet 3/15/2023 at am  No Yes   Sig: Take 1 tablet (5 mg) by mouth 2 times daily   hydrocortisone, Perianal, (HYDROCORTISONE) 2.5 % cream PRN  No Yes   Sig: Place rectally 2 times daily as needed for hemorrhoids   levothyroxine (SYNTHROID/LEVOTHROID) 25 MCG tablet 3/15/2023 at am  No Yes   Sig: Take 1 tablet (25 mcg) by mouth daily   loperamide (IMODIUM) 2 MG capsule PRN  Yes Yes   Sig: Take 1-2 mg by mouth as needed for diarrhea   melatonin 5 MG tablet 3/14/2023 at pm Spouse/Significant Other Yes Yes   Sig: Take 5 mg by mouth At Bedtime   metoprolol succinate ER (TOPROL XL) 25 MG 24 hr tablet 3/15/2023 at am  No Yes   Sig: Take 1 tablet (25 mg) by mouth daily   mirtazapine (REMERON) 7.5 MG tablet 3/14/2023 at pm  No Yes   Sig: Take 1 tablet (7.5 mg) by mouth At Bedtime   nitroGLYcerin (NITROSTAT) 0.4 MG sublingual tablet PRN  No Yes   Sig: For chest pain place 1 tablet under the tongue every 5 minutes for 3 doses. If symptoms persist 5 minutes after 1st dose call 911.   nystatin (MYCOSTATIN) 499290 UNIT/GM external powder PRN  No Yes   Sig: Apply topically 2 times daily as  needed for other (Excoriation)   polyethylene glycol (MIRALAX) 17 g packet PRN  Yes Yes   Sig: Take 1 packet by mouth daily as needed for constipation   potassium chloride ER (KLOR-CON M) 20 MEQ CR tablet 3/15/2023 at am  No Yes   Sig: Take 1 tablet (20 mEq) by mouth 2 times daily   Patient taking differently: Take 20 mEq by mouth daily   torsemide (DEMADEX) 10 MG tablet 3/15/2023 at am  No Yes   Sig: Take 1 tablet (10 mg) by mouth daily   Patient taking differently: Take 10 mg by mouth 2 times daily      Facility-Administered Medications: None     Current Facility-Administered Medications   Medication Dose Route Frequency     acetaminophen  975 mg Oral Q8H IS     enoxaparin ANTICOAGULANT  40 mg Subcutaneous Q24H     levothyroxine  25 mcg Oral Daily     metoprolol succinate ER  25 mg Oral Daily     mirtazapine  7.5 mg Oral At Bedtime     polyethylene glycol  17 g Oral Daily     QUEtiapine  18.75 mg Oral At Bedtime     senna-docusate  1 tablet Oral BID     sodium chloride (PF)  3 mL Intracatheter Q8H     sodium chloride (PF)  3 mL Intracatheter Q8H     Current Facility-Administered Medications   Medication Last Rate     Allergies   Allergies   Allergen Reactions     Atorvastatin Diarrhea     Intolerance to most statin medications        Social History    reports that he quit smoking about 19 years ago. His smoking use included cigarettes. He has never used smokeless tobacco. He reports current alcohol use. He reports that he does not use drugs.    Family History   I have reviewed this patient's family history and updated it with pertinent information if needed.  Family History   Problem Relation Age of Onset     Heart Disease Father         Review of Systems   A comprehensive review of system was performed and is negative other than that noted in the HPI or here.     Physical Exam   Vital Signs with Ranges  Temp:  [97.6  F (36.4  C)-99  F (37.2  C)] 97.6  F (36.4  C)  Pulse:  [] 99  Resp:  [13-25] 16  BP:  ()/(70-92) 107/70  MAP:  [85 mmHg-90 mmHg] 85 mmHg  Arterial Line BP: (113-123)/(70-73) 113/70  SpO2:  [91 %-100 %] 93 %  Wt Readings from Last 4 Encounters:   03/15/23 77.1 kg (170 lb)   03/10/23 63.3 kg (139 lb 8 oz)   03/06/23 63 kg (139 lb)   02/09/23 60.3 kg (133 lb)     I/O last 3 completed shifts:  In: 600 [I.V.:600]  Out: 650 [Urine:400; Blood:250]      Vitals: /70   Pulse 99   Temp 97.6  F (36.4  C) (Oral)   Resp 16   Ht 1.829 m (6')   Wt 77.1 kg (170 lb)   SpO2 93%   BMI 23.06 kg/m      Physical Exam:   General -frail, pale, chronically ill-appearing gentleman  Eyes - No scleral icterus  HEENT - Neck supple, moist mucous membranes  Cardiovascular -regular in rate and rhythm I do not appreciate murmur rub or gallop.  Extremities - There is 1+ edema, although compression wraps are in place.   Respiratory -some coarse rhonchi in bilateral lobes I do not appreciate wheezing, minimal rales.  Skin -pale  Gastrointestinal - Non tender and non distended without rebound or guarding  Psych - Appropriate affect,, appears tired does not always answer questions appropriately.  Neurological - No gross motor neurological focal deficits    No lab results found in last 7 days.    Invalid input(s): TROPONINIES    Recent Labs   Lab 03/17/23  1237 03/17/23  0714 03/16/23  1756 03/16/23  0730 03/15/23  2225 03/15/23  2225 03/15/23  1941   WBC  --  6.4  --  6.7  --   --  5.1   HGB 7.4* 7.6*  --  13.3  --   --  14.5   MCV  --  105*  --  103*  --   --  105*   PLT  --  109*  --  137*  --   --  178   NA  --  136  --  135*  --  133*  --    POTASSIUM  --  4.9  --  4.3  --  3.9  --    CHLORIDE  --  103  --  98  --  97*  --    CO2  --  28  --  26  --  26  --    BUN  --  20.3  --  19.7  --  20.4  --    CR  --  0.80 0.79 0.77   < > 0.82  --    GFRESTIMATED  --  89 90 >90   < > 89  --    ANIONGAP  --  5*  --  11  --  10  --    RANDY  --  8.6*  --  8.8  --  8.7*  --    GLC  --  118*  --  95  --  101*  --     < > = values  in this interval not displayed.     Recent Labs   Lab Test 01/18/23  1517 08/05/21  1110   CHOL 130 204*   HDL 31* 82   LDL 82 104*   TRIG 87 91     Recent Labs   Lab 03/17/23  1237 03/17/23  0714 03/16/23  0730 03/15/23  1941   WBC  --  6.4 6.7 5.1   HGB 7.4* 7.6* 13.3 14.5   HCT  --  22.1* 37.6* 41.5   MCV  --  105* 103* 105*   PLT  --  109* 137* 178     Recent Labs   Lab 03/15/23  1943   PH 7.34   HCO3V 26     Recent Labs   Lab 03/15/23  2225   NTBNPI 11,226*     No results for input(s): DD in the last 168 hours.  No results for input(s): SED, CRP in the last 168 hours.  Recent Labs   Lab 03/17/23  0714 03/16/23  0730 03/15/23  1941   * 137* 178     No results for input(s): TSH in the last 168 hours.  No results for input(s): COLOR, APPEARANCE, URINEGLC, URINEBILI, URINEKETONE, SG, UBLD, URINEPH, PROTEIN, UROBILINOGEN, NITRITE, LEUKEST, RBCU, WBCU in the last 168 hours.    Imaging:  Recent Results (from the past 48 hour(s))   Head CT w/o contrast    Narrative    EXAM: CT HEAD W/O CONTRAST  LOCATION: [facility]  DATE/TIME: 3/15/2023 8:29 PM    INDICATION: Fall, trauma, pain.  COMPARISON: CT 06/29/2022.  TECHNIQUE: Routine CT Head without IV contrast. Multiplanar reformats. Dose reduction techniques were used.    FINDINGS:  INTRACRANIAL CONTENTS: No intracranial hemorrhage, extraaxial collection, or mass effect.  No CT evidence of acute infarct. Mild presumed chronic small vessel ischemic changes. Mild to moderate generalized volume loss. No hydrocephalus. The sella is   unremarkable for technique. The cerebellar tonsils are appropriately positioned.    VISUALIZED ORBITS/SINUSES/MASTOIDS: No intraorbital abnormality. No paranasal sinus mucosal disease. No middle ear or mastoid effusion.    BONES/SOFT TISSUES: No scalp hematoma. No skull fracture.      Impression    IMPRESSION:  1.  No evidence of acute intracranial hemorrhage.  2.  No evidence of acute calvarial fracture.  3.   Age-related changes.   Cervical spine CT w/o contrast    Narrative    EXAM: CT CERVICAL SPINE W/O CONTRAST  LOCATION: [facility]  DATE/TIME: 3/15/2023 8:30 PM    INDICATION: Fall, trauma, pain.  COMPARISON: Radiograph 07/25/2019.  TECHNIQUE: Routine CT Cervical Spine without IV contrast. Multiplanar reformats. Dose reduction techniques were used.    FINDINGS:  ALIGNMENT: Mild retrolisthesis at C4-C5 C5-C6. Symmetric facet alignment. Unremarkable alignment of the craniocervical junction.    BONES: The occipital condyles appear intact. Vertebral body heights are unremarkable. There is no evidence of acute displaced fractures. No destructive bony lesions are apparent.    DISCS: Moderate multilevel spondylosis. This is most pronounced at C4-C7, where there is intervertebral disc height loss and disc-osteophyte complex formation. There is a prominent central protrusion at C3-C4.    SPINAL CANAL: No high-grade stenosis.    NEURAL FORAMINA: Moderate right C3-C4, moderate left C4-C5, moderate bilateral C5-C6, and moderate right C6-C7 neural foraminal stenosis.    SOFT TISSUES: No prevertebral soft tissue thickening. Unremarkable paraspinal soft tissues.    UPPER THORAX: Partially imaged right pleural effusion.      Impression     IMPRESSION:  1.  No evidence of acute displaced fracture.  2.  No evidence of traumatic subluxation.  3.  Level degenerative change.  4.  Partially imaged right pleural effusion.   XR Shoulder Right G/E 3 Views    Narrative    EXAM: XR SHOULDER RIGHT G/E 3 VIEWS  LOCATION: [facility]  DATE/TIME: 3/15/2023 8:42 PM    INDICATION: fall, pain  COMPARISON: None.      Impression    IMPRESSION: No acute fracture or malalignment. Mild glenohumeral and moderate acromioclavicular joint degenerative changes. Osteopenia. Aortic atherosclerosis.   Chest XR,  PA & LAT    Narrative    EXAM: XR CHEST 2 VIEWS  LOCATION: [facility]  DATE/TIME: 3/15/2023 8:44 PM    INDICATION: Shortness of breath. Likely COPD.  COMPARISON: 03/06/2023      Impression    IMPRESSION:     Increased moderate right pleural effusion with adjacent compressive atelectasis. Probable trace left pleural effusion with mild left basilar atelectasis. Otherwise, no focal airspace disease. No pneumothorax.    Stable cardiomegaly.    Multilevel degenerative changes of the spine. Left humeral intramedullary nail, incompletely imaged.   XR Pelvis w Hip Right 1 View    Narrative    EXAM: XR PELVIS AND HIP RIGHT 1 VIEW  LOCATION: [facility]  DATE/TIME: 3/15/2023 8:45 PM    INDICATION: Fall, hip pain.  COMPARISON: None.      Impression    IMPRESSION: Mildly displaced and slightly varus angulated intertrochanteric right proximal femur fracture. No dislocation of the right femoral head from the acetabulum. Lag screw fixation across the left SI joint from left to right. Chronic healed   fracture deformities of the right superior and inferior pubic rami. Mild bilateral hip osteoarthritis. No acute displaced pelvic fracture. Embolization coils project along the right pelvis. Degenerative change lower lumbar spine and both SI joints.   Extensive arterial calcification.    NOTE: ABNORMAL REPORT    THE DICTATION ABOVE DESCRIBES AN ABNORMALITY FOR WHICH FOLLOW-UP IS NEEDED.    XR Femur Right 2 Views    Narrative    EXAM: XR FEMUR RIGHT 2 VIEWS  LOCATION: [facility]  DATE/TIME: 3/15/2023 10:00 PM    INDICATION: hip fracture, need full femur xray  COMPARISON: None.      Impression    IMPRESSION: There is an acute mildly displaced comminuted advanced vascular calcification. Embolic coils are seen in the lower right hemipelvis. The right femur is otherwise normal. Lower intertrochanteric fracture involving the right hip.   XR Surgery LORRI L/T 5 Min Fluoro w Stills    Narrative    EXAM: XR SURGERY LORRI FLUORO LESS THAN 5 MIN W  MONIQUE  LOCATION: [facility]  DATE/TIME: 3/16/2023 3:30 PM    INDICATION: Open Reduction Internal Fixation Right Intertrochanteric Hip Fracture  COMPARISON: None.    TECHNIQUE: Intraoperative fluoroscopy performed during the patient's procedure.    FLUOROSCOPIC TIME: 1  NUMBER OF IMAGES: 6    FINDINGS: Acute postoperative changes of an intramedullary nail with fixation of a intertrochanteric fracture of the right hip. Advanced vascular calcification.   XR Femur Port Right 2 Views    Narrative    EXAM: XR FEMUR PORT RIGHT 2 VIEWS  LOCATION: [facility]  DATE/TIME: 3/16/2023 4:44 PM    INDICATION: Status post hip surgery.  COMPARISON: None.      Impression    IMPRESSION: Intramedullary nail with interlocking screw fixation across a mildly displaced intertrochanteric fracture of the right femur. Osteopenia. Mild degenerative changes in the right hip. Embolization coils in the right pelvis. Cannulated screw in   the sacrum.       Echo:  No results found for this or any previous visit (from the past 4320 hour(s)).    Clinically Significant Risk Factors Present on Admission              Cardiovascular: End Stage Heart Failure  Hypotension, unspecified    Fluid & Electrolyte Disorders: Fluid overload, unspecified    COPD    Pulmonary Heart Disease (Pulmonary hypertension or Cor pulmonale): Pulmonary Hypertension, unspecified    Limitations of activities/Fatigue (optional): Chronic Fatigue and Other Debilities: Age-related physical debility

## 2023-03-17 NOTE — PROGRESS NOTES
Orthopedic Surgery  Rm Villarreal  03/17/2023     Admit Date:  3/15/2023    POD: 1 Day Post-Op   Procedure(s):  RIGHT OPEN REDUCTION INTERNAL FIXATION INTERTROCHANTERIC HIP FRACTURE    Patient resting comfortably in bed.    Wife at bedside.  Patient does not arouse to my voice or exam.   Pain level and postoperative expectations discussed with the patient's wife.  Tolerating oral intake.    Afebrile.  No acute events overnight.    Temp:  [97.6  F (36.4  C)-99  F (37.2  C)] 97.6  F (36.4  C)  Pulse:  [] 99  Resp:  [13-25] 16  BP: ()/(70-92) 107/70  MAP:  [85 mmHg-90 mmHg] 85 mmHg  Arterial Line BP: (113-123)/(70-73) 113/70  SpO2:  [91 %-100 %] 93 %    Sleepy, does not arouse to exam.  Right hip dressings are clean, dry, and intact.   No erythema of the surrounding skin.   Mild thigh swelling.  Bilateral calves are soft.  No grimacing with passive ankle DF and PF.  DP pulse palpable.    Labs:  Recent Labs   Lab Test 03/17/23  1237 03/17/23  0714 03/16/23  0730 03/15/23  1941   WBC  --  6.4 6.7 5.1   HGB 7.4* 7.6* 13.3 14.5   PLT  --  109* 137* 178     Recent Labs   Lab Test 04/04/22  1029 03/07/22  0908 02/22/22  0948   INR 1.1 2.2* 2.0*     Recent Labs   Lab Test 07/02/22  0710 07/01/22  0905   CRP 28.7* 32.3*       A/P    1. S/p right intertrochanteric femur fracture CM nailing   Continue Lovenox and PTA Eliquis for DVT prophylaxis. Monitor Hgb closely and transfuse per protocol.   Mobilize with PT/OT.   WBAT RLE with walker.   Continue current pain regimen.   Dressings: Keep intact.  Change if >50% saturated or peeling off.    Follow-up: 2 weeks post-op with Dr. Brady James    2. Disposition   Anticipate d/c to TCU when medically cleared and progressing in PT.    Abbie Smith PA-C  [facility]

## 2023-03-17 NOTE — PROGRESS NOTES
Reason for Admission: Right ORIF   Cognitive/Mentation: Alert to self  Neuros/CMS: Intact  VS: Remains on 5L oxymask, other VSS  GI: BS active, + flatus,   : Voiding w/o concerns, male external cath at [facility]  Pulmonary: LS clear.  Pain: Reports severe pain with movements, prn oxycodone given       Drains/Lines: R PIV SL, Left PIV infusing LR@75 mL/hr  Skin:  dry skin,, R hip incision dressing CDI. Scattered bruises   Activity: A2/CL and with turns   Diet:  Regular diet- Needs help with feeding   Therapies recs: Pending  Discharge:  Pending PT recomendation     End of shift summary: Pt restless at the beginning of the night shift. Pulling lines and trying to get out of bed. Pt is confused and inconsistently follows commands. Slept well once given night meds. No acute events reported overnight. Will need long arm once awake.

## 2023-03-18 NOTE — PLAN OF CARE
Goal Outcome Evaluation:      3/17/2023 8882-1342 R hip ORIF POD 2  Patient is alert and oriented to self.  VSS x BP soft, afib so HR irreg/tachy at timess. 2L oxygen via NC SpO2 WDL. Dry cough, LS diminished. Agitated and restless, yelled out in pain this morning; prn IV dilaudid and scheduled acetaminophen, turns, cold pack effective Dressing dry and intact. CMS +1 pulses, +2 edema to RLE, MANJULA NV or strengths but Rm moves well. Hgb 6.1 this a.m., RBC transfusing slowly 2/2 CHF/EF 25%. Will give. lasix following. Incontinent but uses urinal at times.

## 2023-03-18 NOTE — PLAN OF CARE
Goal Outcome Evaluation:       /17/2023 [id number] R hip ORIF POD 2  Patient is alert and oriented to self.  VSS x BP soft, afib so HR irreg/tachy at timess. 2L oxygen via NC SpO2 WDL. Dry cough, LS diminished. Agitated and restless, yelled out in pain this morning; prn IV dilaudid and scheduled acetaminophen, turns, cold pack effective Dressing dry and intact. CMS +1 pulses, +2 edema to RLE, MANJULA NV or strengths but Rm moves well. Hgb 6.1 this a.m., RBC transfusing slowly 2/2 CHF/EF 25%.  Lasix held this a.m. per Dr Gamez for hypotension. To reevaluate later this a.m. and administer if BP increases. Incontinent but uses urinal at times. Pills crushed in applesauce, poor PO intake.

## 2023-03-18 NOTE — PROGRESS NOTES
MD Notification    Notified Person: MD    Notified Person Name: Dr. Wiggins    Notification Date/Time: 03-18-23 1019    Notification Interaction: paged via vocera web console    Purpose of Notification: Hgb 6.6

## 2023-03-18 NOTE — PROGRESS NOTES
"[facility]    Medicine Progress Note - Hospitalist Service    Date of Admission:  3/15/2023    Assessment & Plan      Rm Villarreal is a 82 year old male with history of mild dementia (lives at home with spouse), hypothyroidism, CAD, bilateral carotid stenosis, chronic atrial fibrillation on apixaban, COPD, hypertension, and ischemic cardiomyopathy who presented to the ED after missing a step and falling down the stairs at home and resultant R hip pain. He had less than 2 beers (confirmed with spouse) evening of 3/15 but denies any prodrome.  Imaging shows a right intertrochanteric proximal femur fracture. OR on 3/16/23 for operative repair.    Post-operatively, delirium, anemia, and hypotension causing issues.     Acute right intertrochanteric proximal femur fracture secondary to mechanical fall  S/p ORIF with cephalomedullary device 3/17/23  * Pt and spouse report that he fell less than two steps after the staircase turns. Patient denies head trauma or loss of consciousness and reports tripping while going down the steps.  He does endorse having \"a few beers\" earlier this evening but denied any prodrome. Spouse reports he had two beers but did not finish either of them. CT head and CT cervical spine negative for acute pathology.  Right shoulder x-ray negative for acute pathology.  Is otherwise neurologically intact in the ED upon admission.    *  ml   * pre-op hgb 13.3g/dL, post-op hgb 7.6g/dL    - Orthopedic surgery consultation  - regular diet   - As needed analgesia and bowel regimen  - PT OT   - VTE prophylaxis deferred to orthopedic surgery-patient was previously on apixaban    Acute proximal respiratory failure  Right moderate pleural effusion  Severe pulmonary hypertension  History of COPD at baseline  Patient does not use oxygen at home however he is requiring 2 to 3 L nasal cannula in the ED.  Chest imaging does indicate a moderate right pleural effusion with some " compressive atelectasis. Spouse and pt confirm no recent coughing, fevers/chills.  WBC is unremarkable and he is afebrile.  - Supplemental oxygen as needed to maintain 90% or greater  - As needed inhaler/nebulizer for wheezing  - Incentive spirometry  - Could consider thoracentesis pending clinical course and urgency of right hip intervention as above  - hx of cardiomyopathy as below  - Will plan for resumption of PTA diuretics postoperatively pending blood pressure trend    Lactic acidosis, recurrent  * Patient presented as above and was found to have a initial lactic of 4.8. Lactic acid follow up 1.9. Unclear cause, possible stress mediated in setting of severe pHTN and heart failure.   * post-operatively lactate elevated to 2.4 and 2.7. No obvious infection.  - Low suspicion of sepsis or infection currently  - continue to monitor, treat cardiac disease and anemia as discussed elsewhere.    History of dementia  Post-operative delirium  * Patient lives at home with his spouse.  Take mirtazapine and seroquel at home. Follows with Memory doctor. Spouse reports that he often wanders at night and that usually she shadows him to ensure safety.   - PTA Seroquel and mirtazapine to continue   - bed alarm must be on and ideally patient can be close to RN station  - delirium precautions.     CAD - hx of stenting  Ischemic cardiomyopathy  Hypertension  Chronic atrial fibrillation on apixaban  HFrEF, with acute exacerbation.  Acute hypoxic respiratory failure  Heart rate and BP stable in ED. PTA regimen appears to include metoprolol, apixaban, and torsemide. [facility]s [facility] - lately on a weekly basis. Echo Oct 2022 showed EF 25-30%, severe hypokinesis noted, RV normal, LA moderately dilated. Severe pulm HTN also noted.  * Chart review shows recent visit with Cardiology 3/10 with Dr Soni where it was discussed that prognosis is guarded in general; torsemide increased to 10mg BID - with caution due to low/normal BP.  * Post  operatively, received IV fluids due to elevated lactate. O2 needs rising. JVP elevated.   - hold apixaban, await stable hgb prior to resumption. (with Afib, okay to hold for a few days)  - Resume metoprolol  - consult cardiology  - lasix 40mg IV one time, assess response prior to re-dosing.     Hypothyroidism  - Continue levothyroxine    Recent frostbite of all fingertips  *Patient was snowblowing this winter without gloves and managed to frostbite the tips of all of his fingers.  *SPO2 monitoring is difficult on fingers  -Recommend to monitor SPO2 on ear or toes.    Acute blood loss anemia due to intraoperative blood loss  *  ml   * pre-op hgb 13.3g/dL, post-op hgb 7.6g/dL  * s/p 1u PRBC on 3/18  - monitor and transfuse as needed  - pending cardiology assessment, may transfuse to hgb 8 due to CAD  - continue to hold apixaban       Diet: Advance Diet as Tolerated: Regular Diet Adult    DVT Prophylaxis: Pneumatic Compression Devices  Velazquez Catheter: Not present  Lines: None     Cardiac Monitoring: None  Code Status: Full Code      Clinically Significant Risk Factors                # Thrombocytopenia: Lowest platelets = 109 in last 2 days, will monitor for bleeding          # Cachexia: Estimated body mass index is 17.91 kg/m  as calculated from the following:    Height as of this encounter: 1.829 m (6').    Weight as of this encounter: 59.9 kg (132 lb 0.9 oz)., PRESENT ON ADMISSION         Disposition Plan      Expected Discharge Date: 03/19/2023      Destination: home            Edilberto Wiggins MD  Hospitalist Service  [facility]  Securely message with [employer] (more info)  Text page via [facility] Paging/Directory   ______________________________________________________________________    Interval History   Quiet somnolent this AM. He mumbles to voice stimulation. Squints his eyes closed to light sternal rub.   Ongoing hypoxia.   Agitated overnight requiring sit.     Addendum 1700:  discussed with daughter, she was concerned about hgb and blood pressure. Assured her that I share those concerns, but as I discussed with patient's wife preoperatively, patient has significant and severe cardiac and pulmonary disease.  These diseases limit our ability to rapidly give blood, fluids, or other interventions that may throw his very tenuous hemostasis out of balance.  I did assure her that cardiology is following along and they did add medications today that may assist with blood pressure and heart health.    Physical Exam   Vital Signs: Temp: 97.4  F (36.3  C) Temp src: Axillary BP: (!) 85/54 Pulse: 96   Resp: 16 SpO2: 92 % O2 Device: Nasal cannula Oxygen Delivery: 2 LPM  Weight: 132 lbs .89 oz    Constitutional: Resting but stirs to vocal stimulation. no apparent distress  Respiratory: Clear to auscultation bilaterally, no crackles or wheezing  Cardiovascular: Regular rate and rhythm, normal S1 and S2, and no murmur noted.    GI: Normal bowel sounds, soft, non-distended, non-tender  Skin/Integumen: No rashes, no cyanosis, no edema  Other: Delirious      Medical Decision Making       55 MINUTES SPENT BY ME on the date of service doing chart review, history, exam, documentation & further activities per the note.      Data     I have personally reviewed the following data over the past 24 hrs:    N/A  \   6.1 (LL)   / N/A     N/A N/A N/A /  N/A   N/A N/A N/A \       Imaging results reviewed over the past 24 hrs:   Recent Results (from the past 24 hour(s))   Echocardiogram Limited   Result Value    LVEF  25-30%    Narrative    [id number]  [id number]  [id number]  [id number]^KATIE^KAYLEN^MARGIE HAILE     [facility]  Echocardiography Laboratory  [address] [place] [address]     Name: AMEE BAILEY  MRN: [id number]  : 1940  Study Date: 2023 03:35 PM  Age: 82 yrs  Gender: Male  Patient Location: [facility]  Reason For Study: CHF  Ordering Physician: KAYLEN WILSON  MIC  Referring Physician: KAYLEN WILSON  Performed By: Je Cohen     BSA: 2.0 m2  Height: 72 in  Weight: 170 lb  HR: 104  ______________________________________________________________________________  Procedure  Complete Echo Adult. Optison (NDC #[id number]) given intravenously.  ______________________________________________________________________________  Interpretation Summary     The rhythm was atrial fibrillation.  Severely decreased left ventricular systolic function  The visual ejection fraction is 25-30%.  There is severe global hypokinesia of the left ventricle.  The left ventricle is normal in size.  There is apical dyskinesis.  There is severe anterior wall hypokinesis.  There is severe inferior wall hypokinesis.  The right ventricle is moderately dilated.  Severely decreased right ventricular systolic function  There is severe biatrial enlargement.  Right ventricular systolic pressure is elevated, consistent with moderate  pulmonary hypertension.  There is moderate (2+) aortic regurgitation.  Moderate bilateral pleural effusion     Pulmonary pressures are lower than last study 10/17/2022 ( estimated PA  systolic pressure was 66mm HG + RAP , now 44 mmHg + RAP)  ______________________________________________________________________________  Left Ventricle  The left ventricle is normal in size. There is normal left ventricular wall  thickness. Severely decreased left ventricular systolic function. The visual  ejection fraction is 25-30%. There is severe global hypokinesia of the left  ventricle. There is apical dyskinesis. There is severe anterior wall  hypokinesis. There is severe inferior wall hypokinesis. There is no thrombus  seen in the left ventricle.     Right Ventricle  The right ventricle is moderately dilated. Severely decreased right  ventricular systolic function.     Atria  There is severe biatrial enlargement. There is no atrial shunt seen. The left  atrial appendage is not  well visualized.     Mitral Valve  The mitral valve leaflets appear normal. There is no evidence of stenosis,  fluttering, or prolapse. There is mild (1+) mitral regurgitation. There is no  mitral valve stenosis.     Tricuspid Valve  There is moderate (2+) tricuspid regurgitation. The right ventricular systolic  pressure is elevated at 44.2 mmHg. Right ventricular systolic pressure is  elevated, consistent with moderate pulmonary hypertension.     Aortic Valve  There is moderate trileaflet aortic sclerosis. There is moderate (2+) aortic  regurgitation. No aortic stenosis is present.     Pulmonic Valve  Normal pulmonic valve. There is mild (1+) pulmonic valvular regurgitation.  There is no pulmonic valvular stenosis.     Vessels  The aortic root is normal size. Normal size ascending aorta. The inferior vena  cava is normal. The pulmonary artery is normal size.     Pericardium  The pericardium appears normal. Moderate bilateral pleural effusion.     Rhythm  The rhythm was atrial fibrillation.  ______________________________________________________________________________  MMode/2D Measurements & Calculations     IVSd: 1.0 cm  LVIDd: 5.5 cm  LVIDs: 4.4 cm  LVPWd: 1.0 cm  FS: 19.3 %  LV mass(C)d: 209.9 grams  LV mass(C)dI: 105.6 grams/m2  Ao root diam: 3.3 cm  asc Aorta Diam: 3.6 cm  LVOT diam: 2.0 cm  LVOT area: 3.1 cm2  LA Volume (BP): 112.0 ml  LA Volume Index (BP): 56.3 ml/m2  RWT: 0.37     TAPSE: 1.2 cm     Doppler Measurements & Calculations  MV E max haider: 61.3 cm/sec  MV dec slope: 394.0 cm/sec2  MV dec time: 0.16 sec  Ao V2 max: 137.7 cm/sec  Ao max P.0 mmHg  RUBIO(V,D): 1.7 cm2  LV V1 max P.3 mmHg  LV V1 max: 75.6 cm/sec  LV V1 VTI: 11.3 cm  SV(LVOT): 35.5 ml  SI(LVOT): 17.9 ml/m2  PA V2 max: 69.7 cm/sec  PA max P.9 mmHg  PA acc time: 0.07 sec  TR max haider: 331.0 cm/sec  TR max P.2 mmHg  AV Haider Ratio (DI): 0.55  Lateral E/e': 3.4  RV S Haider: 10.8 cm/sec      ______________________________________________________________________________  Report approved by: Dr. Mode Hernandez 03/17/2023 05:12 PM         XR Chest Port 1 View    Narrative    EXAM: XR CHEST PORT 1 VIEW  LOCATION: [facility]  DATE/TIME: 3/18/2023 5:04 AM    INDICATION: hypoxia, reassess pleural effusions  COMPARISON: 03/15/2023      Impression    IMPRESSION: Heart is enlarged, similar to prior exam. A moderate right-sided pleural effusion is again noted, slightly increased in size from prior exam with surrounding opacification of the right mid and lower lung zone, likely reflecting concurrent   atelectasis although superimposed airspace disease these cannot be excluded.

## 2023-03-18 NOTE — PROGRESS NOTES
Orthopedic Surgery  Rm Villarreal  03/18/2023     Admit Date:  3/15/2023    POD: 2 Days Post-Op   Procedure(s):  RIGHT OPEN REDUCTION INTERNAL FIXATION INTERTROCHANTERIC HIP FRACTURE    Patient resting comfortably in bed.    Sitter present. Pt does not respond to questions or exam.   Tolerating oral intake.    Afebrile.  No acute events overnight.    Temp:  [97.2  F (36.2  C)-98.1  F (36.7  C)] 97.4  F (36.3  C)  Pulse:  [] 102  Resp:  [15-18] 16  BP: ()/(50-72) 101/56  Arterial Line BP: (91)/(55) 91/55  SpO2:  [92 %-100 %] 99 %    Sleepy, does not participate with exam.  Right hip dressings are clean, dry, and intact.   No erythema of the surrounding skin.   Bilateral calves are soft.  No grimacing with passive ankle DF and PF.  DP pulse palpable.    Labs:  Recent Labs   Lab Test 03/18/23  0935 03/18/23  0120 03/17/23  1915 03/17/23  1237 03/17/23  0714 03/16/23  0730   WBC 7.4  --   --   --  6.4 6.7   HGB 6.6* 6.1* 7.0*   < > 7.6* 13.3   *  --   --   --  109* 137*    < > = values in this interval not displayed.     Recent Labs   Lab Test 04/04/22  1029 03/07/22  0908 02/22/22  0948   INR 1.1 2.2* 2.0*     Recent Labs   Lab Test 07/02/22  0710 07/01/22  0905   CRP 28.7* 32.3*       A/P    1. S/p right intertrochanteric femur fracture CM nailing   Continue Lovenox and PTA Eliquis for DVT prophylaxis.    HGB 6.6. RBCs ordered per medicine.    Mobilize with PT/OT.   WBAT RLE with walker.   Continue current pain regimen.   Dressings: Keep intact.  Change if >50% saturated or peeling off.    HGB 6.6. RBCs ordered per medicine.    Follow-up: 2 weeks post-op with Dr. Brady James    2. Disposition   Anticipate d/c to TCU when medically cleared and progressing in PT.    Navya Dexter PA-C  [facility]

## 2023-03-18 NOTE — PROGRESS NOTES
Patient is alert and oriented to self only. BPs low and lasix held per MAR, metoprolol held per MD. O2 sats above 90% with 3L via NC applied.     1 unit of blood administered during shift with no transfusion reactions. Now saline locked. Awaiting Hgb results.    LOC inconsistent, with significant drowsiness during most of shift. Arouses to voices of family and staff and appears comfortable at rest; increased agitation and discomfort with repositioning or cares.   Dressings for surgical sites are CDI. Ate 25% of meal this evening with set up assist from family. SCDs applied throughout day and currently resting in room. Plan of care progressing.    Patient vital signs are at baseline: No,  Reason:  SBP soft; 80s-100. Adequate O2 maintained with nasal cannula.   Patient able to ambulate as they were prior to admission or with assist devices provided by therapies during their stay:  No,  Reason:  Pt lethargic/somnolent   Patient MUST void prior to discharge:  Yes, incontinent at times  Patient able to tolerate oral intake:  Yes  Pain has adequate pain control using Oral analgesics:  Yes, scheduled tylenol

## 2023-03-18 NOTE — PROVIDER NOTIFICATION
MD Notification    Notified Person: MD    Notified Person Name:  DUC Cooper MD    Notification Date/Time: 3/17/2023 5570    Notification Interaction: webpage    Purpose of Notification: Hgb at 1930 was 7, down from 7.4 and 7.6. Please see cardiology note. No hgb ordered until a.m. VSS.      Orders Received: Yes. Order received for Hgb at 22:45 on 3/17.    Comments: Phlebotomy called x 2, last time being 01:12, 3/18 to draw Hgb.

## 2023-03-18 NOTE — PLAN OF CARE
Patient vital signs are at baseline: No,  Reason:  BP soft  Patient able to ambulate as they were prior to admission or with assist devices provided by therapies during their stay:  No,  Reason:  PT rescheduled, patient was lethargic/somnolent  Patient MUST void prior to discharge:  Yes  Patient able to tolerate oral intake:  Yes, needed help during meals, patients wife was at the bedside  Pain has adequate pain control using Oral analgesics:  Yes    Alert to self only, recognizes family. Lethargic/somnolent at beginning of shift, was more awake mid-shift.

## 2023-03-18 NOTE — PROVIDER NOTIFICATION
MD Notification    Notified Person: MD    Notified Person Name: MD Franco    Notification Date/Time: 3/18/2023 0222    Notification Interaction: webpage    Purpose of Notification: critical hgb of 6.1    Orders Received:    Comments:

## 2023-03-18 NOTE — PROGRESS NOTES
[facility]    Cardiology Progress     Date of Admission:  3/15/2023  Reason for Consult:  CHF/AF    Impression/plan:    1. Mechanical fall s/p hip ORIF 3/17  2. Chronic AF with RVR/CVR on apixaban  3. Acute on chronic CHF  25-30%  4. Ischemic cardiomyopathy  5. CAD s/p PCI  6. Acute respiratory failure  7. COPD  8. Moderate PH  9. Right moderate pleural effusion  10. Postoperative anemia s/p RBC transfusion    Plan  Borderline BP and AF with borderline HR control + somnolence during visit today. Would likely hold on IV diuretic, BB today and resume tomorrow pending improvement in BP.   Digoxin 125 mcg q6 hours x 3 doses to help with AF/RVR and low EF postoperatively   With good UOP (>2L yesterday and >2L today) I do not think his hypotension is secondary to cardiogenic shock.   Agree with blood transfusion due to Hgb 6.1  Consider thoracentesis when patient better from post op delirium/somnolence phase.   Outpatient evaluation for ICD - hx NSVT    Austin Silva M.D.    Interval History/Subjective:    Somnolent, receiving RBC. AF with  bpm and SBP in the 80-90s.         Physical Exam   Temp: 97.8  F (36.6  C) Temp src: Axillary BP: (!) 86/63 (rn notified) Pulse: 88   Resp: 16 SpO2: 98 % O2 Device: Nasal cannula Oxygen Delivery: 2 LPM  Vital Signs with Ranges  Temp:  [97  F (36.1  C)-98.1  F (36.7  C)] 97.8  F (36.6  C)  Pulse:  [] 88  Resp:  [15-18] 16  BP: ()/(50-72) 86/63  Arterial Line BP: (91)/(55) 91/55  SpO2:  [92 %-100 %] 98 %  132 lbs .89 oz    Constitutional: Somnolent   Respiratory: Decreased respirations, 2L NC  Neck: JVP is moderately elevated  Cardiovascular: irregular rhythm, tachycardia, no murmur  Extremities: No lower or upper extremity edema , perfusion is adequate  Vascular: Radial pulses soft    Data   Most Recent 3 CBC's:  Recent Labs   Lab Test 03/18/23  0935 03/18/23  0120 03/17/23  1915 03/17/23  1237 03/17/23  0714 03/16/23  0730   WBC 7.4   --   --   --  6.4 6.7   HGB 6.6* 6.1* 7.0*   < > 7.6* 13.3     --   --   --  105* 103*   *  --   --   --  109* 137*    < > = values in this interval not displayed.     Most Recent 3 BMP's:  Recent Labs   Lab Test 03/18/23  0925 03/17/23  0714 03/16/23  1756 03/16/23  0730    136  --  135*   POTASSIUM 4.6 4.9  --  4.3   CHLORIDE 101 103  --  98   CO2 24 28  --  26   BUN 32.1* 20.3  --  19.7   CR 1.12 0.80 0.79 0.77   ANIONGAP 11 5*  --  11   RANDY 8.8 8.6*  --  8.8   GLC 97 118*  --  95     Most Recent 3 Troponin's:  Recent Labs   Lab Test 04/18/21  0447 04/18/21  0012 04/17/21  2220   TROPI <0.015 <0.015 <0.015     Most Recent 3 BNP's:  Recent Labs   Lab Test 03/15/23  2225 02/03/23  0958 01/18/23  1517 06/29/22  1132   NTBNPI 11,226*  --   --  6,690*   NTBNP  --  9,617* 11,797*  --      Most Recent Cholesterol Panel:  Recent Labs   Lab Test 01/18/23  1517   CHOL 130   LDL 82   HDL 31*   TRIG 87

## 2023-03-18 NOTE — PROGRESS NOTES
Cross coverage: Paged by the nurse guarding low hemoglobin of 6.1; hemoglobin has been trending down since admission, s/p ORIF on 3/17/2023.  1 unit of PRBCs ordered; transfused slowly over a 4-hour given history of severe CHF with EF of 25%.  Monitor fluid status; may need additional IV diuretic if evidence of fluid overload with a blood transfusion.    Gladys Gamez, Hospitalist

## 2023-03-18 NOTE — PROVIDER NOTIFICATION
ADDENDUM: paged by RN because BP 86/60, 91/55, afib ; hold Lasix dose for now, reassess in few hours; he tolerated the blood transfusion well.    Gladys Gamez <hospitalist     MD Notification    Notified Person: MD    Notified Person Name: Franco    Notification Date/Time: 3/18/2023 0556     Notification Interaction: webpage    Purpose of Notification:  BP 86/60, 91/55, afib . Do you still want him to have the lasix?    Orders Received:    Comments:

## 2023-03-19 NOTE — CONSULTS
Currently not appropriate to be seen by therapy.  Will continue to follow patient's progress and see patient when it is appropriate to begin discharge planning.  Please reorder care management/social work when appropriate.      CECI Camarena, [facility]    [phone]  [facility]

## 2023-03-19 NOTE — PLAN OF CARE
Goal Outcome Evaluation:  Summary: POD 3 of R hip ORIF from a fall  Date & Time: 3/18/23 6443-9293  Orientation: alert to self, orientation fluctuates at times, baseline dementia  Activity Level: not OOB since surgery, Turn and repo  Fall Risk: yes  Behavior & Aggression: green  Pain Management: scheduled tylenol and x1 PRN oxycodone given for pain, appears in pain with repositioning, ice packs applied  ABNL VS/O2: VSS on 3 L NC, BP soft at times, pt has A fib and is on IV digoxin q6 due to metoprolol being held for soft BP  Tele: n/a  ABNL Lab/BG: hemoglobin 8.2, recheck in the AM was 7.5  Diet: reg, pills crushed in applesauce  Bowel/Bladder: incontinent at times but uses urinal other times with help of aid, no BM-pt states he is passing gas, stool softner given  Skin: scattered bruising, dressing CDI on R hip, pale dusky skin   Drains/Devices: PIV SL  Tests/Procedures: PT/OT and social work consulted to see pt  Anticipated DC Date: pending improvement  Other Important Info: Productive cough, crackles heard in base of lungs, pt able to demonstrate the IS, able to get to 1250. Bedside attendant in room overnight for pts safety. Pt slept on and off throughout the night

## 2023-03-19 NOTE — PLAN OF CARE
Summary: POD 3 of R hip ORIF from a fall  Date & Time: 3/19/23 3897-0009  Orientation: A&O to self- baseline dementia  Activity Level: A2-SS; WBAT RLE GB/W  Fall Risk: yes  Behavior & Aggression: green  Pain Management: Nonverbal indicators with T&R, scheduled tylenol given, ice packs applied, reposition  ABNL VS/O2: VSS on 2 L NC,  Tele: n/a  ABNL Lab/BG: hgb 7.5- redraws in for tm am. K+-WDL  Diet: reg, good appetite, need assistance feeding; sm pills whole in applesauce, larger ones crushed   Bowel/Bladder: Incont at times- encourage urinal use. X1 smeary BM today  Skin: dusky skin; frost bite to hands; scattered bruising/scabs; dressing x2 CDI on R hip; blanchable redness on sacrum-mepilex CDI  Drains/Devices: R-PIV SL- pt frequently tugging at it  Tests/Procedures: OT/SW consulted not seen yet  Anticipated DC Date: pending improvement-PT recommending TCU  Other Important Info: Frequent productive cough. LS-diminished. Wife here for most of day, sitter in room when she's gone for safety

## 2023-03-19 NOTE — CONSULTS
"CLINICAL NUTRITION SERVICES  -  ASSESSMENT NOTE    Recommendations Ordered by Registered Dietitian (RD):   Ensure 1x daily   Encourage PO intakes -especially protein  ?Small frequent meals as needed  High Kcal/High Protein diet   Thera vit M daily      Malnutrition:   % Weight Loss:  > 10% in 6 months (severe malnutrition)  % Intake:  </= 75% for >/= 1 month (severe malnutrition)  Subcutaneous Fat Loss:  Orbital region moderate+ depletion  Muscle Loss:  Temporal region moderate+ depletion  Fluid Retention:  None noted    Malnutrition Diagnosis: Severe malnutrition  In Context of:  Acute illness or injury  Chronic illness or disease     REASON FOR ASSESSMENT  Rm Villarreal is a 82 year old male seen by Registered Dietitian for Nutrition Admission Risk Screen Received -   Have you recently lost weight without trying in the last 6 months ? - \"unsure\"  Have you been eating poorly due to a decreased appetite ?- \"no\"    NUTRITION HISTORY  - Information obtained from chart review, visit with patient and his wife.   - H/o CAD, ischemic cardiomyopathy, EF 25-30%, bilateral carotid stenosis, afib, HTN, HLD, COPD. Mild dementia recorded as well. He lives with his wife at home.   - Admitted after a fall w/ hip fracture. ORIF 3/16  - Baseline weight in [facility] 134#. Pt's wife attributed a weight loss of 150s to 130s all to fluid loss with a diuretic medication.   - Pt reports he eats well, spouse noted that he does eat meals TID but they are \"light\".   - Pt provided questionable history, spouse occasionally shook her head in disagreement though didn't add much of her own input.   - He drinks about 1/2 of a \"protein plus\" daily.   - Asked pt if he noticed changes to his muscle or fat mass, or if his clothes were fitting differently. He said no, but his wife nodded \"yes\".     - NKFA    CURRENT NUTRITION ORDERS  Diet Order:     Regular     Current Intake/Tolerance:  0-25% intakes recorded    NUTRITION FOCUSED PHYSICAL " "ASSESSMENT FOR DIAGNOSING MALNUTRITION)  Yes - Face only as pt was covered to chin w/ blankets.          Observed:    Muscle wasting (refer to documentation in Malnutrition section) and Subcutaneous fat loss (refer to documentation in Malnutrition section)    Obtained from Chart/Interdisciplinary Team:  3/16 - [facility]    ANTHROPOMETRICS  Height: 6' 0\"  Weight: 132 lbs .89 oz (59.9 kg)   Body mass index is 17.91 kg/m .  Weight Status:  Underweight BMI <18.5  IBW: 80.9 kg   % IBW: 74%  Weight History: 18# loss I the past 2-4 months (12%).   Wt Readings from Last 15 Encounters:   03/18/23 59.9 kg (132 lb 0.9 oz)   03/10/23 63.3 kg (139 lb 8 oz)   03/06/23 63 kg (139 lb)   02/09/23 60.3 kg (133 lb)   02/06/23 61.1 kg (134 lb 11.2 oz)   02/03/23 59.9 kg (132 lb)   01/20/23 59.4 kg (131 lb)   01/18/23 59.6 kg (131 lb 6.4 oz)   01/16/23 59.9 kg (132 lb 1.6 oz)   01/08/23 68.5 kg (151 lb)   01/06/23 67.8 kg (149 lb 8 oz)   01/04/23 68.5 kg (151 lb)   01/01/23 68.5 kg (151 lb)   11/02/22 68.4 kg (150 lb 12.8 oz)   10/25/22 68.5 kg (151 lb)     LABS  Labs reviewed  Na 135 (L)    MEDICATIONS  Medications reviewed  Levothyroxine  Remeron 7.5 mg q HS  Miralax - given this morning    ASSESSED NUTRITION NEEDS PER APPROVED PRACTICE GUIDELINES:  Dosing Weight 60 kg   Estimated Energy Needs: 8890-1453 kcals (25-30 Kcal/Kg)  Justification: maintenance and repletion  Estimated Protein Needs: 72-90 grams protein (1.2-1.5 g pro/Kg)  Justification: preservation of lean body mass, repletion   Estimated Fluid Needs: 1 mL/kcal  Justification: maintenance    MALNUTRITION:  % Weight Loss:  > 10% in 6 months (severe malnutrition)  % Intake:  </= 75% for >/= 1 month (severe malnutrition)  Subcutaneous Fat Loss:  Orbital region moderate+ depletion  Muscle Loss:  Temporal region moderate+ depletion  Fluid Retention:  None noted    Malnutrition Diagnosis: Severe malnutrition  In Context of:  Acute illness or injury  Chronic illness or " "disease    NUTRITION DIAGNOSIS:  Malnutrition related to suspected baseline inadequate oral intake as evidenced by spouse reports 3 \"light\" meals daily, loss of >10% in <4 months, fat and muscle losses on exam, underweight BMI.     NUTRITION INTERVENTIONS  Recommendations / Nutrition Prescription  Ensure 1x daily   Encourage PO intakes -especially protein  ?Small frequent meals as needed  High Kcal/High Protein diet   Thera vit M daily     Implementation  Nutrition education: Provided education on supplements.   Medical Food Supplement and Multivitamin/Mineral: ordered    Nutrition Goals  Intake of >50% adequate meals TID + 1 ensure daily.     MONITORING AND EVALUATION:  Progress towards goals will be monitored and evaluated per protocol and Practice Guidelines    Alona Tierney RD, LD  [facility], Ortho, [facility]  Pager: [phone]  Weekend Pager: [phone]    "

## 2023-03-19 NOTE — PROGRESS NOTES
"[facility]    Medicine Progress Note - Hospitalist Service    Date of Admission:  3/15/2023    Assessment & Plan      Rm Villarreal is a 82 year old male with history of mild dementia (lives at home with spouse), hypothyroidism, CAD, bilateral carotid stenosis, chronic atrial fibrillation on apixaban, COPD, hypertension, and ischemic cardiomyopathy who presented to the ED after missing a step and falling down the stairs at home and resultant R hip pain. He had less than 2 beers (confirmed with spouse) evening of 3/15 but denies any prodrome.  Imaging shows a right intertrochanteric proximal femur fracture. OR on 3/16/23 for operative repair.    Post-operatively, delirium, anemia, and hypotension causing issues.     Acute right intertrochanteric proximal femur fracture secondary to mechanical fall  S/p ORIF with cephalomedullary device 3/17/23  * Pt and spouse report that he fell less than two steps after the staircase turns. Patient denies head trauma or loss of consciousness and reports tripping while going down the steps.  He does endorse having \"a few beers\" earlier this evening but denied any prodrome. Spouse reports he had two beers but did not finish either of them. CT head and CT cervical spine negative for acute pathology.  Right shoulder x-ray negative for acute pathology.  Is otherwise neurologically intact in the ED upon admission.    *  ml   * pre-op hgb 13.3g/dL, post-op hgb 7.6g/dL    - Orthopedic surgery consultation  - regular diet   - As needed analgesia and bowel regimen  - PT OT   - Enoxaparin for DVT ppx for now. Can resume apixaban as soon as hgb stable.     Acute proximal respiratory failure  Right moderate pleural effusion  Severe pulmonary hypertension  History of COPD at baseline  Patient does not use oxygen at home however he is requiring 2 to 3 L nasal cannula in the ED.  Chest imaging does indicate a moderate right pleural effusion with some " compressive atelectasis. Spouse and pt confirm no recent coughing, fevers/chills.  WBC is unremarkable and he is afebrile.  - Supplemental oxygen as needed to maintain 90% or greater  - As needed inhaler/nebulizer for wheezing  - Incentive spirometry  - Could consider thoracentesis pending clinical course and urgency of right hip intervention as above  - hx of cardiomyopathy as below    Lactic acidosis, recurrent  * Patient presented as above and was found to have a initial lactic of 4.8. Lactic acid follow up 1.9. Unclear cause, possible stress mediated in setting of severe pHTN and heart failure.   * post-operatively lactate elevated to 2.4 and 2.7. No obvious infection.  - Low suspicion of sepsis or infection currently  - continue to monitor, treat cardiac disease and anemia as discussed elsewhere.    History of dementia  Post-operative delirium  * Patient lives at home with his spouse.  Take mirtazapine and seroquel at home. Follows with Memory doctor. Spouse reports that he often wanders at night and that usually she shadows him to ensure safety.   - PTA Seroquel and mirtazapine to continue   - bed alarm must be on and ideally patient can be close to RN station  - delirium precautions.     CAD - hx of stenting  Ischemic cardiomyopathy  Hypertension  Chronic atrial fibrillation on apixaban  HFrEF, with acute exacerbation.  Acute hypoxic respiratory failure  Heart rate and BP stable in ED. PTA regimen appears to include metoprolol, apixaban, and torsemide. Follows [facility] - lately on a weekly basis. Echo Oct 2022 showed EF 25-30%, severe hypokinesis noted, RV normal, LA moderately dilated. Severe pulm HTN also noted.  * Chart review shows recent visit with Cardiology 3/10 with Dr Soni where it was discussed that prognosis is guarded in general; torsemide increased to 10mg BID - with caution due to low/normal BP.  * Post operatively, received IV fluids due to elevated lactate. O2 needs rising. JVP elevated.    - hold apixaban, await stable hgb prior to resumption. (with Afib, okay to hold for a few days)  - Resume metoprolol after holding 3/18  - consult cardiology  - resume PTA torsemide 10mg BID (recently increased from 10mg daily)     Hypothyroidism  - Continue levothyroxine    Recent frostbite of all fingertips  *Patient was snowblowing this winter without gloves and managed to frostbite the tips of all of his fingers.  *SPO2 monitoring is difficult on fingers  -Recommend to monitor SPO2 on ear or toes.    Acute blood loss anemia due to intraoperative blood loss  *  ml   * pre-op hgb 13.3g/dL, post-op hgb 7.6g/dL  * s/p 2u PRBC as of 3/19 at 1200H  - monitor and transfuse as needed  - continue to hold apixaban       Diet: Advance Diet as Tolerated: Regular Diet Adult    DVT Prophylaxis: Pneumatic Compression Devices  Velazquez Catheter: Not present  Lines: None     Cardiac Monitoring: None  Code Status: Full Code      Clinically Significant Risk Factors                # Thrombocytopenia: Lowest platelets = 112 in last 2 days, will monitor for bleeding          # Cachexia: Estimated body mass index is 17.91 kg/m  as calculated from the following:    Height as of this encounter: 1.829 m (6').    Weight as of this encounter: 59.9 kg (132 lb 0.9 oz)., PRESENT ON ADMISSION         Disposition Plan      Expected Discharge Date: 03/20/2023      Destination: home            Edilberto Wiggins MD  Hospitalist Service  [facility]  Securely message with [employer] (more info)  Text page via [employer] Paging/Directory   ______________________________________________________________________    Interval History   BP improved today. He is sitting upright in bed when seen, he is disorient to time and place and situation but he is able to correctly identify what is on the TV and appears engaged. O2 needs down as well.    Did get 2uPRBC yesterday due to post-op anemia. No obvious bleeding today.     Discussed  with ortho PA. Hospitalist team to resume DOAC when we feel that hgb is stable.     Physical Exam   Vital Signs: Temp: 97.2  F (36.2  C) Temp src: Axillary BP: 117/67 Pulse: 84   Resp: 18 SpO2: 100 % O2 Device: Nasal cannula Oxygen Delivery: 2 LPM  Weight: 132 lbs .89 oz    Constitutional: NAD, upright in bed.  Respiratory: Clear to auscultation bilaterally, no crackles or wheezing  Cardiovascular: Regular rate and rhythm, normal S1 and S2, and no murmur noted.    GI: Normal bowel sounds, soft, non-distended, non-tender  Skin/Integumen: No rashes, no cyanosis, no edema  Other: More alert than prior days but AOx1.      Medical Decision Making       51 MINUTES SPENT BY ME on the date of service doing chart review, history, exam, documentation & further activities per the note.      Data     I have personally reviewed the following data over the past 24 hrs:    9.8  \   7.5 (L)   / 112 (L)     135 (L) 101 32.1 (H) /  98   4.4 27 1.07 \       Imaging results reviewed over the past 24 hrs:   No results found for this or any previous visit (from the past 24 hour(s)).

## 2023-03-19 NOTE — PROGRESS NOTES
[facility]    Cardiology Progress     Date of Admission:  3/15/2023  Reason for Consult:  CHF/AF    Impression/plan:    Rm Villarreal is a 82 year old male with PMH significant for longstanding coronary artery disease dating back to the 90s, ischemic cardiomyopathy with EF 25 to 30%, bilateral carotid stenoses status post left CEA, A-fib, hypertension, hyperlipidemia, COPD, elevated pulmonary pressures who presents with a fall after missing a step, falling down the stairs and found to have a hip fracture.  Status post ORIF 3/16/2023.  We are asked to see for assistance with management given his complex cardiac history.    1. Mechanical fall s/p hip ORIF 3/17  2. Chronic AF with RVR/CVR on apixaban  3. Acute on chronic CHF  25-30%  4. Ischemic cardiomyopathy  5. CAD s/p PCI  6. Acute respiratory failure  7. COPD  8. Moderate PH  9. Right moderate pleural effusion  10. Postoperative anemia s/p RBC transfusion    Plan  BP better today, will restart home metoprolol 25 mg XL daily and torsemide 10 mg BID. Baseline weight in [facility] around 134 lbs (Dr. Soni's note) currently 132 lbs. He has mild JVP but otherwise appears euvolemic.   Digoxin 125 mcg q6 hours x 3 doses on 3/18 given AF with borderline RVR.  HR better today.   With good UOP (>2L yesterday and >2L today) I do not think his hypotension is secondary to cardiogenic shock.   s/p blood transfusion due to Hgb 6.1, currently 7.5 , will continue to monitor.   Consider thoracentesis when patient better from post op delirium/somnolence phase.   Outpatient evaluation for ICD - hx NSVT  Will need [facility] follow up    Austin Silva M.D.    Interval History/Subjective:    More alert today, BP and HR improved. He does not have any cardiac complaints. Was slightly confused, ie thought he had gone for a walk 1 hour ago, but has stayed in bed.     Physical Exam   Temp: 97.2  F (36.2  C) Temp src: Axillary BP: 117/67 Pulse: 84   Resp: 18  SpO2: 100 % O2 Device: Nasal cannula Oxygen Delivery: 2 LPM  Vital Signs with Ranges  Temp:  [97  F (36.1  C)-98.1  F (36.7  C)] 97.2  F (36.2  C)  Pulse:  [] 84  Resp:  [15-18] 18  BP: ()/(56-72) 117/67  SpO2:  [91 %-100 %] 100 %  132 lbs .89 oz    Constitutional: Alert and oriented.   Respiratory: Decreased respirations, 2L NC  Neck: JVP is mildly elevated  Cardiovascular: irregular rhythm, no murmur  Extremities: No lower or upper extremity edema , perfusion is adequate  Vascular: Radial pulses soft    Data   Most Recent 3 CBC's:  Recent Labs   Lab Test 03/19/23  0540 03/18/23  1840 03/18/23  0935 03/17/23  1237 03/17/23  0714   WBC 9.8  --  7.4  --  6.4   HGB 7.5* 8.2* 6.6*   < > 7.6*   MCV 99  --  100  --  105*   *  --  113*  --  109*    < > = values in this interval not displayed.     Most Recent 3 BMP's:  Recent Labs   Lab Test 03/19/23  0540 03/18/23  0925 03/17/23  0714   * 136 136   POTASSIUM 4.4 4.6 4.9   CHLORIDE 101 101 103   CO2 27 24 28   BUN 32.1* 32.1* 20.3   CR 1.07 1.12 0.80   ANIONGAP 7 11 5*   RANDY 8.8 8.8 8.6*   GLC 98 97 118*     Most Recent 3 Troponin's:  Recent Labs   Lab Test 04/18/21  0447 04/18/21  0012 04/17/21  2220   TROPI <0.015 <0.015 <0.015     Most Recent 3 BNP's:  Recent Labs   Lab Test 03/15/23  2225 02/03/23  0958 01/18/23  1517 06/29/22  1132   NTBNPI 11,226*  --   --  6,690*   NTBNP  --  9,617* 11,797*  --      Most Recent Cholesterol Panel:  Recent Labs   Lab Test 01/18/23  1517   CHOL 130   LDL 82   HDL 31*   TRIG 87

## 2023-03-19 NOTE — PROGRESS NOTES
03/19/23 1408   Appointment Info   Signing Clinician's Name / Credentials (PT) Shari Adkins, PT, DPT   Rehab Comments (PT) WBAT RLE, no hip precautions.   Living Environment   People in Home spouse   Current Living Arrangements house   Home Accessibility stairs to enter home;stairs within home   Number of Stairs, Main Entrance 2   Stair Railings, Main Entrance none   Number of Stairs, Within Home, Primary greater than 10 stairs   Stair Railings, Within Home, Primary railing on left side (ascending)   Living Environment Comments Pt lives in house with spouse, two stairs to enter then all needs on main level of home. Pt's spouse provides 24H supervision   Self-Care   Usual Activity Tolerance good   Current Activity Tolerance poor   Equipment Currently Used at Home none   Fall history within last six months yes   Number of times patient has fallen within last six months 4   Activity/Exercise/Self-Care Comment Pt does not use AD at baseline, has a walker at home but does not use it. Pt has assist with bathing, eating, cooking, cleaning from spouse. Pt was able to get in and out of bed ind and wanders around home.   General Information   Onset of Illness/Injury or Date of Surgery 03/15/23   Referring Physician Brady James MD   Patient/Family Therapy Goals Statement (PT) Planning on going to TCU prior to home.   Pertinent History of Current Problem (include personal factors and/or comorbidities that impact the POC) Pt is an 82 year old male admitted on 3/15/23 after missing a step and falling down the stairs at home and resultant R hip pain. He had less than 2 beers (confirmed with spouse) this evening but denies any prodrome.  Imaging shows a right intertrochanteric proximal femur fracture. PMHx significant for mild dementia (lives at home with spouse), hypothyroidism, CAD, bilateral carotid stenosis, chronic atrial fibrillation on apixaban, COPD, hypertension, and ischemic cardiomyopathy. Pt underwent R  ORIF of intertrochanteric hip fx on 3/16. Pt is POD#3. Pt with low HgB post op, required a blood transfusion.   Existing Precautions/Restrictions fall;weight bearing;oxygen therapy device and L/min  (2LPM O2)   Weight-Bearing Status - RLE weight-bearing as tolerated   Cognition   Affect/Mental Status (Cognition) confused   Orientation Status (Cognition) disoriented to;place;situation;time;oriented to;person   Follows Commands (Cognition) delayed response/completion;increased processing time needed;physical/tactile prompts required;repetition of directions required;follows one-step commands;50-74% accuracy   Pain Assessment   Patient Currently in Pain   (increased pain in R hip, no value stated.)   Integumentary/Edema   Integumentary/Edema Comments Dressing over incision site on R lateral hip appears CDI, edema on RLE consistant with post op status.   Posture    Posture Kyphosis;Protracted shoulders;Forward head position   Range of Motion (ROM)   ROM Comment Grossly WFL BUE and LE except R hip limited due to post op status and pain.   Strength (Manual Muscle Testing)   Strength Comments Unable to perform SLR on R. Grossly antigravity strength BUE and LLE. Global weakness.   Bed Mobility   Comment, (Bed Mobility) Supine HOB approx 60 deg>sitting EOB, totalA via slide sheet.   Transfers   Comment, (Transfers) Sit>stand to FWW, maxAx2, elevated bed height   Gait/Stairs (Locomotion)   Comment, (Gait/Stairs) NA due to difficulty with stand.   Balance   Balance Comments Pt able to maintain seated balance BUE support. Pt unable to maintain standing balance without assist.   Sensory Examination   Sensory Perception patient reports no sensory changes   Clinical Impression   Criteria for Skilled Therapeutic Intervention Yes, treatment indicated   PT Diagnosis (PT) Impaired gait   Influenced by the following impairments pain, decreased functional strength and ROM, decreased activity tolerance, impaired balance   Functional  limitations due to impairments pain, fall risk, impaired functional independence, impaired ADLs and IADLs   Clinical Presentation (PT Evaluation Complexity) Stable/Uncomplicated   Clinical Presentation Rationale per MR and clinical judgement   Clinical Decision Making (Complexity) low complexity   Planned Therapy Interventions (PT) balance training;bed mobility training;cryotherapy;gait training;home exercise program;orthotic fitting/training;ROM (range of motion);stair training;strengthening;stretching;transfer training;progressive activity/exercise   Risk & Benefits of therapy have been explained evaluation/treatment results reviewed;care plan/treatment goals reviewed;risks/benefits reviewed;current/potential barriers reviewed;participants voiced agreement with care plan;participants included;patient;spouse/significant other   PT Total Evaluation Time   PT Eval, Low Complexity Minutes (77969) 7   Physical Therapy Goals   PT Frequency 5x/week   PT Predicted Duration/Target Date for Goal Attainment 03/26/23   PT Goals Bed Mobility;Transfers;Gait;Stairs   PT: Bed Mobility Supervision/stand-by assist;Supine to/from sit;Rolling   PT: Transfers Supervision/stand-by assist;Sit to/from stand;Bed to/from chair;Assistive device   PT: Gait Supervision/stand-by assist;Assistive device;Rolling walker;50 feet   PT: Stairs Supervision/stand-by assist;2 stairs;Assistive device   PT Discharge Planning   PT Plan progress bed mobility, repeated STS in brett [place], progress hip exercises   PT Discharge Recommendation (DC Rec) Transitional Care Facility   PT Rationale for DC Rec Pt is well below baseline functional independence. Pt limited by pain, decreased strength and ROM, impaired balance, fall risk. Pt currently requiring A2 for mobility, unable to ambulate safely at this time. Pt would benefit from continued skilled therapy at [facility] prior to returning home safely.   PT Brief overview of current status bed mob, totalA. STS to Brett  cam[place], maxAx2.   Total Session Time   Total Session Time (sum of timed and untimed services) 7

## 2023-03-19 NOTE — PROGRESS NOTES
Orthopedic Surgery  Rm Villarreal  03/19/2023     Admit Date:  3/15/2023    POD: 3 Days Post-Op   Procedure(s):  RIGHT OPEN REDUCTION INTERNAL FIXATION INTERTROCHANTERIC HIP FRACTURE    Patient resting comfortably in bed.    Sitter present. Pt does not respond to questions or exam.   Tolerating oral intake per report.    Afebrile.  No acute events overnight.    Temp:  [97  F (36.1  C)-98.1  F (36.7  C)] 97.2  F (36.2  C)  Pulse:  [] 84  Resp:  [15-18] 18  BP: ()/(56-72) 117/67  SpO2:  [91 %-100 %] 100 %    Sleepy, does not participate with exam.  Right hip dressings are clean, dry, and intact.   No erythema of the surrounding skin.   Bilateral calves are soft.  No grimacing with passive ankle DF and PF.  DP pulse palpable.    Labs:  Recent Labs   Lab Test 03/19/23  0540 03/18/23  1840 03/18/23  0935 03/17/23  1237 03/17/23  0714   WBC 9.8  --  7.4  --  6.4   HGB 7.5* 8.2* 6.6*   < > 7.6*   *  --  113*  --  109*    < > = values in this interval not displayed.     Recent Labs   Lab Test 04/04/22  1029 03/07/22  0908 02/22/22  0948   INR 1.1 2.2* 2.0*     Recent Labs   Lab Test 07/02/22  0710 07/01/22  0905   CRP 28.7* 32.3*       A/P    1. S/p right intertrochanteric femur fracture CM nailing   Continue Lovenox started postoperatively, ok from ortho standpt to restart chronic Eliquis. Lovenox can be discontinued if eliquis is restarted. Medicine is aware and will determine appropriate anticoagulation.     Hbg 7.5   Mobilize with PT/OT.   WBAT RLE with walker.   Continue current pain regimen.   Dressings: Keep intact.  Change if >50% saturated or peeling off.    Follow-up: 2 weeks post-op with Dr. Brady James    2. Disposition   Anticipate d/c to TCU when medically cleared and progressing in PT.    CAMILA LambC  [facility]

## 2023-03-20 NOTE — PROGRESS NOTES
[facility]  Cardiology Progress Note  Date of Service: 03/20/2023  Primary Cardiologist: Dr. Soni    Assessment & Plan    Rm Villarreal is a 82 year old male admitted on 3/15/2023 admitted after fall resulting in R hip fracture s/p ORIF 3/16/2023. Cardiology consulted to assist with management of complex cardiac history.     Assessment:  1. Mechanical fall s/p R hip ORIF 3/16/23  2. Biventricular heart failure, ischemic cardiomyopathy - LVEF 25-30%, RV moderately dilated with severely decreased RV systolic function  3. Chronic atrial fibrillation - PTA on apixaban for thromboembolic prophylaxis, plans to resume when hemoglobin stable.   4. Coronary artery disease    - S/p RCA stenting in 1994, previous PTCA of LAD   - History of anterior MI in 2005 and underwent PCI and stenting to LAD, LCx was chronically occluded with left to left collaterals that was medically managed.    - Coronary angiogram in 2014 showed patent RCA and LAD stents, known occluded LCx with left to left collaterals, LAD had chronic occlusion with left to left collaterals. 50-70% stenosis of lateral limb of bifurcating diagonal was medically managed.     - Last ischemic assessment was 1/2021 showed a medium sized area of severe degree of transmural infarction in the apical segments of the LV. Small area of nontransmural infarction of the entire inferior segments of LV  5. Pulmonary hypertension  6. Hypertension  7. COPD    Patient seen resting in chair, family at bedside, overall feeling well. Have noted cough the past couple days, weight 132# today which is at his reported goal weight. Will get a CXR today to evaluate lungs. On exam appears close to euvolemic. He is on minimal GDMT, which includes metoprolol XL. We further discussed optimization of GDMT this with him and family today and will price out ARNI and SGLT2i for consideration. Also recommend establishing care with [facility] clinic for further optimization of GDMT  as tolerated, [facility] consult placed.       Plan:   1. CXR   2. Continue torsemide 10mg BID  3. Continue metoprolol XL 25mg BID  4. Currently on lovenox for DVT prophylaxis with plans to resume apixaban with hemoglobin stable.   5. Daily weight, strict I&O, low sodium diet and 2L fluid restriction  6. Pharmacy liaison consultation to price out ARNI and SGLT2i  7. Recommend enrollment in [facility] clinic at time of hospital discharge for continued close monitoring.     SAMANTHA Coles CNP  [facility]  Pager: [phone]        Interval History   No acute events overnight. Cough the past couple days. Denies shortness of breath. Denies chest pain.     Physical Exam   Temp: 97.4  F (36.3  C) Temp src: Oral BP: 129/76 Pulse: 67   Resp: 16 SpO2: 94 % O2 Device: Nasal cannula Oxygen Delivery: 2 LPM  Vitals:    03/15/23 1940 03/18/23 0309   Weight: 77.1 kg (170 lb) 59.9 kg (132 lb 0.9 oz)     GEN: frail, in no acute distress.  HEENT:  Pupils equal, round. Sclerae nonicteric.   NECK: Supple, no masses appreciated.   C/V:  Irregularly irregular rate and rhythm  RESP: Respirations are unlabored. Slightly diminished in bases with some crackles in RLL.  GI: Abdomen soft, nontender.  EXTREM: No left LE edema, some right LE edema s/p R hip fracture with repair  NEURO: Alert and oriented, cooperative.  SKIN: Warm and dry    Medications       enoxaparin ANTICOAGULANT  40 mg Subcutaneous Q24H     levothyroxine  25 mcg Oral Daily     metoprolol succinate ER  25 mg Oral Daily     mirtazapine  7.5 mg Oral At Bedtime     multivitamin w/minerals  1 tablet Oral Daily     polyethylene glycol  17 g Oral Daily     QUEtiapine  18.75 mg Oral At Bedtime     senna-docusate  1 tablet Oral BID     sodium chloride (PF)  3 mL Intracatheter Q8H     sodium chloride bacteriostatic  10 mL Intravenous Once     torsemide  10 mg Oral BID       Data   Last 24 hours labs reviewed     Echo: 3/17/23  The rhythm was atrial fibrillation.  Severely decreased  left ventricular systolic function  The visual ejection fraction is 25-30%.  There is severe global hypokinesia of the left ventricle.  The left ventricle is normal in size.  There is apical dyskinesis.  There is severe anterior wall hypokinesis.  There is severe inferior wall hypokinesis.  The right ventricle is moderately dilated.  Severely decreased right ventricular systolic function  There is severe biatrial enlargement.  Right ventricular systolic pressure is elevated, consistent with moderate  pulmonary hypertension.  There is moderate (2+) aortic regurgitation.  Moderate bilateral pleural effusion     Pulmonary pressures are lower than last study 10/17/2022 ( estimated PA  systolic pressure was 66mm HG + RAP , now 44 mmHg + RAP)

## 2023-03-20 NOTE — PROGRESS NOTES
Orthopedic Surgery  Rm Villarreal  03/20/2023     Admit Date:  3/15/2023  POD: 4 Days Post-Op   Procedure(s):  RIGHT OPEN REDUCTION INTERNAL FIXATION INTERTROCHANTERIC HIP FRACTURE    Patient resting comfortably in chair.    More alert today.   Tolerating oral intake per report.    Afebrile.  No acute events overnight.    Temp:  [97.3  F (36.3  C)-97.4  F (36.3  C)] 97.4  F (36.3  C)  Pulse:  [67-87] 67  Resp:  [16-18] 16  BP: (101-129)/(56-76) 129/76  SpO2:  [94 %-96 %] 94 %    Right hip dressings are clean, dry, and intact.   No erythema of the surrounding skin.   Bilateral calves are soft.  He is able to actively dorsi and plantar flex ankles.   DP pulse palpable.    Labs:  Recent Labs   Lab Test 03/20/23  0807 03/19/23  0540 03/18/23  1840 03/18/23  0935   WBC 6.0 9.8  --  7.4   HGB 8.1* 7.5* 8.2* 6.6*   * 112*  --  113*     Recent Labs   Lab Test 04/04/22  1029 03/07/22  0908 02/22/22  0948   INR 1.1 2.2* 2.0*     Recent Labs   Lab Test 07/02/22  0710 07/01/22  0905   CRP 28.7* 32.3*       A/P  1. S/p right intertrochanteric femur fracture CM nailing   Continue Lovenox started postoperatively, ok from ortho standpt to restart chronic Eliquis. Lovenox can be discontinued if eliquis is restarted. Medicine is aware and will determine appropriate anticoagulation.     Hbg 8.1   Mobilize with PT/OT.   WBAT RLE with walker.   Continue current pain regimen.   Dressings: Keep intact.  Change if >50% saturated or peeling off.    Follow-up: 2 weeks post-op with Dr. Brady James    2. Disposition   Anticipate discharge likely to TCU when medically cleared and progressing in PT.    [facility]

## 2023-03-20 NOTE — PLAN OF CARE
Goal Outcome Evaluation:    Pt alert to self during the night. VSS on 2L O2 via nasal canula. LS diminished, yells out in pain, pain managed with scheduled Tylenol and PRN Oxycodone x1. Last Hemoglobin check was 7.5, AM lab draw pending. Continue to monitor.

## 2023-03-20 NOTE — PROGRESS NOTES
"[facility]    Medicine Progress Note - Hospitalist Service    Date of Admission:  3/15/2023    Assessment & Plan   Rm Villarreal is a 82 year old male with history of mild dementia (lives at home with spouse), hypothyroidism, CAD, bilateral carotid stenosis, chronic atrial fibrillation on apixaban, COPD, hypertension, and ischemic cardiomyopathy who presented to the ED after missing a step and falling down the stairs at home and resultant R hip pain. He had less than 2 beers (confirmed with spouse) evening of 3/15 but denies any prodrome.  Imaging shows a right intertrochanteric proximal femur fracture. OR on 3/16/23 for operative repair.    Post-operatively, delirium, anemia, and hypotension causing issues.     Acute right intertrochanteric proximal femur fracture secondary to mechanical fall  S/p ORIF with cephalomedullary device 3/17/23  Patient and spouse report that he fell less than two steps after the staircase turns. Patient denies head trauma or loss of consciousness and reports tripping while going down the steps.  He does endorse having \"a few beers\" earlier this evening but denied any prodrome. Spouse reports he had two beers but did not finish either of them. CT head and CT cervical spine negative for acute pathology.  Right shoulder x-ray negative for acute pathology.  Was otherwise neurologically intact in the ED upon admission.    - Admitted to inpatient.  - Orthopedic surgery consulted.  - S/p ORIF with cephalomedullary device on 3/17/23.  ml. Pre-op hgb 13.3g/dL, post-op hgb 7.6g/dL.  - Post op management per orthopedic surgery.  - Enoxaparin for DVT ppx for now. Can resume apixaban as soon as hgb stable.   - PT/OT consulted.  - Care transitions consulted.    Acute respiratory failure with hypoxia  Right moderate pleural effusion  Severe pulmonary hypertension  History of COPD at baseline  Patient does not use oxygen at home however he is requiring 2 to 3 L " nasal cannula in the hospital.  Chest imaging does indicate a moderate right pleural effusion with some compressive atelectasis. Spouse and pt confirm no recent coughing, fevers/chills.  WBC is unremarkable and he is afebrile.  - Supplemental oxygen as needed to maintain 90% or greater.  - As needed inhaler/nebulizer for wheezing.  - Incentive spirometry.  - Consider thoracentesis pending clinical course.  - History of cardiomyopathy as below.    Lactic acidosis, recurrent  * Patient presented as above and was found to have a initial lactic of 4.8. Lactic acid follow up 1.9. Unclear cause, possible stress mediated in setting of severe pHTN and heart failure.   * Post-operatively lactate elevated to 2.4 and 2.7. No obvious infection.  - Low suspicion of sepsis or infection currently.  - Continue to monitor, treat cardiac disease and anemia as discussed elsewhere.    History of dementia  Post-operative delirium  Insomnia  * Patient lives at home with his spouse. Take mirtazapine and seroquel at home. Follows with Memory doctor. Spouse reports that he often wanders at night and that usually she shadows him to ensure safety.   - Continue PTA mirtazapine.  - Increase PTA seroquel to 25 mg nightly. He also has a PRN dose available that can be used for agitation, but could also be used for insomnia if he continues to have difficult sleeping with the increased scheduled dose of seroquel.  - Bed alarm must be on and ideally patient can be close to RN station.  - Delirium precautions.     Coronary artery disease with history of stenting  Ischemic cardiomyopathy  Hypertension  Chronic atrial fibrillation on apixaban  HFrEF, with acute exacerbation  Acute hypoxic respiratory failure  Heart rate and BP stable in ED. PTA regimen appears to include metoprolol, apixaban, and torsemide. Follows at [facility] - lately on a weekly basis. Echo Oct 2022 showed EF 25-30%, severe hypokinesis noted, RV normal, LA moderately dilated.  Severe pulmonary hypertension also noted.  Chart review shows recent visit with Cardiology 3/10 with Dr Soni where it was discussed that prognosis is guarded in general; torsemide increased to 10mg BID - with caution due to low/normal BP.  Post operatively, received IV fluids due to elevated lactate. O2 needs rising. JVP elevated.   - Hold apixaban, await stable hemoglobin prior to resumption.  - Resumed PTA Toprol XL after holding on 3/18.  - Cardiology consulted, appreciate their assistance.  - Resumed PTA torsemide 10mg BID on 3/19/23 (recently increased from 10mg daily).    Hypothyroidism  - Continue PTA levothyroxine.    Recent frostbite of all fingertips  Patient was snowblowing this winter without gloves and managed to frostbite the tips of all of his fingers.  - SPO2 monitoring is difficult on fingers.  - Recommend to monitor SPO2 on ear or toes.    Acute blood loss anemia due to intraoperative blood loss   ml. Pre-op hgb 13.3g/dL, post-op hgb 7.6g/dL. S/p 2u PRBC as of 3/19.  - Monitor and transfuse as needed.  - Continue to hold apixaban.       Diet: Advance Diet as Tolerated: Regular Diet Adult  Snacks/Supplements Adult: Ensure Enlive; Between Meals    DVT Prophylaxis: Enoxaparin (Lovenox) SQ  Velazquez Catheter: Not present  Lines: None     Cardiac Monitoring: None  Code Status: Full Code      Clinically Significant Risk Factors                # Thrombocytopenia: Lowest platelets = 112 in last 2 days, will monitor for bleeding         # Cachexia: Estimated body mass index is 17.91 kg/m  as calculated from the following:    Height as of this encounter: 1.829 m (6').    Weight as of this encounter: 59.9 kg (132 lb 0.9 oz).   # Severe Malnutrition: based on nutrition assessment        Disposition Plan      Expected Discharge Date: 03/22/2023      Destination: home            Bautista Montes MD  Hospitalist Service  [facility]  Securely message with [employer] (more info)  Text page  via [employer] Paging/Directory   ______________________________________________________________________    Interval History   Rm Villarreal was seen today. He feels OK. No specific complaints/concerns for me. Denies fevers, chest pain, shortness of breath, nausea, abdominal pain, although history unreliable due to dementia. Discussed plan of care with his wife, Queenie. He has not been sleeping well at night, would like to adjust medications to try to help with this.    Physical Exam   Vital Signs: Temp: 97.4  F (36.3  C) Temp src: Oral BP: 129/76 Pulse: 67   Resp: 16 SpO2: 94 % O2 Device: Nasal cannula Oxygen Delivery: 2 LPM  Weight: 132 lbs .89 oz    Constitutional: awake, alert, cooperative, no apparent distress, sitting up in a recliner  Respiratory: no increased work of breathing, clear to auscultation bilaterally, no crackles or wheezing  Cardiovascular: regular rate and rhythm, normal S1 and S2, no murmur noted  GI: normal bowel sounds, soft, non-distended, non-tender  Skin: warm, dry  Musculoskeletal: no lower extremity pitting edema present  Neurologic: awake, alert, answers questions appropriately but not oriented, moves all extremities    Medical Decision Making       40 MINUTES SPENT BY ME on the date of service doing chart review, history, exam, documentation & further activities per the note.      Data     I have personally reviewed the following data over the past 24 hrs:    6.0  \   8.1 (L)   / 138 (L)     136 101 24.5 (H) /  107 (H)   3.7 30 (H) 0.86 \

## 2023-03-20 NOTE — PLAN OF CARE
Occupational Therapy: Orders received. Chart reviewed and discussed with care team.? Occupational Therapy not indicated due to IP therapy needs being met by PT. Pt w/ limited tolerance for therapy at this time, plan for pt to discharge to TCU.? Defer discharge recommendations to PT and further skilled OT to next level of care.? Will complete orders.

## 2023-03-20 NOTE — CONSULTS
Care Management Initial Consult    General Information  Assessment completed with: Family, Spouse or significant other, family in patient room  Type of CM/SW Visit: Initial Assessment    Primary Care Provider verified and updated as needed:     Readmission within the last 30 days:        Reason for Consult: discharge planning  Advance Care Planning:            Communication Assessment  Patient's communication style: spoken language (English or Bilingual)    Hearing Difficulty or Deaf: no        Cognitive  Cognitive/Neuro/Behavioral: .WDL except  Level of Consciousness: confused, alert  Arousal Level: opens eyes spontaneously  Orientation: disoriented to, place, time, situation  Mood/Behavior: calm, cooperative  Best Language: 0 - No aphasia  Speech: clear, spontaneous    Living Environment:   People in home: spouse  Queenie  Current living Arrangements: house      Able to return to prior arrangements: yes       Family/Social Support:  Care provided by: spouse/significant other, self  Provides care for: no one  Marital Status:   Wife  Queenie       Description of Support System: Supportive, Involved         Current Resources:   Patient receiving home care services:       Community Resources:    Equipment currently used at home: none  Supplies currently used at home:      Employment/Financial:  Employment Status:          Financial Concerns:             Lifestyle & Psychosocial Needs:  Social Determinants of Health     Tobacco Use: Medium Risk     Smoking Tobacco Use: Former     Smokeless Tobacco Use: Never     Passive Exposure: Not on file   Alcohol Use: Not on file   Financial Resource Strain: Not on file   Food Insecurity: Not on file   Transportation Needs: Not on file   Physical Activity: Not on file   Stress: Not on file   Social Connections: Not on file   Intimate Partner Violence: Not on file   Depression: Not at risk     PHQ-2 Score: 1   Housing Stability: Not on file       Functional Status:  Prior to  admission patient needed assistance:              Mental Health Status:          Chemical Dependency Status:                Values/Beliefs:  Spiritual, Cultural Beliefs, Advent Practices, Values that affect care:                 Additional Information:  Consult for discharge planning. Patient was admitted on 3/15/23 for pleural effusion and elevated lactic acid with a closed intertrochanteric fracture of right femur. Writer met with patient's family at bedside as writer was notified that family would appreciate if SW met with them. Per family, they are in agreement with TCU at discontinue and they would prefer that referrals are sent to [facility] and [facility]. They understand that if there is a sitter in the room, referrals won't be sent. Writer clarified that sitter will need to be discontinued for 24 hours and they understand.     Transportation was not discussed at this time. SW will continue to follow for discharge planning.     DARRYL Damon

## 2023-03-20 NOTE — PROGRESS NOTES
SPIRITUAL HEALTH SERVICES Progress Note  Lucio: [address] Ortho    Saw pt Rm (Zoran) MARY Villarreal per length of stay.  His life Queenie was bedside.  Rm was sleeping.    Patient/Family Understanding of Illness and Goals of Care -   Queenie reported that he has a complex medical history and that she believes he will be going to a TCU to continue rehabilitation after being discharged.  She shared that he also suffers from dementia and is prone to wandering.    Distress and Loss -   In conversation, Queenie expressed distress surrounding the difficulty of being a caretaker and not getting enough rest.  I offered emotional support through reflective listening that affirmed and normalized emotions.   Queenie's parents are also in need of a caretaker and her brother is currently in that role.  She has helped in the past, but currently, she can no longer offer support due to her 's need for a caretaker.  She shared that she does not have much of a support network as her friends and family do not live nearby.  Additionally, she does not currently have a [employer] home.    Strengths, Coping, and Resources - She shared that he has two daughters and son in laws who visit regularly.  Queenie was able to extend trust and show vulnerability.    Meaning, Beliefs, and Spirituality - Queenie shared that prayer is really helpful and welcomed my offer of prayer.  I incorporated conversational themes.     Plan of Care - [facility] is following and follow up visits are planned.  Please consult should needs arise before then.    Jessica Bassett, SHANE   Intern    [facility] routine referrals [facility] *[phone]  [facility] available 24/7 for emergent requests/referrals, either by paging the on-call  or by entering an ASAP/STAT consult in Epic (this will also page the on-call ).

## 2023-03-20 NOTE — PLAN OF CARE
A&O to self. VSS on 2L O2 via NC. Pt denies pain, iced incision area. Pt had removed dressing this morning, applied new dressing, dressing is CDI. Potassium protocol. Pending discharge.

## 2023-03-20 NOTE — CONSULTS
Patient has Medicare Advantage through [facility].    Farxiga is not covered.    Entresto and Jardiance  --Upon receipt of RX, Discharge Pharmacy can provide 1 mo free Entresto and 14 days free Jardiance.  --Subsequent fills will be $35/mo ($70 for 3 mo at [facility]).  --When total drug costs exceed $4,660, price will increase to a 25% coinsurance, equivalent to $141/mo for Jardiance and $160/mo for Entresto.      Becky Delarosa  Pharmacy Technician/Liaison, Discharge Pharmacy   [phone] (voice or text)  [email or web address]

## 2023-03-21 NOTE — PROGRESS NOTES
[facility]  Cardiology Progress Note  Date of Service: 03/21/2023  Primary Cardiologist: Dr. Soni    Assessment & Plan    Rm Villarreal is a 82 year old male admitted on 3/15/2023 admitted after fall resulting in R hip fracture s/p ORIF 3/16/2023. Cardiology consulted to assist with management of complex cardiac history.     Assessment:  1. Mechanical fall s/p R hip ORIF 3/16/23  2. Biventricular heart failure, ischemic cardiomyopathy - LVEF 25-30%, RV moderately dilated with severely decreased RV systolic function   - Goal weight thought to be ~ 130#s.   3. Chronic atrial fibrillation - PTA on apixaban for thromboembolic prophylaxis, plans to resume when hemoglobin stable.   4. Coronary artery disease    - S/p RCA stenting in 1994, previous PTCA of LAD   - History of anterior MI in 2005 and underwent PCI and stenting to LAD, LCx was chronically occluded with left to left collaterals that was medically managed.    - Coronary angiogram in 2014 showed patent RCA and LAD stents, known occluded LCx with left to left collaterals, LAD had chronic occlusion with left to left collaterals. 50-70% stenosis of lateral limb of bifurcating diagonal was medically managed.     - Last ischemic assessment was 1/2021 showed a medium sized area of severe degree of transmural infarction in the apical segments of the LV. Small area of nontransmural infarction of the entire inferior segments of LV  5. Pulmonary hypertension  6. Hypertension  7. COPD    Patient seen resting in bed, confused and restless. Family at bedside. No weights today, daily weight requested. CXR yesterday showed small bilateral pleural effusions (stable). Heart rates noted to be slightly elevated during vital sign checks, will start telemetry to monitor his heart rates in atrial fibrillation. He is on minimal GDMT, which includes metoprolol XL. Priced out ARNI and SGLT2i, entresto/jardiance would cost $30/month or $70 for 3 months through  [facility]. Given cognitive changes today will hold off on starting additional heart failure medication at this time, would recommend considering Entresto outpatient.     Plan:   1. Continue torsemide 10mg BID  2. Continue metoprolol XL 25mg BID  3. Currently on lovenox for DVT prophylaxis with plans to resume apixaban with hemoglobin stable.   4. Start telemetry to monitor heart rate control  5. Daily weight, strict I&O, low sodium diet and 2L fluid restriction  6. Priced out Entresto and Jardiance, $35/month or $70 for 3 months through [facility].   7. Recommend enrollment in [facility] at time of hospital discharge for continued close monitoring and titration of GDMT as tolerated.     SAMANTHA Coles CNP  [facility]  Pager: [phone]        Interval History   Increased confusion overnight. Restless. Family at bedside. Not sleeping well.     Physical Exam   Temp: 97.1  F (36.2  C) Temp src: Axillary BP: 129/73 Pulse: 102   Resp: 18 SpO2: 100 % O2 Device: Nasal cannula Oxygen Delivery: 2 LPM  Vitals:    03/15/23 1940 03/18/23 0309   Weight: 77.1 kg (170 lb) 59.9 kg (132 lb 0.9 oz)     GEN: cachectic frail, confused.   HEENT:  Pupils equal, round. Sclerae nonicteric.   NECK: Supple, no masses appreciated.   C/V:  Irregularly irregular rate and rhythm, heart rate borderline tachycardic.   RESP: Respirations are unlabored. Clear, slightly diminished in bases.   GI: Abdomen soft, flat, nontender.  EXTREM: No left LE edema, some right LE edema s/p R hip fracture with repair  NEURO: Alert and oriented, cooperative.  SKIN: Warm and dry    Medications       enoxaparin ANTICOAGULANT  30 mg Subcutaneous Q24H     levothyroxine  25 mcg Oral Daily     metoprolol succinate ER  25 mg Oral Daily     mirtazapine  15 mg Oral At Bedtime     multivitamin w/minerals  1 tablet Oral Daily     polyethylene glycol  17 g Oral Daily     QUEtiapine  25 mg Oral At Bedtime     senna-docusate  1 tablet Oral BID     sodium  chloride (PF)  3 mL Intracatheter Q8H     torsemide  10 mg Oral BID       Data   Last 24 hours labs reviewed     Telemetry : atrial fibrillation, borderline tachycardic    Echo: 3/17/23  The rhythm was atrial fibrillation.  Severely decreased left ventricular systolic function  The visual ejection fraction is 25-30%.  There is severe global hypokinesia of the left ventricle.  The left ventricle is normal in size.  There is apical dyskinesis.  There is severe anterior wall hypokinesis.  There is severe inferior wall hypokinesis.  The right ventricle is moderately dilated.  Severely decreased right ventricular systolic function  There is severe biatrial enlargement.  Right ventricular systolic pressure is elevated, consistent with moderate  pulmonary hypertension.  There is moderate (2+) aortic regurgitation.  Moderate bilateral pleural effusion     Pulmonary pressures are lower than last study 10/17/2022 ( estimated PA  systolic pressure was 66mm HG + RAP , now 44 mmHg + RAP)

## 2023-03-21 NOTE — PROGRESS NOTES
"[facility]    Medicine Progress Note - Hospitalist Service    Date of Admission:  3/15/2023    Assessment & Plan   Rm Villarreal is a 82 year old male with history of mild dementia (lives at home with spouse), hypothyroidism, CAD, bilateral carotid stenosis, chronic atrial fibrillation on apixaban, COPD, hypertension, and ischemic cardiomyopathy who presented to the ED after missing a step and falling down the stairs at home and resultant R hip pain. He had less than 2 beers (confirmed with spouse) evening of 3/15 but denies any prodrome.  Imaging shows a right intertrochanteric proximal femur fracture. OR on 3/16/23 for operative repair.    Post-operatively, delirium, anemia, and hypotension causing issues.     Acute right intertrochanteric proximal femur fracture secondary to mechanical fall  S/p ORIF with cephalomedullary device 3/17/23  Patient and spouse report that he fell less than two steps after the staircase turns. Patient denies head trauma or loss of consciousness and reports tripping while going down the steps.  He does endorse having \"a few beers\" earlier this evening but denied any prodrome. Spouse reports he had two beers but did not finish either of them. CT head and CT cervical spine negative for acute pathology.  Right shoulder x-ray negative for acute pathology.  Was otherwise neurologically intact in the ED upon admission.    - Admitted to inpatient.  - Orthopedic surgery consulted.  - S/p ORIF with cephalomedullary device on 3/17/23.  ml. Pre-op hgb 13.3g/dL, post-op hgb 7.6g/dL.  - Post op management per orthopedic surgery.  - Enoxaparin for DVT ppx for now. Can resume apixaban as soon as hgb stable.   - PT/OT consulted.  - Care transitions consulted.    Acute respiratory failure with hypoxia  Right moderate pleural effusion  Severe pulmonary hypertension  History of COPD at baseline  Patient does not use oxygen at home however he is requiring 2 to 3 L " nasal cannula in the hospital.  Chest imaging does indicate a moderate right pleural effusion with some compressive atelectasis. Spouse and pt confirm no recent coughing, fevers/chills.  WBC is unremarkable and he is afebrile.  - Supplemental oxygen as needed to maintain 90% or greater.  - As needed inhaler/nebulizer for wheezing.  - Incentive spirometry.  - Consider thoracentesis pending clinical course.  - History of cardiomyopathy as below.    Lactic acidosis, recurrent  * Patient presented as above and was found to have a initial lactic of 4.8. Lactic acid follow up 1.9. Unclear cause, possible stress mediated in setting of severe pHTN and heart failure.   * Post-operatively lactate elevated to 2.4 and 2.7. No obvious infection.  - Low suspicion of sepsis or infection currently.  - Continue to monitor, treat cardiac disease and anemia as discussed elsewhere.    History of dementia  Post-operative delirium  Insomnia  * Patient lives at home with his spouse. Take mirtazapine and seroquel at home. Follows with Memory doctor. Spouse reports that he often wanders at night and that usually she shadows him to ensure safety.   - Increase PTA mirtazapine from 7.5 mg nightly to 15 mg nightly.  - Increased PTA seroquel to 25 mg nightly on 3/20/23, and he also received a PRN dose of Seroqul overnight as well. He is more drowsy and confused on 3/21 and also didn't sleep well overnight again on 3/20-3/21. Will continue increased scheduled dose of seroquel, but stop PRN dose of seroquel (received total of 50 mg of seroquel last night and this appears to have caused more drowsiness/confusion).  - Bed alarm must be on and ideally patient can be close to RN station.  - Delirium precautions.   - Continue to encourage patient to be awake during the day and promote sleep at night.  - Check UA and repeat CBC today.    Coronary artery disease with history of stenting  Ischemic cardiomyopathy  Hypertension  Chronic atrial fibrillation  on apixaban  HFrEF, with acute exacerbation  Acute hypoxic respiratory failure  Heart rate and BP stable in ED. PTA regimen appears to include metoprolol, apixaban, and torsemide. Follows at [facility] - lately on a weekly basis. Echo Oct 2022 showed EF 25-30%, severe hypokinesis noted, RV normal, LA moderately dilated. Severe pulmonary hypertension also noted.  Chart review shows recent visit with Cardiology 3/10 with Dr Soni where it was discussed that prognosis is guarded in general; torsemide increased to 10mg BID - with caution due to low/normal BP.  Post operatively, received IV fluids due to elevated lactate. O2 needs rising. JVP elevated.   - Hold apixaban, await stable hemoglobin prior to resumption.  - Resumed PTA Toprol XL after holding on 3/18.  - Cardiology consulted, appreciate their assistance.  - Resumed PTA torsemide 10mg BID on 3/19/23 (recently increased from 10mg daily).    Hypothyroidism  - Continue PTA levothyroxine.    Hypokalemia  - Replace and recheck per RN managed protocol.    Recent frostbite of all fingertips  Patient was snowblowing this winter without gloves and managed to frostbite the tips of all of his fingers.  - SPO2 monitoring is difficult on fingers.  - Recommend to monitor SPO2 on ear or toes.    Acute blood loss anemia due to intraoperative blood loss   ml. Pre-op hgb 13.3g/dL, post-op hgb 7.6g/dL. S/p 2u PRBC as of 3/19.  - Monitor and transfuse as needed.  - Continue to hold apixaban.  - Recheck CBC today.       Diet: Advance Diet as Tolerated: Regular Diet Adult  Snacks/Supplements Adult: Ensure Enlive; Between Meals    DVT Prophylaxis: Enoxaparin (Lovenox) SQ  Velazquez Catheter: Not present  Lines: None     Cardiac Monitoring: None  Code Status: Full Code      Clinically Significant Risk Factors        # Hypokalemia: Lowest K = 3.1 mmol/L in last 2 days, will replace as needed                 # Cachexia: Estimated body mass index is 17.91 kg/m  as calculated from the  following:    Height as of this encounter: 1.829 m (6').    Weight as of this encounter: 59.9 kg (132 lb 0.9 oz).   # Severe Malnutrition: based on nutrition assessment        Disposition Plan     Expected Discharge Date: 03/22/2023      Destination: home            Bautista Montes MD  Hospitalist Service  [facility]  Securely message with [employer] (more info)  Text page via [employer] Paging/Directory   ______________________________________________________________________    Interval History   Rm Villarreal was seen today. Did not sleep well overnight despite increased dose of seroquel. Also received a PRN dose of seroquel overnight. More drowsy and confused this morning. History from patient unreliable. Family in the room with him today, discuss plan of care.    Physical Exam   Vital Signs: Temp: 97.1  F (36.2  C) Temp src: Axillary BP: 129/73 Pulse: 102   Resp: 18 SpO2: 100 % O2 Device: Nasal cannula Oxygen Delivery: 2 LPM  Weight: 132 lbs .89 oz    Constitutional: awake, drowsy, cooperative, more confused today, laying in the hospital bed  Respiratory: no increased work of breathing, clear to auscultation bilaterally, no crackles or wheezing  Cardiovascular: regular rate and rhythm, normal S1 and S2, no murmur noted  GI: normal bowel sounds, soft, non-distended, non-tender  Skin: warm, dry  Musculoskeletal: no lower extremity pitting edema present  Neurologic: awake, drowsy, not oriented, more confused today, moves all extremities    Medical Decision Making       55 MINUTES SPENT BY ME on the date of service doing chart review, history, exam, documentation & further activities per the note.      Data     I have personally reviewed the following data over the past 24 hrs:    N/A  \   N/A   / N/A     N/A N/A N/A /  N/A   3.1 (L) N/A N/A \

## 2023-03-21 NOTE — PLAN OF CARE
Reason for Admission: POD 4 R hip GONZALO  Cognitive/Mentation: A/Ox 1-2, only to self and year   CMS: Stable with RLE weakness. Hx of dementia- confusion/forgetfulness.   VS: VSS on 2L NC   GI: BS audible, + flatus, one BM. Incontinent.  : Voiding adequately. Incontinent.  Pulmonary: LS diminished  Pain: R hip pain, ice packs given and repositioning.   Drains/Lines: R PIV SL  Skin: Intact with scattered bruising. R hip/abdominal bruising- dressings CDI. Mepilex on sacrum/coccyx. Dusky hands d/t frost bite.   Activity: Assist x 2 with GB/Doris-steady.  Diet: Regular diet with thin liquids. Takes pills whole.   Therapies recs: Pending  Discharge: Pending  Aggression Stoplight Tool: Yellow for confusion  End of shift summary: No changes this shift. Pt restless- sitter maintained at bedside and scheduled Seroquel given.

## 2023-03-21 NOTE — PLAN OF CARE
VSS. Confused, oriented to self. Alert, fidgety and restless. Sitter at bedside. Seroquel PRN given without much improvement. Patient did not sleep well. Tylenol given for pain. Frequently incontinent of urine. Right hip dressing intact. Right thigh and hip bruised. Repositioned with assist of 2. Plan likely for TCU at discharge.

## 2023-03-22 NOTE — PROGRESS NOTES
"[facility]    Medicine Progress Note - Hospitalist Service    Date of Admission:  3/15/2023    Assessment & Plan   Rm Villarreal is a 82 year old male with history of mild dementia (lives at home with spouse), hypothyroidism, CAD, bilateral carotid stenosis, chronic atrial fibrillation on apixaban, COPD, hypertension, and ischemic cardiomyopathy who presented to the ED after missing a step and falling down the stairs at home and resultant R hip pain. He had less than 2 beers (confirmed with spouse) evening of 3/15 but denies any prodrome.  Imaging shows a right intertrochanteric proximal femur fracture. OR on 3/16/23 for operative repair.    Post-operatively, delirium, anemia, and hypotension causing issues.     Acute right intertrochanteric proximal femur fracture secondary to mechanical fall  S/p ORIF with cephalomedullary device 3/17/23  Patient and spouse report that he fell less than two steps after the staircase turns. Patient denies head trauma or loss of consciousness and reports tripping while going down the steps.  He does endorse having \"a few beers\" earlier this evening but denied any prodrome. Spouse reports he had two beers but did not finish either of them. CT head and CT cervical spine negative for acute pathology.  Right shoulder x-ray negative for acute pathology.  Was otherwise neurologically intact in the ED upon admission.    - Admitted to inpatient.  - Orthopedic surgery consulted.  - S/p ORIF with cephalomedullary device on 3/17/23.  ml. Pre-op hgb 13.3g/dL, post-op hgb 7.6g/dL.  - Post op management per orthopedic surgery.  - Enoxaparin for DVT ppx for now. Can resume apixaban as soon as hgb stable.   - PT/OT consulted.  - Care transitions consulted.    Acute respiratory failure with hypoxia  Right moderate pleural effusion  Severe pulmonary hypertension  History of COPD at baseline  Patient does not use oxygen at home however he is requiring 2 to 3 L " nasal cannula in the hospital.  Chest imaging does indicate a moderate right pleural effusion with some compressive atelectasis. Spouse and pt confirm no recent coughing, fevers/chills.  WBC is unremarkable and he is afebrile.  - Supplemental oxygen as needed to maintain 90% or greater.  - As needed inhaler/nebulizer for wheezing.  - Incentive spirometry.  - Consider thoracentesis pending clinical course.  - History of cardiomyopathy as below.    Lactic acidosis, recurrent  * Patient presented as above and was found to have a initial lactic of 4.8. Lactic acid follow up 1.9. Unclear cause, possible stress mediated in setting of severe pHTN and heart failure.   * Post-operatively lactate elevated to 2.4 and 2.7. No obvious infection.  - Low suspicion of sepsis or infection currently.  - Continue to monitor, treat cardiac disease and anemia as discussed elsewhere.    History of dementia  Post-operative delirium  Insomnia  * Patient lives at home with his spouse. Take mirtazapine and seroquel at home. Follows with Memory doctor. Spouse reports that he often wanders at night and that usually she shadows him to ensure safety.   - Increase PTA mirtazapine from 7.5 mg nightly to 15 mg nightly.  - Increased PTA seroquel to 25 mg nightly on 3/20/23, and he also received a PRN dose of Seroqul overnight as well. He was more drowsy and confused on 3/21 and also didn't sleep well overnight again. Decreased seroquel on 3/21 and increased mirtazapine. Still not sleeping well, remains confused/drowsy during the day. Discussed with family on 3/22/23. Discontinue seroquel as its ineffective with insomnia and makes him drowsy/confused during the day. Continue increased dose of mirtazapine. Add scheduled melatonin. Will give melatonin and mirtazapine at 6:00 pm. Continue to try to promote good sleep hygiene.  - Bed alarm must be on and ideally patient can be close to RN station.  - Delirium precautions.   - Continue to encourage  patient to be awake during the day and promote sleep at night.  - UA without signs of infection on 3/21/23.  - WBC within normal limits.  - Ammonia not elevated on 3/22/23.  - VBG without signs of CO2 retention on 3/22/23.      Coronary artery disease with history of stenting  Ischemic cardiomyopathy  Hypertension  Chronic atrial fibrillation on apixaban  HFrEF, with acute exacerbation  Acute hypoxic respiratory failure  Heart rate and BP stable in ED. PTA regimen appears to include metoprolol, apixaban, and torsemide. Follows at [facility] - lately on a weekly basis. Echo Oct 2022 showed EF 25-30%, severe hypokinesis noted, RV normal, LA moderately dilated. Severe pulmonary hypertension also noted.  Chart review shows recent visit with Cardiology 3/10 with Dr Soni where it was discussed that prognosis is guarded in general; torsemide increased to 10mg BID - with caution due to low/normal BP.  Post operatively, received IV fluids due to elevated lactate. O2 needs rising. JVP elevated.   - Hold apixaban, await stable hemoglobin prior to resumption.  - Resumed PTA Toprol XL after holding on 3/18.  - Cardiology consulted, appreciate their assistance.  - Resumed PTA torsemide 10mg BID on 3/19/23 (recently increased from 10mg daily). Will decrease to once daily for now due to altered mental status with decreased oral intake and elevation in bicarb potentially due to contraction alkalosis.  - Recheck labs in AM.    Hypothyroidism  - Continue PTA levothyroxine.    Hypokalemia  - Replace and recheck per RN managed protocol.    Recent frostbite of all fingertips  Patient was snowblowing this winter without gloves and managed to frostbite the tips of all of his fingers.  - SPO2 monitoring is difficult on fingers.  - Recommend to monitor SPO2 on ear or toes.    Acute blood loss anemia due to intraoperative blood loss   ml. Pre-op hgb 13.3g/dL, post-op hgb 7.6g/dL. S/p 2u PRBC as of 3/19.  - Monitor and transfuse as  needed.  - Continue to hold apixaban, resume when hemoglobin stable and mental status improved.  - Hemoglobin 8.1 this morning. Recheck this evening and in AM.       Diet: Advance Diet as Tolerated: Regular Diet Adult  Snacks/Supplements Adult: Ensure Enlive; Between Meals    DVT Prophylaxis: Enoxaparin (Lovenox) SQ  Velazquez Catheter: Not present  Lines: None     Cardiac Monitoring: ACTIVE order. Indication: Tachyarrhythmias, acute (48 hours)  Code Status: Full Code      Clinically Significant Risk Factors        # Hypokalemia: Lowest K = 3.1 mmol/L in last 2 days, will replace as needed       # Hypoalbuminemia: Lowest albumin = 3 g/dL at 3/22/2023  7:54 AM, will monitor as appropriate           # Cachexia: Estimated body mass index is 17.91 kg/m  as calculated from the following:    Height as of this encounter: 1.829 m (6').    Weight as of this encounter: 59.9 kg (132 lb 0.9 oz).   # Severe Malnutrition: based on nutrition assessment        Disposition Plan      Expected Discharge Date: 03/23/2023      Destination: home            Bautista Montes MD  Hospitalist Service  [facility]  Securely message with [employer] (more info)  Text page via [facility] Paging/Directory   ______________________________________________________________________    Interval History   Rm Villarreal was seen today. He seems to feel OK and does not have any complaints, however he is still drowsy and confused and history is not reliable. Did not sleep well again last night. Discussed plan of care with his daughter in the hospital room today.    Physical Exam   Vital Signs: Temp: 98.1  F (36.7  C) Temp src: Axillary BP: 98/74 Pulse: 95   Resp: 16 SpO2: 97 % O2 Device: Nasal cannula Oxygen Delivery: 2 LPM  Weight: 132 lbs .89 oz    Constitutional: awake, drowsy, cooperative, confused, laying in the hospital bed  Respiratory: no increased work of breathing, clear to auscultation bilaterally, no crackles or  wheezing  Cardiovascular: regular rate and rhythm, normal S1 and S2, no murmur noted  GI: normal bowel sounds, soft, non-distended, non-tender  Skin: warm, dry  Musculoskeletal: no lower extremity pitting edema present  Neurologic: awake, drowsy, not oriented, confused, moves all extremities    Medical Decision Making       45 MINUTES SPENT BY ME on the date of service doing chart review, history, exam, documentation & further activities per the note.      Data     I have personally reviewed the following data over the past 24 hrs:    5.3  \   8.1 (L)   / 158     139 101 19.7 /  92   3.6 31 (H) 0.70 \       ALT: 16 AST: 55 (H) AP: 122 TBILI: 4.3 (H)   ALB: 3.0 (L) TOT PROTEIN: 6.0 (L) LIPASE: N/A

## 2023-03-22 NOTE — PROGRESS NOTES
[facility]  Cardiology Progress Note  Date of Service: 03/22/2023  Primary Cardiologist: Dr. Soni    Assessment & Plan    Rm Villarreal is a 82 year old male admitted on 3/15/2023 admitted after fall resulting in R hip fracture s/p ORIF 3/16/2023. Cardiology consulted to assist with management of complex cardiac history.     Assessment:  1. Mechanical fall s/p R hip ORIF 3/16/23  2. Biventricular heart failure, ischemic cardiomyopathy - LVEF 25-30%, RV moderately dilated with severely decreased RV systolic function   - Goal weight thought to be ~ 130#s.   3. Chronic atrial fibrillation - PTA on apixaban for thromboembolic prophylaxis, plans to resume when hemoglobin stable.   4. Coronary artery disease    - S/p RCA stenting in 1994, previous PTCA of LAD   - History of anterior MI in 2005 and underwent PCI and stenting to LAD, LCx was chronically occluded with left to left collaterals that was medically managed.    - Coronary angiogram in 2014 showed patent RCA and LAD stents, known occluded LCx with left to left collaterals, LAD had chronic occlusion with left to left collaterals. 50-70% stenosis of lateral limb of bifurcating diagonal was medically managed.     - Last ischemic assessment was 1/2021 showed a medium sized area of severe degree of transmural infarction in the apical segments of the LV. Small area of nontransmural infarction of the entire inferior segments of LV  5. Pulmonary hypertension  6. Hypertension  7. COPD    Patient seen resting in bed, wife at bedside. He remains confused/delirious. Unfortunately daily weights have not been checked, reviewed with bedside nurse. Overall he appears euvolemic. Telemetry reviewed, chronic atrial fibrillation with overall controlled heart rates. Metoprolol XL held this morning given systolic blood pressure of 98. Advised nurse to recheck blood pressure and given metoprolol XL if SBP > 100.     From a cardiac perspective his medications  are optimized at this point. Recommend CORE follow up at discharge for further consideration for titration of GDMT.     Reviewed with hospital medicine provider Dr. Montes. Cardiology signing off.     Plan:   1. Continue torsemide 10mg BID  2. Continue metoprolol XL 25mg daily   3. Currently on lovenox for DVT prophylaxis with plans to resume apixaban with hemoglobin stable.   4. Daily weight, strict I&O, low sodium diet and 1800ml fluid restriction  5. Priced out Entresto and Jardiance, $35/month or $70 for 3 months through [employer] [facility], this can be considered outpatient.   6. Recommend enrollment in CORE clinic at time of hospital discharge for continued close monitoring and titration of GDMT as tolerated.   7. Cardiology signing off, please contact with any additional questions.     SAMANTHA Coles [facility]  Pager: [phone]        Interval History   No acute events overnight. Remains confused.       Physical Exam   Temp: 98.1  F (36.7  C) Temp src: Axillary BP: 98/74 Pulse: 95   Resp: 16 SpO2: 97 % O2 Device: Nasal cannula Oxygen Delivery: 2 LPM  Vitals:    03/15/23 1940 03/18/23 0309   Weight: 77.1 kg (170 lb) 59.9 kg (132 lb 0.9 oz)     GEN: cachectic frail, confused.   HEENT:  Pupils equal, round. Sclerae nonicteric.   NECK: Supple, no masses appreciated.   C/V:  Irregularly irregular rate and rhythm  RESP: Respirations are unlabored. Clear, slightly diminished in bases.   GI: Abdomen soft, flat, nontender.  EXTREM: No left LE edema, some right LE edema s/p R hip fracture with repair  NEURO: Confused  SKIN: Warm and dry    Medications       enoxaparin ANTICOAGULANT  30 mg Subcutaneous Q24H     levothyroxine  25 mcg Oral Daily     melatonin  3 mg Oral QPM     metoprolol succinate ER  25 mg Oral Daily     mirtazapine  15 mg Oral QPM     multivitamin w/minerals  1 tablet Oral Daily     polyethylene glycol  17 g Oral Daily     senna-docusate  1 tablet Oral BID     sodium chloride (PF)  3 mL  Intracatheter Q8H     torsemide  10 mg Oral BID       Data   Last 24 hours labs reviewed     Telemetry : atrial fibrillation    Echo: 3/17/23  The rhythm was atrial fibrillation.  Severely decreased left ventricular systolic function  The visual ejection fraction is 25-30%.  There is severe global hypokinesia of the left ventricle.  The left ventricle is normal in size.  There is apical dyskinesis.  There is severe anterior wall hypokinesis.  There is severe inferior wall hypokinesis.  The right ventricle is moderately dilated.  Severely decreased right ventricular systolic function  There is severe biatrial enlargement.  Right ventricular systolic pressure is elevated, consistent with moderate  pulmonary hypertension.  There is moderate (2+) aortic regurgitation.  Moderate bilateral pleural effusion     Pulmonary pressures are lower than last study 10/17/2022 ( estimated PA  systolic pressure was 66mm HG + RAP , now 44 mmHg + RAP)

## 2023-03-22 NOTE — PROGRESS NOTES
Alert to self. On 2 lpm NC. Up with 2 brett steady, Dressing CDI. PIV sl. Incontinent of B/B. Sitter at bedside provided.Tele- A-fib, controlled. Discharge pending TCU placement. Will monitor.

## 2023-03-22 NOTE — PLAN OF CARE
A&O to self. 2L NC. Pt denies pain, iced incision area. Dressing is CDI. Potassium protocol, replaced once. Tolerating regular diet. Sitter at bedside. Asst of 2 GB/Doris Steady. Tele controlled AFib w/ PVC. Pending discharge.

## 2023-03-23 NOTE — PLAN OF CARE
Goal Outcome Evaluation:         Diagnosis: R hip ORIF  POD#: 7  Mental Status: alert to self  Activity/dangle up 2 brett steady  Diet: 2g NA, 1800mL fluid restriction  Pain: denied  Velazquez/Voiding: incontinent B&B  Tele/Restraints/Iso: A-fib PVC  02/LDA: IV SL. 2L NC  D/C Date: pending  Other Info:  [facility]ep[facility] bottom

## 2023-03-23 NOTE — PROGRESS NOTES
"CLINICAL NUTRITION SERVICES - REASSESSMENT NOTE    Recommendations Ordered by Registered Dietitian (RD):   Recommend a high kcal/high protein diet in the setting of 2g Na   Provided handout: \"Low Sodium Nutrition therapy for the Undernourished\"   Continue Ensure.   Malnutrition:   % Weight Loss:  > 10% in 6 months (severe malnutrition)  % Intake:  </= 75% for >/= 1 month (severe malnutrition)  Subcutaneous Fat Loss:  Orbital region moderate+ depletion  Muscle Loss:  Temporal region moderate+ depletion  Fluid Retention:  None noted     Malnutrition Diagnosis: Severe malnutrition  In Context of:  Acute illness or injury  Chronic illness or disease     EVALUATION OF PROGRESS TOWARD GOALS   Diet: 2g Na diet   Ensure Enlive between meals   1800 mL fluid restriction     Intake/Tolerance:  - tolerating 25% of most meals the past 2-3 days. Pt seen in room with two family members. Spouse mentioned that patient is eating very small amounts. He drinks the Ensure daily and likes the flavor.     - Labs: reviewed  - Stooling: Stooling 1-2x daily.   - Weight: 57.8 kg (127 lb)     ASSESSED NUTRITION NEEDS:  Dosing Weight 60 kg   Estimated Energy Needs: 0285-4923 kcals (25-30 Kcal/Kg)  Justification: maintenance and repletion  Estimated Protein Needs: 72-90 grams protein (1.2-1.5 g pro/Kg)  Justification: preservation of lean body mass, repletion     NEW FINDINGS:   - Education provided by CORE RN. Spouse declined additional 2g NA diet ed.     Previous Goals:   Intake of >50% adequate meals TID + 1 ensure daily.   Evaluation: Not met    Previous Nutrition Diagnosis:   Malnutrition related to suspected baseline inadequate oral intake as evidenced by spouse reports 3 \"light\" meals daily, loss of >10% in <4 months, fat and muscle losses on exam, underweight BMI.   Evaluation: No change    CURRENT NUTRITION DIAGNOSIS  Malnutrition related to suspected baseline inadequate oral intake as evidenced by spouse reports 3 \"light\" meals daily, " "loss of >10% in <4 months, fat and muscle losses on exam, underweight BMI.     INTERVENTIONS  Recommendations / Nutrition Prescription  Regular Diet   Ensure daily 1x   Recommend a high kcal/high protein diet in the setting of 2g Na     Implementation  Nutrition Education: Discussed the complexities of a 2g NA diet in the setting of malnutrition. Provided handout: \"Low Sodium diet for the Undernourished\".     Goals  Intake of at least 50% meals TID + 1 ensure daily.     MONITORING AND EVALUATION:  Progress towards goals will be monitored and evaluated per protocol and Practice Guidelines    Alona Tierney RD, LD  [facility], Ortho, [facility]o Sp[facility]  Pager: [phone]  Weekend Pager: [phone]    "

## 2023-03-23 NOTE — PROGRESS NOTES
"SPIRITUAL HEALTH SERVICES Progress Note  [facility]  [address] Ortho    I followed up with pt. \"Zoran's\" spouse, Queenie.  Zoran was resting and Queenie was waiting in the hallway.  She shared that he is \"very agitated and confused\" and now has a 24/7 sitter.  Queenie shared that she is at the hospital everyday for 9 or 10 hours.  I offered emotional support through reflective listening and affirmed the challenging circumstances.    Plan of care: [facility] is following and available for spiritual and emotional support for patient and family.  Please consult should needs arise.    Jessica Bassett BS   Intern    [facility] routine referrals [facility] *[phone]  [facility] available 24/7 for emergent requests/referrals, either by paging the on-call  or by entering an ASAP/STAT consult in Epic (this will also page the on-call ).   "

## 2023-03-23 NOTE — PROGRESS NOTES
"[facility]    Medicine Progress Note - Hospitalist Service    Date of Admission:  3/15/2023    Assessment & Plan   Rm Villarreal is a 82 year old male with history of mild dementia (lives at home with spouse), hypothyroidism, CAD, bilateral carotid stenosis, chronic atrial fibrillation on apixaban, COPD, hypertension, and ischemic cardiomyopathy who presented to the ED after missing a step and falling down the stairs at home and resultant R hip pain. He had less than 2 beers (confirmed with spouse) evening of 3/15 but denies any prodrome.  Imaging shows a right intertrochanteric proximal femur fracture. OR on 3/16/23 for operative repair.    Post-operatively, delirium, anemia, and hypotension causing issues.     Acute right intertrochanteric proximal femur fracture secondary to mechanical fall  S/p ORIF with cephalomedullary device 3/17/23  Patient and spouse report that he fell less than two steps after the staircase turns. Patient denies head trauma or loss of consciousness and reports tripping while going down the steps.  He does endorse having \"a few beers\" earlier this evening but denied any prodrome. Spouse reports he had two beers but did not finish either of them. CT head and CT cervical spine negative for acute pathology.  Right shoulder x-ray negative for acute pathology.  Was otherwise neurologically intact in the ED upon admission.    - Admitted to inpatient.  - Orthopedic surgery consulted.  - S/p ORIF with cephalomedullary device on 3/17/23.  ml. Pre-op hgb 13.3g/dL, post-op hgb 7.6g/dL. Anemia management as below.  - Post op management per orthopedic surgery.  - Enoxaparin for DVT ppx for now. Can resume apixaban as soon as hgb stable.   - PT/OT consulted.  - Care transitions consulted.    Acute respiratory failure with hypoxia  Right moderate pleural effusion  Severe pulmonary hypertension  History of COPD at baseline  Patient does not use oxygen at home however " he is requiring 2 to 3 L nasal cannula in the hospital.  Chest imaging does indicate a moderate right pleural effusion with some compressive atelectasis. Spouse and pt confirm no recent coughing, fevers/chills.  WBC is unremarkable and he is afebrile.  - Supplemental oxygen as needed to maintain 90% or greater.  - As needed inhaler/nebulizer for wheezing.  - Incentive spirometry.  - Consider thoracentesis pending clinical course.  - History of cardiomyopathy as below.    Lactic acidosis, recurrent  * Patient presented as above and was found to have a initial lactic of 4.8. Lactic acid follow up 1.9. Unclear cause, possible stress mediated in setting of severe pHTN and heart failure.   * Post-operatively lactate elevated to 2.4 and 2.7. No obvious infection.  - Low suspicion of sepsis or infection currently.  - Continue to monitor, treat cardiac disease and anemia as discussed elsewhere.    History of dementia  Post-operative delirium  Insomnia  * Patient lives at home with his spouse. Take mirtazapine and seroquel at home. Follows with Memory doctor. Spouse reports that he often wanders at night and that usually she shadows him to ensure safety.   - Increase PTA mirtazapine from 7.5 mg nightly to 15 mg nightly.  - Increased PTA seroquel to 25 mg nightly on 3/20/23, and he also received a PRN dose of Seroqul overnight as well. He was more drowsy and confused on 3/21 and also didn't sleep well overnight again. Decreased seroquel on 3/21 and increased mirtazapine. Still not sleeping well, remains confused/drowsy during the day. Discussed with family on 3/22/23. Discontinue seroquel as its ineffective with insomnia and makes him drowsy/confused during the day. Continue increased dose of mirtazapine. Added scheduled melatonin 3/22/23, will increase to 5 mg nightly on 3/23/23. Will give melatonin and mirtazapine at 6:00 pm. Continue to try to promote good sleep hygiene.  - Bed alarm must be on and ideally patient can be  close to RN station.  - Delirium precautions.   - Continue to encourage patient to be awake during the day and promote sleep at night.  - UA without signs of infection on 3/21/23.  - WBC within normal limits.  - Ammonia not elevated on 3/22/23.  - VBG without signs of CO2 retention on 3/22/23.    Coronary artery disease with history of stenting  Ischemic cardiomyopathy  Hypertension  Chronic atrial fibrillation on apixaban  HFrEF, with acute exacerbation  Acute hypoxic respiratory failure  Heart rate and BP stable in ED. PTA regimen appears to include metoprolol, apixaban, and torsemide. Follows at CORE clinic - lately on a weekly basis. Echo Oct 2022 showed EF 25-30%, severe hypokinesis noted, RV normal, LA moderately dilated. Severe pulmonary hypertension also noted.  Chart review shows recent visit with Cardiology 3/10 with Dr Soni where it was discussed that prognosis is guarded in general; torsemide increased to 10mg BID - with caution due to low/normal BP.  Post operatively, received IV fluids due to elevated lactate. O2 needs rising. JVP elevated.   - Hold apixaban, await stable hemoglobin prior to resumption.  - Resumed PTA Toprol XL after holding on 3/18.  - Cardiology consulted, appreciate their assistance.  - Resumed PTA torsemide 10mg BID on 3/19/23 (recently increased from 10mg daily). Will stop for now due to altered mental status with decreased oral intake and elevation in bicarb potentially due to contraction alkalosis.  - Start IV fluids today, monitor volume status closely.    Hypothyroidism  - Continue PTA levothyroxine.    Hypokalemia  - Replace and recheck per RN managed protocol.    Recent frostbite of all fingertips  Patient was snowblowing this winter without gloves and managed to frostbite the tips of all of his fingers.  - SPO2 monitoring is difficult on fingers.  - Recommend to monitor SPO2 on ear or toes.    Acute blood loss anemia due to intraoperative blood loss   ml. Pre-op hgb  13.3g/dL, post-op hgb 7.6g/dL. S/p 2u PRBC as of 3/19.  - Monitor and transfuse as needed.  - Continue to hold apixaban, resume when hemoglobin stable and mental status improved.  - Hemoglobin stable at 8.9 this morning.       Diet: Snacks/Supplements Adult: Ensure Enlive; Between Meals  Advance Diet as Tolerated: Regular Diet Adult; 2 gm NA Diet  Fluid restriction 1800 ML FLUID    DVT Prophylaxis: Enoxaparin (Lovenox) SQ  Velazquez Catheter: Not present  Lines: None     Cardiac Monitoring: ACTIVE order. Indication: Tachyarrhythmias, acute (48 hours)  Code Status: Full Code      Clinically Significant Risk Factors           # Hypercalcemia: corrected calcium is >10.1, will monitor as appropriate    # Hypoalbuminemia: Lowest albumin = 3 g/dL at 3/22/2023  7:54 AM, will monitor as appropriate           # Cachexia: Estimated body mass index is 17.28 kg/m  as calculated from the following:    Height as of this encounter: 1.829 m (6').    Weight as of this encounter: 57.8 kg (127 lb 6.8 oz).   # Severe Malnutrition: based on nutrition assessment        Disposition Plan      Expected Discharge Date: 03/25/2023      Destination: home            Bautista Montes MD  Hospitalist Service  [facility]  Securely message with [employer] (more info)  Text page via [facility] Paging/Directory   ______________________________________________________________________    Interval History   Rm Villarreal was seen earlier this afternoon. He seems a little better today, but still not nearly back to baseline. He had no complaints/concerns for me. Denies pain, shortness of breath, nausea, abdominal pain. A little more appropriate in conversation today but still confused and drowsy. His son-in-law was in the room with him, discussed plan of care.    Physical Exam   Vital Signs: Temp: 98.1  F (36.7  C) Temp src: Oral BP: (!) 129/90 Pulse: 110   Resp: 16 SpO2: 92 % O2 Device: Nasal cannula    Weight: 127 lbs 6.81  oz    Constitutional: awake, drowsy, cooperative, no apparent distress, sitting up in a chair  Respiratory: no increased work of breathing, clear to auscultation bilaterally, no crackles or wheezing  Cardiovascular: regular rate and rhythm, normal S1 and S2, no murmur noted  GI: normal bowel sounds, soft, non-distended, non-tender  Skin: warm, dry  Musculoskeletal: no lower extremity pitting edema present  Neurologic: awake, drowsy, not oriented, confused but slightly more appropriate in conversation today, moves all extremities    Medical Decision Making       40 MINUTES SPENT BY ME on the date of service doing chart review, history, exam, documentation & further activities per the note.      Data     I have personally reviewed the following data over the past 24 hrs:    5.2  \   8.9 (L)   / 178     143 102 24.0 (H) /  96   3.5 31 (H) 0.76 \       ALT: 19 AST: 60 (H) AP: 123 TBILI: 5.5 (H)   ALB: 3.3 (L) TOT PROTEIN: 6.5 LIPASE: N/A

## 2023-03-23 NOTE — CONSULTS
[facility]    Received C[facility] Clinic Consult from SAMANTHA Prado, CNP.    Per record review patient admitted after mechanical fall at home suffering a R intertrochanteric proximal femur fx. In the ED he required supplemental 02 to maintain Sp02 >90%. Per Xray he had moderate L pleural effusion. ECHO done this admission showed EF ~25% w/severe biventricular dysfunction    Given above information, patient will be enrolled into the CORE clinic. As patient continues to be confused I met with his wife to review the management of heart failure and the plan for clinic follow up.     Patient's primary insurance:  [employer]    Living situation:     Private home w/spouse    Med set up:     Had been independent    Scale available at home:     yes    Barriers to HF follow-up:     Non identified at this time    CM/HF education topics we reviewed:  1. Low sodium  2. Daily weights  3. Symptoms of HF to be reported    Education materials provided:    1. Low sodium food and drink handout  2. Low sodium food product examples  3. Already prepared low salt meal delivery services  4. HF stoplight tool  5. Guide to HF booklet      Discharge date and disposition currently unknown. Patient remains confused and restless and requires a sitter. Patient will likely need TCU at discharge. I have messaged our schedulers to contact patients wife to arrange CORE enrollment appopintment    Future Appointments   Date Time Provider Department Center   3/29/2023 11:00 AM Greg Royal MD [facility] CS   5/9/2023  3:00 PM Inge Hollis APRN CNP [facility]   6/9/2023 10:30 AM Greg Royal MD [facility] CS        Please call with any further questions.      Sharron Rodgers RN, BSN  [facility] Clinic RN Care Coordinator  [facility]  [phone]  [facility] Clinic: Cardiomyopathy, Optimization, Rehabilitation, Education

## 2023-03-23 NOTE — PLAN OF CARE
A&O to self. 2L NC. Pt denies pain, iced incision area. Dressing is CDI. Potassium protocol. Tolerating regular diet. Sitter at bedside. Asst of 2 GB/Doris Steady. Tele controlled AFib w/ PVC. Pending discharge.

## 2023-03-24 NOTE — CONSULTS
REPORT OF CONSULTATION  Patient Name: Rm Villarreal   MRN: [id number]   : 1940   Admission Date/Time: 3/15/2023  7:26 PM   Primary Care Physician: Greg Royal   Consulting Physician: Cristina Gramajo MD    REASON FOR CONSULTATION  I am asked to see this patient in consultation at the request of Edilberto Wiggins MD for evaluation of persistent worsening encephalopathy.     HPI: This is a 82 year old male with hx of dementia (lives at home with spouse), hypothyroidism, CAD and ischemic cardiomyopathy, carotid stenosis, chronic a-fib on apixaban, HTN, COPD who was admitted after sustained mechanical fall on 3/15 and broke his femur. Had surgery on 3/16 and since that time has been delirious. Patients hemoglobin dropped after surgery from 13 down to 7.6 and patient's apixaban was stopped he was placed on enoxaparin for DVT ppx only. Per notes patient developed acute respiratory failure with hypoxia, right moderate pleural effusion, severe pulmonary HTN, lactic acidosis. Patient was started on IVF today due to mental status and decrease oral intake.   He was evaluated with CT head today which didn't show concerning changes and consult was requested.     Currently patient wasn't able to provide meaningful complaints. Denied pain and then was mumbling. Per family and hospitalist Dr Montes patient has been declining in the last 2 days. Seems to be trying to grab something from the air. Doesn't sleep at night. Seems to recognize family members.       PAST MEDICAL HISTORY  Past Medical History:   Diagnosis Date     Adjustment disorder      Carotid stenosis, bilateral     left CEA      Chronic atrial fibrillation (H)     warfarin     COPD (chronic obstructive pulmonary disease) (H) 2021     Coronary artery disease     cardiac cath 2014: chronic occlusion of circumflex and apical LAD: medical management; cath 2005: stent to LAD, historical: stent to RCA     History of blood transfusion       Hypertension      Ischemic cardiomyopathy 2021    ef 25%     Pulmonary nodule         PAST SURGICAL HISTORY  Past Surgical History:   Procedure Laterality Date     angiogram  02/19/2014    cardiac cath 2014: chronic occlusion of circumflex and apical LAD: medical management     ANGIOGRAM  2005    stent to LAD     ANGIOGRAM      stent to RCA     COLONOSCOPY      2010     COLONOSCOPY N/A 8/30/2018    Procedure: COMBINED COLONOSCOPY, SINGLE OR MULTIPLE BIOPSY/POLYPECTOMY BY BIOPSY;  colonoscopy;  Surgeon: Tyler Dee MD;  Location:  GI     GI SURGERY      hemorrhoidectomy     IR VISCERAL ANGIOGRAM  7/25/2019     OPEN REDUCTION INTERNAL FIXATION HIP NAILING Right 3/16/2023    Procedure: RIGHT OPEN REDUCTION INTERNAL FIXATION INTERTROCHANTERIC HIP FRACTURE;  Surgeon: Brady James MD;  Location:  OR     VASCULAR SURGERY  2007    left CEA        ALLERGIES/SENSITIVITIES  Allergies   Allergen Reactions     Atorvastatin Diarrhea     Intolerance to most statin medications          CURRENT MEDS  Current Outpatient Medications   Medication Sig Dispense Refill     acetaminophen (TYLENOL) 325 MG tablet Take 2 tablets (650 mg) by mouth every 6 hours as needed for other (For optimal non-opioid multimodal pain management to improve pain control.) 100 tablet 0     enoxaparin ANTICOAGULANT (LOVENOX) 40 MG/0.4ML syringe Inject 0.4 mLs (40 mg) Subcutaneous every 24 hours for 30 days 12 mL 0     ondansetron (ZOFRAN ODT) 4 MG ODT tab Take 1 tablet (4 mg) by mouth every 6 hours as needed for nausea or vomiting 5 tablet 0     oxyCODONE (ROXICODONE) 5 MG tablet Take 1 tablet (5 mg) by mouth every 4 hours as needed for severe pain (7-10) 20 tablet 0     senna-docusate (SENOKOT-S/PERICOLACE) 8.6-50 MG tablet Take 1 tablet by mouth 2 times daily 21 tablet 0        SOCIAL HISTORY  Social History     Socioeconomic History     Marital status:      Spouse name: Not on file     Number of children: Not on file     Years of  education: Not on file     Highest education level: Not on file   Occupational History     Not on file   Tobacco Use     Smoking status: Former     Types: Cigarettes     Quit date: 2003     Years since quittin.6     Smokeless tobacco: Never   Vaping Use     Vaping Use: Never used   Substance and Sexual Activity     Alcohol use: Yes     Comment: 1-2 beer daily     Drug use: No     Sexual activity: Not Currently     Partners: Female   Other Topics Concern     Parent/sibling w/ CABG, MI or angioplasty before 65F 55M? Not Asked   Social History Narrative     Not on file     Social Determinants of Health     Financial Resource Strain: Not on file   Food Insecurity: Not on file   Transportation Needs: Not on file   Physical Activity: Not on file   Stress: Not on file   Social Connections: Not on file   Intimate Partner Violence: Not on file   Housing Stability: Not on file       FAMILY HISTORY  Family History   Problem Relation Age of Onset     Heart Disease Father         REVIEW OF SYSTEMS: unable to review due to patient's condition    EXAM: /77   Pulse 70   Temp 96.9  F (36.1  C) (Axillary)   Resp 20   Ht 1.829 m (6')   Wt 57.8 kg (127 lb 6.8 oz)   SpO2 94%   BMI 17.28 kg/m     General Appearance: no acute distress, thin man laying in bed  HEENT: NC/AT, somewhat stiff,but no clear rigidity  Neck: full passive ROM; questionable carotid bruit on the left  Chest: CTAB; normal respiratory effort, patient intermittently coughs  Cardio: RRR; no murmurs; normal peripheral pulses  Abd: NT/ND  Mental status: Patient had his eyes closed, but upon talking to him he was Alert and oriented to person, not to place and time. Wasn't able to tell me his family members. Followed some commands and not others. Speech clear and language grossly within normal limits. Patient was very tangential with his thinking.  Cranial nerves: Pupils small, equal, round, and reactive to light. Extraocular movements  - appears to  "have no restrition. Visual fields -possible less blink to threat on the left. Facial strength normal and sensation ? intact bilaterally. Palate elevation symmetric. Hearing intact to voice. Tongue protrusion midline.   Motor: Muscle tone and bulk are normal.  Strength  -patient was able to hold both UEs antigravity and raise his LLE up. I didn't examine his RLE due to hip fracture  Sensory: Light touch -I wasn't able to get consistent responses. Patient stated \"right\" when his left leg was touched and didn't respond to other extremities touched.   Coordination: Finger-to-nose -with mild ataxia on the left, heel-to-shin were deferred  Reflexes: depressed bilaterally, mute plantar responses bilaterally.  Gait: deferred.     IMAGING: I have personally reviewed the patient's imaging:   IMPRESSION:  1. No CT findings of acute intracranial process.  2. Brain atrophy and presumed chronic small vessel ischemic change, as  described.     LABS:     Component      Latest Ref Rng & Units 3/24/2023   Sodium      136 - 145 mmol/L 143   Potassium      3.4 - 5.3 mmol/L 3.7   Chloride      98 - 107 mmol/L 104   Carbon Dioxide (CO2)      22 - 29 mmol/L 28   Anion Gap      7 - 15 mmol/L 11   Urea Nitrogen      8.0 - 23.0 mg/dL 27.1 (H)   Creatinine      0.67 - 1.17 mg/dL 0.80   Calcium      8.8 - 10.2 mg/dL 8.9   Glucose      70 - 99 mg/dL 99   Alkaline Phosphatase      40 - 129 U/L 112   AST      10 - 50 U/L 52 (H)   ALT      10 - 50 U/L 21   Protein Total      6.4 - 8.3 g/dL 5.9 (L)   Albumin      3.5 - 5.2 g/dL 3.2 (L)   Bilirubin Total      <=1.2 mg/dL 5.5 (H)   GFR Estimate      >60 mL/min/1.73m2 89   WBC      4.0 - 11.0 10e3/uL 7.5   RBC Count      4.40 - 5.90 10e6/uL 2.30 (L)   Hemoglobin      13.3 - 17.7 g/dL 8.0 (L)   Hematocrit      40.0 - 53.0 % 24.0 (L)   MCV      78 - 100 fL 104 (H)   MCH      26.5 - 33.0 pg 34.8 (H)   MCHC      31.5 - 36.5 g/dL 33.3   RDW      10.0 - 15.0 % 20.0 (H)   Platelet Count      150 - 450 " 10e3/uL 184       CONSULTATION IMPRESSION/RECOMMENDATIONS:   Principal Problem:    Elevated lactic acid level  Active Problems:    Long term current use of anticoagulant therapy    Pleural effusion    Closed displaced intertrochanteric fracture of right femur, initial encounter (H)    Closed intertrochanteric fracture of hip, right, initial encounter (H)     This is a 82 year old man with Hx of dementia, a-fib on apixaban which was stopped due to significant anemia post-surgery who presents with persistent and actually worsening in the last two days confusion. Currently patient is delirious, however, on exam does have some focal findings concerning for possible R MCA territory.   Discussed that patient is at risk for strokes due to having Hx of a-fib and off anticoagulation due to anemia. Also seizures are of consideration  -will try to evaluate with MRI brain ww/o contrast  -if MRI will not show strokes that explain patients condition will do EEG  -discussed with family that if above tests will not show concerning changes then could be related to underlying dementia exacerbation in the setting of medical issues and recovery typically takes time up to 6 weeks    Will continue to follow.   Please page with questions: pager [phone]  I thank Dr. Edilberto Wiggins for the opportunity to participate in the patient's care.     Total consult time 120 minutes with the time spent on chart review, imaging and lab results review, and more than 50 % of the time spent on coordination of cares and face-to-face time with the patient including counseling regarding pathophysiology of the above conditions, results of the tests and further directions of care.     Cristina Gramajo MD  [facility]

## 2023-03-24 NOTE — PROGRESS NOTES
Care Management Follow Up    Length of Stay (days): 9    Expected Discharge Date: 03/25/2023     Concerns to be Addressed:       Patient plan of care discussed at interdisciplinary rounds: Yes    Anticipated Discharge Disposition: Transitional Care     Anticipated Discharge Services: None  Anticipated Discharge DME: None    Patient/family educated on Medicare website which has current facility and service quality ratings: no  Education Provided on the Discharge Plan:    Patient/Family in Agreement with the Plan: yes    Referrals Placed by CM/SW:    Private pay costs discussed: Not applicable    Additional Information:  Writer informed that sitter was removed. Referrals sent to [facility] and [facility] as was the request of the family with this writer met with them early in patient's stay. Writer will keep family updated on status.      DARRYL Damon

## 2023-03-24 NOTE — PLAN OF CARE
Goal Outcome Evaluation:         Pt Alert to self. VSS-3L NC. Up 2 brett steady. Tele: A-fib-RVR. Dressing CDI. Iv infusing. 2g NA diet, 1800mL fluid restriction. Denies pain. Discharge pending

## 2023-03-24 NOTE — PROGRESS NOTES
"[facility]    Medicine Progress Note - Hospitalist Service    Date of Admission:  3/15/2023    Assessment & Plan   Rm Villarreal is a 82 year old male with history of mild dementia (lives at home with spouse), hypothyroidism, CAD, bilateral carotid stenosis, chronic atrial fibrillation on apixaban, COPD, hypertension, and ischemic cardiomyopathy who presented to the ED after missing a step and falling down the stairs at home and resultant R hip pain. He had less than 2 beers (confirmed with spouse) evening of 3/15 but denies any prodrome.  Imaging shows a right intertrochanteric proximal femur fracture. OR on 3/16/23 for operative repair.    Post-operatively, delirium, anemia, and hypotension causing issues.     Acute right intertrochanteric proximal femur fracture secondary to mechanical fall  S/p ORIF with cephalomedullary device 3/17/23  Patient and spouse report that he fell less than two steps after the staircase turns. Patient denies head trauma or loss of consciousness and reports tripping while going down the steps.  He does endorse having \"a few beers\" earlier this evening but denied any prodrome. Spouse reports he had two beers but did not finish either of them. CT head and CT cervical spine negative for acute pathology.  Right shoulder x-ray negative for acute pathology.  Was otherwise neurologically intact in the ED upon admission.    - Admitted to inpatient.  - Orthopedic surgery consulted.  - S/p ORIF with cephalomedullary device on 3/17/23.  ml. Pre-op hgb 13.3g/dL, post-op hgb 7.6g/dL. Anemia management as below.  - Post op management per orthopedic surgery.  - Enoxaparin for DVT ppx for now. Can resume apixaban as soon as hgb stable and mental status improved.   - PT/OT consulted.  - Care transitions consulted.    Acute respiratory failure with hypoxia  Right moderate pleural effusion  Severe pulmonary hypertension  History of COPD at baseline  Patient does not " use oxygen at home however he is requiring 2 to 3 L nasal cannula in the hospital.  Chest imaging does indicate a moderate right pleural effusion with some compressive atelectasis. Spouse and pt confirm no recent coughing, fevers/chills.  WBC is unremarkable and he is afebrile.  - Supplemental oxygen as needed to maintain 90% or greater.  - As needed inhaler/nebulizer for wheezing.  - Incentive spirometry.  - Consider thoracentesis pending clinical course.  - History of cardiomyopathy as below.    Lactic acidosis, recurrent  * Patient presented as above and was found to have a initial lactic of 4.8. Lactic acid follow up 1.9. Unclear cause, possible stress mediated in setting of severe pHTN and heart failure.   * Post-operatively lactate elevated to 2.4 and 2.7. No obvious infection.  - Low suspicion of sepsis or infection currently.  - Continue to monitor, treat cardiac disease and anemia as discussed elsewhere.    History of dementia  Post-operative delirium  Insomnia  * Patient lives at home with his spouse. Take mirtazapine and seroquel at home. Follows with Memory doctor. Spouse reports that he often wanders at night and that usually she shadows him to ensure safety.   - Increased PTA seroquel to 25 mg nightly on 3/20/23, and he also received a PRN dose of Seroqul overnight as well. He was more drowsy and confused on 3/21 and also didn't sleep well overnight again. Decreased seroquel on 3/21 and increased mirtazapine. Still not sleeping well, remains confused/drowsy during the day. Discussed with family on 3/22/23. Discontinue seroquel as its ineffective with insomnia and makes him drowsy/confused during the day.  Mirtazapine dose increased to 15 mg nightly, however will decrease dose back to 7.5 mg nightly as increased dose did not seem to be effective assisting with sleep. Added scheduled melatonin 3/22/23, will increase to 5 mg nightly on 3/23/23. Will give melatonin and mirtazapine at 6:00 pm. Continue to  try to promote good sleep hygiene.  - Bed alarm must be on and ideally patient can be close to RN station.  - Delirium precautions.   - Continue to encourage patient to be awake during the day and promote sleep at night.  - UA without signs of infection on 3/21/23.  - WBC within normal limits.  - Ammonia not elevated on 3/22/23.  - VBG without signs of CO2 retention on 3/22/23.  - Can use as needed Dilaudid in the evening as he does seem to have pain from his hip and rests better when his pain is well controlled.  We will try to avoid opioids during the day to encourage him to be awake, however also need to make sure that his pain is managed.  - Neurology consulted 3/24/2023 due to ongoing/worsening delirium.  Plan for brain MRI this evening, possible EEG tomorrow.  Appreciate neurology assistance.  - Concern for dysphagia in setting of delirium.  Will ask SLP to evaluate him, minimize oral intake to essential medications/comfort until SLP evaluation.  If unable to advance diet in the near future, would need to consider artificial nutrition, but would need to have a discussion about overall goals of care prior to proceeding with this given his underlying dementia and comorbidities.    Coronary artery disease with history of stenting  Ischemic cardiomyopathy  Hypertension  Chronic atrial fibrillation on apixaban  HFrEF, with acute exacerbation  Acute hypoxic respiratory failure  Heart rate and BP stable in ED. PTA regimen appears to include metoprolol, apixaban, and torsemide. [facility]s at [facility] - lately on a weekly basis. Echo Oct 2022 showed EF 25-30%, severe hypokinesis noted, RV normal, LA moderately dilated. Severe pulmonary hypertension also noted.  Chart review shows recent visit with Cardiology 3/10 with Dr Soni where it was discussed that prognosis is guarded in general; torsemide increased to 10mg BID - with caution due to low/normal BP.  Post operatively, received IV fluids due to elevated lactate. O2  needs rising. JVP elevated.   - Hold apixaban, await stable hemoglobin prior to resumption.  He is currently getting prophylactic dose Lovenox.  - Resumed PTA Toprol XL after holding on 3/18.  - Cardiology consulted, appreciate their assistance.  - Resumed PTA torsemide 10mg BID on 3/19/23 (recently increased from 10mg daily).  Torsemide on hold for now due to altered mental status with decreased oral intake and elevation in bicarb potentially due to contraction alkalosis.  -IV fluids started 3/23/2023 due to altered mental status with significantly decreased oral intake.  Monitor volume status closely.    Hypothyroidism  - Continue PTA levothyroxine.    Hypokalemia  - Replace and recheck per RN managed protocol.    Recent frostbite of all fingertips  Patient was snowblowing this winter without gloves and managed to frostbite the tips of all of his fingers.  - SPO2 monitoring is difficult on fingers.  - Recommend to monitor SPO2 on ear or toes.    Acute blood loss anemia due to intraoperative blood loss   ml. Pre-op hgb 13.3g/dL, post-op hgb 7.6g/dL. S/p 2u PRBC as of 3/19.  - Monitor and transfuse as needed.  - Continue to hold apixaban, resume when hemoglobin stable and mental status improved.  - Hemoglobin lower at 8.0 this morning.    - Recheck in AM.       Diet: Snacks/Supplements Adult: Ensure Enlive; Between Meals  Advance Diet as Tolerated: Regular Diet Adult; 2 gm NA Diet  Fluid restriction 1800 ML FLUID    DVT Prophylaxis: Enoxaparin (Lovenox) SQ  Velazquez Catheter: Not present  Lines: None     Cardiac Monitoring: ACTIVE order. Indication: Tachyarrhythmias, acute (48 hours)  Code Status: Full Code      Clinically Significant Risk Factors              # Hypoalbuminemia: Lowest albumin = 3 g/dL at 3/22/2023  7:54 AM, will monitor as appropriate           # Cachexia: Estimated body mass index is 17.28 kg/m  as calculated from the following:    Height as of this encounter: 1.829 m (6').    Weight as of  this encounter: 57.8 kg (127 lb 6.8 oz).   # Severe Malnutrition: based on nutrition assessment        Disposition Plan     Expected Discharge Date: 03/25/2023      Destination: home        Will need TCU at the time of discharge.  Need to see consistent improvement in encephalopathy prior to considering discharge.       Bautista Montes MD  Hospitalist Service  [facility]  Securely message with [employer] (more info)  Text page via [employer] Paging/Directory   ______________________________________________________________________    Interval History   Rm Villarreal was seen this morning.  He was restless and recently received a dose of IV Dilaudid.  He was quite drowsy when I saw him, mumbled in response to questions but beyond that mainly just slept in the hospital bed.  Multiple family members present in the room, discussed plan of care.  He was seen again this afternoon.  More alert and talking more this afternoon.  Was able to answer some questions appropriately, but also confused at times.  Neurology was consulted and saw patient this afternoon, discussed with neurologist, appreciate their assistance.  Plan for brain MRI this evening, possible EEG tomorrow.  Plan of care was discussed with family again this afternoon.    Physical Exam   Vital Signs: Temp: 96.9  F (36.1  C) Temp src: Axillary BP: 105/77 Pulse: 70   Resp: 20 SpO2: 94 % O2 Device: None (Room air) Oxygen Delivery: 2 LPM  Weight: 127 lbs 6.81 oz    Constitutional: somnolent, no apparent distress, laying in the hospital bed  Respiratory: no increased work of breathing, clear to auscultation bilaterally, no crackles or wheezing  Cardiovascular: regular rate and rhythm, normal S1 and S2, no murmur noted  GI: normal bowel sounds, soft, non-distended, non-tender  Skin: warm, dry  Musculoskeletal: no lower extremity pitting edema present  Neurologic: somnolent this morning    Medical Decision Making       60 MINUTES SPENT BY ME on  the date of service doing chart review, history, exam, documentation & further activities per the note.      Data     I have personally reviewed the following data over the past 24 hrs:    7.5  \   8.0 (L)   / 184     143 104 27.1 (H) /  99   3.7 28 0.80 \       ALT: 21 AST: 52 (H) AP: 112 TBILI: 5.5 (H)   ALB: 3.2 (L) TOT PROTEIN: 5.9 (L) LIPASE: N/A       Imaging results reviewed over the past 24 hrs:   Recent Results (from the past 24 hour(s))   CT Head w/o Contrast    Narrative    CT SCAN OF THE HEAD WITHOUT CONTRAST   3/24/2023 12:09 PM     HISTORY: Worsening encephalopathy.    TECHNIQUE:  Axial images of the head and coronal reformations without  IV contrast material. Radiation dose for this scan was reduced using  automated exposure control, adjustment of the mA and/or kV according  to patient size, or iterative reconstruction technique.    COMPARISON: CT of the head 3/15/2023.    FINDINGS: There is no evidence of intracranial hemorrhage, mass, acute  infarct or anomaly. The ventricles are normal in size, shape and  configuration. Mild to moderate diffuse parenchymal volume loss. Mild  patchy periventricular white matter hypodensities which are  nonspecific, but likely related to chronic microvascular ischemic  disease. Atherosclerotic calcifications involving the carotid siphons  and to a lesser degree the vertebral arteries.    The visualized portions of the sinuses and mastoids appear normal. The  bony calvarium and bones of the skull base appear intact. Left greater  than right temporomandibular joint degenerative change, advanced on  the left and probably moderate on the right.      Impression    IMPRESSION:  1. No CT findings of acute intracranial process.  2. Brain atrophy and presumed chronic small vessel ischemic change, as  described.     RADHA BARCLAY MD         SYSTEM ID:  [id number]

## 2023-03-25 NOTE — PROGRESS NOTES
Orientation: Pt alert to self. Arouse by voice. Inconsistent with following commends.     Vitals/Tele: VSS, Afib    IV Access/drains: Left forearm PIV, infusing 50/hr NS, kerlix reinforced to prevent pt from pulling IV.     Diet: Pureed, feeder.     Mobility: Ax2, lift, T/R     GI/: Incontinent     Wound/Skin: Skin is intact, very bright and blocthy skin, sweet cream applied, nails cut.     Consults: Speech following    Discharge Plan: TBD.       See Flow sheets for assessment     EEG completed this evening.  MRI was unsuccessful due to pt moving.

## 2023-03-25 NOTE — PLAN OF CARE
Pt Alert to self, denies pain when not moving, tele in use, Afib RVR, assist of 2, CMS intact, NS @ 75, will continue to monitor

## 2023-03-25 NOTE — PLAN OF CARE
Date/Time 3/25/23, [id number]    Trauma/Ortho/Medical (Choose one) ortho    Diagnosis: ORIF- Right Hip  POD#: 9  Mental Status: Alert to self, confused  Activity/dangle assist of 2 with brett steady  Diet: puree, mildly thick liquids  Pain: no  Velazquez/Voiding: incontinent of B&B  Tele/Restraints/Iso: tele A-fib  02/LDA: RA/ NC infusing @ 50 ml/hr  D/C Date: TBD  Other Info: Aquacel dressing applied on incision. Fluid restriction 1800 ml. Potassium protocol. Sitter at bedside

## 2023-03-25 NOTE — PLAN OF CARE
A&O to self. Pt denies pain. Dressing is CDI. Potassium protocol. Asst of 2 GB/Doris Steady. Tele controlled AFib w/ RVR. Pt lethargic this morning but in the evening was more alert. Pending discharge TCU placement.

## 2023-03-25 NOTE — PROGRESS NOTES
"Speech Language Pathology- Clinical swallow evaluation     03/25/23 1202   Appointment Info   Signing Clinician's Name / Credentials (SLP) Rose Saldana MS CCC SLP   General Information   Onset of Illness/Injury or Date of Surgery 03/15/23   Referring Physician Bautista Montes MD   Patient/Family Therapy Goal Statement (SLP) Patient does not state specific goals.   Pertinent History of Current Problem Per chart review \"Rm Villarreal is a 82 year old male with history of mild dementia (lives at home with spouse), hypothyroidism, CAD, bilateral carotid stenosis, chronic atrial fibrillation on apixaban, COPD, hypertension, and ischemic cardiomyopathy who presented to the ED after missing a step and falling down the stairs at home and resultant R hip pain. He had less than 2 beers (confirmed with spouse) evening of 3/15 but denies any prodrome.  Imaging shows a right intertrochanteric proximal femur fracture. OR on 3/16/23 for operative repair.\" SLP consulted for swallow evaluation due to concern for dysphagia.   General Observations Patient appears lethargic and confused. Several family members present, report patient has been \"fidgety\" and feel his speech is very difficult to understand. Pt keeps eyes closed during majority of session but opens mouth for po trials. Speech is quite dysartric and topics are unrelated to current conversation.   Type of Evaluation   Type of Evaluation Swallow Evaluation   Oral Motor   Oral Musculature generally intact   Structural Abnormalities none present   Mucosal Quality dry   Dentition (Oral Motor)   Dentition (Oral Motor) adequate dentition   Facial Symmetry (Oral Motor)   Facial Symmetry (Oral Motor) unable/difficult to assess   Comment, Facial Symmetry (Oral Motor) Patient does not follow commands for oral motor exam. No gross facial asymmetry observed.   Lip Function (Oral Motor)   Lip Range of Motion (Oral Motor) unable/difficult to assess   Comment, Lip Function " (Oral Motor) Patient does not follow commands for oral motor exam. Labial movement appears WFL during speech though strength may be somewhat reduced. Speech is dysarthric.   Tongue Function (Oral Motor)   Comment, Tongue Function (Oral Motor) Patient does not follow commands for oral motor exam. Lingual movement appears WFL during speech though ROM and coordination may be somewhat reduced.   Vocal Quality/Secretion Management (Oral Motor)   Vocal Quality (Oral Motor) WFL   General Swallowing Observations   Past History of Dysphagia none found in EHR.   Current Diet/Method of Nutritional Intake (General Swallowing Observations, NIS) thin liquids (level 0);regular diet   Swallowing Evaluation Clinical swallow evaluation   Clinical Swallow Evaluation   Feeding Assistance dependent   Clinical Swallow Evaluation Textures Trialed thin liquids;mildly thick liquids;pureed   Clinical Swallow Eval: Thin Liquid Texture Trial   Mode of Presentation, Thin Liquids cup;fed by clinician  (ice chips)   Volume of Liquid or Food Presented 3 sips   Oral Phase of Swallow premature pharyngeal entry   Pharyngeal Phase of Swallow coughing/choking;repeated swallows   Diagnostic Statement No overt s/s aspiration with small ice chips. Cough noted with thin liquids by cup rim.   Clinical Swallow Eval: Mildly Thick Liquids   Mode of Presentation cup;spoon;fed by clinician   Volume Presented 3 oz   Oral Phase WFL   Pharyngeal Phase   (no overt s/s aspiration)   Diagnostic Statement No overt s/s aspiration.   Clinical Swallow Evaluation: Puree Solid Texture Trial   Mode of Presentation, Puree spoon;fed by clinician   Volume of Puree Presented 2 oz   Oral Phase, Puree effortful AP movement   Pharyngeal Phase, Puree   (no overt s/s aspiration)   Diagnostic Statement No overt s/s aspiration. At risk for oral pocketing/residue.   Esophageal Phase of Swallow   Patient reports or presents with symptoms of esophageal dysphagia No   Swallowing  Recommendations   Diet Consistency Recommendations mildly thick liquids (level 2);pureed (level 4)  (Ice chips for comfort)   Supervision Level for Intake 1:1 supervision needed   Mode of Delivery Recommendations bolus size, small;no straws   Swallowing Maneuver Recommendations alternate food and liquid intake   Monitoring/Assistance Required (Eating/Swallowing) check mouth frequently for oral residue/pocketing;stop eating activities when fatigue is present;monitor for cough or change in vocal quality with intake   Recommended Feeding/Eating Techniques (Swallow Eval) maintain upright sitting position for eating   Medication Administration Recommendations, Swallowing (SLP) crushed in puree   Instrumental Assessment Recommendations   (consider pending progress)   General Therapy Interventions   Planned Therapy Interventions Dysphagia Treatment   Dysphagia treatment Modified diet education;Instruction of safe swallow strategies;Compensatory strategies for swallowing   Clinical Impression   Criteria for Skilled Therapeutic Interventions Met (SLP Eval) Yes, treatment indicated   SLP Diagnosis dysphagia   Risks & Benefits of therapy have been explained evaluation/treatment results reviewed;patient;participants included  (family)   Clinical Impression Comments Patient seen for clinical swallow evaluation. Patient did not follow commands for oral mech exam, though oral motor function appears grossly intact during speech. Pt was lethargic/confused during evaluation. He kept eyes closed but opened mouth to accept po trials. Coughing noted with thin liquids. No overt s/s aspiration observed with mildly thick liquids or pureed solids.Pt at risk for oral residue/pocketing. Recommend Pureed solids/mildly thick liquids with 1:1 supervision/assist. Pt must be alert and upright for meals. Small bites/sips. Monitor for oral residue. Allow ice chips for comfort with 1:1 supervision (small chip offered one at a time).   SLP Total  Evaluation Time   Eval: oral/pharyngeal swallow function, clinical swallow Minutes (09698) 25   SLP Goals   Therapy Frequency (SLP Eval) daily   SLP Predicted Duration/Target Date for Goal Attainment 04/08/23   Swallowing Dysfunction &/or Oral Function for Feeding   Treatment of Swallowing Dysfunction &/or Oral Function for Feeding Minutes (96961) 10   Treatment Detail/Skilled Intervention SLP: swallow evaluation completed and tx initiated. Educated family on recommended swallow strategies and rationale for modified diet. Family verbalized understanding of all teaching.   SLP Discharge Planning   SLP Plan SLP: diet tolerance, meal f/u. Trial advanced textures as appropriate.   SLP Discharge Recommendation Transitional Care Facility   SLP Rationale for DC Rec below baseline swallow function.   SLP Brief overview of current status  Recommend Pureed solids/mildly thick liquids with 1:1 supervision/assist. Pt must be alert and upright for meals. Small bites/sips. Monitor for oral residue. Allow ice chips for comfort with 1:1 supervision (small chip offered one at a time). Discontinue feeding if respiratory status declines.   Total Session Time   Total Session Time (sum of timed and untimed services) 35

## 2023-03-25 NOTE — PROGRESS NOTES
[facility]    Medicine Progress Note - Hospitalist Service    Date of Admission:  3/15/2023    Assessment & Plan   Rm Villarreal is a 82 year old male with history of mild dementia (lives at home with spouse), hypothyroidism, CAD, bilateral carotid stenosis, chronic atrial fibrillation on apixaban, COPD, hypertension, and ischemic cardiomyopathy who presented to the ED after missing a step and falling down the stairs at home and resultant R hip pain. He had less than 2 beers (confirmed with spouse) evening of 3/15 but denies any prodrome.  Imaging shows a right intertrochanteric proximal femur fracture. OR on 3/16/23 for operative repair.    Post-operatively, he developed worsening encephalopathy, repeat CT head negative for acute findings. Neurology was consulted and recommended MRI brain wwo contrast due to concern for ischemic stroke.  He also has acute blood loss anemia 13>7.6 g/dl. S/p 2u PRBC as of 3/19. HGB has been stable since ranging 8-9 g/dl.    Encephalopathy   2/2  hospital acquired delirium in the setting of underlying dementia then   History of dementia  Rule out CVA given concern R MCA infarct   Insomnia  * Patient lives at home with his spouse. Take mirtazapine and seroquel at home. Follows with Memory doctor. Spouse reports that he often wanders at night and that usually she shadows him to ensure safety.   - Neurology consulted 3/24/2023 due to ongoing/worsening delirium.   - Plan for brain MRI wwo contrast. If negative, EEG to follow  - SLP consult to evaluate swallow due to concern for dysphagia  - Continue mirtazipine and melatonin. Seroquel qHs discontinued due to excessive sedation during the day  - As needed Seroquel for agitations    Acute blood loss anemia due to intraoperative blood loss   ml. Pre-op hgb 13.3g/dL, post-op hgb 7.6g/dL. S/p 2u PRBC as of 3/19.  - Monitor and transfuse as needed.  - Continue to hold apixaban, resume when hemoglobin stable  and mental status improved.  - Hemoglobin 9.2 this morning.    - Recheck in AM.    Acute right intertrochanteric proximal femur fracture secondary to mechanical fall  - S/p ORIF with cephalomedullary device on 3/17/23.   - Post op management per orthopedic surgery.  - Enoxaparin for DVT ppx for now. Can resume apixaban as soon as hgb stable and mental status improved.   - PT/OT consulted. Unable to participate due confusion and not following commands.  - Care transitions consulted.    Acute respiratory failure with hypoxia, resolved  Right moderate pleural effusion  Severe pulmonary hypertension  History of COPD at baseline  He required 2 to 3 L nasal cannula in the hospital on arrival  Chest imaging does indicate a moderate right pleural effusion with some compressive atelectasis. Spouse and pt confirm no recent coughing, fevers/chills.  WBC is unremarkable and he is afebrile.   - On Room Air as of this morning 3/25/23.  Bumex on hold due to decrease po intake.   - Supplemental oxygen as needed to maintain 90% or greater.  - As needed inhaler/nebulizer for wheezing.  - Incentive spirometry.    Coronary artery disease with history of stenting  Ischemic cardiomyopathy  Hypertension  Chronic atrial fibrillation on apixaban  HFrEF, with acute exacerbation  Acute hypoxic respiratory failure  Echo Oct 2022 showed EF 25-30%, severe hypokinesis noted, RV normal, LA moderately dilated. Severe pulmonary hypertension also noted. Recently torsemide increased to 10mg BID outpatient by cards.  Post operatively, received IV fluids due to elevated lactate. On RA sating mid 90's. JVP elevated.   - Hold apixaban, await stable hemoglobin prior to resumption.  He is currently getting prophylactic dose Lovenox.  - Resumed PTA Toprol XL after holding on 3/18.  - Cardiology consulted, appreciate their assistance.  - Torsemide on hold for now due to decreased oral intake and elevation in bicarb potentially due to contraction  alkalosis.  -IV fluids started 3/23/2023 due to altered mental status with significantly decreased oral intake.  Monitor volume status closely.     Lactic acidosis, resolved  * Patient presented as above and was found to have a initial lactic of 4.8. Lactic acid follow up 1.9. Unclear cause, possible stress mediated in setting of severe pHTN and heart failure.   * Post-operatively lactate elevated to 2.4 and 2.7. No obvious infection.  - Low suspicion of sepsis or infection currently.  - Continue to monitor     Hypothyroidism  - Continue PTA levothyroxine.    Hypokalemia  - Replace and recheck per RN managed protocol.    Recent frostbite of all fingertips  Patient was snowblowing this winter without gloves and managed to frostbite the tips of all of his fingers.  - SPO2 monitoring is difficult on fingers.  - Recommend to monitor SPO2 on ear or toes.           Diet: Snacks/Supplements Adult: Ensure Enlive; Between Meals  Advance Diet as Tolerated: Regular Diet Adult; 2 gm NA Diet  Fluid restriction 1800 ML FLUID    DVT Prophylaxis: Enoxaparin (Lovenox) SQ  Velazquez Catheter: Not present  Lines: None     Cardiac Monitoring: ACTIVE order. Indication: Tachyarrhythmias, acute (48 hours)  Code Status: Full Code      Clinically Significant Risk Factors              # Hypoalbuminemia: Lowest albumin = 3 g/dL at 3/22/2023  7:54 AM, will monitor as appropriate           # Cachexia: Estimated body mass index is 17.28 kg/m  as calculated from the following:    Height as of this encounter: 1.829 m (6').    Weight as of this encounter: 57.8 kg (127 lb 6.8 oz).   # Severe Malnutrition: based on nutrition assessment        Disposition Plan     Expected Discharge Date: 03/25/2023      Destination: home        Will need TCU at the time of discharge.  Need to see consistent improvement in encephalopathy prior to considering discharge.       Iesha Rabago MD  Hospitalist Service  [facility]  Securely message with  [employer] (more info)  Text page via [facility] Paging/Directory   ______________________________________________________________________    Interval History   Remains confused and delirious. Unchanged. HD stable otherwise.    Physical Exam   Vital Signs: Temp: 97.6  F (36.4  C) Temp src: Axillary BP: 125/77 Pulse: 95   Resp: 18 SpO2: 91 % O2 Device: None (Room air)    Weight: 127 lbs 6.81 oz    Constitutional: somnolent, no apparent distress, laying in the hospital bed  Respiratory: no increased work of breathing, clear to auscultation bilaterally, no crackles or wheezing  Cardiovascular: regular rate and rhythm, normal S1 and S2, no murmur noted  GI: normal bowel sounds, soft, non-distended, non-tender  Skin: warm, dry  Musculoskeletal: no lower extremity pitting edema present  Neurologic: somnolent this morning    Medical Decision Making       35 MINUTES SPENT BY ME on the date of service doing chart review, history, exam, documentation & further activities per the note.      Data         Imaging results reviewed over the past 24 hrs:   Recent Results (from the past 24 hour(s))   CT Head w/o Contrast    Narrative    CT SCAN OF THE HEAD WITHOUT CONTRAST   3/24/2023 12:09 PM     HISTORY: Worsening encephalopathy.    TECHNIQUE:  Axial images of the head and coronal reformations without  IV contrast material. Radiation dose for this scan was reduced using  automated exposure control, adjustment of the mA and/or kV according  to patient size, or iterative reconstruction technique.    COMPARISON: CT of the head 3/15/2023.    FINDINGS: There is no evidence of intracranial hemorrhage, mass, acute  infarct or anomaly. The ventricles are normal in size, shape and  configuration. Mild to moderate diffuse parenchymal volume loss. Mild  patchy periventricular white matter hypodensities which are  nonspecific, but likely related to chronic microvascular ischemic  disease. Atherosclerotic calcifications involving the carotid  siphons  and to a lesser degree the vertebral arteries.    The visualized portions of the sinuses and mastoids appear normal. The  bony calvarium and bones of the skull base appear intact. Left greater  than right temporomandibular joint degenerative change, advanced on  the left and probably moderate on the right.      Impression    IMPRESSION:  1. No CT findings of acute intracranial process.  2. Brain atrophy and presumed chronic small vessel ischemic change, as  described.     RADHA BARCLAY MD         SYSTEM ID:  [id number]

## 2023-03-26 NOTE — PROGRESS NOTES
[facility]    Medicine Progress Note - Hospitalist Service    Date of Admission:  3/15/2023    Assessment & Plan   Rm Villarreal is a 82 year old male with history of mild dementia (lives at home with spouse), hypothyroidism, CAD, bilateral carotid stenosis, chronic atrial fibrillation on apixaban, COPD, hypertension, and ischemic cardiomyopathy who presented to the ED after missing a step and falling down the stairs at home and resultant R hip pain. He had less than 2 beers (confirmed with spouse) evening of 3/15 but denies any prodrome.  Imaging shows a right intertrochanteric proximal femur fracture. OR on 3/16/23 for operative repair.    Post-operatively, he developed worsening encephalopathy, repeat CT head negative for acute findings. Neurology was consulted and recommended MRI brain wwo contrast due to concern for ischemic stroke.  He also has acute blood loss anemia 13>7.6 g/dl. S/p 2u PRBC as of 3/19. HGB has been stable since ranging 8-9 g/dl.    Encephalopathy   2/2  hospital acquired delirium in the setting of underlying dementia then   History of dementia  Rule out CVA given concern R MCA infarct   Insomnia  * Patient lives at home with his spouse. Take mirtazapine and seroquel at home. Follows with Memory doctor. Spouse reports that he often wanders at night and that usually she shadows him to ensure safety.   - Neurology consulted 3/24/2023 due to ongoing/worsening delirium.   - Plan for brain MRI wwo contrast. If negative, EEG to follow  - SLP consult to evaluate swallow due to concern for dysphagia  - Increase Mirtazipine to 15 mg daily. Continue Melatonin. Seroquel qHs discontinued due to excessive sedation during the day  - As needed Seroquel for agitations    Acute blood loss anemia due to intraoperative blood loss   ml. Pre-op hgb 13.3g/dL, post-op hgb 7.6g/dL. S/p 2u PRBC as of 3/19.  - Monitor and transfuse as needed.  - Continue to hold apixaban, resume  when hemoglobin stable and mental status improved.  - Hemoglobin stable.     Acute right intertrochanteric proximal femur fracture secondary to mechanical fall  - S/p ORIF with cephalomedullary device on 3/17/23.   - Post op management per orthopedic surgery.  - Enoxaparin for DVT ppx for now. Can resume apixaban as soon as hgb stable and mental status improved.   - PT/OT consulted. Unable to participate due confusion and not following commands.  - Care transitions consulted.    Acute respiratory failure with hypoxia, resolved  Right moderate pleural effusion  Severe pulmonary hypertension  History of COPD at baseline  He required 2 to 3 L nasal cannula in the hospital on arrival  Chest imaging does indicate a moderate right pleural effusion with some compressive atelectasis. Spouse and pt confirm no recent coughing, fevers/chills.  WBC is unremarkable and he is afebrile.   - On Room Air as of this morning 3/25/23.  Bumex on hold due to decrease po intake.   - Supplemental oxygen as needed to maintain 90% or greater.  - As needed inhaler/nebulizer for wheezing.  - Incentive spirometry.    Coronary artery disease with history of stenting  Ischemic cardiomyopathy  Hypertension  Chronic atrial fibrillation on apixaban  HFrEF, with acute exacerbation  Acute hypoxic respiratory failure  Echo Oct 2022 showed EF 25-30%, severe hypokinesis noted, RV normal, LA moderately dilated. Severe pulmonary hypertension also noted. Recently torsemide increased to 10mg BID outpatient by cards.  Post operatively, received IV fluids due to elevated lactate. On RA sating mid 90's. JVP elevated.   - Hold apixaban, await stable hemoglobin prior to resumption.  He is currently getting prophylactic dose Lovenox.  - Resumed PTA Toprol XL after holding on 3/18.  - Cardiology consulted, appreciate their assistance.  - Torsemide on hold for now due to decreased oral intake and elevation in bicarb potentially due to contraction alkalosis.  -IV  fluids started 3/23/2023 due to altered mental status with significantly decreased oral intake.  Monitor volume status closely.     Lactic acidosis, resolved  * Patient presented as above and was found to have a initial lactic of 4.8. Lactic acid follow up 1.9. Unclear cause, possible stress mediated in setting of severe pHTN and heart failure.   * Post-operatively lactate elevated to 2.4 and 2.7. No obvious infection.  - Low suspicion of sepsis or infection currently.  - Continue to monitor     Hypothyroidism  - Continue PTA levothyroxine.    Hypernatremia  -Na trending up (146 to 152). 2/2 hypovolemia.  -Start D5W at 75 ml/hr.   -Check Na every 12 hours.    Hypokalemia  - Replace and recheck per RN managed protocol.    Recent frostbite of all fingertips  Patient was snowblowing this winter without gloves and managed to frostbite the tips of all of his fingers.  - SPO2 monitoring is difficult on fingers.  - Recommend to monitor SPO2 on ear or toes.    Hyperbilirubinemia  Likely 2/2 to acute illness.  ALP is normal. AST mildly elevated at 58.   Ordered Direct bilirubin.  Continue supportive care    Severe malnutrition  Cachexia  --On Mirtazapine for appetite stimulant.  --Nutrition consulted for further recommendations.       Diet: Snacks/Supplements Adult: Ensure Enlive; Between Meals  Fluid restriction 1800 ML FLUID  Pureed Diet (level 4) Mildly Thick (level 2) (Allow ice chips for comfort, one at a time with 1:1 supervision.)    DVT Prophylaxis: Enoxaparin (Lovenox) SQ  Velazquez Catheter: Not present  Lines: None     Cardiac Monitoring: ACTIVE order. Indication: Tachyarrhythmias, acute (48 hours)  Code Status: Full Code      Clinically Significant Risk Factors        # Hypokalemia: Lowest K = 3 mmol/L in last 2 days, will replace as needed  # Hypernatremia: Highest Na = 152 mmol/L in last 2 days, will monitor as appropriate   # Hypercalcemia: corrected calcium is >10.1, will monitor as appropriate    #  Hypoalbuminemia: Lowest albumin = 3 g/dL at 3/22/2023  7:54 AM, will monitor as appropriate           # Cachexia: Estimated body mass index is 17.28 kg/m  as calculated from the following:    Height as of this encounter: 1.829 m (6').    Weight as of this encounter: 57.8 kg (127 lb 6.8 oz).   # Severe Malnutrition: based on nutrition assessment        Disposition Plan   Will need TCU at the time of discharge.  Need to see consistent improvement in encephalopathy prior to considering discharge.       Evi Cooper MD  Hospitalist Service  [facility]  Securely message with [employer] (more info)  Text page via [facility] Paging/Directory   ____________________________________________________    Interval History   No acute events overnight. Denies pain. Continues to have decreased appetite.    Physical Exam   Vital Signs: Temp: 97.5  F (36.4  C) Temp src: Axillary BP: 125/76 Pulse: 93   Resp: 18 SpO2: 94 % O2 Device: None (Room air)    Weight: 127 lbs 6.81 oz    Constitutional: No apparent distress, laying in the hospital bed.  Cachectic.  Jaundiced.  HEENT: Scleral icterus present.  Respiratory: no increased work of breathing, clear to auscultation bilaterally, no crackles or wheezing  Cardiovascular: regular rate and rhythm, normal S1 and S2, no murmur noted  GI: normal bowel sounds, soft, non-distended, non-tender  Skin: warm, dry  Musculoskeletal: no lower extremity pitting edema present      Data     I have personally reviewed the following data over the past 24 hrs:    6.5  \   9.4 (L)   / 251     152 (H) 113 (H) 28.6 (H) /  87   3.0 (L) 27 0.72 \       ALT: 25 AST: 58 (H) AP: 117 TBILI: 7.1 (H)   ALB: 3.2 (L) TOT PROTEIN: 6.2 (L) LIPASE: N/A       Imaging results reviewed over the past 24 hrs:   No results found for this or any previous visit (from the past 24 hour(s)).

## 2023-03-26 NOTE — PLAN OF CARE
Trauma/Ortho/Medical (Choose one) Ortho    Diagnosis: ORIF- R hip  POD#: 10  Mental Status: Alert to self  Activity/dangle Assist of 2 with brett steady  Diet: Pureed, mildly thick    Pain: prn tylenol  Velazquez/Voiding: Incontinent of bowel and bladder  Tele/Restraints/Iso: Afib  02/LDA: RA/ D5 infusing at 75 ml/hr  D/C Date: TBD  Other Info: Aquacel dressing CDI. Fluid restriction 1800 ml. Potassium protocol 3.0, replaced and recheck at 7:00 pm.

## 2023-03-26 NOTE — PROCEDURES
Name: Rm Villarreal  MRN: [id number]  : 1940  Ordering provider: Cristina Gramajo MD  Date of service: 23    EEG #: [facility]     Medications:  Prior to Admission Medications   Prescriptions Last Dose Informant Patient Reported? Taking?   Cyanocobalamin (B-12) 100 MCG TABS 3/15/2023 at am  Yes Yes   Sig: Take 1 tablet by mouth daily   QUEtiapine (SEROQUEL) 25 MG tablet PRN  No Yes   Sig: TAKE 1/2 TABLET BY MOUTH EVERY DAY AS NEEDED FOR ANXIETY   Patient taking differently: TAKE 1/2- 1 TABLET BY MOUTH EVERY DAY AS NEEDED FOR ANXIETY   QUEtiapine (SEROQUEL) 25 MG tablet 3/14/2023 at pm  Yes Yes   Sig: Take 18.75 mg by mouth At Bedtime Wife breaks tablet and gives 3/4 of a tablet at bedtime   Saccharomyces boulardii (PROBIOTIC) 250 MG CAPS 3/15/2023 at am  Yes Yes   Sig: Take 250 mg by mouth daily   Vitamin D3 (CHOLECALCIFEROL) 125 MCG (5000 UT) tablet 3/15/2023 at am  Yes Yes   Sig: Take 1 tablet by mouth daily   apixaban ANTICOAGULANT (ELIQUIS) 5 MG tablet 3/15/2023 at am  No Yes   Sig: Take 1 tablet (5 mg) by mouth 2 times daily   hydrocortisone, Perianal, (HYDROCORTISONE) 2.5 % cream PRN  No Yes   Sig: Place rectally 2 times daily as needed for hemorrhoids   levothyroxine (SYNTHROID/LEVOTHROID) 25 MCG tablet 3/15/2023 at am  No Yes   Sig: Take 1 tablet (25 mcg) by mouth daily   loperamide (IMODIUM) 2 MG capsule PRN  Yes Yes   Sig: Take 1-2 mg by mouth as needed for diarrhea   melatonin 5 MG tablet 3/14/2023 at pm Spouse/Significant Other Yes Yes   Sig: Take 5 mg by mouth At Bedtime   metoprolol succinate ER (TOPROL XL) 25 MG 24 hr tablet 3/15/2023 at am  No Yes   Sig: Take 1 tablet (25 mg) by mouth daily   mirtazapine (REMERON) 7.5 MG tablet 3/14/2023 at pm  No Yes   Sig: Take 1 tablet (7.5 mg) by mouth At Bedtime   nitroGLYcerin (NITROSTAT) 0.4 MG sublingual tablet PRN  No Yes   Sig: For chest pain place 1 tablet under the tongue every 5 minutes for 3 doses. If symptoms persist 5 minutes after  1st dose call 911.   nystatin (MYCOSTATIN) 452074 UNIT/GM external powder PRN  No Yes   Sig: Apply topically 2 times daily as needed for other (Excoriation)   polyethylene glycol (MIRALAX) 17 g packet PRN  Yes Yes   Sig: Take 1 packet by mouth daily as needed for constipation   potassium chloride ER (KLOR-CON M) 20 MEQ CR tablet 3/15/2023 at am  No Yes   Sig: Take 1 tablet (20 mEq) by mouth 2 times daily   Patient taking differently: Take 20 mEq by mouth daily   torsemide (DEMADEX) 10 MG tablet 3/15/2023 at am  No Yes   Sig: Take 1 tablet (10 mg) by mouth daily   Patient taking differently: Take 10 mg by mouth 2 times daily      Facility-Administered Medications: None       enoxaparin ANTICOAGULANT  30 mg Subcutaneous Q24H     gadobutrol  6 mL Intravenous Once     levothyroxine  25 mcg Oral Daily     melatonin  5 mg Oral QPM     metoprolol succinate ER  25 mg Oral Daily     mirtazapine  15 mg Oral QPM     multivitamin w/minerals  1 tablet Oral Daily     polyethylene glycol  17 g Oral Daily     senna-docusate  1 tablet Oral BID     sodium chloride (PF)  3 mL Intracatheter Q8H     [Held by provider] torsemide  10 mg Oral Daily       Reason for study: 83 yo man with history of dementia and protracted delirium.    FINDINGS:  The awake EEG background was characterized by a reactive, moderately well organized, continuous admixture of alpha, beta and theta frequencies with a symmetric 8 Hz posterior dominant rhythm (PDR) and preserved anterior to posterior gradient.   Significant movement and electrode artifact compromised parts of the recording.     During drowsiness there was attenuation and fragmentation of the PDR.  No normal sleep elements were seen.     Activation procedures:  Photic stimulation was performed between 2-25 Hz and was not associated with any abnormal background changes; normal driving response was not seen.  Hyperventilation was not performed     Mild-moderate generalized slowing characterized by  intermittent polymorphic delta frequencies was seen during this recording.    No epileptiform discharges were seen    Single lead ECG showed irregular rate and rhythm of 90 bpm.     IMPRESSION:  This is abnormal routine awake, drowsy EEG due to the findings of mild-moderate  diffuse cerebral dysfunction (encephalopathy) seen in toxic-metabolic states, diffuse structural brain abnormalities, clinical correlation required.    This abnormal routine EEG neither refutes nor confirms diagnosis of seizures or epilepsy.    Cristina Gramajo MD

## 2023-03-26 NOTE — PROGRESS NOTES
Neurology Progress Note.    Subjective: No acute events overnight.  Today patient is not able to provide any complaints. Mumbling most the time when we tried to discuss. Doing somewhat better per family member as was able to get better sleep last night.   I discussed EEG result with patient's family.     Objective:  /76 (BP Location: Right arm)   Pulse 93   Temp 97.5  F (36.4  C) (Axillary)   Resp 18   Ht 1.829 m (6')   Wt 57.8 kg (127 lb 6.8 oz)   SpO2 94%   BMI 17.28 kg/m    General: no acute distress  Neuro:  Alert, not able to answer orientation questions.   The rest of the exam deferred due to counseling time    I have personally reviewed all the labs and imaging studies. Pertinent findings: EEG- mild-moderate encephalopathy  MRI - not done    Assessment and Plan:  This is a 82 year old with Hx of dementia, a-fib on apixaban which was stopped due to significant anemia post-surgery who presents with persistent and actually worsening in the last few days confusion.   EEG with mild-moderate encephalopathy, no clear asymmetry. No seizures or discharges.   Patient didn't tolerate MRI attempt.   -discussed with patient's RN and they'll [facility] and try to attempt another time  -discussed with family that will follow up on this result (will give sign out to my colleague who is coming on service), but if it will not show concerning changes likely related to effect on brain from medical issues and will take time to get better.     Will follow    Thank you for allowing me to participate in cares of this patient. Please contact me with any questions of concerns (pager [phone]).     Total time of visit: 25 minutes with the time spent on chart review, imaging and lab results review, and more than 50 % of the time spent on coordination of cares and face-to-face time with the patient including counseling regarding pathophysiology of the above conditions, results of the tests and further directions of  care.     Cristina Gramajo MD  [facility]

## 2023-03-27 NOTE — PROVIDER NOTIFICATION
MD Notification    Notified Person: MD    Notified Person Name: Dr. Felizon    Notification Date/Time: 3/27/23 3519    Notification Interaction: Page    Purpose of Notification: Xray just called about feeding tube placement, they said pt is still very restless and they cannot safely put in feeding tube at this time. They would need another PRN medication on hand and they would need a restraint order to place tube.     Orders Received:    Comments:

## 2023-03-27 NOTE — PROVIDER NOTIFICATION
Vocera message to Dr Richard.     Missed the window with radiation to get the feeding tube in. Wondering if the oral medications can be switched to IV such a metoprolol with parameters. Pt is still pretty agitated any other PRN that can be added.   Didn't give one time dose ativan as this was for Tube placement.    Orders Received :     Comments

## 2023-03-27 NOTE — PROVIDER NOTIFICATION
MD Notification    Notified Person: MD    Notified Person Name: Dr. Richard    Notification Date/Time: 1400, 3/27/23    Notification Interaction: Page    Purpose of Notification: Pt is still very restless, is there anything we can order to help pt with anxiety?    VALERIO speech saw pt, changing to NPO       Orders Received:Small dose of IV haldol ordered for pt    Comments:

## 2023-03-27 NOTE — PROGRESS NOTES
[facility]    Medicine Progress Note - Hospitalist Service    Date of Admission:  3/15/2023    Assessment & Plan   Rm Villarreal is a 82 year old male with history of mild dementia (lives at home with spouse), hypothyroidism, CAD, bilateral carotid stenosis, chronic atrial fibrillation on apixaban, COPD, hypertension, and ischemic cardiomyopathy who presented to the ED after missing a step and falling down the stairs at home and resultant R hip pain. He had less than 2 beers (confirmed with spouse) evening of 3/15 but denies any prodrome.  Imaging shows a right intertrochanteric proximal femur fracture. OR on 3/16/23 for operative repair.     Post-operatively, he developed worsening encephalopathy, repeat CT head negative for acute findings. Neurology was consulted and recommended MRI brain wwo contrast due to concern for ischemic stroke.  He also has acute blood loss anemia 13>7.6 g/dl. S/p 2u PRBC as of 3/19. HGB has been stable since ranging 8-9 g/dl.     Encephalopathy   2/2  hospital acquired delirium in the setting of underlying dementia then   History of dementia  Rule out CVA given concern R MCA infarct   Insomnia  * Patient lives at home with his spouse. Take mirtazapine and seroquel at home. Follows with Memory doctor. Spouse reports that he often wanders at night and that usually she shadows him to ensure safety.   - Neurology consulted 3/24/2023 due to ongoing/worsening delirium. EEG consistent with encephalopathy   - Plan for brain MRI wwo contrast  - SLP consult to evaluate swallow due to concern for dysphagia  - Increase Mirtazipine to 15 mg daily. Continue Melatonin. Seroquel qHs discontinued due to excessive sedation during the day  - As needed Seroquel for agitation  -check pro-calcitonin, ammonia and VBG     Acute blood loss anemia due to intraoperative blood loss   ml. Pre-op hgb 13.3g/dL, post-op hgb 7.6g/dL. S/p 2u PRBC as of 3/19.  - Monitor and transfuse  as needed.  - Continue to hold apixaban, resume when hemoglobin stable and mental status improved.  - Hemoglobin stable.      Acute right intertrochanteric proximal femur fracture secondary to mechanical fall  - S/p ORIF with cephalomedullary device on 3/17/23.   - Post op management per orthopedic surgery.  - Enoxaparin for DVT ppx for now. Can resume apixaban as soon as hgb stable and mental status improved.   - PT/OT consulted. Unable to participate due confusion and not following commands.  - Care transitions consulted.     Acute respiratory failure with hypoxia, resolved  Right moderate pleural effusion  Severe pulmonary hypertension  History of COPD at baseline  He required 2 to 3 L nasal cannula in the hospital on arrival  Chest imaging does indicate a moderate right pleural effusion with some compressive atelectasis. Spouse and pt confirm no recent coughing, fevers/chills.  WBC is unremarkable and he is afebrile.   - On Room Air as of this morning 3/25/23.  Bumex on hold due to decrease po intake.   - Supplemental oxygen as needed to maintain 90% or greater.  - As needed inhaler/nebulizer for wheezing.  - Incentive spirometry.     Coronary artery disease with history of stenting  Ischemic cardiomyopathy  Hypertension  Chronic atrial fibrillation on apixaban  HFrEF, with acute exacerbation  Acute hypoxic respiratory failure  Echo Oct 2022 showed EF 25-30%, severe hypokinesis noted, RV normal, LA moderately dilated. Severe pulmonary hypertension also noted. Recently torsemide increased to 10mg BID outpatient by cards.  Post operatively, received IV fluids due to elevated lactate. On RA sating mid 90's. JVP elevated.   - Hold apixaban, await stable hemoglobin prior to resumption.  He is currently getting prophylactic dose Lovenox.  - Resumed PTA Toprol XL after holding on 3/18.  - Cardiology consulted, appreciate their assistance.  - Torsemide on hold for now due to decreased oral intake and elevation in bicarb  potentially due to contraction alkalosis.  -IV fluids started 3/23/2023 due to altered mental status with significantly decreased oral intake.  Monitor volume status closely.      Lactic acidosis, resolved  * Patient presented as above and was found to have a initial lactic of 4.8. Lactic acid follow up 1.9. Unclear cause, possible stress mediated in setting of severe pHTN and heart failure.   * Post-operatively lactate elevated to 2.4 and 2.7. No obvious infection.  - Low suspicion of sepsis or infection currently.  - Continue to monitor      Hypothyroidism  - Continue PTA levothyroxine.     Hypernatremia  -Na trending up (146 to 152). 2/2 hypovolemia.  -Increase DW5     Hypokalemia  - Replace and recheck per RN managed protocol.     Recent frostbite of all fingertips  Patient was snowblowing this winter without gloves and managed to frostbite the tips of all of his fingers.  - SPO2 monitoring is difficult on fingers.  - Recommend to monitor SPO2 on ear or toes.     Hyperbilirubinemia  Likely 2/2 to acute illness.  ALP is normal. AST mildly elevated at 58.   Ordered Direct bilirubin.  Continue supportive care     Severe malnutrition  Cachexia  --On Mirtazapine for appetite stimulant.  -place nasogastric tube for tube feedings         Diet: Snacks/Supplements Adult: Ensure Enlive; Between Meals  Fluid restriction 1800 ML FLUID  Pureed Diet (level 4) Mildly Thick (level 2) (Allow ice chips for comfort, one at a time with 1:1 supervision.)  Adult Formula Drip Feeding: Continuous Jevity 1.5; Other - Specify in Comment; Goal Rate: 45; mL/hr; From: 9:00 AM; To: 7:00 AM; START @ 15 mL/hr. Advance by 15 mL q 12 hours until goal is reached. Replace low lytes prior to advancing. HOLD TF sherman...    DVT Prophylaxis: Enoxaparin (Lovenox) SQ  Velazquez Catheter: Not present  Lines: None     Cardiac Monitoring: ACTIVE order. Indication: Tachyarrhythmias, acute (48 hours)  Code Status: Full Code      Clinically Significant Risk Factors         # Hypokalemia: Lowest K = 3 mmol/L in last 2 days, will replace as needed  # Hypernatremia: Highest Na = 152 mmol/L in last 2 days, will monitor as appropriate      # Hypoalbuminemia: Lowest albumin = 3 g/dL at 3/22/2023  7:54 AM, will monitor as appropriate           # Cachexia: Estimated body mass index is 17.28 kg/m  as calculated from the following:    Height as of this encounter: 1.829 m (6').    Weight as of this encounter: 57.8 kg (127 lb 6.8 oz).   # Severe Malnutrition: based on nutrition assessment        Disposition Plan      Expected Discharge Date: 03/29/2023    Discharge Delays: Placement - TCU  Destination: home            Damon Richard MD  Hospitalist Service  [facility]  Securely message with [employer] (more info)  Text page via [employer] Paging/Directory   ______________________________________________________________________    Interval History   Still minimally responsive.  Family at bedside.     Physical Exam   Vital Signs: Temp: 98.6  F (37  C) Temp src: Axillary BP: (!) 129/96 Pulse: 78   Resp: 18 SpO2: 97 % O2 Device: None (Room air)    Weight: 127 lbs 6.81 oz    Constitutional: not in acute distress  Respiratory: No increased work of breathing, good air exchange, clear to auscultation bilaterally, no crackles or wheezing  Cardiovascular: regular rate and rhythm, normal S1 and S2, no S3 or S4, and no murmur noted  Neurologic: not alert but occasionally opens eyes.  Moving all extremities equally. No tremor or clonus.  He is non verbal is is not following commands.      Medical Decision Making       MANAGEMENT DISCUSSED with the following over the past 24 hours: nurse, daughter   NOTE(S)/MEDICAL RECORDS REVIEWED over the past 24 hours: epic      Data     I have personally reviewed the following data over the past 24 hrs:    N/A  \   N/A   / N/A     152 (H) N/A N/A /  N/A   3.9 N/A N/A \       Procal: 0.20 (H) CRP: 59.67 (H) Lactic Acid: N/A         Imaging  results reviewed over the past 24 hrs:   No results found for this or any previous visit (from the past 24 hour(s)).

## 2023-03-27 NOTE — PROGRESS NOTES
Orientation/Cognitive: A/Ox1  Mobility Level/Assist Equipment: Ax2 lift, turn and repo  Fall Risk (Y/N): yes  Behavior Concerns: very confused and restless, PRN Haldol ordered for pt given 1x  Pain Management: right hip pain near incision, PRN dilaudid given 2x  Tele/VS/O2: Tele is A fib with PVCs  ABNL Lab/BG: CRP 59.67, Na 152  Diet: NPO,Feeding tube ordered to be placed today  Bowel/Bladder: incontinent of B&B  Skin Concerns: Right sided bruising to back and hip near incision. Incision CDI  Drains/Devices: Left PIV with D5 @125 mL/hr   Tests/Procedures for next shift: Tube feeding being placed, Brain MRI needed  Anticipated DC date & active delays: tbd

## 2023-03-27 NOTE — PLAN OF CARE
Pt Alert to self, denies pain when not moving, tele in use, Afib RVR, assist of 2, CMS intact, D5 @ 75, will continue to monitor

## 2023-03-27 NOTE — CONSULTS
"CLINICAL NUTRITION SERVICES - REASSESSMENT NOTE + Consult received \"Registered Dietitian to order TF per Medical Nutrition Therapy Guidelines\"    Recommendations Ordered by Registered Dietitian (RD):   Start TF as follows:   Jevity 1.5 @ 15 ml/hr. Advance by 15 mL q 12 hours until goal is reached.     Goal =   Jevity 1.5 @ goal of 50ml/hr x 22 hours (1100 ml/day) HOLD from 7 am - 9 am for Synthroid dosing at 8 am.   Provides: 1650 kcals (28 kcal/kg), 70 g PRO (1.2 g/kg), 836 ml free H20, 238 g CHO, and 23 g fiber daily.  Flush 60 mL q 4 hours    Stop D5 IVF once TF starts  Risk of refeeding - electrolyte checks, replace per protocol   Change Thera vit M to Certavite    Future/Additional Recommendations:   - Recommend an otherwise high kcal/high protein diet. Previously provided handout for low sodium diet for the undernourished.    Malnutrition:  3/27    % Weight Loss:  > 10% in 6 months (severe malnutrition)  % Intake:  </= 75% for >/= 1 month (severe malnutrition)  Subcutaneous Fat Loss:  Orbital region moderate depletion, Upper arm region severe depletion and Thoracic region severe depletion  Muscle Loss:  Temporal region moderate-severe depletion, Clavicle bone region severe depletion, Acromion bone region severe depletion, Scapular bone region severe depletion and Posterior calf region moderate-severe depletion  Fluid Retention: Trace    Malnutrition Diagnosis: Severe malnutrition  In Context of:  Acute illness or injury  Chronic illness or disease     EVALUATION OF PROGRESS TOWARD GOALS   Diet: Pureed Diet (level 4); Mildly Thick (level 2)  Ensure Enlive ordered 1x daily between meals and PRN   1800 mL fluid restriction     Intake/Tolerance:   - 25% intakes record for the past 5 days. Appetite is poor-fair. Eats very small amounts. Likes ensure.   - Pt and spouse seen briefly in room. Spouse notes that he is feeling 'fidgety' today. Discussed TF - questions answered.     - Labs: Na 152 (H), CRP 60 (H)  - " "Stooling: daily BM 1-3x  - Weight: 57.8 kg - lowest of admission measured 3/22.     ASSESSED NUTRITION NEEDS:  Dosing Weight 58 kg   Estimated Energy Needs: 7486-9042 kcals (25-30 Kcal/Kg)  Justification: maintenance and repletion  Estimated Protein Needs: 69-87 grams protein (1.2-1.5 g pro/Kg)  Justification: preservation of lean body mass, repletion     NEW FINDINGS:   3/25: SLP assessment - downgraded diet to Pureed with Mildly thick liquids.   A/o to self   Meds:   Levothyroxine 0800  Remeron 15 mg q HS  Thera vit M not given \"too lethargic\"  Miralax available   Demadex - held by provider  D5 IVF @ 125 mL/hr --> 150 g, 510 kcal daily from dextrose     Previous Goals:   Intake of at least 50% meals TID + 1 ensure daily.   Evaluation: Not met    Previous Nutrition Diagnosis:   Malnutrition related to suspected baseline inadequate oral intake as evidenced by spouse reports 3 \"light\" meals daily, loss of >10% in <4 months, fat and muscle losses on exam, underweight BMI.   Evaluation: No change    CURRENT NUTRITION DIAGNOSIS  Malnutrition related to suspected baseline inadequate oral intake as evidenced by spouse reports 3 \"light\" meals daily, loss of >10% in <4 months, fat and muscle losses on exam, underweight BMI, request received to begin TF.     INTERVENTIONS  Recommendations / Nutrition Prescription  Start TF as follows:   Jevity 1.5 @ 15 ml/hr. Advance by 15 mL q 12 hours until goal is reached.     Goal =   Jevity 1.5 @ goal of 50ml/hr x 22 hours (1100 ml/day) HOLD from 7 am - 9 am for Synthroid dosing at 8 am.   Provides: 1650 kcals (28 kcal/kg), 70 g PRO (1.2 g/kg), 836 ml free H20, 238 g CHO, and 23 g fiber daily.  Flush 60 mL q 4 hours    Stop D5 IVF once TF starts  Risk of refeeding - electrolyte checks, replace per protocol   Change Thera vit M to Certavite     Implementation  EN Composition, EN Schedule and Feeding Tube Flush  Nutrition Education: provided education on plan for TF start.     Goals  TF @ " goal to provide % estimated needs in the next 3 days.     MONITORING AND EVALUATION:  Progress towards goals will be monitored and evaluated per protocol and Practice Guidelines    Alona Tierney RD, LD  [facility] [place], 68, [facility], [facility]  Pager: [phone]  Weekend Pager: [phone]

## 2023-03-27 NOTE — PLAN OF CARE
Goal Outcome Evaluation:                    Summary Fall, Femur fracture R,  elevated lactic  Hx Dementia (lives at home with spouse), hypothyroidism, CAD, bilateral carotid stenosis, chronic atrial fibrillation on apixaban, COPD, hypertension, and ischemic cardiomyopathy      3/27/2023 1323-1278    Orientation Alt to self only     Vitals/Tele VSS x tachycardic,  Afib     IV Access/drains PIV infusing 125ml / D5    Diet NPO     Mobility Not out of bed this shift repos self (sera steady ast 2)    GI/ Incontinent      Wound/Skin Jaundice.  Bruising on right side of body along leg, hip from fall. Scattered bruising and scabs.     Test/Procedures  Tube Feed placement,  MRI   Pt was not calm enough for these to be completed plan to try tomorrow      Discharge Plan Pending     RRT called at 9pm due to low blood glucose 35 and intermitted episodes of rapid breathing and agitation followed by episodes of drowsyness. However BG found to be inconclusive result due to frostbite on hands. New labs drawn and . Lactic was also high 6.2 also inconclusive and redrawn 4.1.  See further notes for RRT.     See Flow sheets for assessment

## 2023-03-28 NOTE — PLAN OF CARE
Goal Outcome Evaluation:    Alert to self. Occasionally agitated. PIV infusing. On Tele- A-fib. Incontinent of B/B. Pain managed with Dilaudid. On NPO. On soft restrains on BUE d/t pt pulls out IV lines. Will monitor.

## 2023-03-28 NOTE — PLAN OF CARE
Goal Outcome Evaluation:    Date/Time:3/28/23 1830    Trauma/Ortho/Medical (Choose one) trauma    Diagnosis:hip fx,ORIF right hip  POD#: 12  Mental Status: A&Ox1  Activity/dangle: up with lift, bedrest today with inability to tolerate d/t agitation/restlessness  Diet:NPO, TF placed, started at 1430 @ 15cc/hr with 60cc flush q 4hr  Pain: IV Dilaudid, started oxy/tyl per FT this pm  Velazquez/Voiding:incontinent   Tele/Restraints/Iso: bilat wrist restraints, telemetry A-fib with uncontrolled rate at times with PVC's  02/LDA: O2 @3L oxymask, IVF, FT  D/C Date:TBD  Other Info: haldol x2 for agitation, unable to obtain MRI d/t restless, TF placed today, remains on IV D5, bg 106,96 today, T&R q 2hr, mepilex to coccyx and bilat heels for protection.

## 2023-03-28 NOTE — PROGRESS NOTES
[facility]    Medicine Progress Note - Hospitalist Service    Date of Admission:  3/15/2023    Assessment & Plan   Rm Villarreal is a 82 year old male with history of mild dementia (lives at home with spouse), hypothyroidism, CAD, bilateral carotid stenosis, chronic atrial fibrillation on apixaban, COPD, hypertension, and ischemic cardiomyopathy who presented to the ED after missing a step and falling down the stairs at home and resultant R hip pain. He had less than 2 beers (confirmed with spouse) evening of 3/15 but denies any prodrome.  Imaging shows a right intertrochanteric proximal femur fracture. OR on 3/16/23 for operative repair.  Post-operatively, he developed worsening encephalopathy, repeat CT head negative for acute findings. Neurology was consulted and recommended MRI brain wwo contrast due to concern for ischemic stroke.  He also has acute blood loss anemia 13>7.6 g/dl. S/p 2u PRBC as of 3/19. HGB has been stable since ranging 8-9 g/dl.     Encephalopathy   2/2  hospital acquired delirium in the setting of underlying dementia then   History of dementia  Rule out CVA given concern R MCA infarct   Insomnia  Patient lives at home with his spouse. Take mirtazapine and seroquel at home. Follows with Memory doctor. Spouse reports that he often wanders at night and that usually she shadows him to ensure safety.   Neurology consulted 3/24/2023 due to ongoing/worsening delirium. EEG consistent with encephalopathy.    MRI brain as able- might need to be done with anesthesia     Acute blood loss anemia due to intraoperative blood loss  Hemoglobin   Date Value Ref Range Status   03/28/2023 9.9 (L) 13.3 - 17.7 g/dL Final   03/27/2023 10.1 (L) 13.3 - 17.7 g/dL Final   04/17/2021 14.4 13.3 - 17.7 g/dL Final   12/21/2020 14.3 13.3 - 17.7 g/dL Final    ml. Pre-op hgb 13.3g/dL, post-op hgb 7.6g/dL. S/p 2u PRBC as of 3/19.    Monitor    Continue to hold apixaban, resume when  hemoglobin stable and mental status improved     Acute right intertrochanteric proximal femur fracture secondary to mechanical fall  S/p ORIF with cephalomedullary device on 3/17/23.     Post op management per orthopedic surgery.    Enoxaparin for DVT ppx for now. Can resume apixaban as soon as hgb stable and mental status improved.     PT/OT consulted. Unable to participate due confusion and not following commands.    Care transitions consulted     Acute respiratory failure with hypoxia, resolved  Right moderate pleural effusion  Severe pulmonary hypertension  History of COPD at baseline  He required 2 to 3 L nasal cannula in the hospital on arrival  Chest imaging does indicate a moderate right pleural effusion with some compressive atelectasis. Spouse and pt confirm no recent coughing, fevers/chills.  WBC is unremarkable and he is afebrile.   On room air as of 3/25/23.    As needed inhaler/nebulizer for wheezing     Coronary artery disease with history of stenting  Ischemic cardiomyopathy  Hypertension  Chronic atrial fibrillation on apixaban  HFrEF, with acute exacerbation  Acute hypoxic respiratory failure  Echo Oct 2022 showed EF 25-30%, severe hypokinesis noted, RV normal, LA moderately dilated. Severe pulmonary hypertension also noted. Recently torsemide increased to 10mg BID outpatient by cards.  Post operatively, received IV fluids due to elevated lactate. On RA sating mid 90's. JVP elevated.     Holding apixaban, await stable hemoglobin prior to resumption.  He is currently getting prophylactic dose Lovenox    Resumed PTA Toprol XL after holding on 3/18.    Cardiology consulted, appreciate their assistance    Torsemide on hold for now due to decreased oral intake and elevation in bicarb potentially due to contraction alkalosis.    IV fluids started 3/23/2023 due to altered mental status with significantly decreased oral intake  Monitor volume status closely     Lactic acidosis    Repeat tomorrow after he  has been on tube feedings and more intravenous fluid      Hypothyroidism    Continue PTA levothyroxine     Hypernatremia  Sodium   Date Value Ref Range Status   03/28/2023 143 136 - 145 mmol/L Final   03/28/2023 143 136 - 145 mmol/L Final   04/17/2021 136 133 - 144 mmol/L Final   Na trending down on intravenous D5W    Continue D5W and monitor     Hypokalemia   Potassium   Date Value Ref Range Status   03/28/2023 3.7 3.4 - 5.3 mmol/L Final   01/01/2023 4.0 3.4 - 5.3 mmol/L Final   04/17/2021 3.7 3.4 - 5.3 mmol/L Final     Replace as needed     Recent frostbite of all fingertips  Patient was snowblowing this winter without gloves and managed to frostbite the tips of all of his fingers.  SPO2 monitoring is difficult on fingers.    Recommend to monitor SPO2 on ear or toes     Hyperbilirubinemia  Likely 2/2 to acute illness.  ALP is normal. AST mildly elevated at 58. Ammonia 15    Continue supportive care and monitor      Severe malnutrition  Cachexia    On Mirtazapine for appetite stimulant.    I placed an nasogastric tube for tube feedings today- start tube feedings         Diet: Snacks/Supplements Adult: Ensure Enlive; Between Meals  Fluid restriction 1800 ML FLUID  Adult Formula Drip Feeding: Continuous Jevity 1.5; Other - Specify in Comment; Goal Rate: 45; mL/hr; From: 9:00 AM; To: 7:00 AM; START @ 15 mL/hr. Advance by 15 mL q 12 hours until goal is reached. Replace low lytes prior to advancing. HOLD TF sherman...  NPO for Medical/Clinical Reasons Except for: No Exceptions    DVT Prophylaxis: Enoxaparin (Lovenox) SQ  Velazquez Catheter: Not present  Lines: None     Cardiac Monitoring: ACTIVE order. Indication: Acute decompensated heart failure (48 hours)  Code Status: Full Code      Clinically Significant Risk Factors         # Hypernatremia: Highest Na = 152 mmol/L in last 2 days, will monitor as appropriate   # Hypercalcemia: corrected calcium is >10.1, will monitor as appropriate    # Hypoalbuminemia: Lowest albumin = 3  g/dL at 3/28/2023  5:16 AM, will monitor as appropriate           # Cachexia: Estimated body mass index is 16.95 kg/m  as calculated from the following:    Height as of this encounter: 1.829 m (6').    Weight as of this encounter: 56.7 kg (125 lb).   # Severe Malnutrition: based on nutrition assessment        Disposition Plan     Expected Discharge Date: 03/29/2023    Discharge Delays: Placement - TCU  Destination: home            Damon Richard MD  Hospitalist Service  [facility]  Securely message with [employer] (more info)  Text page via [facility] Paging/Directory   ______________________________________________________________________    Interval History   No change in condition.      Physical Exam   Vital Signs: Temp: 98.2  F (36.8  C) Temp src: Axillary BP: 130/75 Pulse: 116   Resp: 20 SpO2: 94 % O2 Device: Oxymask Oxygen Delivery: 3 LPM  Weight: 125 lbs .01 oz    Constitutional: not in acute distress  Respiratory: No increased work of breathing, good air exchange, clear to auscultation bilaterally, no crackles or wheezing  Cardiovascular: regular rate and rhythm, normal S1 and S2, no S3 or S4, and no murmur noted  Neurologic: not alert but occasionally opens eyes.  He is non verbal is is not following commands.  He is in wrist restraints    Medical Decision Making       MANAGEMENT DISCUSSED with the following over the past 24 hours: nurse, daughter   NOTE(S)/MEDICAL RECORDS REVIEWED over the past 24 hours: epic      Data     I have personally reviewed the following data over the past 24 hrs:    8.9  \   9.9 (L)   / 239     143; 143 109 (H) 26.3 (H) /  96   3.7 24 0.78 \       ALT: 25 AST: 62 (H) AP: 108 TBILI: 6.9 (H)   ALB: 3.0 (L) TOT PROTEIN: 5.5 (L) LIPASE: N/A       Trop: 42 (H) BNP: 24,298 (H)       Procal: N/A CRP: N/A Lactic Acid: 3.2 (H)       INR:  1.82 (H) PTT:  N/A   D-dimer:  N/A Fibrinogen:  N/A       Imaging results reviewed over the past 24 hrs:   Recent Results (from  the past 24 hour(s))   XR Abdomen Port 1 View    Narrative    XR ABDOMEN PORT 1 VIEW 3/28/2023 12:05 PM    HISTORY: for post feeding tube placement    COMPARISON: None.      Impression    IMPRESSION: Feeding tube is in the stomach. Bowel gas pattern is  nonobstructed.    BETSY SORIA MD         SYSTEM ID:  [id number]

## 2023-03-28 NOTE — PROGRESS NOTES
"SPIRITUAL HEALTH SERVICES Progress Note  [facility]  [address]    I visited pt. \"Zoran's\" wife Queenie per follow up for emotional support.  In conversation, she expressed distress surrounding the need for Zoran to have a feeding tube and soft restraints.  I offered reflective listening and emotional support, affirming the difficulty of this time period.      Queenie asked questions about his healthcare directive, and I have requested [facility] follow up tomorrow in regards to this.      Jessica Bassett, BS   Intern    [facility] routine referrals [facility] *[phone]  [facility] available 24/7 for emergent requests/referrals, either by paging the on-call  or by entering an ASAP/STAT consult in Epic (this will also page the on-call ).   "

## 2023-03-28 NOTE — CODE/RAPID RESPONSE
"[facility]    House FITZ RRT Note  3/27/2023   Time Called: 2101    RRT called for: Hypoglycemia    Assessment & Plan     Erroneous hypoglycemia likely 2/2 frostbite on bilateral hands.  Lactic acidosis suspect multifactorial 2/2 severe ischemic cardiomyopathy, acute on chronic HFrEF, tachycardia, anemia. ?frostbite contributing.   Jaundice x2-3 months.  Ongoing agitation 2/2 acute encephalopathy, hospital acquired delirium in setting of PMH dementia, insomnia.  Elevated INR.  Acute hypoxic respiratory failure 2/2 small bilateral pleural effusions, bibasilar atelectasis in setting of acute on chronic HFrEF, ischemic cardiomyopathy with PMH severe pHTN.  Elevated troponin likely 2/2 demand ischemia in setting of acute on chronic HFrEF, ischemic cardiomyopathy, tachycardia, anemia.  Paroxysmal atrial fibrillation with rapid ventricular response.  - Upon arrival, pt lying in bed, eyes closed, in no overt distress with intermittent restlessness noted with pt pulling at gown, telemetry, PIV and sitting up randomly.  Nursing notes pt has been intermittently agitated throughout evening prompting nursing to check BG.  Pt's BG from finger was 35 prompting RRT to be called.  Upon arrival, pt intermittently restless as noted above, PERRL, moving all extremities, intermittently grimacing.  Pt's VS noting HR 110s-160s, SBP 110s-120s, RR 10s, O2 sats > 92% on 3L O2 via oxymask, afebrile.      Noted pt had acute on chronic HFrEF with severe ischemic cardiomyopathy postoperatively requiring IV diuretic therapy 3/17 and had been stopped 3/18 due to hypotension.  Pt was transitioned to PTA torsemide 3/19 and was stopped 3/23 due to decreased PO intake and elevated bicarb.    Pt's wife notes pt has been jaundice for the past 2-3 months in which pt had outpatient work up with pt's wife noting was told jaundice due to \"heart failure\".      INTERVENTIONS:  - BG from newly placed PIV - 102  - Stat lactic acid, " "glucose, CMP, CBC, VBG, INR, BNP, trop, blood culture x1; in setting of elevated lactic acid from newly placed PIV, will request for lab to repeat lactic acid now as pH on VBG WNL.  Repeat lactic acid 4.1.  Will repeat lactic acid in AM.    - 2-point soft restraints for BUE  - Will have metoprolol 2.5 mg IV Q4H available PRN  - Stat EKG  - VS Q4H, telemetry monitoring and continuous pulse oximetry  - Bladder scan 120 ml  - Noted negative UA 3/21 - remains afebrile with no leukocytosis  - Requested for nursing to administer IV dilaudid 0.2 mg now as pt restless and grimacing  - Pt continues on D5 at 125 ml/hr; pt had been initiated on MIVF 3/23 due to decreased PO intake and AMS   - Contacted pt's wife, Queenie, to discuss above.  Discussed with Queenei pt with concerning co-morbidities and now with delirium in setting of underlying cognitive impairment.  Discussed with Queenie pt's overall clinical status could continue to deteriorate despite aggressive measures being implemented.  Discussed with Queenie what FULL CODE entails; noted pt would likely develop rib fractures with CPR as pt frail and cachetic.  Queenie notes pt had a home health nurse at their home ~ 1 month ago, and pt told the home health nurse \"of course I want to life\".  Pt's wife is deriving pt's overall desire for restorative care from that statement.  Queenie notes \"it's a very difficult situation\".  At this time, pt remains restorative, FULL CODE; however, recommend ongoing goals of care discussion as pt's overall clinical status continues to deteriorate with several co-morbidities despite 12 days of inpatient treatment.    - Pt's wife adamant that pt has feeding tube placement and MRI brain WWO completed 3/28  - Will defer to rounding hospitalist if additional workup deemed appropriate for noted jaundice and elevated bilirubin    At the end of the RRT pt remains hemodynamically stable, resting, in no overt distress    Discussed with and defer further " cares to nursing and hospitalist     Interval History     Rm Villarreal is a 82 year old male who was admitted on 3/15/2023 for fall, found to have R intertrochanteric proximal femur fracture.    Medical history significant for: Dementia (lives at home with spouse), hypothyroidism, CAD, bilateral carotid stenosis, chronic atrial fibrillation on apixaban, COPD, hypertension, HFrEF, ischemic cardiomyopathy, severe pHTN     Code Status: Full Code    Allergies   Allergies   Allergen Reactions     Atorvastatin Diarrhea     Intolerance to most statin medications        Physical Exam   Vital Signs with Ranges:  Temp:  [97.1  F (36.2  C)-99.3  F (37.4  C)] 99.3  F (37.4  C)  Pulse:  [] 116  Resp:  [16-20] 20  BP: (128-138)/(71-96) 132/84  SpO2:  [93 %-97 %] 94 %  No intake/output data recorded.    Constitutional: Pt lying in bed, eyes closed, in no overt distress with intermittent restlessness noted with pt pulling at gown, telemetry, PIV and sitting up randomly  Neck: No upper airway wheezes or stridor noted; dry oral mucosa  Pulmonary: In no apparent respiratory distress, clear to auscultation bilaterally, no crackles or wheezes noted  Cardiovascular: Tachycardic, irregular rate and rhythm, normal S1S2, no murmur, rub or gallop noted  GI: Flat, soft, nondistended, nontender to palpation, no obvious guarding or rebound tenderness noted  Skin/Integumen: Warm, dry, jaundice throughout including sclera, noted purplish coloring of bilateral hands, noted bruising on R hip extending onto penis/scrotum, inner thigh, surgical aquacel dressing in place on R hip, CDI  Neuro: PERRL, moving all extremities spontaneously, no verbal sounds noted but did attempt to speak words, does not follow commands  Psych:  Intermittently restless  Extremities: No peripheral edema noted    Data     VBG:  Recent Labs   Lab 03/27/23  2130 03/27/23  1229 03/22/23  1106   PHV 7.38 7.40 7.46*   PO2V 26 17* 14*   PCO2V 40 46 50   HCO3V 24 28 35*      BNP:  Recent Labs   Lab 03/27/23 2250   NTBNPI 24,298*     Lactic acid:  Recent Labs   Lab 03/27/23 2250 03/27/23 2130   LACT 4.1* 6.2*     CBC with Diff:  Recent Labs   Lab Test 03/27/23 2250 03/27/23 2130   WBC  --  9.3   HGB  --  10.1*   MCV  --  109*   PLT  --  285   INR 1.82*  --      Comprehensive Metabolic Panel:  Recent Labs   Lab 03/27/23 2250 03/27/23  1758 03/27/23  0909   *   < > 152*   POTASSIUM 3.6  --   --    CHLORIDE 109*  --   --    CO2 24  --   --    ANIONGAP 14  --   --    *   < >  --    BUN 28.2*  --   --    CR 0.81  --   --    GFRESTIMATED 89  --   --    RANDY 9.5  --   --    MAG  --   --  2.3   PHOS  --   --  3.4   PROTTOTAL 6.3*  --   --    ALBUMIN 3.4*  --   --    BILITOTAL 8.3*  --   --    ALKPHOS 121  --   --    AST 71*  --   --    ALT 31  --   --     < > = values in this interval not displayed.      Latest Reference Range & Units 03/27/23 22:50   Troponin T, High Sensitivity <=22 ng/L 42 (H)   (H): Data is abnormally high    Medical Decision Making       45 MINUTES SPENT BY ME on the date of service doing chart review, history, exam, documentation & further activities per the note.       SAMANTHA Lopez [facility]  Hospitalist-House FITZ  Hospitalist Service  Securely message with [employer] (more info)  Text page via [facility] Paging/Directory

## 2023-03-29 NOTE — PROGRESS NOTES
[facility]    Medicine Progress Note - Hospitalist Service    Date of Admission:  3/15/2023    Assessment & Plan   Rm Villarreal is a 82 year old male with history of mild dementia (lives at home with spouse), hypothyroidism, CAD, bilateral carotid stenosis, chronic atrial fibrillation on apixaban, COPD, hypertension, and ischemic cardiomyopathy who presented to the ED after missing a step and falling down the stairs at home and resultant R hip pain. He had less than 2 beers (confirmed with spouse) evening of 3/15 but denies any prodrome.  Imaging shows a right intertrochanteric proximal femur fracture. OR on 3/16/23 for operative repair.  Post-operatively, he developed worsening encephalopathy, repeat CT head negative for acute findings. Neurology was consulted and recommended MRI brain wwo contrast due to concern for ischemic stroke.  He also has acute blood loss anemia 13>7.6 g/dl. S/p 2u PRBC as of 3/19. HGB has been stable since ranging 8-9 g/dl.     Encephalopathy   2/2  hospital acquired delirium in the setting of underlying dementia   History of dementia  Rule out CVA given concern R MCA infarct   Insomnia  Patient lives at home with his spouse. Take mirtazapine and seroquel at home. Follows with Memory doctor. Spouse reports that he often wanders at night and that usually she shadows him to ensure safety.   Neurology consulted 3/24/2023 due to ongoing/worsening delirium. EEG consistent with encephalopathy.    Hold off on MRI given overall clinical picture     Acute blood loss anemia due to intraoperative blood loss  Hemoglobin   Date Value Ref Range Status   03/29/2023 10.0 (L) 13.3 - 17.7 g/dL Final   03/28/2023 9.9 (L) 13.3 - 17.7 g/dL Final   04/17/2021 14.4 13.3 - 17.7 g/dL Final   12/21/2020 14.3 13.3 - 17.7 g/dL Final    ml. Pre-op hgb 13.3g/dL, post-op hgb 7.6g/dL. S/p 2u PRBC as of 3/19.    Monitor    Continue to hold apixaban, resume when hemoglobin stable and  mental status improved     Acute right intertrochanteric proximal femur fracture secondary to mechanical fall  S/p ORIF with cephalomedullary device on 3/17/23.     Post op management per orthopedic surgery.    Enoxaparin for DVT ppx for now. Can resume apixaban as soon as hgb stable and mental status improved.     PT/OT consulted. Unable to participate due confusion and not following commands.     Acute respiratory failure with hypoxia, resolved  Right moderate pleural effusion  Severe pulmonary hypertension  History of COPD at baseline  He required 2 to 3 L nasal cannula in the hospital on arrival  Chest imaging does indicate a moderate right pleural effusion with some compressive atelectasis. Spouse and pt confirm no recent coughing, fevers/chills.  WBC is unremarkable and he is afebrile.   On room air as of 3/25/23.    As needed inhaler/nebulizer for wheezing    Continue oxygen      Coronary artery disease with history of stenting  Ischemic cardiomyopathy  Hypertension  Chronic atrial fibrillation on apixaban  HFrEF, with acute exacerbation  Acute hypoxic respiratory failure  Echo Oct 2022 showed EF 25-30%, severe hypokinesis noted, RV normal, LA moderately dilated. Severe pulmonary hypertension also noted. Recently torsemide increased to 10mg BID outpatient by cards.  Post operatively, received IV fluids due to elevated lactate. On RA sating mid 90's. JVP elevated.     Holding apixaban, await stable hemoglobin prior to resumption.  He is currently getting prophylactic dose Lovenox    Resumed PTA Toprol XL after holding on 3/18.    Cardiology consulted, appreciate their assistance    Torsemide on hold for now due to decreased oral intake and elevation in bicarb potentially due to contraction alkalosis.    IV fluids started 3/23/2023 due to altered mental status with significantly decreased oral intake  Monitor volume status closely     Lactic acidosis  Improved.    Hypothyroidism    Continue PTA  levothyroxine     Hypernatremia  Sodium   Date Value Ref Range Status   03/29/2023 137 136 - 145 mmol/L Final   03/29/2023 137 136 - 145 mmol/L Final   04/17/2021 136 133 - 144 mmol/L Final   Na trending down on intravenous D5W.    Change to NS    Hypokalemia   Potassium   Date Value Ref Range Status   03/29/2023 3.9 3.4 - 5.3 mmol/L Final   01/01/2023 4.0 3.4 - 5.3 mmol/L Final   04/17/2021 3.7 3.4 - 5.3 mmol/L Final     Replace as needed     Recent frostbite of all fingertips  Patient was snowblowing this winter without gloves and managed to frostbite the tips of all of his fingers.  SPO2 monitoring is difficult on fingers.    Recommend to monitor SPO2 on ear or toes     Hyperbilirubinemia  Likely 2/2 to acute illness.  ALP is normal. AST mildly elevated at 58. Ammonia 15    Continue supportive care and monitor      Severe malnutrition  Cachexia    On Mirtazapine for appetite stimulant.    Continue tube feedings     Goals of care  Health care directive reviewed.  The patient has multiorgan failure and his prognosis is poor.  The patient's wife would like him to be no code blue but continue with other care.          Diet: Snacks/Supplements Adult: Ensure Enlive; Between Meals  Fluid restriction 1800 ML FLUID  Adult Formula Drip Feeding: Continuous Jevity 1.5; Other - Specify in Comment; Goal Rate: 45; mL/hr; From: 9:00 AM; To: 7:00 AM; START @ 15 mL/hr. Advance by 15 mL q 12 hours until goal is reached. Replace low lytes prior to advancing. HOLD TF sherman...  NPO for Medical/Clinical Reasons Except for: No Exceptions    DVT Prophylaxis: Enoxaparin (Lovenox) SQ  Velazquez Catheter: Not present  Lines: None     Cardiac Monitoring: ACTIVE order. Indication: Acute decompensated heart failure (48 hours)  Code Status: Full Code      Clinically Significant Risk Factors         # Hypernatremia: Highest Na = 148 mmol/L in last 2 days, will monitor as appropriate   # Hypercalcemia: corrected calcium is >10.1, will monitor as  appropriate    # Hypoalbuminemia: Lowest albumin = 3 g/dL at 3/29/2023  6:16 AM, will monitor as appropriate           # Cachexia: Estimated body mass index is 16.95 kg/m  as calculated from the following:    Height as of this encounter: 1.829 m (6').    Weight as of this encounter: 56.7 kg (125 lb).   # Severe Malnutrition: based on nutrition assessment        Disposition Plan      Expected Discharge Date: 03/30/2023    Discharge Delays: Placement - TCU  Destination: home            Damon Richard MD  Hospitalist Service  [facility]  Securely message with [employer] (more info)  Text page via [employer] Paging/Directory   ______________________________________________________________________    Interval History   No change in condition.      Physical Exam   Vital Signs: Temp: 97.1  F (36.2  C) Temp src: Axillary BP: (!) 118/99 Pulse: 115   Resp: 18 SpO2: 97 % O2 Device: High Flow Nasal Cannula (HFNC) Oxygen Delivery: 30 LPM  Weight: 125 lbs .01 oz    Constitutional: not in acute distress  Respiratory: no wheezing, rales or rhonchi   Cardiovascular: regular rate and rhythm, normal S1 and S2, no S3 or S4, and no murmur noted  Neurologic: not alert but occasionally opens eyes.  He is non verbal is is not following commands.  He is in wrist restraints  + nasogastric tube     Medical Decision Making       MANAGEMENT DISCUSSED with the following over the past 24 hours: nurse, daughter   NOTE(S)/MEDICAL RECORDS REVIEWED over the past 24 hours: epic      Data     I have personally reviewed the following data over the past 24 hrs:    9.5  \   10.0 (L)   / 224     137; 137 103 29.4 (H) /  120 (H)   3.9 20 (L) 0.93 \       ALT: 46 AST: 98 (H) AP: 131 (H) TBILI: 8.3 (H)   ALB: 3.0 (L) TOT PROTEIN: 6.1 (L) LIPASE: N/A       Procal: 0.31 (H) CRP: N/A Lactic Acid: 2.6 (H)         Imaging results reviewed over the past 24 hrs:   Recent Results (from the past 24 hour(s))   XR Chest Port 1 View    Narrative     CHEST ONE VIEW March 29, 2023 9:25 AM     HISTORY: Hypoxia.    COMPARISON: March 20, 2023.      Impression    IMPRESSION: Some fullness of the right hilum noted although this has  been recently CT. Nearly resolved bilateral effusions and associated  compressive atelectasis and/or infiltrate. Cardiac size stable.    RADHA PETERS MD         SYSTEM ID:  [id number]

## 2023-03-29 NOTE — PLAN OF CARE
Goal Outcome Evaluation:      Diagnosis:hip fx,ORIF right hip  POD#: 13  Mental Status: Confused,agitation/restlessness  Activity/dangle: up with lift,   Diet:NPO, TF placed, increased rate at 0230 am @ 30cc/hr with 60cc flush q 4hr  Pain: Oxy& tylenol  Velazquez/Voiding:incontinent B&B  Tele/Restraints/Iso: bilat wrist restraints, telemetry A-fib with uncontrolled rate at times with PVC's  02/LDA: O2 @5L oxymask, IVF, FT  D/C Date:TBD  Other Info:  TF placed yesterday, remains on IV D5, bg 124 today, T&R q 2hr, mepilex to coccyx and bilat heels for protection. Pt needed intermittent suction by RT . Oral care done. Had small  BM . Incontinent of urine, skin scattered bruises. Dressing CDI.   VSS except HR Tachy

## 2023-03-29 NOTE — PROGRESS NOTES
RT was called to assess patient regarding increase secretions. Patient was on 3L oxymask with SPO2 of 93%. Breath sounds were clear, patient had secretions in the upper airway. NT suction patient X2 for large amount of creamy thick secretions, tolerated well. Despite the NT suction patient was still coarse. Encouraged RN to use oral suction since patient has spontaneous cough, Will continue to monitor patient as needed.

## 2023-03-29 NOTE — PROGRESS NOTES
Respiratory care note- Patient placed on HFNC 30% and 30LPM this shift. SpO2 97%.Breath sounds are coarse crackle mostly in the upper lung fields. Suctioned for large to copious amounts of dark yellow thick and changing to thin secretions. Unable to insert nasal trumpet. Code status changed to NO CPR -  Do Not Intubate this shift.

## 2023-03-29 NOTE — PROGRESS NOTES
SPIRITUAL HEALTH SERVICES Progress Note  [place] - 23 Ortho    Referral: Follow-Up per other  request.    Ptnt Zoran was not responsive. I visited with Zoran's son-in-law briefly; he shared that family were taking turns sitting with Zoran, and he asked me to come back to see Zoran's wife.    When I returned, I visited with Zoran's wife Queenie. She named an extensive list of Zoran's health conditions and described some increasing cognitive challenges in the past few months, where she doesn't feel like he can be left alone for a moment.    She shared questions and reflections about the HCD she had and its implications as Zoran's Health Care Agent.    She read parts of it aloud to me and wondered about the meaning of different treatments and how they align with the HCD. She pondered whether she should have Zoran's status changed to DNR. Queenie asked if it were true that ribs crack during CPR, and I affirmed it's common; she said that it sounded like it might be too much but wanted to contemplate further.    I reassured her that she was considering appropriately and any decisions coming from a place of highest aspirations would be fitting.    Queenie shared she is very close to her sister; I promoted talking things through with her, as well as Zoran's daughter and son-in-law, as part of her process.    Queenie  feels most at peace in Nature, and I encouraged her self-care, affirmed her emotions and experiences.    I encouraged Queenie to ask medical providers about implications and expectations of treatments, clarifying possible outcomes, as she processes their significance and discerns best choices and how they relate to the HCD. I validated her efforts at respecting and honouring Zoran's wishes.    I then shared about the visit with Social Work and Bedside Nurse.    SH remains available.    Rev Abigail Castillo  Associate   [facility] Phone Line [phone]  [place] (Tuesdays & Thursdays)  [phone]

## 2023-03-30 NOTE — PROGRESS NOTES
Patient required frequent oral and nasal suctioning through out my shift (6:30a-1900) using a 14fr catheter.   Very coarse upon arrival, improved with suctioning.    Suctioned a large amount of thick, yellow secretions.    Would become very coarse again shortly after. RT unable to insert nasal trumpet into nare.   Due to the trauma with frequent suctioning it is being done approximately every 3 hours.    Remains on HFNC 30L 40% to help thin the secretions.    RT will continue to monitor.    TRINIDAD ORTA, RRT

## 2023-03-30 NOTE — PROGRESS NOTES
Orthopedic Surgery  Rm Villarreal  03/30/2023     Admit Date:  3/15/2023    POD: 14 Days Post-Op   Procedure(s):  RIGHT OPEN REDUCTION INTERNAL FIXATION INTERTROCHANTERIC HIP FRACTURE    Patient remains encephalopathic and restless.  Nursing staff at bedside completing hygiene cares.    Requiring enteral feeds via NG tube.  Per RN, patient recently changed to DNR/DNI but not on comfort cares.  No acute events overnight.    Temp:  [97.8  F (36.6  C)-100.9  F (38.3  C)] 100.9  F (38.3  C)  Pulse:  [] 109  Resp:  [19-20] 20  BP: (108-140)/(74-84) 108/74  FiO2 (%):  [32 %-40 %] 40 %  SpO2:  [92 %-100 %] 95 %    Restless, yelling out. Does not follow commands.   Right hip dressing removed by RN.   Surgical site is clean, dry, and intact. No drainage or dehiscence.  Extensive ecchymosis along the lateral thigh.   No erythema.  Minimal swelling.  Bilateral calves are soft.  Spontaneously able to move all extremities.  Patient able to DF and PF ankles at times when restless in bed.  DP pulses 2+, bilaterally.    Labs:  Recent Labs   Lab Test 03/29/23  0616 03/28/23  0832 03/27/23  2130   WBC 9.5 8.9 9.3   HGB 10.0* 9.9* 10.1*    239 285     Recent Labs   Lab Test 03/27/23  2250 04/04/22  1029 03/07/22  0908   INR 1.82* 1.1 2.2*     Recent Labs   Lab Test 07/02/22  0710 07/01/22  0905   CRP 28.7* 32.3*       A/P    1. S/p right CM nail for an intertrochanteric femur fracture   Continue Lovenox for DVT prophylaxis.     Mobilize with PT/OT if able.    WBAT RLE with walker if able to mobilize.   Continue current pain regimen.   I ordered routine postop x-rays of the right hip and pelvis to be completed today. Per RN, the radiology department tech is unable to complete portable imaging of the hip/pelvis. The patient is not suitable for transfer to the radiology department due to decreased mental status and agitation/restlessness. He has not been able to WB and there have been no known trauma to the right hip  since surgery. For these reasons, we will defer postop imaging at this time.   Dressings: Aquacel removed today. No need for further dressings. Okay to shower. No soaking/submersion of surgical site x 2 more weeks. Discussed with RN importance of trying to avoid the patient scratching/picking at the area.   Follow-up at 8 weeks postop with Dr. Brady James for repeat x-rays and reevaluation.    2. Disposition   Anticipate d/c to TCU when medically cleared and progressing in PT.    Abbie Smith PA-C  [facility]

## 2023-03-30 NOTE — PROGRESS NOTES
CLINICAL NUTRITION SERVICES - REASSESSMENT NOTE    Recommendations Ordered by Registered Dietitian (RD):     - Continue to advance TF by 15 mL q 12 hours until goal is reached (50 mL/hr x 22 hours).     - Discontinued Ensure order (Pt is NPO)     Malnutrition:   (3/27)  % Weight Loss:  > 10% in 6 months (severe malnutrition)  % Intake:  </= 75% for >/= 1 month (severe malnutrition)  Subcutaneous Fat Loss:  Orbital region moderate depletion, Upper arm region severe depletion and Thoracic region severe depletion  Muscle Loss:  Temporal region moderate-severe depletion, Clavicle bone region severe depletion, Acromion bone region severe depletion, Scapular bone region severe depletion and Posterior calf region moderate-severe depletion  Fluid Retention: Trace     Malnutrition Diagnosis: Severe malnutrition  In Context of:  Acute illness or injury  Chronic illness or disease     EVALUATION OF PROGRESS TOWARD GOALS   Diet: NPO  Nutrition Support: TF initiated on 3/28. Slowly titrating up.     Nutrition Support Enteral:  Type of Feeding Tube: NGT (3/28)  Enteral Frequency:  Continuous  Enteral Regimen: Jevity 1.5 @ 50 mL/hr x 22 hours (1100 ml/day) HOLD from 7 am - 9 am for Synthroid dosing at 8 am  Total Enteral Provisions: 1650 kcals (28 kcal/kg), 70 g PRO (1.2 g/kg), 836 ml free H20, 238 g CHO, and 23 g fiber daily  Free Water Flush: Flush 60 mL q 4 hours    Intake/Tolerance:   - Tolerating TF advancement thus far. Holding x2 hrs daily for synthroid dosing. Last documented advancement up to 30 ml/hr on 3/29 @ 0230. Still at 30 ml/hr per 7 pm RN note *should be advancing q 12 hrs. Nothing documented in flowsheets.     - Labs:   Na checked today, NL  K/Mg/Phos all NL yesterday   - Stooling  Daily 1-4x  - Weight trends  Measured at 64 kg - up 7.3 kg from 3/28 (suspect inaccurate bedscale measurement, fluids and other artifacts may by influencing)     - Meds:   Synthroid @ 0800  Remeron 15 mg q HS  Liquid multivitamin daily  "  Miralax / Senokot scheduled (to be held for loose stools)  NaCl IVF @ 100 mL/hr (Previously tolerating D5 @ 125 - this was protective for refeeding syndrome)     ASSESSED NUTRITION NEEDS:  Dosing Weight 58 kg   Estimated Energy Needs: 3571-9782 kcals (25-30 Kcal/Kg)  Justification: maintenance and repletion  Estimated Protein Needs: 69-87 grams protein (1.2-1.5 g pro/Kg)  Justification: preservation of lean body mass, repletion     NEW FINDINGS:   - Suctioned for thick, yellow secretions.     Previous Goals:   TF @ goal to provide % estimated needs in the next 3 days.   Evaluation: Not met (yet)    Previous Nutrition Diagnosis:   Malnutrition related to suspected baseline inadequate oral intake as evidenced by spouse reports 3 \"light\" meals daily, loss of >10% in <4 months, fat and muscle losses on exam, underweight BMI, request received to begin TF.   Evaluation: No change - updated to current problem     CURRENT NUTRITION DIAGNOSIS  Inadequate enteral nutrition infusion related to slow advancement as evidenced by TF titrating to goal over the past 2 days, NPO diet in place, and D5 now off.     INTERVENTIONS  Recommendations / Nutrition Prescription  Goal =   Jevity 1.5 @ goal of 50ml/hr x 22 hours (1100 ml/day) HOLD from 7 am - 9 am for Synthroid dosing at 8 am.   Provides: 1650 kcals (28 kcal/kg), 70 g PRO (1.2 g/kg), 836 ml free H20, 238 g CHO, and 23 g fiber daily.  Flush 60 mL q 4 hours     Certavite daily  Daily weights  Weekly lyte checks (Monday)     Implementation  None new. Updated order to reflect tube tip, and goal TF given needing to hold for med admin @ 0800.     Goals  TF @ goal to provide % estimated needs.     MONITORING AND EVALUATION:  Progress towards goals will be monitored and evaluated per protocol and Practice Guidelines    Alona Tierney RD, LD  [facility], Ortho, [facility]o Spine  Pager: [phone]  Weekend Pager: [phone]    "

## 2023-03-30 NOTE — PLAN OF CARE
Trauma/Ortho/Medical (Choose one) Ortho    Diagnosis: ORIF Right Hip  POD#:14  Mental Status: Alert to self  Activity/dangle Bedrest  Diet: NPO with tube feeding at 30cc/hr  Pain: oxycodone  Velazquez/Voiding: Incontinent B&B  Tele/Restraints/Iso: Tele, Bilat wrist restraints  02/LDA:  High flow O2 at 15L with humidity  D/C Date: TBD  Other Info:TF started at 09:00 AM. PRN Atropine given for increased secretions. Scop patch in place. Pt suctioned x5 with large yellow thick sputum. Incision site open to air, no drainage noted.

## 2023-03-30 NOTE — PROGRESS NOTES
[facility]    Medicine Progress Note - Hospitalist Service    Date of Admission:  3/15/2023    Assessment & Plan   Rm Villarreal is a 82 year old male with history of mild dementia (lives at home with spouse), hypothyroidism, CAD, bilateral carotid stenosis, chronic atrial fibrillation on apixaban, COPD, hypertension, and ischemic cardiomyopathy who presented to the ED after missing a step and falling down the stairs at home and resultant R hip pain. He had less than 2 beers (confirmed with spouse) evening of 3/15 but denies any prodrome.  Imaging shows a right intertrochanteric proximal femur fracture. OR on 3/16/23 for operative repair.  Post-operatively, he developed worsening encephalopathy, repeat CT head negative for acute findings. Neurology was consulted and recommended MRI brain wwo contrast due to concern for ischemic stroke.  He also has acute blood loss anemia 13>7.6 g/dl. S/p 2u PRBC as of 3/19. HGB has been stable since ranging 8-9 g/dl.     Encephalopathy   2/2  hospital acquired delirium in the setting of underlying dementia   History of dementia  Rule out CVA given concern R MCA infarct   Insomnia  Patient lives at home with his spouse. Take mirtazapine and seroquel at home. Follows with Memory doctor. Spouse reports that he often wanders at night and that usually she shadows him to ensure safety.   Neurology consulted 3/24/2023 due to ongoing/worsening delirium. EEG consistent with encephalopathy.    Hold off on MRI given overall clinical picture     Acute blood loss anemia due to intraoperative blood loss  Hemoglobin   Date Value Ref Range Status   03/29/2023 10.0 (L) 13.3 - 17.7 g/dL Final   03/28/2023 9.9 (L) 13.3 - 17.7 g/dL Final   04/17/2021 14.4 13.3 - 17.7 g/dL Final   12/21/2020 14.3 13.3 - 17.7 g/dL Final    ml. Pre-op hgb 13.3g/dL, post-op hgb 7.6g/dL. S/p 2u PRBC as of 3/19.    Monitor    Continue to hold apixaban, resume when hemoglobin stable and  mental status improved     Acute right intertrochanteric proximal femur fracture secondary to mechanical fall  S/p ORIF with cephalomedullary device on 3/17/23.     Post op management per orthopedic surgery.    Enoxaparin for DVT ppx for now. Can resume apixaban as soon as hgb stable and mental status improved.     PT/OT consulted. Unable to participate due confusion and not following commands.     Acute respiratory failure with hypoxia, resolved  Right moderate pleural effusion  Severe pulmonary hypertension  History of COPD at baseline  He required 2 to 3 L nasal cannula in the hospital on arrival  Chest imaging does indicate a moderate right pleural effusion with some compressive atelectasis. Spouse and pt confirm no recent coughing, fevers/chills.  WBC is unremarkable and he is afebrile.   On room air as of 3/25/23.    As needed inhaler/nebulizer for wheezing    Continue oxygen      Coronary artery disease with history of stenting  Ischemic cardiomyopathy  Hypertension  Chronic atrial fibrillation on apixaban  HFrEF, with acute exacerbation  Acute hypoxic respiratory failure  Echo Oct 2022 showed EF 25-30%, severe hypokinesis noted, RV normal, LA moderately dilated. Severe pulmonary hypertension also noted. Recently torsemide increased to 10mg BID outpatient by cards.  Post operatively, received IV fluids due to elevated lactate. On RA sating mid 90's. JVP elevated.     Holding apixaban, await stable hemoglobin prior to resumption.  He is currently getting prophylactic dose Lovenox    Resumed PTA Toprol XL after holding on 3/18.    Cardiology consulted, appreciate their assistance    Torsemide on hold for now due to decreased oral intake and elevation in bicarb potentially due to contraction alkalosis.    IV fluids started 3/23/2023 due to altered mental status with significantly decreased oral intake  Monitor volume status closely     Lactic acidosis  Improved.    Hypothyroidism    Continue PTA  levothyroxine     Hypernatremia  Sodium   Date Value Ref Range Status   03/30/2023 138 136 - 145 mmol/L Final   04/17/2021 136 133 - 144 mmol/L Final   Na trending down on intravenous D5W.    Changed to NS    Hypokalemia   Potassium   Date Value Ref Range Status   03/29/2023 3.9 3.4 - 5.3 mmol/L Final   01/01/2023 4.0 3.4 - 5.3 mmol/L Final   04/17/2021 3.7 3.4 - 5.3 mmol/L Final     Replace as needed     Recent frostbite of all fingertips  Patient was snowblowing this winter without gloves and managed to frostbite the tips of all of his fingers.  SPO2 monitoring is difficult on fingers.    Recommend to monitor SPO2 on ear or toes     Hyperbilirubinemia  Likely 2/2 to acute illness.  ALP is normal. AST mildly elevated at 58. Ammonia 15    Continue supportive care and monitor      Severe malnutrition  Cachexia    On Mirtazapine for appetite stimulant.    Continue tube feedings     Goals of care  Health care directive reviewed.  The patient has multiorgan failure and his prognosis is poor.  The patient's wife would like him to be no code blue but continue with other care. I do not believe that his condition is reversible at this point and I conveyed this to his wife.      Atropine and scopolamine added for secretions    No code blue         Diet: Fluid restriction 1800 ML FLUID  NPO for Medical/Clinical Reasons Except for: No Exceptions  Adult Formula Drip Feeding: Continuous Jevity 1.5; Nasogastric tube; Goal Rate: 50; mL/hr; From: 9:00 AM; To: 7:00 AM; Advance by 15 mL q 12 hours until goal (50 mL/hr) is reached. Replace low lytes prior to advancing. HOLD TF daily from 7 am - 9 ...    DVT Prophylaxis: Enoxaparin (Lovenox) SQ  Velazquez Catheter: Not present  Lines: None     Cardiac Monitoring: ACTIVE order. Indication: Acute decompensated heart failure (48 hours)  Code Status: No CPR- Do NOT Intubate      Clinically Significant Risk Factors              # Hypoalbuminemia: Lowest albumin = 3 g/dL at 3/29/2023  6:16 AM,  will monitor as appropriate            # Severe Malnutrition: based on nutrition assessment        Disposition Plan     Expected Discharge Date: 03/30/2023    Discharge Delays: Placement - TCU  Destination: home            Damon Richard MD  Hospitalist Service  [facility]  Securely message with [employer] (more info)  Text page via [employer] [facility] [facility] Paging/Directory   ______________________________________________________________________    Interval History   Unable to obtain a history. He remains obtunded.     Physical Exam   Vital Signs: Temp: 99  F (37.2  C) Temp src: Axillary BP: 121/73 Pulse: 102   Resp: 20 SpO2: 97 % O2 Device: High Flow Nasal Cannula (HFNC) Oxygen Delivery: 30 LPM  Weight: 141 lbs 1.51 oz    Constitutional: not in acute distress but has audible upper respiratory sounds  Respiratory: no wheezing, rales or rhonchi   Cardiovascular: regular rate and rhythm, normal S1 and S2, no S3 or S4, and no murmur noted  Neurologic: not alert  + nasogastric tube     Medical Decision Making       MANAGEMENT DISCUSSED with the following over the past 24 hours: wife    NOTE(S)/MEDICAL RECORDS REVIEWED over the past 24 hours: epic      Data     I have personally reviewed the following data over the past 24 hrs:    N/A  \   N/A   / N/A     138 N/A N/A /  N/A   N/A N/A N/A \       Imaging results reviewed over the past 24 hrs:   No results found for this or any previous visit (from the past 24 hour(s)).

## 2023-03-30 NOTE — PROGRESS NOTES
Date/Time:03/29, [id number]    Trauma/Ortho/Medical (Choose one) :Trauma    Diagnosis:ORIF right hip fx  POD#:14  Mental Status:A&O X1  Activity/dangle:Not oob  Diet:NPO,TF  Pain:  Velazquez/Voiding:Incontinent,changed  Tele/Restraints/Iso:Tele:A-Fib with RVC's  02/LDA:high flow O2 at 30L with humidity  D/C Date:TBD  Other Info:TF stopped at 0700  Pt suctioned x 2 orally and x 1 nasal  with  thick yellow secretionsI return.

## 2023-03-30 NOTE — PLAN OF CARE
Goal Outcome Evaluation:    Date/Time: 3/29/23 1900    Trauma/Ortho/Medical (Choose one)trauma    Diagnosis:hip fx, ORIF right hip  POD#:13  Mental Status:A&Ox1 with periods of restlessness  Activity/dangle:BR  Diet:NPO with TF  Pain:oxycodone  Velazquez/Voiding:incontinent B&B  Tele/Restraints/Iso:telemetry, bilat wrist restraints  02/LDA:high flow O2 at 30L with humidity  D/C Date: TBD  Other Info: pt having increased secretions, deep suction x3 today with large thick yellow sputum, DNR/DNI, TF at 30cc/hr, scop patch placed to assist with secretions.

## 2023-03-31 NOTE — PROGRESS NOTES
[facility]    Medicine Progress Note - Hospitalist Service    Date of Admission:  3/15/2023    Assessment & Plan   Rm Villarreal is a 82 year old male with history of mild dementia (lives at home with spouse), hypothyroidism, CAD, bilateral carotid stenosis, chronic atrial fibrillation on apixaban, COPD, hypertension, and ischemic cardiomyopathy who presented to the ED after missing a step and falling down the stairs at home and resultant R hip pain. He had less than 2 beers (confirmed with spouse) evening of 3/15 but denies any prodrome.  Imaging shows a right intertrochanteric proximal femur fracture. OR on 3/16/23 for operative repair.  Post-operatively, he developed worsening encephalopathy, repeat CT head negative for acute findings. Neurology was consulted and recommended MRI brain wwo contrast due to concern for ischemic stroke.  He also has acute blood loss anemia 13>7.6 g/dl. S/p 2u PRBC as of 3/19. HGB has been stable since ranging 8-9 g/dl.  The patient has developed multiorgan failure and remains comatose.  His respiratory status has deteriorated.  The patient has been made no CODE BLUE and the family is considering full comfort care.    Multiorgan failure and including respiratory failure, liver failure and encephalopathy  Goals of care  The patient does not likely have any reversible condition.  Per his healthcare directive he was made no CODE BLUE and although he did not want artificial nutrition it is being continued for now per the family request.  I had a family conference this morning and the family is leaning towards full comfort care.  I did speak with the palliative care physician who is also going to talk with the family today.    Continue hydromorphone as needed for comfort    Continue atropine drops and scopolamine patch for secretion    No CODE BLUE status     Encephalopathy   2/2  hospital acquired delirium in the setting of underlying dementia   History of  dementia  Rule out CVA given concern R MCA infarct   Insomnia  Patient lives at home with his spouse. Take mirtazapine and seroquel at home. Follows with Memory doctor. Spouse reports that he often wanders at night and that usually she shadows him to ensure safety.   Neurology consulted 3/24/2023 due to ongoing/worsening delirium. EEG consistent with encephalopathy.    Holding off on MRI given overall clinical picture     Acute blood loss anemia due to intraoperative blood loss  Hemoglobin   Date Value Ref Range Status   03/31/2023 10.6 (L) 13.3 - 17.7 g/dL Final   03/29/2023 10.0 (L) 13.3 - 17.7 g/dL Final   04/17/2021 14.4 13.3 - 17.7 g/dL Final   12/21/2020 14.3 13.3 - 17.7 g/dL Final    ml. Pre-op hgb 13.3g/dL, post-op hgb 7.6g/dL. S/p 2u PRBC as of 3/19.    Monitor    Continue to hold apixaban     Acute right intertrochanteric proximal femur fracture secondary to mechanical fall  S/p ORIF with cephalomedullary device on 3/17/23.     Post op management per orthopedic surgery.    Enoxaparin for DVT ppx for now. Can resume apixaban as soon as hgb stable and mental status improved.     PT/OT consulted. Unable to participate due confusion and not following commands.     Acute respiratory failure with hypoxia, resolved now worsened due to multiorgan failure  Right moderate pleural effusion  Severe pulmonary hypertension  History of COPD at baseline  He required 2 to 3 L nasal cannula in the hospital on arrival  Chest imaging does indicate a moderate right pleural effusion with some compressive atelectasis. Spouse and pt confirm no recent coughing, fevers/chills.  WBC is unremarkable and he is afebrile.   On room air as of 3/25/23.    Continue oxygen as needed     Coronary artery disease with history of stenting  Ischemic cardiomyopathy  Hypertension  Chronic atrial fibrillation on apixaban  HFrEF, with acute exacerbation  Acute hypoxic respiratory failure  Echo Oct 2022 showed EF 25-30%, severe hypokinesis  noted, RV normal, LA moderately dilated. Severe pulmonary hypertension also noted. Recently torsemide increased to 10mg BID outpatient by cards.  Post operatively, received IV fluids due to elevated lactate. On RA sating mid 90's. JVP elevated.     Holding apixaban, he is currently getting prophylactic dose Lovenox    Resumed PTA Toprol XL after holding on 3/18.    Cardiology consulted, appreciate their assistance    Torsemide on hold for now due to decreased oral intake and elevation in bicarb potentially due to contraction alkalosis.    IV fluids started 3/23/2023 due to altered mental status with significantly decreased oral intake     Lactic acidosis due to multiorgan failure    Hypothyroidism    Continue PTA levothyroxine     Hypernatremia  Sodium   Date Value Ref Range Status   03/31/2023 141 136 - 145 mmol/L Final   04/17/2021 136 133 - 144 mmol/L Final   Na trending down on intravenous D5W.    Changed to NS    Hypokalemia, treated  Potassium   Date Value Ref Range Status   03/31/2023 4.7 3.4 - 5.3 mmol/L Final     Comment:     Specimen slightly hemolyzed, potassium may be falsely elevated.   01/01/2023 4.0 3.4 - 5.3 mmol/L Final   04/17/2021 3.7 3.4 - 5.3 mmol/L Final     Replace as needed     Recent frostbite of all fingertips  Patient was snowblowing this winter without gloves and managed to frostbite the tips of all of his fingers.  SPO2 monitoring is difficult on fingers.    Recommend to monitor SPO2 on ear or toes     Hyperbilirubinemia likely due to liver failure associated with his heart and respiratory failure    Continue supportive care and monitor      Severe malnutrition  Cachexia    On Mirtazapine for appetite stimulant.    Continue tube feedings        Diet: Fluid restriction 1800 ML FLUID  NPO for Medical/Clinical Reasons Except for: No Exceptions  Adult Formula Drip Feeding: Continuous Jevity 1.5; Nasogastric tube; Goal Rate: 50; mL/hr; From: 9:00 AM; To: 7:00 AM; Advance by 15 mL q 12 hours  until goal (50 mL/hr) is reached. Replace low lytes prior to advancing. HOLD TF daily from 7 am - 9 ...    DVT Prophylaxis: Enoxaparin (Lovenox) SQ  Velazquez Catheter: Not present  Lines: None     Cardiac Monitoring: ACTIVE order. Indication: Acute decompensated heart failure (48 hours)  Code Status: No CPR- Do NOT Intubate      Clinically Significant Risk Factors        # Hypokalemia: Lowest K = 3.3 mmol/L in last 2 days, will replace as needed    # Hypercalcemia: corrected calcium is >10.1, will monitor as appropriate    # Hypoalbuminemia: Lowest albumin = 2.6 g/dL at 3/31/2023  5:51 AM, will monitor as appropriate            # Severe Malnutrition: based on nutrition assessment        Disposition Plan      Expected Discharge Date: 04/01/2023    Discharge Delays: Placement - TCU  Destination: home  Discharge Comments: family conference today 3/31/2023          Damon Richard MD  Hospitalist Service  [facility]  Securely message with [employer] (more info)  Text page via [employer] Paging/Directory   ______________________________________________________________________    Interval History   Unable to obtain a history. He remains obtunded.  An RRT was called this morning due to worse sending respiratory status    Physical Exam   Vital Signs: Temp: (!) 101.3  F (38.5  C) Temp src: Axillary BP: (!) 135/90 Pulse: 104   Resp: 25 SpO2: 100 % O2 Device: High Flow Nasal Cannula (HFNC) Oxygen Delivery: 50 LPM  Weight: 141 lbs 1.51 oz    Constitutional:  He appears a little tachypneic  Neurologic: not alert  + nasogastric tube     Medical Decision Making       MANAGEMENT DISCUSSED with the following over the past 24 hours: wife and other family as well as the palliative care physician   NOTE(S)/MEDICAL RECORDS REVIEWED over the past 24 hours: epic      Data     I have personally reviewed the following data over the past 24 hrs:    5.0  \   10.6 (L)   / 187; 187     141 109 (H) 21.9 /  115 (H)   4.7 17  (L) 0.71 \       ALT: 36 AST: 61 (H) AP: 129 TBILI: 7.5 (H)   ALB: 2.6 (L) TOT PROTEIN: 5.6 (L) LIPASE: N/A       Procal: N/A CRP: N/A Lactic Acid: 2.0         Imaging results reviewed over the past 24 hrs:   Recent Results (from the past 24 hour(s))   XR Chest Port 1 View    Narrative    EXAM: XR CHEST PORT 1 VIEW  LOCATION: [facility]  DATE/TIME: 3/30/2023 9:25 PM    INDICATION: RRT: acute hypoxic respiratory failure, concern for aspiration  COMPARISON: 03/20/2023      Impression    IMPRESSION: Persistent mild basilar atelectasis and small pleural effusions, worse on the right. Vascular indistinctness may reflect edema. Stable enlarged cardiac silhouette. Enteric tube courses into the stomach and off the field-of-view.       XR Chest Port 1 View    Narrative    CHEST PORTABLE 1 VIEW  3/31/2023 11:10 AM       INDICATION: Respiratory distress.  COMPARISON: 3/30/2023       Impression    IMPRESSION: Feeding tube courses below the diaphragm. Persistent  small, right greater than left pleural effusions with underlying  consolidation or atelectasis in the lung bases. Upper lungs clear. No  pneumothorax. No significant interval change.

## 2023-03-31 NOTE — PLAN OF CARE
Pt is very lethargic, very minimal arousal. . Pt transferrin to In patient hospice. Family at bedside. GIP hospice saw the family member earlier. Family at bedside. Pt on supplemental oxygen and IVF fluids. NG in place. Tele. IV dilaudid given for pain.

## 2023-03-31 NOTE — PLAN OF CARE
Goal Outcome Evaluation:    Dx: ORIF R hip fx    Disoriented X4. Bedrest. Turned and repositioned. Incontinent of bowel and bladder. Bilateral soft wrist restraints. NG tube in place, tube feed on hold. Patient NPO. RT called at 0200 due to patient having increased secretions and labored breathing, deep suctioning done. Patient remains on high flow nasal cannula at 80% FiO2. PRN atropine administered for increased secretions. Pain managed with Dilaudid. On tele; Afib with RVR. Surgical incision is HÉCTOR. PIV infusing.

## 2023-03-31 NOTE — PROGRESS NOTES
"[facility]    Consult Note - [facility]    ______________________________________________________________________    [facility] 24/7 Contact Number: [phone]    - Providers: Please contact [facility] with changes in orders or clinical plan of care   - Nursing: Please contact [facility] with significant changes in patient condition    Hospice will notify the care team (including the hospitalist) to confirm date of inpatient hospice (GIP) admission.    New Epic encounter will not be created until hospice completes admission.   ______________________________________________________________________        Hospice Diagnosis: Acute respiratory failure     Indication for Inpatient Hospice: Respiratory distress / Removal of HF oxygen    Goals for Hospital Discharge: Patient is not medically stable for transport     Plan of Care Discussed with the Following:   - Nurse: Olivia   - Hospitalist/Rounding Provider:  Dr. Hilary Abdalla's Family/Preferred Contact:Spouse Queenie  - Hospice Provider: Randy Saldaña    Summary of Visit (includes assessment, medications and any new orders):     Writer met with patient's spouse, Queenie and son in law for follow up hospice consultation.  Family is in agreement with [facility] hospice enrollment.  EOL education provided and questions answered surrounding \"what to expect when Zoran dies\"  Medicare consent forms have been signed.      Recommendations:    Family would like NG tube removed as soon as possible and specifically requested that patient be pre-medicated with pain medication to alleviate any discomfort.  Please utilize IV dilaudid prior to removal.    Family requested removal of HFNC on 4/01 to allow time for patient's daughter to visit.  Please plan to premedicate with IV dilaudid and ativan pre and post removal of HFNC.for management of pain / dyspnea     Robinul 0.2 mg IV Q6H for secretions     Dilaudid 0.4 mg IV Q2H PRN for pain / " dyspnea (administer for RR greater than 22)    Ativan 1 mg IV Q3H PRN for anxiety / agitation     Irene Cheatham RN  [facility]

## 2023-03-31 NOTE — PROVIDER NOTIFICATION
MD Notification    Notified Person: MD    Notified Person Name: Dr. Richard    Notification Date/Time: 3/31 @0844    Notification Interaction: vocera message    Purpose of Notification: update on patients wife in room-she is planning on a care conference at 1130, pts temp and HR elevated, Lactic came back at 3.6    Orders Received:

## 2023-03-31 NOTE — PROGRESS NOTES
Speech Language Therapy Discharge Summary    Reason for therapy discharge:    Change in medical status.    Progress towards therapy goal(s). See goals on Care Plan in [facility] electronic health record for goal details.  Goals not met.  Barriers to achieving goals:   limited tolerance for therapy and respiratory status.    Therapy recommendation(s):    No further therapy is recommended. IF pt becomes appropriate to participate in Swallow Evaluation a new consult can be place at that time.

## 2023-03-31 NOTE — CODE/RAPID RESPONSE
[facility]  House FITZ RRT Note  3/30/2023   Time called: 2058  RRT called for: Hypoxia, afib RVR  Code status: No CPR- Do NOT Intubate    Assessment & Plan    Patient is an 82-year-old male with a past medical history of dementia, hypothyroidism, CAD, bilateral carotid stenosis, chronic atrial fibrillation on apixaban, COPD, HTN, ischemic cardiomyopathy who presented to the ED emergency department after a fall at home and right femur fracture.    I was paged to the bedside to evaluate Mr. Rm Villarreal for an acute episode of hypoxia and tachycardia.    Upon my arrival the patient was in acute distress.  Patient was breathing with a respiratory rate in the 30s, high flow nasal cannula at 80% FiO2, heart rate in the 160s and irregular.  Patient was warm and dry, lung sounds coarse Rales and rhonchi especially in the upper lobes and upper airway.  Respiratory therapy suctioned patient with NT suction and was able to get out a lot of thick yellow/brown secretions.  Flying squad RN also suctioned with Yonker orally and was able to suction a large amount of yellow/brown thick secretions.  The secretions could represent tube feeding so this was paused and will remain paused overnight.  Patient had no gag reflex during suctioning.  Overall patient was minimally arousable and had a GCS of 7.  Patient's atrial fibrillation was treated with metoprolol IV and it responded well decreasing to the 1 teens prior to my departure.  Patient's oxygenation improved after suctioning and his respiratory rate decreased to 20.    Diagnosis:  -- Acute hypoxic respiratory failure 2/2 secretions  Patient is required frequent deep suctioning by RT throughout the day.  Per respiratory therapist patient has had a gurgle/rattle pattern to his breathing for several days.  Upon suctioning both oral and nasotracheal a significant amount of yellow/brown, thick secretions were suctioned.  This improved patient's  oxygenation but did not improve the rattle.  Patient had no gag reflex on suctioning.     -- Atrial fibrillation with rapid ventricular response  Patient has history of afib and is anticoagulated on lovenox in the hospital. Patient's heart rate increased to 170s during respiratory distress but remained >150 even after suctioning. This was treated with metoprolol IV and seemed to resolve.    Plan:  -- CXR  -- CBC, BMP, magnesium  -- 2.5mg IV metoprolol  -- Stop tube feeding overnight  -- 500mL IVF bolus    At the conclusion of this RRT patient was hemodynamically stable and will remain on current unit    Goals of Care:  I called patient's wife Queenie who had changed patient's code status from FULL to DNR/DNI earlier today. She was tearful when I discussed patient's overall declining health and inability to protect his airway. I asked her to prepare for continued issues with his airway and the strong possibility that he would develop pneumonia and further respiratory problems. I told her that I did not expect her  to live much longer, days to weeks, as his overall health continued to decline. Queenie stated that she had been planning to have a care conference with the rounding physician tomorrow at 1130 but the plans had not yet been solidified with the physician. I communicated this to the nurse who will have the day nurse page hospitalist in the morning. I also added this information to the physician sticky note.    His history is significant for:  Past Medical History:   Diagnosis Date     Adjustment disorder      Carotid stenosis, bilateral     left CEA 2007     Chronic atrial fibrillation (H)     warfarin     COPD (chronic obstructive pulmonary disease) (H) 02/03/2021     Coronary artery disease     cardiac cath 2014: chronic occlusion of circumflex and apical LAD: medical management; cath 2005: stent to LAD, historical: stent to RCA     History of blood transfusion      Hypertension      Ischemic  cardiomyopathy 2021    ef 25%     Pulmonary nodule      Past Surgical History:   Procedure Laterality Date     angiogram  02/19/2014    cardiac cath 2014: chronic occlusion of circumflex and apical LAD: medical management     ANGIOGRAM  2005    stent to LAD     ANGIOGRAM      stent to RCA     COLONOSCOPY      2010     COLONOSCOPY N/A 8/30/2018    Procedure: COMBINED COLONOSCOPY, SINGLE OR MULTIPLE BIOPSY/POLYPECTOMY BY BIOPSY;  colonoscopy;  Surgeon: Tyler Dee MD;  Location:  GI     GI SURGERY      hemorrhoidectomy     IR VISCERAL ANGIOGRAM  7/25/2019     OPEN REDUCTION INTERNAL FIXATION HIP NAILING Right 3/16/2023    Procedure: RIGHT OPEN REDUCTION INTERNAL FIXATION INTERTROCHANTERIC HIP FRACTURE;  Surgeon: Brady James MD;  Location:  OR     VASCULAR SURGERY  2007    left CEA       Review of Systems   The 10 point Review of Systems is negative other than noted in the HPI or here.     Allergies   Allergies   Allergen Reactions     Atorvastatin Diarrhea     Intolerance to most statin medications        Physical Exam   Physical Exam  Constitutional:       Appearance: He is cachectic. He is ill-appearing.   HENT:      Mouth/Throat:      Mouth: Mucous membranes are dry.   Eyes:      Pupils: Pupils are equal, round, and reactive to light.   Cardiovascular:      Rate and Rhythm: Tachycardia present. Rhythm irregular.      Pulses: Normal pulses.   Pulmonary:      Effort: Respiratory distress present.      Breath sounds: Rhonchi present.   Skin:     Capillary Refill: Capillary refill takes less than 2 seconds.      Coloration: Skin is pale.   Neurological:      Mental Status: He is lethargic.         Vital Signs with Ranges:  Temp:  [96.6  F (35.9  C)-100.9  F (38.3  C)] 96.6  F (35.9  C)  Pulse:  [] 99  Resp:  [19-20] 19  BP: (108-140)/(72-83) 125/72  FiO2 (%):  [38.8 %-43 %] 38.8 %  SpO2:  [93 %-100 %] 100 %  I/O last 3 completed shifts:  In: 1976.67 [I.V.:1856.67; NG/GT:120]  Out: -     Data    Results for orders placed or performed during the hospital encounter of 03/15/23 (from the past 24 hour(s))   Sodium   Result Value Ref Range    Sodium 138 136 - 145 mmol/L   Sodium   Result Value Ref Range    Sodium 139 136 - 145 mmol/L   Potassium   Result Value Ref Range    Potassium 3.3 (L) 3.4 - 5.3 mmol/L   XR Chest Port 1 View    Narrative    EXAM: XR CHEST PORT 1 VIEW  LOCATION: [facility]  DATE/TIME: 3/30/2023 9:25 PM    INDICATION: RRT: acute hypoxic respiratory failure, concern for aspiration  COMPARISON: 03/20/2023      Impression    IMPRESSION: Persistent mild basilar atelectasis and small pleural effusions, worse on the right. Vascular indistinctness may reflect edema. Stable enlarged cardiac silhouette. Enteric tube courses into the stomach and off the field-of-view.         COVID-19 PCR Results    COVID-19 PCR Results 4/17/21 6/29/22   SARS-CoV-2 Virus Specimen Source Nasopharyngeal    SARS-CoV-2 PCR Result NEGATIVE    SARS CoV2 PCR  Positive (A)   (A) Abnormal value       Comments are available for some flowsheets but are not being displayed.         COVID-19 Antibody Results, Testing for Immunity    COVID-19 Antibody Results, Testing for Immunity   No data to display.             Time Spent on this Encounter   I spent 45 minutes of critical care time on the unit/floor managing the care of Rm Villarreal. Upon evaluation, this patient had a high probability of imminent or life-threatening deterioration due to acute hypoxic respiratory failure, which required my direct attention, intervention, and personal management. 100% of my time was spent at the bedside counseling the patient and/or coordinating care regarding services listed in this note.    SAMANTHA Alfredo, CNP  Hospitalist - House FITZ  Text me on the [employer] fitz for a textback  Text page with web based paging for a callback

## 2023-03-31 NOTE — PLAN OF CARE
Patient vital signs are at baseline: No,  Reason:  tachycardic, on high flow oxygen  Patient able to ambulate as they were prior to admission or with assist devices provided by therapies during their stay:  No,  Reason:  bedbound  Patient MUST void prior to discharge:  Yes  Patient able to tolerate oral intake:  No,  Reason:  NG tube feed  Pain has adequate pain control using Oral analgesics:  Yes  Does patient have an identified :  Yes  Has goal D/C date and time been discussed with patient:  No,  Reason:  Patient not ready for discharge    Ortho/Medical    Diagnosis:R hip fx, hypoxia  POD#:Surgery was 3/16 R hip ORIF  Mental Status:Disorientated x4, responded to touch and pain  Activity/dangle: Bedbound  Diet:NPO, TF was running then held after RRT  Pain:Denies  Velazquez/Voiding:Incont  Tele/Restraints/Iso: soft bilateral wrist restraints  02/LDA:Continuous fluids  D/C Date:TBD  Other Info:RRT called at 2058 for tachycardia, secretions, labored breathing with increased oxygen demand

## 2023-03-31 NOTE — PROGRESS NOTES
Referral Received - [facility]       [facility] would like to thank you for the [facility] referral.    Referral received and initial insurance information sent to  Hospice intake for review.    We are determining your patient's eligibility with a medical director at this time.    Our plan is to visit your patient as soon as possible. We will connect with the primary care team shortly to collect more information on the patient's progression. Thank you for your patience.      Annia James, ZEINAB  [phone]  [facility]  Contact information available via [facility] Paging/Directory     Listed as Hosp[facility] in [facility]
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
## 2023-03-31 NOTE — PROGRESS NOTES
[facility]    Progress Note - [facility]    ______________________________________________________________________    [facility] Contact Number: [phone]    - Providers: Please contact [facility] with changes in orders or clinical plan of care   - Nursing: Please contact [facility] with significant changes in patient condition  ______________________________________________________________________        Plan of Care Discussed with the Following:   - Nurse: Yolanda  - Hospitalist/Rounding Provider: Dr. Richard-   Rm's Family/Preferred Contact: Spouse Queenie   - Hospice Provider: Randy Saldaña     Summary of Visit:    Patient is eligible for inpatient hospice.     Hospice SW met with patient's family at bedside.  Spouse, Queenie, and son in law present for conversation.  Hospice SW explained hospice benefit and goals for comfort focused care within the hospital.   Family had additional questions regarding the difference between GIP hospice and comfort focused care, which SW explained in detail, highlighting bereavement services for family members after death.  Family requested to speak with provider for further clarification. Hospice SW provided family with contact information and encouraged them to call with additional questions, or if they would like Zoran to admit to inpatient hospice.  Care team updated that patient is eligible for GIP hospice, however, family remains undecided.      Irene Cheatham, RN

## 2023-03-31 NOTE — PLAN OF CARE
Goal Outcome Evaluation:    RRT called this AM, see providers note. Patient minimally arousable, bilateral wrist restraints removed at 1045. NG in place, not used this shift. High flow oxygen on. Frequent oral suctioning. IV metoprolol given x2 and IV dilaudid given x1, patient has appeared comfortable since completion of RRT and dilaudid dose.   Family still having questions regarding palliative & hospice,  called to come back to help discussions with family.

## 2023-03-31 NOTE — CODE/RAPID RESPONSE
[facility]    RRT Note  3/31/2023   Time Called: 1037am    RRT called for: Increased respiratory distress    Assessment & Plan   IMPRESSION & PLAN:    Rm Villarreal is a 82 year old male w/ PMH of dementia regularly at home, hypothyroidism, CAD, bilateral carotid stenosis, chronic A-fib on anticoagulation, COPD with severe pulmonary hypertension, hypertension, ischemic cardiomyopathy who was admitted on 3/15/2023 for fall in which he sustained a right hip fracture and is status post surgical repair.  His postoperative course has been protracted, currently hospital day 16, and complicated including encephalopathy secondary to delirium and possible right MCA stroke, acute blood loss anemia current RBCs transfusion x2, acute respiratory failure, hypoxic type secondary to pleural effusion and acute chronic decompensated heart failure, protein calorie malnutrition requiring artificial nutrition and hydration.    Last 24 hours patient had rapid response for respiratory distress and has had increase in FiO2 requirements from 30 up to 60% FiO2 by high flow nasal cannula.  Patient's spouse has changed goals of treatment from full code to DNR/DNI last evening after RRT.  His GCS at that time was 7.  Patient's had A-fib with RVR.  On the late morning of 3/31/2023 RN was concerned about patient's worsening respiratory status as well as questionable VT on telemetry.  For these reasons RRT was activated.  On my arrival I find an elderly man unresponsive to all stimuli, does not follow any commands.  Pupils are 4 mm and briskly reactive bilaterally he coughs spontaneously, grimace to pain and can only withdraw bilateral lower extremities to pain.  He is tachypneic with respiratory rate of 30, audible gurgling respirations.  RT is suctioning copious amounts of tube feed colored tan secretions repeatedly from his airway on oral pharyngeal suctioning.  Skin is hot, he is in A-fib with RVR heart rate 140s  150s.  he appears jaundiced.  BP is 141/92    I personally reviewed the radiographs bilateral right more than left pleural effusions right lower lobe infiltrate and bilateral pulmonary vascular congestion    Impression  Multiorgan failure including   acute hypoxic respiratory failure,  encephalopathy, GCS is 6,   afib with RVR.    concurrent chronic HFrEF with LVEF 25-30%    Differential diagnosis:  Consider medication effects, received Remeron 6:45 PM on the evening of 3/30/2023 versus metabolic encephalopathy.  Highly suspicious that there is aspiration event ongoing causing his respiratory failure.  He is ventilating and oxygenating sufficiently by ABG but his CNS failure (GCS 6) predicts loss of protective airway reflexes.  Code status is DNR/DNI    INTERVENTIONS:  - Stat EKG  --> afib w/ RVR, no VT or other ventricular arrhythmias noted, see below for additional details  - Stat ABG  - Glucose 115 mg/dL  - Add on remainder of BMP P, CBC, LFTs  -Stat portable chest x-ray  -Patient's family is due for a care conference imminently.  - I had nursing staff paged hospitalist attending physician to bedside to expedite conference.  I have updated that provider on the events of the RRT  -Patient's family and hospitalist attending physician convened for said care conference.  I did not join them.  I did share my opinion w/ the hospitalist attendign MD that patient likely will not survive hospitalization.  I would recommend withdrawal of life supporting therapy/focus on comfort.  -Post care conference family is interested in palliative consultation  -Patient is DNR/DNI, continue current level of care however if goals of treatment change towards aggressive intervention he would warrant intubation, antimicrobials etc.    Working diagnosis: Multiorgan failure post hip fracture with most pressing issues being CNS failure/likely metabolic encephalopathy, acute hypoxic respiratory failure and acute liver injury    At the end of  the RRT labs and diagnostics have been completed.  I reviewed them at a later time.    disposition continue current level of care    Discussed with and defer further cares to hospitalist attending physician Dr. Jonathan Richard     Code Status: No CPR- Do NOT Intubate    Allergies   Allergies   Allergen Reactions     Atorvastatin Diarrhea     Intolerance to most statin medications        Physical Exam   Vital Signs with Ranges:  Temp:  [96.6  F (35.9  C)-101.3  F (38.5  C)] 101.3  F (38.5  C)  Pulse:  [] 104  Resp:  [19-25] 25  BP: ()/(68-99) 135/90  FiO2 (%):  [38.8 %-80.8 %] 60 %  SpO2:  [91 %-100 %] 100 %  I/O last 3 completed shifts:  In: 3272 [I.V.:1360; NG/GT:481; IV Piggyback:500]  Out: -     Constitutional: vs as above and/or per EMR  General:  adult pt lying in bed without acute distress   GCS:   Motor 4=Withdraws from pain   Verbal 1=None   Eye Opening 1=None   Total: 6       Neuro: does not +follows commands, nonverbal, face symmetric, tongue midline,   Eyes pupils equal round 4mm briskly reactive bilat, sclera icteric, noninjected, conjunctiva pink,  Head, ENT & mouth: NC/AT,  mouth moist oral mucosa  Neck: supple  CV A-fib with RVR, hypertensive  resp: Loud gurgling respirations audible from the doorway, tachypneic, rhonchi bilateral upper lobes  gi:normoactive bowel sounds, soft, nontender, nondisteded  Ext: no edema or mottling, warm skin  Skin: no rashes on exposed skin  Lymph: defer  Musculoskeletal no bony joint deformities      Data     EKG:  Interpreted by SAMANTHA Gar CNP  Time reviewed: 1052 a.m.  Symptoms at time of EKG: Respiratory failure  Rhythm: atrial fibrillation - rapid  Rate: Tachycardia  Axis: Left Axis Deviation  Ectopy: none  Conduction: normal  ST Segments/ T Waves: T wave inversion aVL, V5 and V6  Q Waves: none  Comparison to prior: Compared to prior study aVL and V5 T wave inversions are new, V6 was present and similar study on 3/28/2023      ABG:  -  Recent Labs    Lab 03/31/23  1104   PH 7.36   PCO2 35   PO2 224*   HCO3 20*   O2PER 60       IMAGING: (X-ray/CT/MRI)   Recent Results (from the past 24 hour(s))   XR Chest Port 1 View    Narrative    EXAM: XR CHEST PORT 1 VIEW  LOCATION: [facility]  DATE/TIME: 3/30/2023 9:25 PM    INDICATION: RRT: acute hypoxic respiratory failure, concern for aspiration  COMPARISON: 03/20/2023      Impression    IMPRESSION: Persistent mild basilar atelectasis and small pleural effusions, worse on the right. Vascular indistinctness may reflect edema. Stable enlarged cardiac silhouette. Enteric tube courses into the stomach and off the field-of-view.       XR Chest Port 1 View    Narrative    CHEST PORTABLE 1 VIEW  3/31/2023 11:10 AM       INDICATION: Respiratory distress.  COMPARISON: 3/30/2023       Impression    IMPRESSION: Feeding tube courses below the diaphragm. Persistent  small, right greater than left pleural effusions with underlying  consolidation or atelectasis in the lung bases. Upper lungs clear. No  pneumothorax. No significant interval change.       CBC with Diff:  Recent Labs   Lab Test 03/31/23  0320 03/28/23  0832 03/27/23  2250   WBC 5.0   < >  --    HGB 10.6*   < >  --    *   < >  --      187   < >  --    INR  --   --  1.82*    < > = values in this interval not displayed.        Lactic Acid:    Lab Results   Component Value Date    LACT 3.6 03/31/2023       Repeat is 2    Comprehensive Metabolic Panel:  Recent Labs   Lab 03/31/23  1058 03/31/23  0827 03/31/23  0551 03/29/23  1812 03/29/23  0616   NA  --  141 142   < > 137  137   POTASSIUM  --  4.7  --    < > 3.9   CHLORIDE  --  109*  --   --  103   CO2  --  17*  --   --  20*   ANIONGAP  --  15  --   --  14   * 83  --   --  120*   BUN  --  21.9  --   --  29.4*   CR  --  0.71  --    < > 0.93   GFRESTIMATED  --  >90  --    < > 83   RANDY  --  9.1  --   --  9.0   MAG  --   --   --   --  2.1   PHOS  --   --   --   --  3.9   PROTTOTAL   --   --  5.6*  --  6.1*   ALBUMIN  --   --  2.6*  --  3.0*   BILITOTAL  --   --  7.5*  --  8.3*   ALKPHOS  --   --  129  --  131*   AST  --   --  61*  --  98*   ALT  --   --  36  --  46    < > = values in this interval not displayed.       Time Spent on this Encounter   I spent 45 minutes (1040 - 1125) of critical care time on the unit/floor managing the care of Rm Villarreal. Upon evaluation, this patient had a high probability of imminent or life-threatening deterioration due to multiorgan failure, which required my direct attention, intervention, and personal management including review of EKG, chest x-ray, % of my time was spent at the bedside counseling the patient and/or coordinating care regarding services listed in this note.    Saritha Braswell, [facility] FITZ  [phone]

## 2023-03-31 NOTE — PROGRESS NOTES
Patient remains on HFNC 55L and 80%. Patient had desaturations earlier in the shift, RRT was called. Upon arrival patient sating low 80s with increased work of breathing and very coarse. RT suction patient via nasally and orally x2 for large amount of thick yellow secretions. Patient is very coarse despite suctioning. Flow was increased from 35L to 55L to help with the work of breathing and FIO2 was increased to 80%. Will continue to monitor patient.

## 2023-03-31 NOTE — PROGRESS NOTES
MD Notification    Notified Person: MD    Notified Person Name: Kavon Canada    Notification Date/Time: 3/31/23 12:20 AM    Notification Interaction: Paged    Purpose of Notification: Soft restraint order , able to reorder? Bilateral arm restraints.     Orders Received: Awaiting    Comments:

## 2023-03-31 NOTE — PROGRESS NOTES
"SPIRITUAL HEALTH SERVICES Progress Note  [facility] [address] Ortho    Saw pt Rm (Zoran) MARY Villarreal per length of stay follow up.  There were multiple follow ups throughout the day.      While I was visiting with patient's wife, Queenie, a RRT was called for Zoran. Another family member, Ed, was present.  A care conference was planned for later that morning. I remained to take part in that conference, per her request.      Patient/Family Understanding of Illness and Goals of Care -   In conversation during the care conference, the family has decided that comfort care is the most suitable care going forward.  Later in the afternoon, Queenie requested the feeding tube be taken out as soon as possible.  I followed up with the bedside RN.     Distress and Loss - There is a significant amount of distress for the family \"watching him suffer knowing it wouldn't be his wish to live like this.\"    Strengths, Coping, and Resources - Family is aligned in their comfort care wishes for Zoran.      Meaning, Beliefs, and Spirituality - Queenie welcomed my offer of prayer and scripture reading (Psalm 121), which I provided.      Plan of Care - [facility] is following and family requests continued  support.  24/7 on call  support is available and family has been oriented to this.  Please consult should needs arise.    Jessica Bassett, SHANE   Intern    [facility] routine referrals [facility] *[phone]  [facility] available 24/7 for emergent requests/referrals, either by paging the on-call  or by entering an ASAP/STAT consult in Epic (this will also page the on-call ).   "

## 2023-04-01 NOTE — DISCHARGE SUMMARY
[facility]    Death Summary - Hospitalist Service     Date of Admission:  3/31/2023  Date of Death: 4/1/2023  Discharging Provider: Damon Richard MD    Discharge Diagnoses   Multiorgan failure     Cause of death: multiorgan failure     Hospital Course   The patient was not a 82-year-old male admitted to inpatient hospice care after an acute stay which culminated in multiorgan failure and transition to hospice care.  See the acute state discharge summary for details.    Damon Richard MD  [facility]  ______________________________________________________________________      Significant Results and Procedures   Most Recent 3 CBC's:Recent Labs   Lab Test 03/31/23  0320 03/29/23  0616 03/28/23  0832   WBC 5.0 9.5 8.9   HGB 10.6* 10.0* 9.9*   * 108* 109*     187 224 239     Most Recent 3 BMP's:Recent Labs   Lab Test 03/31/23  1058 03/31/23  0827 03/31/23  0551 03/31/23  0320 03/30/23  1650 03/29/23  1812 03/29/23  0616 03/28/23  1409 03/28/23  0516   NA  --  141 142  --  139   < > 137  137   < > 143  143   POTASSIUM  --  4.7  --  4.1 3.3*  --  3.9  --  3.7   CHLORIDE  --  109*  --   --   --   --  103  --  109*   CO2  --  17*  --   --   --   --  20*  --  24   BUN  --  21.9  --   --   --   --  29.4*  --  26.3*   CR  --  0.71  --  0.71  --   --  0.93  --  0.78   ANIONGAP  --  15  --   --   --   --  14  --  10   RANDY  --  9.1  --   --   --   --  9.0  --  9.1   * 83  --   --   --   --  120*   < > 106*    < > = values in this interval not displayed.   ,   Results for orders placed or performed during the hospital encounter of 03/15/23   Head CT w/o contrast    Narrative    EXAM: CT HEAD W/O CONTRAST  LOCATION: [facility]  DATE/TIME: 3/15/2023 8:29 PM    INDICATION: Fall, trauma, pain.  COMPARISON: CT 06/29/2022.  TECHNIQUE: Routine CT Head without IV contrast. Multiplanar reformats. Dose reduction techniques  were used.    FINDINGS:  INTRACRANIAL CONTENTS: No intracranial hemorrhage, extraaxial collection, or mass effect.  No CT evidence of acute infarct. Mild presumed chronic small vessel ischemic changes. Mild to moderate generalized volume loss. No hydrocephalus. The sella is   unremarkable for technique. The cerebellar tonsils are appropriately positioned.    VISUALIZED ORBITS/SINUSES/MASTOIDS: No intraorbital abnormality. No paranasal sinus mucosal disease. No middle ear or mastoid effusion.    BONES/SOFT TISSUES: No scalp hematoma. No skull fracture.      Impression    IMPRESSION:  1.  No evidence of acute intracranial hemorrhage.  2.  No evidence of acute calvarial fracture.  3.  Age-related changes.   XR Shoulder Right G/E 3 Views    Narrative    EXAM: XR SHOULDER RIGHT G/E 3 VIEWS  LOCATION: [facility]  DATE/TIME: 3/15/2023 8:42 PM    INDICATION: fall, pain  COMPARISON: None.      Impression    IMPRESSION: No acute fracture or malalignment. Mild glenohumeral and moderate acromioclavicular joint degenerative changes. Osteopenia. Aortic atherosclerosis.   Cervical spine CT w/o contrast    Narrative    EXAM: CT CERVICAL SPINE W/O CONTRAST  LOCATION: [facility]  DATE/TIME: 3/15/2023 8:30 PM    INDICATION: Fall, trauma, pain.  COMPARISON: Radiograph 07/25/2019.  TECHNIQUE: Routine CT Cervical Spine without IV contrast. Multiplanar reformats. Dose reduction techniques were used.    FINDINGS:  ALIGNMENT: Mild retrolisthesis at C4-C5 C5-C6. Symmetric facet alignment. Unremarkable alignment of the craniocervical junction.    BONES: The occipital condyles appear intact. Vertebral body heights are unremarkable. There is no evidence of acute displaced fractures. No destructive bony lesions are apparent.    DISCS: Moderate multilevel spondylosis. This is most pronounced at C4-C7, where there is intervertebral disc height loss and disc-osteophyte complex formation. There is  a prominent central protrusion at C3-C4.    SPINAL CANAL: No high-grade stenosis.    NEURAL FORAMINA: Moderate right C3-C4, moderate left C4-C5, moderate bilateral C5-C6, and moderate right C6-C7 neural foraminal stenosis.    SOFT TISSUES: No prevertebral soft tissue thickening. Unremarkable paraspinal soft tissues.    UPPER THORAX: Partially imaged right pleural effusion.      Impression     IMPRESSION:  1.  No evidence of acute displaced fracture.  2.  No evidence of traumatic subluxation.  3.  Level degenerative change.  4.  Partially imaged right pleural effusion.   Chest XR,  PA & LAT    Narrative    EXAM: XR CHEST 2 VIEWS  LOCATION: [facility]  DATE/TIME: 3/15/2023 8:44 PM    INDICATION: Shortness of breath. Likely COPD.  COMPARISON: 03/06/2023      Impression    IMPRESSION:     Increased moderate right pleural effusion with adjacent compressive atelectasis. Probable trace left pleural effusion with mild left basilar atelectasis. Otherwise, no focal airspace disease. No pneumothorax.    Stable cardiomegaly.    Multilevel degenerative changes of the spine. Left humeral intramedullary nail, incompletely imaged.   XR Pelvis w Hip Right 1 View    Narrative    EXAM: XR PELVIS AND HIP RIGHT 1 VIEW  LOCATION: [facility]  DATE/TIME: 3/15/2023 8:45 PM    INDICATION: Fall, hip pain.  COMPARISON: None.      Impression    IMPRESSION: Mildly displaced and slightly varus angulated intertrochanteric right proximal femur fracture. No dislocation of the right femoral head from the acetabulum. Lag screw fixation across the left SI joint from left to right. Chronic healed   fracture deformities of the right superior and inferior pubic rami. Mild bilateral hip osteoarthritis. No acute displaced pelvic fracture. Embolization coils project along the right pelvis. Degenerative change lower lumbar spine and both SI joints.   Extensive arterial calcification.    NOTE: ABNORMAL  REPORT    THE DICTATION ABOVE DESCRIBES AN ABNORMALITY FOR WHICH FOLLOW-UP IS NEEDED.    XR Femur Right 2 Views    Narrative    EXAM: XR FEMUR RIGHT 2 VIEWS  LOCATION: [facility]  DATE/TIME: 3/15/2023 10:00 PM    INDICATION: hip fracture, need full femur xray  COMPARISON: None.      Impression    IMPRESSION: There is an acute mildly displaced comminuted advanced vascular calcification. Embolic coils are seen in the lower right hemipelvis. The right femur is otherwise normal. Lower intertrochanteric fracture involving the right hip.   XR Femur Port Right 2 Views    Narrative    EXAM: XR FEMUR PORT RIGHT 2 VIEWS  LOCATION: [facility]  DATE/TIME: 3/16/2023 4:44 PM    INDICATION: Status post hip surgery.  COMPARISON: None.      Impression    IMPRESSION: Intramedullary nail with interlocking screw fixation across a mildly displaced intertrochanteric fracture of the right femur. Osteopenia. Mild degenerative changes in the right hip. Embolization coils in the right pelvis. Cannulated screw in   the sacrum.   XR Surgery LORRI L/T 5 Min Fluoro w Stills    Narrative    EXAM: XR SURGERY LORRI FLUORO LESS THAN 5 MIN W STILLS  LOCATION: [facility]  DATE/TIME: 3/16/2023 3:30 PM    INDICATION: Open Reduction Internal Fixation Right Intertrochanteric Hip Fracture  COMPARISON: None.    TECHNIQUE: Intraoperative fluoroscopy performed during the patient's procedure.    FLUOROSCOPIC TIME: 1  NUMBER OF IMAGES: 6    FINDINGS: Acute postoperative changes of an intramedullary nail with fixation of a intertrochanteric fracture of the right hip. Advanced vascular calcification.   XR Chest Port 1 View    Narrative    EXAM: XR CHEST PORT 1 VIEW  LOCATION: [facility]  DATE/TIME: 3/18/2023 5:04 AM    INDICATION: hypoxia, reassess pleural effusions  COMPARISON: 03/15/2023      Impression    IMPRESSION: Heart is enlarged, similar to prior exam. A  moderate right-sided pleural effusion is again noted, slightly increased in size from prior exam with surrounding opacification of the right mid and lower lung zone, likely reflecting concurrent   atelectasis although superimposed airspace disease these cannot be excluded.   XR Chest Port 1 View    Narrative    XR CHEST PORT 1 VIEW 3/20/2023 2:54 PM    HISTORY: cough, RLL crackles    COMPARISON: 3/18/2023      Impression    IMPRESSION: Stable small bilateral pleural effusions, right greater  than left and bibasilar patchy opacities, likely due to edema and  atelectasis. Stable cardiomegaly. No pneumothorax. Left humerus dale  and screw fixation.    JOSE KHANNA MD         SYSTEM ID:  [id number]   CT Head w/o Contrast    Narrative    CT SCAN OF THE HEAD WITHOUT CONTRAST   3/24/2023 12:09 PM     HISTORY: Worsening encephalopathy.    TECHNIQUE:  Axial images of the head and coronal reformations without  IV contrast material. Radiation dose for this scan was reduced using  automated exposure control, adjustment of the mA and/or kV according  to patient size, or iterative reconstruction technique.    COMPARISON: CT of the head 3/15/2023.    FINDINGS: There is no evidence of intracranial hemorrhage, mass, acute  infarct or anomaly. The ventricles are normal in size, shape and  configuration. Mild to moderate diffuse parenchymal volume loss. Mild  patchy periventricular white matter hypodensities which are  nonspecific, but likely related to chronic microvascular ischemic  disease. Atherosclerotic calcifications involving the carotid siphons  and to a lesser degree the vertebral arteries.    The visualized portions of the sinuses and mastoids appear normal. The  bony calvarium and bones of the skull base appear intact. Left greater  than right temporomandibular joint degenerative change, advanced on  the left and probably moderate on the right.      Impression    IMPRESSION:  1. No CT findings of acute intracranial  process.  2. Brain atrophy and presumed chronic small vessel ischemic change, as  described.     RADHA BARCLAY MD         SYSTEM ID:  [id number]   XR Abdomen Port 1 View    Narrative    XR ABDOMEN PORT 1 VIEW 3/28/2023 12:05 PM    HISTORY: for post feeding tube placement    COMPARISON: None.      Impression    IMPRESSION: Feeding tube is in the stomach. Bowel gas pattern is  nonobstructed.    BETSY SORIA MD         SYSTEM ID:  [id number]   XR Chest Port 1 View    Narrative    CHEST ONE VIEW March 29, 2023 9:25 AM     HISTORY: Hypoxia.    COMPARISON: March 20, 2023.      Impression    IMPRESSION: Some fullness of the right hilum noted although this has  been recently CT. Nearly resolved bilateral effusions and associated  compressive atelectasis and/or infiltrate. Cardiac size stable.    RADHA PETERS MD         SYSTEM ID:  [id number]   XR Chest Port 1 View    Narrative    EXAM: XR CHEST PORT 1 VIEW  LOCATION: [facility]  DATE/TIME: 3/30/2023 9:25 PM    INDICATION: RRT: acute hypoxic respiratory failure, concern for aspiration  COMPARISON: 03/20/2023      Impression    IMPRESSION: Persistent mild basilar atelectasis and small pleural effusions, worse on the right. Vascular indistinctness may reflect edema. Stable enlarged cardiac silhouette. Enteric tube courses into the stomach and off the field-of-view.       XR Chest Port 1 View    Narrative    CHEST PORTABLE 1 VIEW  3/31/2023 11:10 AM       INDICATION: Respiratory distress.  COMPARISON: 3/30/2023       Impression    IMPRESSION: Feeding tube courses below the diaphragm. Persistent  small, right greater than left pleural effusions with underlying  consolidation or atelectasis in the lung bases. Upper lungs clear. No  pneumothorax. No significant interval change.    BETSY SORIA MD         SYSTEM ID:  [id number]   Echocardiogram Limited     Value    LVEF  25-30%    Narrative    [id number]  [id number]  [id number]  [id number]^MORE^KAYLEN^MARGIE HAILE      [facility]  Echocardiography Laboratory  [address] [place] [address]     Name: AMEE BAILEY  MRN: [id number]  : 1940  Study Date: 2023 03:35 PM  Age: 82 yrs  Gender: Male  Patient Location: [facility]  Reason For Study: CHF  Ordering Physician: KAYLEN WILSON  Referring Physician: KAYLEN WILSON  Performed By: Je Cohen     BSA: 2.0 m2  Height: 72 in  Weight: 170 lb  HR: 104  ______________________________________________________________________________  Procedure  Complete Echo Adult. Optison (NDC #[id number]) given intravenously.  ______________________________________________________________________________  Interpretation Summary     The rhythm was atrial fibrillation.  Severely decreased left ventricular systolic function  The visual ejection fraction is 25-30%.  There is severe global hypokinesia of the left ventricle.  The left ventricle is normal in size.  There is apical dyskinesis.  There is severe anterior wall hypokinesis.  There is severe inferior wall hypokinesis.  The right ventricle is moderately dilated.  Severely decreased right ventricular systolic function  There is severe biatrial enlargement.  Right ventricular systolic pressure is elevated, consistent with moderate  pulmonary hypertension.  There is moderate (2+) aortic regurgitation.  Moderate bilateral pleural effusion     Pulmonary pressures are lower than last study 10/17/2022 ( estimated PA  systolic pressure was 66mm HG + RAP , now 44 mmHg + RAP)  ______________________________________________________________________________  Left Ventricle  The left ventricle is normal in size. There is normal left ventricular wall  thickness. Severely decreased left ventricular systolic function. The visual  ejection fraction is 25-30%. There is severe global hypokinesia of the left  ventricle. There is apical dyskinesis. There is severe anterior wall  hypokinesis. There is severe  inferior wall hypokinesis. There is no thrombus  seen in the left ventricle.     Right Ventricle  The right ventricle is moderately dilated. Severely decreased right  ventricular systolic function.     Atria  There is severe biatrial enlargement. There is no atrial shunt seen. The left  atrial appendage is not well visualized.     Mitral Valve  The mitral valve leaflets appear normal. There is no evidence of stenosis,  fluttering, or prolapse. There is mild (1+) mitral regurgitation. There is no  mitral valve stenosis.     Tricuspid Valve  There is moderate (2+) tricuspid regurgitation. The right ventricular systolic  pressure is elevated at 44.2 mmHg. Right ventricular systolic pressure is  elevated, consistent with moderate pulmonary hypertension.     Aortic Valve  There is moderate trileaflet aortic sclerosis. There is moderate (2+) aortic  regurgitation. No aortic stenosis is present.     Pulmonic Valve  Normal pulmonic valve. There is mild (1+) pulmonic valvular regurgitation.  There is no pulmonic valvular stenosis.     Vessels  The aortic root is normal size. Normal size ascending aorta. The inferior vena  cava is normal. The pulmonary artery is normal size.     Pericardium  The pericardium appears normal. Moderate bilateral pleural effusion.     Rhythm  The rhythm was atrial fibrillation.  ______________________________________________________________________________  MMode/2D Measurements & Calculations     IVSd: 1.0 cm  LVIDd: 5.5 cm  LVIDs: 4.4 cm  LVPWd: 1.0 cm  FS: 19.3 %  LV mass(C)d: 209.9 grams  LV mass(C)dI: 105.6 grams/m2  Ao root diam: 3.3 cm  asc Aorta Diam: 3.6 cm  LVOT diam: 2.0 cm  LVOT area: 3.1 cm2  LA Volume (BP): 112.0 ml  LA Volume Index (BP): 56.3 ml/m2  RWT: 0.37     TAPSE: 1.2 cm     Doppler Measurements & Calculations  MV E max erna: 61.3 cm/sec  MV dec slope: 394.0 cm/sec2  MV dec time: 0.16 sec  Ao V2 max: 137.7 cm/sec  Ao max P.0 mmHg  RUBIO(V,D): 1.7 cm2  LV V1 max P.3  mmHg  LV V1 max: 75.6 cm/sec  LV V1 VTI: 11.3 cm  SV(LVOT): 35.5 ml  SI(LVOT): 17.9 ml/m2  PA V2 max: 69.7 cm/sec  PA max P.9 mmHg  PA acc time: 0.07 sec  TR max haider: 331.0 cm/sec  TR max P.2 mmHg  AV Haider Ratio (DI): 0.55  Lateral E/e': 3.4  RV S Haider: 10.8 cm/sec     ______________________________________________________________________________  Report approved by: Dr. Mode Hernandez 2023 05:12 PM             Consultations This Hospital Stay   PHARMACY IP CONSULT    Primary Care Physician   Greg Royal    Time Spent on this Encounter   I, Damon Richard MD, discharged this patient today but I did not personally see the patient today and will not be billing for the patient's discharge.

## 2023-04-01 NOTE — PROGRESS NOTES
HHFOT removed per RT. IV dilaudid, IV ativan, and IV robunil given per bedside RN. At 1105, family stepped into the hallway requesting that I assess the patient again. Writer entered room to assess patient at 1106. Absent respirations, radial pulses, and heart sounds. Informed bedside nurse of my findings. Provider paged for death pronouncement per RN. Writer provided emotional/grief support for family.    I would like to thank the charge nurse, bedside nurse, , and respiratory therapist for their amazing work with this patient today.    Greatly appreciate the care that is being provided by the bedside staff for this patient.    Please reach out to cell listed below with any questions or concerns.    CONSTANCE Friedman, RN  Clinical Nurse Liaison  [facility]  Cell: [phone]  [facility]  Office: [phone]  [email or web address]  [email or web address]

## 2023-04-01 NOTE — PROGRESS NOTES
SPIRITUAL HEALTH SERVICES Progress Note   Ortho    Referral: Nursing rang  phone line requesting support for family of dying Ptnt.    I visited with Zoran's wife Queenie and son-in-law Ed throughout the morning. They shared reflections about Ed and their thoughts and feelings, as they processed his imminent death.    I offered words of reassurance, affirmed experiences, normalized emotions, encouraged self-care, and led an end-of-life ritual after Ed .     remains available.    Rev Abigail Castillo  Associate   [facility] Spiritual Health Phone Line [phone]  [place] ( & ) [phone]

## 2023-04-01 NOTE — PHARMACY-ADMISSION MEDICATION HISTORY
Admission medication history completed at [facility]. Please see Pharmacy - Admission Medication History note from 3/15/2023.

## 2023-04-01 NOTE — H&P
[facility]    History and Physical - Hospitalist Service       Date of Admission:  3/31/2023    Assessment & Plan    Rm Villarreal is a 82 year old male admitted to [facility].  See discharge summary from inpatient stay for details.     Multiorgan failure   [facility] care   Medicines for comfort care have been ordered.      Diet:  NPO  DVT Prophylaxis: hospice patient   Velazquez Catheter: Not present  Lines: None     Cardiac Monitoring: None  Code Status: No CPR- Do NOT Intubate      Clinically Significant Risk Factors Present on Admission        # Hypokalemia: Lowest K = 3.3 mmol/L in last 2 days, will replace as needed    # Hypercalcemia: corrected calcium is >10.1, will monitor as appropriate    # Hypoalbuminemia: Lowest albumin = 2.6 g/dL at 3/31/2023  5:51 AM, will monitor as appropriate      # Chronic systolic heart failure: echo within the past year with EF <40%   # Dementia: noted on problem list            Disposition Plan      Expected Discharge Date: 04/01/2023                  Damon Richard MD  Hospitalist Service  [facility]  Securely message with [employer] (more info)  Text page via [employer] Paging/Directory     ______________________________________________________________________    Chief Complaint   Multiorgan failure     History of Present Illness   See discharge summary from acute inpatient stay       Past Medical History    Past Medical History:   Diagnosis Date     Adjustment disorder      Carotid stenosis, bilateral     left CEA 2007     Chronic atrial fibrillation (H)     warfarin     COPD (chronic obstructive pulmonary disease) (H) 02/03/2021     Coronary artery disease     cardiac cath 2014: chronic occlusion of circumflex and apical LAD: medical management; cath 2005: stent to LAD, historical: stent to RCA     History of blood transfusion      Hypertension      Ischemic cardiomyopathy 2021    ef 25%     Pulmonary nodule        Past Surgical History    Past Surgical History:   Procedure Laterality Date     angiogram  02/19/2014    cardiac cath 2014: chronic occlusion of circumflex and apical LAD: medical management     ANGIOGRAM  2005    stent to LAD     ANGIOGRAM      stent to RCA     COLONOSCOPY      2010     COLONOSCOPY N/A 8/30/2018    Procedure: COMBINED COLONOSCOPY, SINGLE OR MULTIPLE BIOPSY/POLYPECTOMY BY BIOPSY;  colonoscopy;  Surgeon: Tyler Dee MD;  Location:  GI     GI SURGERY      hemorrhoidectomy     IR VISCERAL ANGIOGRAM  7/25/2019     OPEN REDUCTION INTERNAL FIXATION HIP NAILING Right 3/16/2023    Procedure: RIGHT OPEN REDUCTION INTERNAL FIXATION INTERTROCHANTERIC HIP FRACTURE;  Surgeon: Brady James MD;  Location:  OR     VASCULAR SURGERY  2007    left CEA       Prior to Admission Medications   Prior to Admission Medications   Prescriptions Last Dose Informant Patient Reported? Taking?   HYDROmorphone (DILAUDID) 0.2 MG/ML SOLN injection'   No No   Sig: Inject 1 mL (0.2 mg) into the vein every hour as needed   acetaminophen (TYLENOL) 325 MG tablet   No No   Sig: Take 2 tablets (650 mg) by mouth every 6 hours as needed for other (For optimal non-opioid multimodal pain management to improve pain control.)   atropine 1 % ophthalmic solution   No No   Sig: Place 2-4 drops under the tongue every 2 hours as needed for other (secretions)   benzocaine-menthol (CHLORASEPTIC) 6-10 MG lozenge   No No   Sig: Place 1 lozenge inside cheek every hour as needed for sore throat (sore throat without fever)   bisacodyl (DULCOLAX) 10 MG suppository   No No   Sig: Place 1 suppository (10 mg) rectally daily as needed for constipation (Use if Magnesium hydroxide (MILK of MAGNESIA) not effective after 24 hours. May discontinue if patient having bowel movement.)   haloperidol lactate (HALDOL) 5 MG/ML injection   No No   Sig: Inject 0.4 mLs (2 mg) into the vein every 6 hours as needed for agitation   hydrocortisone, Perianal, (HYDROCORTISONE) 2.5 % cream    No No   Sig: Place rectally 2 times daily as needed for hemorrhoids   ipratropium - albuterol 0.5 mg/2.5 mg/3 mL (DUONEB) 0.5-2.5 (3) MG/3ML neb solution   No No   Sig: Take 1 vial (3 mLs) by nebulization every 4 hours as needed for wheezing   melatonin 5 MG tablet  Spouse/Significant Other Yes No   Sig: Take 5 mg by mouth At Bedtime   mirtazapine (REMERON) 15 MG tablet   No No   Si tablet (15 mg) by Oral or Feeding Tube route every evening   ondansetron (ZOFRAN ODT) 4 MG ODT tab   No No   Sig: Take 1 tablet (4 mg) by mouth every 6 hours as needed for nausea or vomiting   oxyCODONE (ROXICODONE) 5 MG/5ML solution   No No   Si mLs (5 mg) by Oral or Feeding Tube route every 4 hours as needed for moderate pain (4-6)   oxyCODONE (ROXICODONE) 5 MG/5ML solution   No No   Sig: 10 mLs (10 mg) by Oral or Feeding Tube route every 4 hours as needed for severe pain (7-10)   polyethylene glycol (MIRALAX) 17 g packet   Yes No   Sig: Take 1 packet by mouth daily as needed for constipation   scopolamine (TRANSDERM) 1 MG/3DAYS 72 hr patch   No No   Sig: Place 1 patch onto the skin every 72 hours      Facility-Administered Medications: None        Physical Exam   Vital Signs:                    Weight: 0 lbs 0 oz  Constitutional: not awake, comfortable but has upper respiratory sounds       Medical Decision Making       MANAGEMENT DISCUSSED with the following over the past 24 hours: family nurse and pall care MD   NOTE(S)/MEDICAL RECORDS REVIEWED over the past 24 hours: epic      Data         Imaging results reviewed over the past 24 hrs:   No results found for this or any previous visit (from the past 24 hour(s)).

## 2023-04-01 NOTE — PLAN OF CARE
Goal Outcome Evaluation:    Family here with pt. Hospice RN was here. Pt family requested for the high flow oxygen to be removed.  was called. PRN meds given per hospice RN recommendation prior to high flow removal. Pt passed at 1106. Eliecer LESLIE, ANS and Hospitalist were notified. Dr Richard pronounced pt. Family visited for a while before they left. Gold ring still on pt unable to remove, sent with pt to the [facility].

## 2023-04-01 NOTE — PROGRESS NOTES
[facility]    Progress Note - [facility]    ______________________________________________________________________    [facility] Contact Number: [phone]    - Providers: Please contact [facility] with changes in orders or clinical plan of care   - Nursing: Please contact [facility] with significant changes in patient condition  ______________________________________________________________________        Plan of Care Discussed with the Following:   - Charge nurse and bedside nurse   - Patient's family at bedside  - Hospice Provider: Dr. Randy Saldaña    GIP Eligibility: Withdrawal of respiratory support    Education provided: Educated family about premedicating before w/d, providing medications after w/d, and continuing to provide comfort meds    Pertinent assessment information:     Plan for withdrawal of HHFOT therapy this morning per family request. Family requesting hospice presence during withdrawal for reassurance of symptom control and EOL symptom education. Charge nurse paging hospitalist for PRN robunil order. Respiratory therapy to be paged once all orders are in place.     Hospice recommendations per Dr. Saldaña:     - Consider adding 0.2mg IV Robunil q4h PRN for congestion  - Consider adding 0.5-1mg IV Ativan q3h PRN for dyspnea and anxiety  - Consider adding 0.5-1mg IV Dilaudid q1h PRN for air hunger, dyspnea, and pain        - Premedicate with IV dilaudid, IV ativan, and IV robunil  - After w/d of HFNC, please reassess for EOL symptoms (pain, air hunger, anxiety, congestion, etc.)  - Continue to utilize PRNs throughout the day    Greatly appreciate the care that is being provided by the bedside staff for this patient.    Please reach out to cell listed below with any questions or concerns.    SHANE FriedmanN, RN  Clinical Nurse Liaison  [facility]  Cell: [phone]  [facility]  [place]  Office: [phone]  [email or web address]  [email or web address]        WEI CORTES RN  [facility]  Contact information available via [facility] Paging/Directory

## 2023-04-01 NOTE — PROGRESS NOTES
Pt on hospice. Pt comatose, continues on O2 HFNC per family's request. Pt with gurgly breathing, intermittent oral suctioning done, small to moderate amount of brownish secretion suctioned. Oral cares done. Turned and repositioned with assist of 2. PRN Dilaudid and PRN Atropine administered. Scopolamine patch applied behind R ear. Pts wife by bedside.

## 2023-04-02 LAB — BACTERIA BLD CULT: NO GROWTH

## 2023-04-04 LAB
ATRIAL RATE - MUSE: 100 BPM
DIASTOLIC BLOOD PRESSURE - MUSE: NORMAL MMHG
INTERPRETATION ECG - MUSE: NORMAL
P AXIS - MUSE: NORMAL DEGREES
PR INTERVAL - MUSE: NORMAL MS
QRS DURATION - MUSE: 72 MS
QT - MUSE: 280 MS
QTC - MUSE: 438 MS
R AXIS - MUSE: -85 DEGREES
SYSTOLIC BLOOD PRESSURE - MUSE: NORMAL MMHG
T AXIS - MUSE: 98 DEGREES
VENTRICULAR RATE- MUSE: 147 BPM

## 2023-07-04 NOTE — TELEPHONE ENCOUNTER
TRIAMCINOLONE      Last Written Prescription Date: 01/09/17  Last Fill Quantity: 30G,  # refills: 1   Last Office Visit with [facility], [facility] or [facility] prescribing provider: 05/24/17    Quiana Trejo MA                                                  Home

## (undated) DEVICE — GLOVE BIOGEL PI ORTHOPRO SZ 7.5 47675

## (undated) DEVICE — DRILL BIT QUICK COUPLING 3 FLUTE 4.2MMX330/100MM CALIBRATE

## (undated) DEVICE — ESU GROUND PAD UNIVERSAL W/O CORD

## (undated) DEVICE — DRILL BIT CANNULATED 10MM TAPERED

## (undated) DEVICE — ADH SKIN CLOSURE PREMIERPRO EXOFIN MICOR HV 0.5ML 3471

## (undated) DEVICE — TAPE DRSG UNIVERSAL CLOTH 2" WHITE LATEX 881-2

## (undated) DEVICE — Device

## (undated) DEVICE — SU VICRYL 2-0 FS-1 27" UND  J443H

## (undated) DEVICE — BLADE CLIPPER 4412A

## (undated) DEVICE — SU VICRYL 2-0 CT-1 27" UND J259H

## (undated) DEVICE — SU VICRYL 0 CT-1 27" J340H

## (undated) DEVICE — SU MONOCRYL 3-0 PS-2 27" Y427H

## (undated) DEVICE — GLOVE BIOGEL PI MICRO INDICATOR UNDERGLOVE SZ 7.5 48975

## (undated) DEVICE — GOWN IMPERVIOUS SPECIALTY XL/XLONG 39049

## (undated) DEVICE — GLOVE BIOGEL PI ULTRATOUCH SZ 7.5 41175

## (undated) DEVICE — MANIFOLD NEPTUNE 4 PORT 700-20

## (undated) DEVICE — WIRE GUIDE 3.2X400MM  357.399

## (undated) DEVICE — SU VICRYL 0 CT-1 27" UND J260H

## (undated) DEVICE — DRILL BIT CANNULATED 16MM FLEX

## (undated) DEVICE — DRSG TEGADERM 6X8" 1628

## (undated) DEVICE — SOL NACL 0.9% IRRIG 1000ML BOTTLE 2F7124

## (undated) DEVICE — SU MONOCRYL 2-0 CT-1 36" UND Y945H

## (undated) DEVICE — PREP CHLORAPREP 26ML TINTED HI-LITE ORANGE 930815

## (undated) DEVICE — KIT PATIENT CARE HANA TABLE PROFX SUPINE 6855

## (undated) DEVICE — LINEN TOWEL PACK X5 5464

## (undated) DEVICE — PACK HIP NAILING SOP15HNSB

## (undated) DEVICE — SU VICRYL 2-0 CT-2 27" UND J269H

## (undated) DEVICE — DRAPE C-ARM 60X42" 1013

## (undated) RX ORDER — DEXAMETHASONE SODIUM PHOSPHATE 4 MG/ML
INJECTION, SOLUTION INTRA-ARTICULAR; INTRALESIONAL; INTRAMUSCULAR; INTRAVENOUS; SOFT TISSUE
Status: DISPENSED
Start: 2023-01-01

## (undated) RX ORDER — PROPOFOL 10 MG/ML
INJECTION, EMULSION INTRAVENOUS
Status: DISPENSED
Start: 2023-01-01

## (undated) RX ORDER — BUPIVACAINE HYDROCHLORIDE AND EPINEPHRINE 5; 5 MG/ML; UG/ML
INJECTION, SOLUTION EPIDURAL; INTRACAUDAL; PERINEURAL
Status: DISPENSED
Start: 2023-01-01

## (undated) RX ORDER — REGADENOSON 0.08 MG/ML
INJECTION, SOLUTION INTRAVENOUS
Status: DISPENSED
Start: 2021-01-13

## (undated) RX ORDER — LIDOCAINE HYDROCHLORIDE 20 MG/ML
JELLY TOPICAL
Status: DISPENSED
Start: 2023-01-01

## (undated) RX ORDER — ONDANSETRON 2 MG/ML
INJECTION INTRAMUSCULAR; INTRAVENOUS
Status: DISPENSED
Start: 2023-01-01

## (undated) RX ORDER — FENTANYL CITRATE 50 UG/ML
INJECTION, SOLUTION INTRAMUSCULAR; INTRAVENOUS
Status: DISPENSED
Start: 2023-01-01

## (undated) RX ORDER — TRANEXAMIC ACID 10 MG/ML
INJECTION, SOLUTION INTRAVENOUS
Status: DISPENSED
Start: 2023-01-01

## (undated) RX ORDER — FENTANYL CITRATE 50 UG/ML
INJECTION, SOLUTION INTRAMUSCULAR; INTRAVENOUS
Status: DISPENSED
Start: 2018-08-30

## (undated) RX ORDER — CEFAZOLIN SODIUM/WATER 2 G/20 ML
SYRINGE (ML) INTRAVENOUS
Status: DISPENSED
Start: 2023-01-01